# Patient Record
Sex: MALE | Race: WHITE | NOT HISPANIC OR LATINO | Employment: OTHER | URBAN - METROPOLITAN AREA
[De-identification: names, ages, dates, MRNs, and addresses within clinical notes are randomized per-mention and may not be internally consistent; named-entity substitution may affect disease eponyms.]

---

## 2017-01-12 LAB
EJECTION FRACTION: 53
HBA1C MFR BLD: 7.6 % (ref 0–5.7)

## 2017-01-16 ENCOUNTER — TRANSFERRED RECORDS (OUTPATIENT)
Dept: HEALTH INFORMATION MANAGEMENT | Facility: CLINIC | Age: 52
End: 2017-01-16

## 2017-01-16 ENCOUNTER — OFFICE VISIT (OUTPATIENT)
Dept: FAMILY MEDICINE | Facility: CLINIC | Age: 52
End: 2017-01-16
Payer: COMMERCIAL

## 2017-01-16 VITALS
SYSTOLIC BLOOD PRESSURE: 113 MMHG | HEIGHT: 74 IN | BODY MASS INDEX: 38.5 KG/M2 | HEART RATE: 89 BPM | RESPIRATION RATE: 20 BRPM | WEIGHT: 300 LBS | DIASTOLIC BLOOD PRESSURE: 78 MMHG | OXYGEN SATURATION: 97 % | TEMPERATURE: 97.3 F

## 2017-01-16 DIAGNOSIS — I25.810 CORONARY ARTERY DISEASE INVOLVING CORONARY BYPASS GRAFT OF NATIVE HEART WITHOUT ANGINA PECTORIS: Chronic | ICD-10-CM

## 2017-01-16 DIAGNOSIS — E66.01 MORBID OBESITY, UNSPECIFIED OBESITY TYPE (H): Chronic | ICD-10-CM

## 2017-01-16 DIAGNOSIS — E11.40 TYPE 2 DIABETES MELLITUS WITH DIABETIC NEUROPATHY, WITHOUT LONG-TERM CURRENT USE OF INSULIN (H): ICD-10-CM

## 2017-01-16 DIAGNOSIS — Z09 HOSPITAL DISCHARGE FOLLOW-UP: Primary | ICD-10-CM

## 2017-01-16 LAB
ANION GAP SERPL CALCULATED.3IONS-SCNC: 11 MMOL/L (ref 3–14)
BUN SERPL-MCNC: 19 MG/DL (ref 7–30)
CALCIUM SERPL-MCNC: 9.6 MG/DL (ref 8.5–10.1)
CHLORIDE SERPL-SCNC: 104 MMOL/L (ref 94–109)
CHOLEST SERPL-MCNC: 216 MG/DL
CO2 SERPL-SCNC: 21 MMOL/L (ref 20–32)
CREAT SERPL-MCNC: 0.89 MG/DL (ref 0.66–1.25)
GFR SERPL CREATININE-BSD FRML MDRD: 89 ML/MIN/1.7M2
GLUCOSE SERPL-MCNC: 250 MG/DL (ref 70–99)
HDLC SERPL-MCNC: 26 MG/DL
LDLC SERPL CALC-MCNC: ABNORMAL MG/DL
LDLC SERPL DIRECT ASSAY-MCNC: 98 MG/DL
NONHDLC SERPL-MCNC: 190 MG/DL
POTASSIUM SERPL-SCNC: 4.3 MMOL/L (ref 3.4–5.3)
SODIUM SERPL-SCNC: 136 MMOL/L (ref 133–144)
TRIGL SERPL-MCNC: 997 MG/DL

## 2017-01-16 PROCEDURE — 80048 BASIC METABOLIC PNL TOTAL CA: CPT | Performed by: INTERNAL MEDICINE

## 2017-01-16 PROCEDURE — 36415 COLL VENOUS BLD VENIPUNCTURE: CPT | Performed by: INTERNAL MEDICINE

## 2017-01-16 PROCEDURE — 80061 LIPID PANEL: CPT | Performed by: INTERNAL MEDICINE

## 2017-01-16 PROCEDURE — 99496 TRANSJ CARE MGMT HIGH F2F 7D: CPT | Performed by: INTERNAL MEDICINE

## 2017-01-16 PROCEDURE — 83721 ASSAY OF BLOOD LIPOPROTEIN: CPT | Mod: 59 | Performed by: INTERNAL MEDICINE

## 2017-01-16 RX ORDER — GABAPENTIN 300 MG/1
900 CAPSULE ORAL 3 TIMES DAILY
Qty: 270 CAPSULE | Refills: 5 | Status: SHIPPED | OUTPATIENT
Start: 2017-01-16 | End: 2017-06-30

## 2017-01-16 RX ORDER — GABAPENTIN 300 MG/1
900 CAPSULE ORAL 3 TIMES DAILY
Qty: 270 CAPSULE | Refills: 5 | Status: SHIPPED | OUTPATIENT
Start: 2017-01-16 | End: 2017-01-16

## 2017-01-16 RX ORDER — ISOSORBIDE MONONITRATE 30 MG/1
15 TABLET, EXTENDED RELEASE ORAL DAILY
Qty: 45 TABLET | Refills: 3 | Status: SHIPPED | OUTPATIENT
Start: 2017-01-16 | End: 2018-11-05

## 2017-01-16 RX ORDER — CLOPIDOGREL BISULFATE 75 MG/1
75 TABLET ORAL
COMMUNITY
Start: 2017-01-13 | End: 2017-01-16

## 2017-01-16 RX ORDER — CLOPIDOGREL BISULFATE 75 MG/1
75 TABLET ORAL DAILY
Qty: 90 TABLET | Refills: 3 | Status: SHIPPED | OUTPATIENT
Start: 2017-01-16 | End: 2018-01-24

## 2017-01-16 RX ORDER — ISOSORBIDE MONONITRATE 30 MG/1
15 TABLET, EXTENDED RELEASE ORAL
COMMUNITY
Start: 2017-01-13 | End: 2017-01-16

## 2017-01-16 NOTE — MR AVS SNAPSHOT
After Visit Summary   1/16/2017    Javier Markham    MRN: [id number]           Patient Information     Date Of Birth          1965        Visit Information        Provider Department      1/16/2017 1:00 PM Morelia Pedraza,  [facility]        Today's Diagnoses     Hospital discharge follow-up    -  1     Type 2 diabetes mellitus with diabetic neuropathy, without long-term current use of insulin (H)         Coronary artery disease involving coronary bypass graft of native heart without angina pectoris         Morbid obesity, unspecified obesity type (H)           Care Instructions    Update sleep clinic on recent sleep paralysis.   Schedule follow up appointment with cardiologist.  Begin taking Metformin 1,000 mg twice daily  Increase Gabapentin to 900 mg three times per day for nerve pain.  Labs today.  I'll send you the form from the [facility] and send in One Touch Verio supplies.  Follow up in 3 months or sooner if needed.         Follow-ups after your visit        Who to contact     If you have questions or need follow up information about today's clinic visit or your schedule please contact [facility] directly at [phone].  Normal or non-critical lab and imaging results will be communicated to you by [employer], letter or phone within 4 business days after the clinic has received the results. If you do not hear from us within 7 days, please contact the clinic through [employer] or phone. If you have a critical or abnormal lab result, we will notify you by phone as soon as possible.  Submit refill requests through [employer] or call your pharmacy and they will forward the refill request to us. Please allow 3 business days for your refill to be completed.          Additional Information About Your Visit        NuView Systemshart Information     [employer] lets you send messages to your doctor, view your test results, renew your prescriptions, schedule appointments and more. To sign up, go  "to [email or web address] . Click on \"Log in\" on the left side of the screen, which will take you to the Welcome page. Then click on \"Sign up Now\" on the right side of the page.     You will be asked to enter the access code listed below, as well as some personal information. Please follow the directions to create your username and password.     Your access code is: [email or web address]  Expires: 2017  1:59 PM     Your access code will  in 90 days. If you need help or a new code, please call your [place] clinic or [phone].        Care EveryWhere ID     This is your Care EveryWhere ID. This could be used by other organizations to access your [place] medical records  [id number]        Your Vitals Were     Pulse Temperature Respirations Height BMI (Body Mass Index) Pulse Oximetry    89 97.3  F (36.3  C) (Oral) 20 6' 1.75\" (1.873 m) 38.79 kg/m2 97%       Blood Pressure from Last 3 Encounters:   17 113/78   16 120/69   16 125/89    Weight from Last 3 Encounters:   17 300 lb (136.079 kg)   16 303 lb 3.2 oz (137.531 kg)   16 301 lb (136.533 kg)              We Performed the Following     Basic metabolic panel  (Ca, Cl, CO2, Creat, Gluc, K, Na, BUN)     Lipid panel reflex to direct LDL          Today's Medication Changes          These changes are accurate as of: 17  1:59 PM.  If you have any questions, ask your nurse or doctor.               These medicines have changed or have updated prescriptions.        Dose/Directions    clopidogrel 75 MG tablet   Commonly known as:  PLAVIX   This may have changed:  when to take this   Used for:  Coronary artery disease involving coronary bypass graft of native heart without angina pectoris   Changed by:  Morelia Pedraza DO        Dose:  75 mg   Take 1 tablet (75 mg) by mouth daily   Quantity:  90 tablet   Refills:  3       gabapentin 300 MG capsule   Commonly known as:  NEURONTIN   This may have changed:    - medication " strength  - when to take this  - additional instructions   Changed by:  Morelia Pedraza DO        Dose:  900 mg   Take 3 capsules (900 mg) by mouth 3 times daily   Quantity:  270 capsule   Refills:  5       isosorbide mononitrate 30 MG 24 hr tablet   Commonly known as:  IMDUR   This may have changed:  when to take this   Used for:  Coronary artery disease involving coronary bypass graft of native heart without angina pectoris   Changed by:  Morelia Pedraza DO        Dose:  15 mg   Take 0.5 tablets (15 mg) by mouth daily   Quantity:  45 tablet   Refills:  3       metFORMIN 500 MG tablet   Commonly known as:  GLUCOPHAGE   This may have changed:  how much to take   Used for:  Type 2 diabetes mellitus with diabetic neuropathy, without long-term current use of insulin (H)        Dose:  1000 mg   Take 2 tablets (1,000 mg) by mouth 2 times daily (with meals)   Quantity:  360 tablet   Refills:  1            Where to get your medicines      These medications were sent to [facility] [place], [place] - [address], [facility] [address]  [address], [facility] [address], [facility] [address]     Phone:  [phone]    - clopidogrel 75 MG tablet  - gabapentin 300 MG capsule  - isosorbide mononitrate 30 MG 24 hr tablet  - metFORMIN 500 MG tablet             Primary Care Provider Office Phone # Fax #    Morelia Pedraza [phone]       [facility] [address]  [facility] [address]        Thank you!     Thank you for choosing [facility]  for your care. Our goal is always to provide you with excellent care. Hearing back from our patients is one way we can continue to improve our services. Please take a few minutes to complete the written survey that you may receive in the mail after your visit with us. Thank you!             Your Updated Medication List - Protect others around you: Learn how to safely use, store and throw away your medicines at www.disposemymeds.org.           This list is accurate as of: 1/16/17  1:59 PM.  Always use your most recent med list.                   Brand Name Dispense Instructions for use    acetaminophen 500 MG tablet    TYLENOL    100 tablet    Take 2 tablets (1,000 mg) by mouth every 8 hours as needed for mild pain       albuterol 108 (90 BASE) MCG/ACT Inhaler    PROAIR HFA/PROVENTIL HFA/VENTOLIN HFA    1 Inhaler    Inhale 2 puffs into the lungs every 4 hours as needed for shortness of breath / dyspnea or wheezing       Alcohol Prep Pad 70 % Pads     100 each    1 each as needed       aspirin 81 MG EC tablet     90 tablet    TAKE ONE TABLET BY MOUTH EVERY DAY       atorvastatin 80 MG tablet    LIPITOR    90 tablet    Take 1 tablet (80 mg) by mouth daily       B-12 1000 MCG Tbcr     100 tablet    Take 1,000 mcg by mouth daily       blood glucose lancets standard    no brand specified    1 Box    Use to test blood sugar 2 times daily or as directed.       blood glucose monitoring meter device kit    no brand specified    1 kit    Use to test blood sugar 1 times daily or as directed.       blood glucose monitoring test strip    no brand specified    100 each    Use to test blood sugars 1 times daily or as directed       clopidogrel 75 MG tablet    PLAVIX    90 tablet    Take 1 tablet (75 mg) by mouth daily       ferrous sulfate 325 (65 FE) MG tablet    IRON    30 tablet    TAKE ONE TABLET BY MOUTH EVERY MORNING WITH BREAKFAST       gabapentin 300 MG capsule    NEURONTIN    270 capsule    Take 3 capsules (900 mg) by mouth 3 times daily       hydrOXYzine 25 MG tablet    ATARAX    120 tablet    TAKE ONE TO TWO TABLETS BY MOUTH EVERY 6 HOURS AS NEEDED FOR ANXIETY       ibuprofen 800 MG tablet    ADVIL/MOTRIN    60 tablet    Take 1 tablet (800 mg) by mouth every 8 hours as needed for moderate pain       isosorbide mononitrate 30 MG 24 hr tablet    IMDUR    45 tablet    Take 0.5 tablets (15 mg) by mouth daily       lisinopril 5 MG tablet    PRINIVIL/ZESTRIL     90 tablet    Take 1 tablet (5 mg) by mouth daily       loratadine 10 MG tablet    CLARITIN    90 tablet    TAKE ONE TABLET BY MOUTH EVERY DAY       MELATONIN PO      Take 10 mg by mouth nightly as needed       metFORMIN 500 MG tablet    GLUCOPHAGE    360 tablet    Take 2 tablets (1,000 mg) by mouth 2 times daily (with meals)       metoprolol 25 MG 24 hr tablet    TOPROL-XL    180 tablet    Take 1 tablet (25 mg) by mouth 2 times daily       NITROSTAT 0.4 MG sublingual tablet   Generic drug:  nitroglycerin     25 tablet    PLACE 1 TABLET UNDER THE TONGUE AT THE ONSET OF CHEST PAIN. MAY REPEAT EVERY 5 MINUTES AS NEEDED FOR A TOTAL OF 3 DOSES.       omeprazole 20 MG tablet     180 tablet    Take 1 tablet (20 mg) by mouth 2 times daily Take 30-60 minutes before a meal.       oxyCODONE 5 MG IR tablet    ROXICODONE    20 tablet    Take 1-2 tablets (5-10 mg) by mouth every 3 hours as needed for moderate to severe pain       polyethylene glycol powder    MIRALAX/GLYCOLAX     Take 17 g by mouth daily as needed for constipation       tamsulosin 0.4 MG capsule    FLOMAX    90 capsule    Take 1 capsule (0.4 mg) by mouth daily       triamcinolone 0.1 % cream    KENALOG     Apply topically daily as needed for irritation (under Arms)       vitamin D 2000 UNITS tablet     100 tablet    TAKE ONE TABLET BY MOUTH EVERY DAY

## 2017-01-16 NOTE — NURSING NOTE
"Chief Complaint   Patient presents with     Recheck Medication     Hospital F/U       Initial /78 mmHg  Pulse 89  Temp(Src) 97.3  F (36.3  C) (Oral)  Resp 20  Ht 6' 1.75\" (1.873 m)  Wt 300 lb (136.079 kg)  BMI 38.79 kg/m2  SpO2 97% Estimated body mass index is 38.79 kg/(m^2) as calculated from the following:    Height as of this encounter: 6' 1.75\" (1.873 m).    Weight as of this encounter: 300 lb (136.079 kg).  BP completed using cuff size: large; Right Arm  Anu Bal CMA (AAMA)      "

## 2017-01-16 NOTE — PROGRESS NOTES
SUBJECTIVE:                                                    Javier Markham is a 52 year old male who presents to clinic today for the following health issues:      Hospital Follow-up Visit:    Hospital/Nursing Home/IP Rehab Facility: [facility]  Date of Admission: 1/12/2017  Date of Discharge: 1/13/2017  Reason(s) for Admission: Chest Pain - thought to be anginal in nature although has some atypical features             Problems taking medications regularly:  None       Medication changes since discharge: None       Problems adhering to non-medication therapy:  None    Summary of hospitalization:  CareEverywhere information obtained and reviewed  Diagnostic Tests/Treatments reviewed.  Follow up needed: Cardiology, A1c, Lipids  Other Healthcare Providers Involved in Patient s Care: Specialist appointment - cardiologist- Dr. Silverio  Update since discharge: improved.     Post Discharge Medication Reconciliation: discharge medications reconciled, continue medications without change.  Plan of care communicated with patient     Coding guidelines for this visit:  Type of Medical   Decision Making Face-to-Face Visit       within 7 Days of discharge Face-to-Face Visit        within 14 days of discharge   Moderate Complexity [id number]   High Complexity 16168 [id number]            On 1/8 Alexx had chest pain while lying in bed with a sharp pain in his shoulder, he proceeded to take two tabs of Nitrostat and Aspirin which dulled the pain. The pain persisted on and off for 3 days before he finally chose to call the heart clinic on 1/12 when he was seen in clinic and subsequently admitted to the hospital to have catheterization (see below).    Alexx had an Echo and an angiogram (catheter site R groin) completed. No acute intervention could be performed. Two of the SVGs from his triple bypass are blocked. Troponin levels did not indicate a heart attack on admission but were minimally elevated.   Since discharge Alexx has been  taking Plavix and Imdur (Plavix re-added --> he had been on it a year but was ok'd to d/c it per cardiology after the year abby and prior to his inguinal hernia surgery); Imdur is a new medication. Alexx takes both medications in the evening. Alexx notes that he will experience intermittent discomfort when he is active which is very vague. He is better though. He says that they may do some other procedure b/c of his blocked grafts but he is not sure (per notes, Consider referral to expert for PCI of LCX )    Pt denies experiencing acid reflux. Alexx notes that the quality of the pain that he experienced prior to admission was different than pain he has previously experienced (hx of CAD). Alexx notes that he still experiences intermittent increased lethargy and feeling unwell. He is trying to work on weight loss. He states he has not smoked cigarettes in a year.     Latest A1C was 7.6 completed in the hospital. -140 before eating.    Alexx has noted good pain management with Gabapentin but it wears off. He expresses that he has new sleep paralysis which is scary to him (has seen sleep clinic) and diabetic neuropathy. Overall his left inguinal hernia surgery is healing well.    Angiogram Findings: 1/13/17: 2 SVGs are occluded and 2 stents are open.  Diagnostic Findings  DIAGNOSTIC - CORONARY  Right dominant coronary artery system  The left main artery has mild disease.  The LAD is severely diseased. Distal vessel grafted.  The circumflex artery is severely diseased. Distal vessel is collateralized from RPL  The RCA has mild disease. Prior stents patent.  DIAGNOSTIC - GRAFTS  The LIMA to the LAD is patent  Two other grafts (SVGs) known to be occluded  SPECIAL PROCEDURES  Right femoral arteriotomy was successfully closed utilizing a closure device (Perclose)  RECOMMENDATIONS & PLAN  Optimize risk factors and medications  Consider referral to expert for PCI of LCX     LEFT MAIN CORONARY ARTERY  30% stenosis  "in the LMCA.    LEFT ANTERIOR DESCENDING  100% stenosis in the Mid LAD.    RIGHT CORONARY ARTERY  20% stenosis in the Proximal RCA.    CORONARY ARTERY BYPASS GRAFTS  100% stenosis in the Aorta graft to 2nd Marginal.   Ascending aortic dilation from  from 4.1 to 4.3 cm.    Problem list and histories reviewed & adjusted, as indicated.    ROS:  Detailed as above    This document serves as a record of the services and decisions personally performed and made by Morelia Pedraza DO. It was created on his/her behalf by Zoya Patino, a trained medical scribe. The creation of this document is based the provider's statements to the medical scribe.  Scribe Zoya Patino 1:10 PM, January 16, 2017    OBJECTIVE:                                                    /78 mmHg  Pulse 89  Temp(Src) 97.3  F (36.3  C) (Oral)  Resp 20  Ht 1.873 m (6' 1.75\")  Wt 136.079 kg (300 lb)  BMI 38.79 kg/m2  SpO2 97%  Body mass index is 38.79 kg/(m^2).  Alert, pleasant, casually dressed, smells of smoke  Lungs clear to auscultation - no rales, rhonchi or wheezes  Heart regular rate and rhythm, normal S1 S2, no S3 or S4, no murmur, click or rub, no peripheral edema  R groin catheter site with very small ecchymosis as expected without tenderness/swelling/hematoma  L inguinal area mild firm buldge that is non tender (he states that d/t large size of his hernia he was told that this mild presence may last for up to 6 months and that he says it is getting some smaller)     ASSESSMENT/PLAN:                                                      1. Hospital discharge follow-up  Reviewed hospital discharge summary per CareEverywhere  Does need county SNAP form sent in     2. Coronary artery disease involving coronary bypass graft of native heart with angina pectoris  Will follow up with cardiologist, Dr. Silverio, in next month at [facility] and consider additional procedures - currently medication management with addition of Plavix and " Imdur  States has not smoked in a year although smells of smoke  His cardiologist was considering starting Lovaza to manage his hypertriglyceridemia   - Lipid panel reflex to direct LDL  - isosorbide mononitrate (IMDUR) 30 MG 24 hr tablet; Take 0.5 tablets (15 mg) by mouth daily  Dispense: 45 tablet; Refill: 3  - clopidogrel (PLAVIX) 75 MG tablet; Take 1 tablet (75 mg) by mouth daily  Dispense: 90 tablet; Refill: 3  - Basic metabolic panel  (Ca, Cl, CO2, Creat, Gluc, K, Na, BUN)    3. Type 2 diabetes mellitus with diabetic neuropathy, without long-term current use of insulin (H)  Sent in order for OneTouch Verio diabetic meter and test strips   A1C in hospital was 7.6% so will increase Metformin  Neuropathy worse and he can feel some wear-off so will increase Gabapentin - he is not feeling fatigued and no edema  Is on B12 as well  - metFORMIN (GLUCOPHAGE) 500 MG tablet; Take 2 tablets (1,000 mg) by mouth 2 times daily (with meals)  Dispense: 360 tablet; Refill: 1  - gabapentin (NEURONTIN) 300 MG capsule; Take 3 capsules (900 mg) by mouth 3 times daily  Dispense: 270 capsule; Refill: 5    4. Morbid obesity, unspecified obesity type (H)  Discussed healthy eating habits  Metformin has been increased to 1,000 mg BID    Patient Instructions   Update sleep clinic on recent sleep paralysis.   Schedule follow up appointment with cardiologist.  Begin taking Metformin 1,000 mg twice daily  Increase Gabapentin to 900 mg three times per day for nerve pain.  Labs today.  I'll send you the form from the [facility] and send in One Touch Verio supplies.  Follow up in 3 months or sooner if needed.     The information in this document, created by the medical scribe for me, accurately reflects the services I personally performed and the decisions made by me. I have reviewed and approved this document for accuracy prior to leaving the patient care area.  Morelia Pedraza DO  1:10 PM, 01/16/2017    Morelia Pedraza DO  [facility]  [facility]

## 2017-01-16 NOTE — PATIENT INSTRUCTIONS
Update sleep clinic on recent sleep paralysis.   Schedule follow up appointment with cardiologist.  Begin taking Metformin 1,000 mg twice daily  Increase Gabapentin to 900 mg three times per day for nerve pain.  Labs today.  I'll send you the form from the [facility] and send in One Touch Verio supplies.  Follow up in 3 months or sooner if needed.

## 2017-01-16 NOTE — Clinical Note
[facility]  [address] [facility]  [address]  [place]  [address]  Tel: [phone]    January 27, 2017    Javier Markham  [address]   [facility] [address]        Alexx,    I hope you are doing well.  Your triglycerides remain high. Per your cardiologist's office note, he was considering starting a medication called Lovaza to manage your high triglycerides.  Please show these labs to him as I agree that would be a good idea.  Please continue with the plan of care as we discussed and let me know if you have any questions/concerns. Thanks!      Sincerely,    Morelia Pedraza, DO    Results for orders placed or performed in visit on 01/16/17   Lipid panel reflex to direct LDL   Result Value Ref Range    Cholesterol 216 (H) <200 mg/dL    Triglycerides 997 (H) <150 mg/dL    HDL Cholesterol 26 (L) >39 mg/dL    LDL Cholesterol Calculated  <100 mg/dL     Cannot estimate LDL when triglyceride exceeds 400 mg/dL    Non HDL Cholesterol 190 (H) <130 mg/dL   Basic metabolic panel  (Ca, Cl, CO2, Creat, Gluc, K, Na, BUN)   Result Value Ref Range    Sodium 136 133 - 144 mmol/L    Potassium 4.3 3.4 - 5.3 mmol/L    Chloride 104 94 - 109 mmol/L    Carbon Dioxide 21 20 - 32 mmol/L    Anion Gap 11 3 - 14 mmol/L    Glucose 250 (H) 70 - 99 mg/dL    Urea Nitrogen 19 7 - 30 mg/dL    Creatinine 0.89 0.66 - 1.25 mg/dL    GFR Estimate 89 >60 mL/min/1.7m2    GFR Estimate If Black >90   GFR Calc   >60 mL/min/1.7m2    Calcium 9.6 8.5 - 10.1 mg/dL   LDL cholesterol direct   Result Value Ref Range    LDL Cholesterol Direct 98 <100 mg/dL

## 2017-01-17 ASSESSMENT — PATIENT HEALTH QUESTIONNAIRE - PHQ9: SUM OF ALL RESPONSES TO PHQ QUESTIONS 1-9: 8

## 2017-01-22 ENCOUNTER — TELEPHONE (OUTPATIENT)
Dept: FAMILY MEDICINE | Facility: CLINIC | Age: 52
End: 2017-01-22

## 2017-01-22 DIAGNOSIS — I25.810 CORONARY ARTERY DISEASE INVOLVING CORONARY BYPASS GRAFT OF NATIVE HEART, ANGINA PRESENCE UNSPECIFIED: Chronic | ICD-10-CM

## 2017-01-22 DIAGNOSIS — E11.9 TYPE 2 DIABETES MELLITUS WITHOUT COMPLICATION, WITHOUT LONG-TERM CURRENT USE OF INSULIN (H): Primary | Chronic | ICD-10-CM

## 2017-01-22 NOTE — TELEPHONE ENCOUNTER
"Filled out [facility]'s \"Request for Medical Opinion\" form which patient wants SENT TO HIS HOUSE so that he can sign it and turn it into the county.    I placed it in the FORMS box.  "

## 2017-02-15 ENCOUNTER — TRANSFERRED RECORDS (OUTPATIENT)
Dept: HEALTH INFORMATION MANAGEMENT | Facility: CLINIC | Age: 52
End: 2017-02-15

## 2017-02-17 ENCOUNTER — TELEPHONE (OUTPATIENT)
Dept: FAMILY MEDICINE | Facility: CLINIC | Age: 52
End: 2017-02-17

## 2017-02-17 DIAGNOSIS — K21.9 GASTROESOPHAGEAL REFLUX DISEASE WITHOUT ESOPHAGITIS: Chronic | ICD-10-CM

## 2017-02-17 DIAGNOSIS — K59.00 CONSTIPATION, UNSPECIFIED CONSTIPATION TYPE: Primary | ICD-10-CM

## 2017-02-17 DIAGNOSIS — J30.2 SEASONAL ALLERGIES: ICD-10-CM

## 2017-02-17 RX ORDER — POLYETHYLENE GLYCOL 3350 17 G/17G
17 POWDER, FOR SOLUTION ORAL DAILY PRN
Qty: 500 G | Refills: 5 | Status: SHIPPED | OUTPATIENT
Start: 2017-02-17

## 2017-02-17 RX ORDER — LORATADINE 10 MG/1
TABLET ORAL
Qty: 90 TABLET | Refills: 3 | Status: SHIPPED | OUTPATIENT
Start: 2017-02-17 | End: 2018-01-17

## 2017-02-17 NOTE — TELEPHONE ENCOUNTER
loratadine (CLARITIN) 10 MG tablet        Last Written Prescription Date: 3/18/2016  Last Fill Quantity: 90,  # refills: 3   Last Office Visit with [facility], [facility] or [facility] prescribing provider: 1/16/2017                                         Next 5 appointments (look out 90 days)     Apr 03, 2017 10:30 AM CDT   [facility] Morelia [facility]   [facility]    [address] [facility] [address]   [phone]

## 2017-02-17 NOTE — TELEPHONE ENCOUNTER
Prescription approved per [facility].  Sandra Carolina RN

## 2017-02-17 NOTE — TELEPHONE ENCOUNTER
Please do not close this encounter until this has been addressed.  (prior auth approved/denied, prescriber refusal to complete prior auth or medication changed/discontinued)    Prior Authorization needed on: Omeprazole 20mg  Drug NDC: [address]-57     Insurance: [employer]  Member ID: [id number]   Insurance phone #: [phone]    Pharmacy NPI: [id number]  Pharmacy Phone #: [phone]  Pharmacy Fax #: [phone]    Please let us know if the PA gets approved or denied or if medication is changed

## 2017-02-17 NOTE — TELEPHONE ENCOUNTER
Patient is requesting a stool softener, I do not see that he has had it on his list before.    Stool Softener      Last Written Prescription Date:  No Rx's Filled  Last Fill Quantity: 0,   # refills: 0  Last Office Visit with [facility], [facility] or [facility] prescribing provider: 1/16/2017  Future Office visit:    Next 5 appointments (look out 90 days)     Apr 03, 2017 10:30 AM CDT   Office Visit with Morelia Pedraza,    [facility]    [address] [facility] [address]   [phone]

## 2017-02-22 RX ORDER — OMEPRAZOLE 40 MG/1
40 CAPSULE, DELAYED RELEASE ORAL DAILY
Qty: 90 CAPSULE | Refills: 1 | Status: SHIPPED | OUTPATIENT
Start: 2017-02-22 | End: 2017-05-30

## 2017-02-22 NOTE — TELEPHONE ENCOUNTER
Patients insurance through [employer] is not covering the omeprazole 20 mg because it is exceeding quantity limits (max 1 per day). Could the patient try omeprazole 40 mg once daily instead?    Alexx Lopez, CMA

## 2017-02-27 ENCOUNTER — TELEPHONE (OUTPATIENT)
Dept: FAMILY MEDICINE | Facility: CLINIC | Age: 52
End: 2017-02-27

## 2017-02-27 NOTE — TELEPHONE ENCOUNTER
PCP: MIRELLA    A week ago Friday developed cold symptoms: headache, congestion, runny nose, cough.   The cold symptoms have tapered off and now has an occasional non-productive cough.    4 days ago developed a dizzy spell with onset of vomiting and diarrhea after the dizziness started.  Had 4-5 episodes of vomiting, 2-3 diarrhea stools, symptoms resolved after one day, no blood passed with either.     Again felt like he was improving, has been able to eat and drink without vomiting or diarrhea.     States that 4 days ago the roof of his mouth and the left side of his throat got sore.   Has not looked inside his mouth.   Continues to feel intermittently dizzy.   Does not feel like passing out.     Has been urinating normally. States that he does not feel dehydrated.   Denies any chest pain or shortness of breath.   Having normal BM's  Denies any abdominal discomfort.   Denies any numbness or tingling in the arms or legs, can move everything fully.  Denies headache.   Denies any vision change.  No recent falls.  Blood sugar today 145 around noon today (3 hours after eating breakfast)   Had a ringing in his left ear when the vomiting started, this resolved the same day.     States that he is able to function ok.  Feels better when laying down.  Concerned because of the ongoing soreness in the roof of his mouth, concerned about strep throat.     Appointment scheduled with TEAM tomorrow morning  Advised to be seen tonight if symptoms worsen or change, if dizziness worsens or feels like passing out.   He is agreeable to this plan.    Brandi Horowitz RN

## 2017-02-27 NOTE — TELEPHONE ENCOUNTER
Reason for call:  Patient reporting a symptom    Symptom or request: Sore throat, dizzy, cough  Vomit and diarrhea yesterday     Duration (how long have symptoms been present): 1 week    Additional comments:     Phone Number patient can be reached at:  Home number on file [phone] (home)    Best Time:  anytime    Can we leave a detailed message on this number:  YES    Call taken on 2/27/2017 at 4:59 PM by Cassia Whitmore

## 2017-02-28 ENCOUNTER — TELEPHONE (OUTPATIENT)
Dept: FAMILY MEDICINE | Facility: CLINIC | Age: 52
End: 2017-02-28

## 2017-02-28 ENCOUNTER — OFFICE VISIT (OUTPATIENT)
Dept: FAMILY MEDICINE | Facility: CLINIC | Age: 52
End: 2017-02-28
Payer: COMMERCIAL

## 2017-02-28 VITALS
WEIGHT: 293 LBS | DIASTOLIC BLOOD PRESSURE: 85 MMHG | BODY MASS INDEX: 37.6 KG/M2 | TEMPERATURE: 98.6 F | HEIGHT: 74 IN | OXYGEN SATURATION: 96 % | HEART RATE: 93 BPM | SYSTOLIC BLOOD PRESSURE: 118 MMHG

## 2017-02-28 DIAGNOSIS — B34.9 VIRAL ILLNESS: Primary | ICD-10-CM

## 2017-02-28 PROCEDURE — 99213 OFFICE O/P EST LOW 20 MIN: CPT | Performed by: NURSE PRACTITIONER

## 2017-02-28 RX ORDER — MECLIZINE HYDROCHLORIDE 25 MG/1
25 TABLET ORAL 3 TIMES DAILY PRN
Qty: 30 TABLET | Refills: 1 | Status: SHIPPED | OUTPATIENT
Start: 2017-02-28 | End: 2017-05-31

## 2017-02-28 RX ORDER — ECHINACEA PURPUREA EXTRACT 125 MG
1 TABLET ORAL 4 TIMES DAILY PRN
Qty: 1 BOTTLE | Refills: 0 | Status: SHIPPED | OUTPATIENT
Start: 2017-02-28 | End: 2017-12-18

## 2017-02-28 RX ORDER — GUAIFENESIN AND DEXTROMETHORPHAN HYDROBROMIDE 1200; 60 MG/1; MG/1
1 TABLET, EXTENDED RELEASE ORAL 2 TIMES DAILY PRN
Qty: 28 TABLET | Refills: 0 | Status: SHIPPED | OUTPATIENT
Start: 2017-02-28 | End: 2017-05-31

## 2017-02-28 NOTE — PATIENT INSTRUCTIONS
Be sure to push fluids   You can take Meclizine 12.5-25mg up to 3 times a day as needed for dizziness   I recommend a saline nasal spray as needed

## 2017-02-28 NOTE — TELEPHONE ENCOUNTER
Patient has a prescription for Lovaza from Dr. Angie Schneider dated 2/15/2017 - however it needs a prior authorization and patient has complained that their office is not working on this very fast for him.  He requested that I ask you for a prescription and ask that you work on getting a prior auth for him.  He is aware that he may have to pay for generic fish oil himself but would like to see if his insurance will cover this first.     If approved please send a new prescription (looks like other rx is 2bid) and work on a pa.    Please do not close this encounter until this has been addressed.  (prior auth approved/denied, prescriber refusal to complete prior auth or medication changed/discontinued)    Prior Authorization needed on: generic Lovaza  Drug NDC: 70950/3170/07     [facility]  Member ID: [id number]   Insurance phone #: [phone]    Pharmacy NPI: [id number]  Pharmacy Phone #: [phone]  Pharmacy Fax #: [phone]    Please let us know if the PA gets approved or denied or if medication is changed    Thanks so much!  Wanda Kenny CPhT  [facility]  [phone]

## 2017-02-28 NOTE — MR AVS SNAPSHOT
After Visit Summary   2/28/2017    Javier Markham    MRN: [id number]           Patient Information     Date Of Birth          1965        Visit Information        Provider Department      2/28/2017 9:00 AM Radha Knight APRN CNP [facility]        Today's Diagnoses     Viral illness    -  1      Care Instructions    Be sure to push fluids   You can take Meclizine 12.5-25mg up to 3 times a day as needed for dizziness   I recommend a saline nasal spray as needed             Follow-ups after your visit        Your next 10 appointments already scheduled     Apr 03, 2017 10:30 AM CDT   Office Visit with Morelia Pedraza, DO   [facility]    [address] [facility] [address] [phone]           Bring a current list of meds and any records pertaining to this visit.  For Physicals, please bring immunization records and any forms needing to be filled out.  Please arrive 10 minutes early to complete paperwork.              Who to contact     If you have questions or need follow up information about today's clinic visit or your schedule please contact [facility] directly at [phone].  Normal or non-critical lab and imaging results will be communicated to you by MyChart, letter or phone within 4 business days after the clinic has received the results. If you do not hear from us within 7 days, please contact the clinic through [facility] [employer] or phone. If you have a critical or abnormal lab result, we will notify you by phone as soon as possible.  Submit refill requests through [employer] or call your pharmacy and they will forward the refill request to us. Please allow 3 business days for your refill to be completed.          Additional Information About Your Visit        MyChart Information     [employer] lets you send messages to your doctor, view your test results, renew your prescriptions, schedule appointments and more. To sign up,  "go to [email or web address] . Click on \"Log in\" on the left side of the screen, which will take you to the Welcome page. Then click on \"Sign up Now\" on the right side of the page.     You will be asked to enter the access code listed below, as well as some personal information. Please follow the directions to create your username and password.     Your access code is: [email or web address]  Expires: 2017  1:59 PM     Your access code will  in 90 days. If you need help or a new code, please call your [place] clinic or [phone].        Care EveryWhere ID     This is your Care EveryWhere ID. This could be used by other organizations to access your [place] medical records  [id number]        Your Vitals Were     Pulse Temperature Height Pulse Oximetry BMI (Body Mass Index)       93 98.6  F (37  C) (Oral) 6' 1.75\" (1.873 m) 96% 37.87 kg/m2        Blood Pressure from Last 3 Encounters:   17 118/85   17 113/78   16 120/69    Weight from Last 3 Encounters:   17 293 lb (132.9 kg)   17 300 lb (136.1 kg)   16 (!) 303 lb 3.2 oz (137.5 kg)              Today, you had the following     No orders found for display         Today's Medication Changes          These changes are accurate as of: 17  9:31 AM.  If you have any questions, ask your nurse or doctor.               Start taking these medicines.        Dose/Directions    meclizine 25 MG tablet   Commonly known as:  ANTIVERT   Used for:  Viral illness   Started by:  Radha Knight APRN CNP        Dose:  25 mg   Take 1 tablet (25 mg) by mouth 3 times daily as needed for dizziness   Quantity:  30 tablet   Refills:  1       MUCINEX DM MAXIMUM STRENGTH  MG Tb12   Used for:  Viral illness   Started by:  Radha Knight APRN CNP        Dose:  1 each   Take 1 each by mouth 2 times daily as needed   Quantity:  28 tablet   Refills:  0       sodium chloride 0.65 % nasal spray   Commonly known as:  OCEAN   Used for:  " Viral illness   Started by:  Radha Knight APRN CNP        Dose:  1 spray   Spray 1 spray into both nostrils 4 times daily as needed for congestion   Quantity:  1 Bottle   Refills:  0            Where to get your medicines      These medications were sent to [facility], [place] - [address], [address]  [address], [address], [facility] [address]     Phone:  [phone]     meclizine 25 MG tablet    MUCINEX DM MAXIMUM STRENGTH  MG Tb12    sodium chloride 0.65 % nasal spray                Primary Care Provider Office Phone # Fax #    Morelia Pedraza, [phone]       [facility] [address]  [facility] [address]        Thank you!     Thank you for choosing [facility]  for your care. Our goal is always to provide you with excellent care. Hearing back from our patients is one way we can continue to improve our services. Please take a few minutes to complete the written survey that you may receive in the mail after your visit with us. Thank you!             Your Updated Medication List - Protect others around you: Learn how to safely use, store and throw away your medicines at www.disposemymeds.org.          This list is accurate as of: 2/28/17  9:31 AM.  Always use your most recent med list.                   Brand Name Dispense Instructions for use    acetaminophen 500 MG tablet    TYLENOL    100 tablet    Take 2 tablets (1,000 mg) by mouth every 8 hours as needed for mild pain       albuterol 108 (90 BASE) MCG/ACT Inhaler    PROAIR HFA/PROVENTIL HFA/VENTOLIN HFA    1 Inhaler    Inhale 2 puffs into the lungs every 4 hours as needed for shortness of breath / dyspnea or wheezing       Alcohol Prep Pad 70 % Pads     100 each    1 each as needed       aspirin 81 MG EC tablet     90 tablet    TAKE ONE TABLET BY MOUTH EVERY DAY       atorvastatin 80 MG tablet    LIPITOR    90 tablet    Take 1 tablet (80 mg) by mouth daily        B-12 1000 MCG Tbcr     100 tablet    Take 1,000 mcg by mouth daily       blood glucose lancets standard    no brand specified    1 Box    Use to test blood sugar daily or as directed. ONE TOUCH VERIO.       blood glucose monitoring meter device kit    no brand specified    1 kit    Use to test blood sugar 1 times daily or as directed. ONE TOUCH VERIO.       blood glucose monitoring test strip    no brand specified    100 each    Use to test blood sugars 1 times daily or as directed. ONE TOUCH VERIO.       clopidogrel 75 MG tablet    PLAVIX    90 tablet    Take 1 tablet (75 mg) by mouth daily       ferrous sulfate 325 (65 FE) MG tablet    IRON    30 tablet    TAKE ONE TABLET BY MOUTH EVERY MORNING WITH BREAKFAST       gabapentin 300 MG capsule    NEURONTIN    270 capsule    Take 3 capsules (900 mg) by mouth 3 times daily       hydrOXYzine 25 MG tablet    ATARAX    120 tablet    TAKE ONE TO TWO TABLETS BY MOUTH EVERY 6 HOURS AS NEEDED FOR ANXIETY       ibuprofen 800 MG tablet    ADVIL/MOTRIN    60 tablet    Take 1 tablet (800 mg) by mouth every 8 hours as needed for moderate pain       isosorbide mononitrate 30 MG 24 hr tablet    IMDUR    45 tablet    Take 0.5 tablets (15 mg) by mouth daily       lisinopril 5 MG tablet    PRINIVIL/ZESTRIL    90 tablet    Take 1 tablet (5 mg) by mouth daily       loratadine 10 MG tablet    CLARITIN    90 tablet    TAKE ONE TABLET BY MOUTH EVERY DAY       meclizine 25 MG tablet    ANTIVERT    30 tablet    Take 1 tablet (25 mg) by mouth 3 times daily as needed for dizziness       MELATONIN PO      Take 10 mg by mouth nightly as needed       metFORMIN 500 MG tablet    GLUCOPHAGE    360 tablet    Take 2 tablets (1,000 mg) by mouth 2 times daily (with meals)       metoprolol 25 MG 24 hr tablet    TOPROL-XL    180 tablet    Take 1 tablet (25 mg) by mouth 2 times daily       MUCINEX DM MAXIMUM STRENGTH  MG Tb12     28 tablet    Take 1 each by mouth 2 times daily as needed        NITROSTAT 0.4 MG sublingual tablet   Generic drug:  nitroglycerin     25 tablet    PLACE 1 TABLET UNDER THE TONGUE AT THE ONSET OF CHEST PAIN. MAY REPEAT EVERY 5 MINUTES AS NEEDED FOR A TOTAL OF 3 DOSES.       omeprazole 40 MG capsule    priLOSEC    90 capsule    Take 1 capsule (40 mg) by mouth daily Take 30-60 minutes before a meal.       polyethylene glycol powder    MIRALAX/GLYCOLAX    500 g    Take 17 g by mouth daily as needed for constipation       sodium chloride 0.65 % nasal spray    OCEAN    1 Bottle    Spray 1 spray into both nostrils 4 times daily as needed for congestion       tamsulosin 0.4 MG capsule    FLOMAX    90 capsule    Take 1 capsule (0.4 mg) by mouth daily       triamcinolone 0.1 % cream    KENALOG     Apply topically daily as needed for irritation (under Arms)       vitamin D 2000 UNITS tablet     100 tablet    TAKE ONE TABLET BY MOUTH EVERY DAY

## 2017-02-28 NOTE — PROGRESS NOTES
"  SUBJECTIVE:                                                    Javier Markham is a 52 year old male who presents to clinic today for the following health issues:    Chief Complaint   Patient presents with     Balance/ Vestibular     dizziness feels off balance, roof of mouth sore,  pain left throat when swallows nausea, 1 week. Pt wonders if has strep.        Was told he may have been in contact with mycobacterium a couple years ago during a surgery at [facility]. There was no evidence that their ORs were contaminated, but somewhere [place] the country was contaminated. He just wanted this noted.     About 10 days ago developed congestion and bad runny nose  Had throat soreness and roof of mouth  Symptoms fluctuated   Had one episode of dizziness when he bent forward that resolved   Then later that same day he went inside his apartment and got severely dizzy again which then lead to vomiting and diarrhea   Still has this \"car sick\" type feeling since Saturday night   Drinking plenty of water and able to eat just fine   No fevers   Lying he feels ok   Sitting here currently feels ok   Moving head suddenly and quickly, that'll bring on the dizziness       Past Medical History   Diagnosis Date     Anxiety      CAD (coronary artery disease) Dec 2014     s/p CABGx3 Dec 2014 and later RCA stent July 2015     DM2 (diabetes mellitus, type 2) (H) Dx June 2015     A1c 6.5% at time of dx     GERD (gastroesophageal reflux disease)      Heart attack (H) 12/2014, 6/2015, 7/4/2015     x3     History of cocaine abuse      Smoked crack cocaine (remission since Nov 2014)     History of tobacco abuse      Hyperlipidemia      Incomplete RBBB      Inguinal hernia      Left     Numbness and tingling      diabetic neuropathy     Prediabetes Dec 2014     Restless leg syndrome      S/P CABG x 3 Dec 2014     Sleep apnea      mild, does not use CPAP     Stented coronary artery      Past Surgical History   Procedure Laterality Date     C " nonspecific procedure  age 15     both feet bone spur removal in the arch of the foot     C cabg, vein, three  12/23/2014     Cardiac surgery       stents     Herniorrhaphy inguinal Left 9/20/2016     Procedure: HERNIORRHAPHY INGUINAL;  Surgeon: Haim Green MD;  Location: [facility]     Social History   Substance Use Topics     Smoking status: Former Smoker     Packs/day: 0.50     Years: 25.00     Types: Cigarettes     Quit date: 11/1/2015     Smokeless tobacco: Never Used     Alcohol use No     Current Outpatient Prescriptions   Medication Sig Dispense Refill     omeprazole (PRILOSEC) 40 MG capsule Take 1 capsule (40 mg) by mouth daily Take 30-60 minutes before a meal. 90 capsule 1     loratadine (CLARITIN) 10 MG tablet TAKE ONE TABLET BY MOUTH EVERY DAY 90 tablet 3     polyethylene glycol (MIRALAX/GLYCOLAX) powder Take 17 g by mouth daily as needed for constipation 500 g 5     isosorbide mononitrate (IMDUR) 30 MG 24 hr tablet Take 0.5 tablets (15 mg) by mouth daily 45 tablet 3     clopidogrel (PLAVIX) 75 MG tablet Take 1 tablet (75 mg) by mouth daily 90 tablet 3     metFORMIN (GLUCOPHAGE) 500 MG tablet Take 2 tablets (1,000 mg) by mouth 2 times daily (with meals) 360 tablet 1     gabapentin (NEURONTIN) 300 MG capsule Take 3 capsules (900 mg) by mouth 3 times daily 270 capsule 5     blood glucose monitoring (NO BRAND SPECIFIED) meter device kit Use to test blood sugar 1 times daily or as directed. ONE TOUCH VERIO. 1 kit 0     blood glucose monitoring (NO BRAND SPECIFIED) test strip Use to test blood sugars 1 times daily or as directed. ONE TOUCH VERIO. 100 each 5     blood glucose (NO BRAND SPECIFIED) lancets standard Use to test blood sugar daily or as directed. ONE TOUCH VERIO. 1 Box 5     hydrOXYzine (ATARAX) 25 MG tablet TAKE ONE TO TWO TABLETS BY MOUTH EVERY 6 HOURS AS NEEDED FOR ANXIETY 120 tablet 3     aspirin 81 MG EC tablet TAKE ONE TABLET BY MOUTH EVERY DAY 90 tablet 3     acetaminophen (TYLENOL)  "500 MG tablet Take 2 tablets (1,000 mg) by mouth every 8 hours as needed for mild pain 100 tablet 5     NITROSTAT 0.4 MG SL tablet PLACE 1 TABLET UNDER THE TONGUE AT THE ONSET OF CHEST PAIN. MAY REPEAT EVERY 5 MINUTES AS NEEDED FOR A TOTAL OF 3 DOSES. 25 tablet 1     ibuprofen (ADVIL,MOTRIN) 800 MG tablet Take 1 tablet (800 mg) by mouth every 8 hours as needed for moderate pain 60 tablet 1     ferrous sulfate (IRON) 325 (65 FE) MG tablet TAKE ONE TABLET BY MOUTH EVERY MORNING WITH BREAKFAST 30 tablet 11     MELATONIN PO Take 10 mg by mouth nightly as needed       triamcinolone (KENALOG) 0.1 % cream Apply topically daily as needed for irritation (under Arms)       metoprolol (TOPROL-XL) 25 MG 24 hr tablet Take 1 tablet (25 mg) by mouth 2 times daily 180 tablet 3     lisinopril (PRINIVIL,ZESTRIL) 5 MG tablet Take 1 tablet (5 mg) by mouth daily 90 tablet 3     tamsulosin (FLOMAX) 0.4 MG 24 hr capsule Take 1 capsule (0.4 mg) by mouth daily 90 capsule 3     atorvastatin (LIPITOR) 80 MG tablet Take 1 tablet (80 mg) by mouth daily 90 tablet 3     Cyanocobalamin (B-12) 1000 MCG TBCR Take 1,000 mcg by mouth daily 100 tablet 3     albuterol (PROAIR HFA, PROVENTIL HFA, VENTOLIN HFA) 108 (90 BASE) MCG/ACT inhaler Inhale 2 puffs into the lungs every 4 hours as needed for shortness of breath / dyspnea or wheezing 1 Inhaler 1     Cholecalciferol (VITAMIN D) 2000 UNITS tablet TAKE ONE TABLET BY MOUTH EVERY  tablet 2     Alcohol Swabs (ALCOHOL PREP PAD) 70 % PADS 1 each as needed 100 each 2     Allergies   Allergen Reactions     Perfume      Soap      Perfume in soap-reaction excema       Reviewed PMH, med list and allergies.      Review of Systems  Detailed as above     /85 (BP Location: Right arm, Patient Position: Chair, Cuff Size: Adult Large)  Pulse 93  Temp 98.6  F (37  C) (Oral)  Ht 6' 1.75\" (1.873 m)  Wt 293 lb (132.9 kg)  SpO2 96%  BMI 37.87 kg/m2      Physical Exam   Constitutional: He is oriented to person, " place, and time. He appears well-developed.   HENT:   Head: Normocephalic.   Right Ear: Tympanic membrane, external ear and ear canal normal.   Left Ear: Tympanic membrane, external ear and ear canal normal.   Mouth/Throat: Oropharynx is clear and moist. No oropharyngeal exudate.   Eyes: Conjunctivae are normal.   Neck: Normal range of motion.   Cardiovascular: Normal rate, regular rhythm and normal heart sounds.    No murmur heard.  Pulmonary/Chest: Effort normal and breath sounds normal. No respiratory distress.   Musculoskeletal: Normal range of motion.   Lymphadenopathy:     He has no cervical adenopathy.   Neurological: He is alert and oriented to person, place, and time.   Skin: Skin is warm and dry.   Psychiatric: He has a normal mood and affect. Judgment normal.         Assessment and Plan:       ICD-10-CM    1. Viral illness B34.9 sodium chloride (OCEAN) 0.65 % nasal spray     Dextromethorphan-Guaifenesin (MUCINEX DM MAXIMUM STRENGTH)  MG TB12     meclizine (ANTIVERT) 25 MG tablet       I suspect this is viral illnessthis on multiple symptoms  We'll treat symptomatically  Call or return to clinic with any worsening symptoms      Patient Instructions   Be sure to push fluids   You can take Meclizine 12.5-25mg up to 3 times a day as needed for dizziness   I recommend a saline nasal spray as needed         STEPHANIE Payne, CNP  [facility]

## 2017-03-01 NOTE — TELEPHONE ENCOUNTER
Reason for call:  Patient reporting a symptom    Symptom or request: sore throat. Possible strep    Duration (how long have symptoms been present): a week.    Have you been treated for this before? Yes    Additional comments: pt was seen earlier today but still having symptoms and would like to speak with someone    Phone Number patient can be reached at:  Home number on file [phone] (home)    Best Time:      Can we leave a detailed message on this number:  YES    Call taken on 2/28/2017 at 6:49 PM by Pepe Yen

## 2017-03-01 NOTE — TELEPHONE ENCOUNTER
I got notification from [facility] that PA was already denied from Dr Angie Schneider and there is now an appeal in process, again from Dr Angie Schneider. While this is in process we will not be able to submit any other PAs on this medication. Insurance has said that it can take up to 30 calender days to review appeals, but hopefully it will take far less time than that.    I left another detailed message letting the patient know.    Alexx Lopez, CMA

## 2017-03-01 NOTE — TELEPHONE ENCOUNTER
Ok I sent a PA to [facility] for further review of this medication. Still waiting on clinical review questions.\    I left voice message to patient letting him know what's going on    Alexx Lopez, [facility]

## 2017-03-01 NOTE — TELEPHONE ENCOUNTER
Call back to patient.  Patient states he was seen today in clinic for sore throat and cold symptoms.  He is reporting his throat is more sore, thinks he may have strep throat.  Reviewed with patient(after reviewing OV notes) that was not likely that was what he has, is likely viral in nature.  Reviewed also with patients that less probability of adults getting strep throat than children and he did not know of any positive contacts.  Patient reports that he has been sick for one week now, had diarrhea and vomiting over weekend, now has cough and sore throat.  Patient also reporting sinus congestion, no fever.  Reviewed with patient that multiple factors can be contributing to his sore throat including previous vomiting, cough, and PND.   Advised he push fluids and rest most importantly, treat symptoms with OTC medications including NSAID for sore throat.  Did state he could try OTC Flonase to help with his nasal secretions, may help PND contributing to sore throat.  Advised to avoid any foods/liquids that may also contribute to sore throat.  Advised he call back to the clinic if not improving or new symptoms by end of the week.  Patient hesitantly agrees to plan, has no further questions.  Alanis Blevins RN

## 2017-03-07 DIAGNOSIS — I25.10 CAD (CORONARY ARTERY DISEASE): ICD-10-CM

## 2017-03-07 NOTE — TELEPHONE ENCOUNTER
clopidogrel (PLAVIX) 75 MG tablet             Last Written Prescription Date: 1/16/2017  Last Fill Quantity: 90, # refills: 3    Last Office Visit with G, P or [facility] prescribing provider:  1/16/2017   Future Office Visit:    Next 5 appointments (look out 90 days)     Apr 03, 2017 10:30 AM CDT   [facility] Morelia [facility] DO   [facility]    [address] [facility] [address]   [phone]                   Lab Results   Component Value Date    WBC 10.4 06/14/2015     Lab Results   Component Value Date    RBC 4.81 06/14/2015     Lab Results   Component Value Date    HGB 14.5 11/09/2016     Lab Results   Component Value Date    HCT 41.5 06/14/2015     No components found for: MCT  Lab Results   Component Value Date    MCV 86 06/14/2015     Lab Results   Component Value Date    MCH 29.5 06/14/2015     Lab Results   Component Value Date    MCHC 34.2 06/14/2015     Lab Results   Component Value Date    RDW 13.9 06/14/2015     Lab Results   Component Value Date     06/14/2015     Lab Results   Component Value Date    AST 26 08/12/2016     Lab Results   Component Value Date    ALT 51 08/12/2016     Creatinine   Date Value Ref Range Status   01/16/2017 0.89 0.66 - 1.25 mg/dL Final   ]

## 2017-03-08 RX ORDER — CLOPIDOGREL BISULFATE 75 MG/1
TABLET ORAL
Qty: 90 TABLET | Refills: 3 | OUTPATIENT
Start: 2017-03-08

## 2017-03-08 NOTE — TELEPHONE ENCOUNTER
Sent denial to pharmacy.  Pharmacy should have prescription that was sent on 1/16/17    Mildred Huizar RN

## 2017-03-14 DIAGNOSIS — E11.9 TYPE 2 DIABETES MELLITUS WITHOUT COMPLICATION (H): Chronic | ICD-10-CM

## 2017-03-14 NOTE — TELEPHONE ENCOUNTER
Pending Prescriptions:                       Disp   Refills    Alcohol Swabs (B-D SINGLE USE SWABS REGUL*100 ea*2            Sig: USE AS NEEDED          Last Written Prescription Date: 3/1/16  Last Fill Quantity: 100 ea,  # refills: 2   Last Office Visit with [facility], [facility] or [facility] prescribing provider: 1/16/17                                         Next 5 appointments (look out 90 days)     Apr 03, 2017 10:30 AM CDT   [facility]    [address] [facility] [id number] [phone]

## 2017-03-15 RX ORDER — ISOPROPYL ALCOHOL 0.75 G/1
SWAB TOPICAL
Qty: 100 EACH | Refills: 2 | Status: SHIPPED | OUTPATIENT
Start: 2017-03-15 | End: 2017-07-31

## 2017-03-15 NOTE — TELEPHONE ENCOUNTER
Prescription approved per [facility] Refill Protocol.  Denita Lozada RN, BSN

## 2017-03-22 ENCOUNTER — TELEPHONE (OUTPATIENT)
Dept: FAMILY MEDICINE | Facility: CLINIC | Age: 52
End: 2017-03-22

## 2017-03-22 NOTE — TELEPHONE ENCOUNTER
Please do not close this encounter until this has been addressed.  (prior auth approved/denied, prescriber refusal to complete prior auth or medication changed/discontinued)    Prior Authorization needed on: Omeprazole 40mg (insurance limits to qty 90 for 365 days)  Drug NDC: [address]-68     Insurance: [employer]  Member ID: [id number]   Insurance phone #: [phone]    Pharmacy NPI: [id number]  Pharmacy Phone #: [phone]  Pharmacy Fax #: [phone]    Please let us know if the PA gets approved or denied or if medication is changed    [facility] Pharmacy Technician  [facility]  [phone] fax [phone]

## 2017-03-28 NOTE — TELEPHONE ENCOUNTER
PA has been approved for the quantity limit exception from today's date and good for 1 year. I let the pharmacy know.    Alexx Lopez, CMA

## 2017-04-11 DIAGNOSIS — F41.9 ANXIETY: Chronic | ICD-10-CM

## 2017-04-11 DIAGNOSIS — E53.8 VITAMIN B12 DEFICIENCY (NON ANEMIC): ICD-10-CM

## 2017-04-11 RX ORDER — HYDROXYZINE HYDROCHLORIDE 25 MG/1
TABLET, FILM COATED ORAL
Qty: 120 TABLET | Refills: 3 | Status: SHIPPED | OUTPATIENT
Start: 2017-04-11 | End: 2017-07-31

## 2017-04-11 RX ORDER — LANOLIN ALCOHOL/MO/W.PET/CERES
CREAM (GRAM) TOPICAL
Qty: 100 TABLET | Refills: 3 | Status: SHIPPED | OUTPATIENT
Start: 2017-04-11 | End: 2017-12-18

## 2017-04-11 NOTE — TELEPHONE ENCOUNTER
hydrOXYzine (ATARAX) 25 MG tablet 120 tablet 3 12/28/2016           Last Written Prescription Date: 12/28/2016  Last Fill Quantity: 120,  # refills: 3   [place] [facility] or  Health prescribing provider: 02/28/2017                                         Next 5 appointments (look out 90 days)     May 03, 2017 11:30 AM CDT   Office Visit with Morelia Pedraza DO   [facility] ([facility])    [address] [facility] [address]   [phone]                  Cyanocobalamin (B-12) 1000 MCG TBCR 100 tablet 3 8/22/2016            Last Written Prescription Date: 08/22/2016  Last Fill Quantity: 100,    # refills: 3  [place] [facility] or  Health prescribing provider:  2/28/2017   Next 5 appointments (look out 90 days)     May 03, 2017 11:30 AM CDT   Office Visit with Morelia Pedraza DO   [facility] ([facility])    [address] [facility] [address]   [phone]                   Lab Results   Component Value Date    WBC 10.4 06/14/2015     Lab Results   Component Value Date    RBC 4.81 06/14/2015     Lab Results   Component Value Date    HGB 14.5 11/09/2016     Lab Results   Component Value Date    HCT 41.5 06/14/2015     No components found for: MCT  Lab Results   Component Value Date    MCV 86 06/14/2015     Lab Results   Component Value Date    MCH 29.5 06/14/2015     Lab Results   Component Value Date    MCHC 34.2 06/14/2015     Lab Results   Component Value Date    RDW 13.9 06/14/2015     Lab Results   Component Value Date     06/14/2015     Lab Results   Component Value Date    AST 26 08/12/2016     Lab Results   Component Value Date    ALT 51 08/12/2016     Creatinine   Date Value Ref Range Status   01/16/2017 0.89 0.66 - 1.25 mg/dL Final

## 2017-05-09 DIAGNOSIS — E55.9 VITAMIN D DEFICIENCY: ICD-10-CM

## 2017-05-09 RX ORDER — CHOLECALCIFEROL (VITAMIN D3) 50 MCG
1 TABLET ORAL DAILY
Qty: 100 TABLET | Refills: 0 | Status: SHIPPED | OUTPATIENT
Start: 2017-05-09 | End: 2017-07-31

## 2017-05-09 NOTE — TELEPHONE ENCOUNTER
Vitamin D      Last Written Prescription Date: 7-  Last Fill Quantity: 100,  # refills: 2   Last Office Visit with [facility] or [facility] prescribing provider: 2-                                         Next 5 appointments (look out 90 days)     May 30, 2017 11:30 AM CDT   Office Visit with Morelia Pedraza DO   [facility] ([facility])    [address] [facility] [id number] [phone]                    [employer] [facility] [employer] Pharm[facility] Technician  [facility]  [phone] fax [phone]

## 2017-05-30 ENCOUNTER — OFFICE VISIT (OUTPATIENT)
Dept: FAMILY MEDICINE | Facility: CLINIC | Age: 52
End: 2017-05-30
Payer: COMMERCIAL

## 2017-05-30 VITALS
HEART RATE: 79 BPM | WEIGHT: 297 LBS | HEIGHT: 74 IN | TEMPERATURE: 98.1 F | OXYGEN SATURATION: 96 % | BODY MASS INDEX: 38.12 KG/M2 | DIASTOLIC BLOOD PRESSURE: 83 MMHG | SYSTOLIC BLOOD PRESSURE: 121 MMHG

## 2017-05-30 DIAGNOSIS — M25.561 RIGHT KNEE PAIN, UNSPECIFIED CHRONICITY: ICD-10-CM

## 2017-05-30 DIAGNOSIS — R11.2 NAUSEA AND VOMITING, INTRACTABILITY OF VOMITING NOT SPECIFIED, UNSPECIFIED VOMITING TYPE: ICD-10-CM

## 2017-05-30 DIAGNOSIS — I25.810 CORONARY ARTERY DISEASE INVOLVING CORONARY BYPASS GRAFT OF NATIVE HEART, ANGINA PRESENCE UNSPECIFIED: Chronic | ICD-10-CM

## 2017-05-30 DIAGNOSIS — G25.81 RESTLESS LEGS SYNDROME (RLS): Chronic | ICD-10-CM

## 2017-05-30 DIAGNOSIS — Z13.0 SCREENING FOR IRON DEFICIENCY ANEMIA: ICD-10-CM

## 2017-05-30 DIAGNOSIS — E55.9 VITAMIN D DEFICIENCY: ICD-10-CM

## 2017-05-30 DIAGNOSIS — E11.9 TYPE 2 DIABETES MELLITUS WITHOUT COMPLICATION, WITHOUT LONG-TERM CURRENT USE OF INSULIN (H): Primary | Chronic | ICD-10-CM

## 2017-05-30 DIAGNOSIS — K21.9 GASTROESOPHAGEAL REFLUX DISEASE WITHOUT ESOPHAGITIS: Chronic | ICD-10-CM

## 2017-05-30 DIAGNOSIS — G25.0 BENIGN ESSENTIAL TREMOR: ICD-10-CM

## 2017-05-30 DIAGNOSIS — Z12.11 SCREEN FOR COLON CANCER: ICD-10-CM

## 2017-05-30 LAB
ALBUMIN SERPL-MCNC: 3.9 G/DL (ref 3.4–5)
ALP SERPL-CCNC: 134 U/L (ref 40–150)
ALT SERPL W P-5'-P-CCNC: 60 U/L (ref 0–70)
ANION GAP SERPL CALCULATED.3IONS-SCNC: 11 MMOL/L (ref 3–14)
AST SERPL W P-5'-P-CCNC: 31 U/L (ref 0–45)
BILIRUB SERPL-MCNC: 0.4 MG/DL (ref 0.2–1.3)
BUN SERPL-MCNC: 11 MG/DL (ref 7–30)
CALCIUM SERPL-MCNC: 9.4 MG/DL (ref 8.5–10.1)
CHLORIDE SERPL-SCNC: 106 MMOL/L (ref 94–109)
CHOLEST SERPL-MCNC: 180 MG/DL
CO2 SERPL-SCNC: 23 MMOL/L (ref 20–32)
CREAT SERPL-MCNC: 0.82 MG/DL (ref 0.66–1.25)
FERRITIN SERPL-MCNC: 112 NG/ML (ref 26–388)
GFR SERPL CREATININE-BSD FRML MDRD: ABNORMAL ML/MIN/1.7M2
GLUCOSE SERPL-MCNC: 130 MG/DL (ref 70–99)
HBA1C MFR BLD: 7.6 % (ref 4.3–6)
HDLC SERPL-MCNC: 36 MG/DL
LDLC SERPL CALC-MCNC: ABNORMAL MG/DL
LDLC SERPL DIRECT ASSAY-MCNC: 90 MG/DL
NONHDLC SERPL-MCNC: 144 MG/DL
POTASSIUM SERPL-SCNC: 4 MMOL/L (ref 3.4–5.3)
PROT SERPL-MCNC: 7 G/DL (ref 6.8–8.8)
SODIUM SERPL-SCNC: 140 MMOL/L (ref 133–144)
TRIGL SERPL-MCNC: 463 MG/DL
TSH SERPL DL<=0.005 MIU/L-ACNC: 1.21 MU/L (ref 0.4–4)

## 2017-05-30 PROCEDURE — 83721 ASSAY OF BLOOD LIPOPROTEIN: CPT | Mod: 59 | Performed by: INTERNAL MEDICINE

## 2017-05-30 PROCEDURE — 84443 ASSAY THYROID STIM HORMONE: CPT | Performed by: INTERNAL MEDICINE

## 2017-05-30 PROCEDURE — 36415 COLL VENOUS BLD VENIPUNCTURE: CPT | Performed by: INTERNAL MEDICINE

## 2017-05-30 PROCEDURE — 82728 ASSAY OF FERRITIN: CPT | Performed by: INTERNAL MEDICINE

## 2017-05-30 PROCEDURE — 83036 HEMOGLOBIN GLYCOSYLATED A1C: CPT | Performed by: INTERNAL MEDICINE

## 2017-05-30 PROCEDURE — 80061 LIPID PANEL: CPT | Performed by: INTERNAL MEDICINE

## 2017-05-30 PROCEDURE — 99214 OFFICE O/P EST MOD 30 MIN: CPT | Performed by: INTERNAL MEDICINE

## 2017-05-30 PROCEDURE — 80053 COMPREHEN METABOLIC PANEL: CPT | Performed by: INTERNAL MEDICINE

## 2017-05-30 PROCEDURE — 82306 VITAMIN D 25 HYDROXY: CPT | Performed by: INTERNAL MEDICINE

## 2017-05-30 RX ORDER — OMEGA-3-ACID ETHYL ESTERS 1 G/1
2 CAPSULE, LIQUID FILLED ORAL 2 TIMES DAILY
COMMUNITY
Start: 2017-02-15 | End: 2017-10-24

## 2017-05-30 NOTE — PATIENT INSTRUCTIONS
Labs today   Continue to check your blood pressure with goal < 140/90 mmHg   -If you continue to see high blood pressure consider a 24 hour monitor   Let me know if dizziness does not resolve ; if knee pain gets worse let me know as well  Keep appointments with cardiology and dietician   Talk with Dr. Welsh for clonazepam for restless legs  Follow up with me in 6 months

## 2017-05-30 NOTE — NURSING NOTE
"Chief Complaint   Patient presents with     RECHECK       Initial /83 (BP Location: Right arm, Cuff Size: Adult Large)  Pulse 79  Temp 98.1  F (36.7  C) (Oral)  Ht 6' 1.75\" (1.873 m)  Wt 297 lb (134.7 kg)  SpO2 96%  BMI 38.39 kg/m2 Estimated body mass index is 38.39 kg/(m^2) as calculated from the following:    Height as of this encounter: 6' 1.75\" (1.873 m).    Weight as of this encounter: 297 lb (134.7 kg).  Medication Reconciliation: complete     Hayde Ramirez CMA      "

## 2017-05-30 NOTE — MR AVS SNAPSHOT
After Visit Summary   5/30/2017    Javier Markham    MRN: [id number]           Patient Information     Date Of Birth          1965        Visit Information        Provider Department      5/30/2017 11:30 AM Morelia Pedraza,  [facility]        Today's Diagnoses     Type 2 diabetes mellitus without complication, without long-term current use of insulin (H)    -  1    Coronary artery disease involving coronary bypass graft of native heart, angina presence unspecified        Nausea and vomiting, intractability of vomiting not specified, unspecified vomiting type        Vitamin D deficiency        Gastroesophageal reflux disease without esophagitis        Right knee pain, unspecified chronicity        Benign essential tremor        Restless legs syndrome (RLS)        Screening for iron deficiency anemia        Screen for colon cancer          Care Instructions    Labs today   Continue to check your blood pressure with goal < 140/90 mmHg   -If you continue to see high blood pressure consider a 24 hour monitor   Let me know if dizziness does not resolve ; if knee pain gets worse let me know as well  Keep appointments with cardiology and dietician   Talk with Dr. Welsh for clonazepam for restless legs  Follow up with me in 6 months           Follow-ups after your visit        Future tests that were ordered for you today     Open Future Orders        Priority Expected Expires Ordered    Fecal colorectal cancer screen FIT - Future (S+30) Routine 6/20/2017 6/29/2017 5/30/2017            Who to contact     If you have questions or need follow up information about today's clinic visit or your schedule please contact [facility] directly at [phone].  Normal or non-critical lab and imaging results will be communicated to you by MyChart, letter or phone within 4 business days after the clinic has received the results. If you do not hear from us within 7 days, please contact the  "clinic through [employer] or phone. If you have a critical or abnormal lab result, we will notify you by phone as soon as possible.  Submit refill requests through [employer] or call your pharmacy and they will forward the refill request to us. Please allow 3 business days for your refill to be completed.          Additional Information About Your Visit        Care EveryWhere ID     This is your Care EveryWhere ID. This could be used by other organizations to access your [facility] medical records  [id number]        Your Vitals Were     Pulse Temperature Height Pulse Oximetry BMI (Body Mass Index)       79 98.1  F (36.7  C) (Oral) 6' 1.75\" (1.873 m) 96% 38.39 kg/m2        Blood Pressure from Last 3 Encounters:   05/30/17 121/83   02/28/17 118/85   01/16/17 113/78    Weight from Last 3 Encounters:   05/30/17 297 lb (134.7 kg)   02/28/17 293 lb (132.9 kg)   01/16/17 300 lb (136.1 kg)              We Performed the Following     Comprehensive metabolic panel     Ferritin     Hemoglobin A1c     Lipid panel reflex to direct LDL     TSH WITH FREE T4 REFLEX     Vitamin D Deficiency          Today's Medication Changes          These changes are accurate as of: 5/30/17 12:21 PM.  If you have any questions, ask your nurse or doctor.               These medicines have changed or have updated prescriptions.        Dose/Directions    omeprazole 20 MG CR capsule   Commonly known as:  priLOSEC   This may have changed:    - medication strength  - how much to take  - when to take this   Used for:  Gastroesophageal reflux disease without esophagitis   Changed by:  Morelia Pedraza DO        Dose:  20 mg   Take 1 capsule (20 mg) by mouth 2 times daily Take 30-60 minutes before a meal.   Quantity:  180 capsule   Refills:  1       vitamin D 2000 UNITS tablet   This may have changed:  how much to take   Used for:  Vitamin D deficiency        Dose:  1 tablet   Take 1 tablet by mouth daily   Quantity:  100 tablet   Refills:  0            Where " to get your medicines      These medications were sent to [facility], [place] - [address], [address]  [address], [address], [facility] [place] [address]     Phone:  [phone]     omeprazole 20 MG CR capsule                Primary Care Provider Office Phone # Fax #    Morelia Pedraza [phone]       [facility] [address]  [facility] [address]        Thank you!     Thank you for choosing [facility]  for your care. Our goal is always to provide you with excellent care. Hearing back from our patients is one way we can continue to improve our services. Please take a few minutes to complete the written survey that you may receive in the mail after your visit with us. Thank you!             Your Updated Medication List - Protect others around you: Learn how to safely use, store and throw away your medicines at www.disposemymeds.org.          This list is accurate as of: 5/30/17 12:21 PM.  Always use your most recent med list.                   Brand Name Dispense Instructions for use    acetaminophen 500 MG tablet    TYLENOL    100 tablet    Take 2 tablets (1,000 mg) by mouth every 8 hours as needed for mild pain       albuterol 108 (90 BASE) MCG/ACT Inhaler    PROAIR HFA/PROVENTIL HFA/VENTOLIN HFA    1 Inhaler    Inhale 2 puffs into the lungs every 4 hours as needed for shortness of breath / dyspnea or wheezing       aspirin 81 MG EC tablet     90 tablet    TAKE ONE TABLET BY MOUTH EVERY DAY       atorvastatin 80 MG tablet    LIPITOR    90 tablet    Take 1 tablet (80 mg) by mouth daily       B-D SINGLE USE SWABS REGULAR Pads     100 each    USE AS NEEDED       blood glucose lancets standard    no brand specified    1 Box    Use to test blood sugar daily or as directed. ONE TOUCH VERIO.       blood glucose monitoring meter device kit    no brand specified    1 kit    Use to test blood sugar 1 times daily or as directed. ONE TOUCH VERIO.        blood glucose monitoring test strip    no brand specified    100 each    Use to test blood sugars 1 times daily or as directed. ONE TOUCH VERIO.       clopidogrel 75 MG tablet    PLAVIX    90 tablet    Take 1 tablet (75 mg) by mouth daily       cyanocobalamin 1000 MCG tablet    vitamin  B-12    100 tablet    TAKE ONE TABLET BY MOUTH EVERY DAY       ferrous sulfate 325 (65 FE) MG tablet    IRON    30 tablet    TAKE ONE TABLET BY MOUTH EVERY MORNING WITH BREAKFAST       gabapentin 300 MG capsule    NEURONTIN    270 capsule    Take 3 capsules (900 mg) by mouth 3 times daily       hydrOXYzine 25 MG tablet    ATARAX    120 tablet    TAKE ONE TO TWO TABLETS BY MOUTH EVERY 6 HOURS AS NEEDED FOR ANXIETY       ibuprofen 800 MG tablet    ADVIL/MOTRIN    60 tablet    Take 1 tablet (800 mg) by mouth every 8 hours as needed for moderate pain       isosorbide mononitrate 30 MG 24 hr tablet    IMDUR    45 tablet    Take 0.5 tablets (15 mg) by mouth daily       lisinopril 5 MG tablet    PRINIVIL/ZESTRIL    90 tablet    Take 1 tablet (5 mg) by mouth daily       loratadine 10 MG tablet    CLARITIN    90 tablet    TAKE ONE TABLET BY MOUTH EVERY DAY       meclizine 25 MG tablet    ANTIVERT    30 tablet    Take 1 tablet (25 mg) by mouth 3 times daily as needed for dizziness       MELATONIN PO      Take 10 mg by mouth nightly as needed       metFORMIN 500 MG tablet    GLUCOPHAGE    360 tablet    Take 2 tablets (1,000 mg) by mouth 2 times daily (with meals)       metoprolol 25 MG 24 hr tablet    TOPROL-XL    180 tablet    Take 1 tablet (25 mg) by mouth 2 times daily       MUCINEX DM MAXIMUM STRENGTH  MG Tb12     28 tablet    Take 1 each by mouth 2 times daily as needed       NITROSTAT 0.4 MG sublingual tablet   Generic drug:  nitroglycerin     25 tablet    PLACE 1 TABLET UNDER THE TONGUE AT THE ONSET OF CHEST PAIN. MAY REPEAT EVERY 5 MINUTES AS NEEDED FOR A TOTAL OF 3 DOSES.       omega-3 acid ethyl esters 1 G capsule     Lovaza     Take 2 g by mouth 2 times daily       omeprazole 20 MG CR capsule    priLOSEC    180 capsule    Take 1 capsule (20 mg) by mouth 2 times daily Take 30-60 minutes before a meal.       polyethylene glycol powder    MIRALAX/GLYCOLAX    500 g    Take 17 g by mouth daily as needed for constipation       sodium chloride 0.65 % nasal spray    OCEAN    1 Bottle    Spray 1 spray into both nostrils 4 times daily as needed for congestion       tamsulosin 0.4 MG capsule    FLOMAX    90 capsule    Take 1 capsule (0.4 mg) by mouth daily       triamcinolone 0.1 % cream    KENALOG     Apply topically daily as needed for irritation (under Arms)       vitamin D 2000 UNITS tablet     100 tablet    Take 1 tablet by mouth daily

## 2017-05-30 NOTE — PROGRESS NOTES
SUBJECTIVE:                                                    Javier Markham is a 52 year old male who presents to clinic today for the following health issues:      Diabetes Follow-up    Patient is checking blood sugars: once daily.  Results are as follows:         am - 148    Diabetic concerns: None     Symptoms of hypoglycemia (low blood sugar): shaky     Paresthesias (numbness or burning in feet) or sores: Yes      Date of last diabetic eye exam: 01/22/2017     Hyperlipidemia Follow-Up      Rate your low fat/cholesterol diet?: fair    Taking statin?  Yes, no muscle aches from statin    Other lipid medications/supplements?:  Lovaza, without side effects       Amount of exercise or physical activity: 4-5 days/week for an average of less than 15 minutes    Problems taking medications regularly: No    Medication side effects: none    Diet: regular (no restrictions)    Acute Sickness- Alexx reports nausea and vertigo last week (11 days ago). He went to bed and notes increased sweating and vomited many times that night. He continued to feel unwell for the next three days, but did not vomit any more. He still does not feel back to 100% but says he has been getting progressively better but still notes light headedness. He also saw Radha 02/28/17 for vertigo and viral illness. No diarrhea.    Hypertension- Checking BP with averages in 145/90's mmHg, he used his neighbors BP monitor and got similar readings. His BP is 121/83 mmHg in the office. Always good in office. ?Consider ambulatory BP monitor.    Diabetes- BG was 148 mg/dL this morning. He is trying to lose weight, but notes it is difficult. He had questions if he was able to lose more weight and reverse diabetic neuropathy. Complaining of intense nerve pain the other night. Gabapentin helps, but does not completely relieve nerve pain.     CAD- Follows with cardiology. They advised he see a specialist about potentially having another arterial stent surgery.  "Appointment with Dietician and cardiology on 6/14/17.     Ortho Concerns- Trauma to his right knee when he fell in the past. Notes now he will get excruciating pain across his middle and lower knee if he walks for too long- like going to [employer]. He will sit down for 10 minutes and this relieves pain. Used ACE wraps in the past. He does not want to pursue ortho at this time since he believes it is r/t weight.  Complaining of bilateral wrist pain, but does not hurt today.     Essential Tremor/Restless Legs- Tremor has become worse. Spread to his head and is exacerbated when he is ill. Past use of phenobarbital and inderal. He doesn't believe he gave phenobarbital enough time to notice results. He has also been on Ativan for tremor and restless leg, but he d/c after rehab. He is unsure if he has tried clonazepam, but I am unable to find it on his med list.      Of Note-  His brother has PKU.  Complains of increased cerumen build up with L>R.    Problem list and histories reviewed & adjusted, as indicated.    ROS:  Detailed as above     This document serves as a record of the services and decisions personally performed and made by Morelia Pedraza DO. It was created on his/her behalf by Milli De La Torre, a trained medical scribe. The creation of this document is based the provider's statements to the medical scribe.  Rk De La Torre 11:51 AM, May 30, 2017   OBJECTIVE:                                                    /83 (BP Location: Right arm, Cuff Size: Adult Large)  Pulse 79  Temp 98.1  F (36.7  C) (Oral)  Ht 6' 1.75\" (1.873 m)  Wt 297 lb (134.7 kg)  SpO2 96%  BMI 38.39 kg/m2  Body mass index is 38.39 kg/(m^2).  Alert, pleasant, cooperative, NAD, obese   Normal affect  Normal MS exam of right knee  Negative Gini's, abdomen soft and NT, ND  Normal rapid alternating movements bilaterally      ASSESSMENT/PLAN:                                                    1. Type 2 diabetes mellitus " "without complication, without long-term current use of insulin (H)  Discussed importance of weight loss which he is working on with some success  Continues on Metformin  Gabapentin 900 mg TID for neuropathy with still some lingering sx; consider adding Lyrica to this but he is already on so many medications - hold off for now  - TSH WITH FREE T4 REFLEX  - Hemoglobin A1c    2. Coronary artery disease involving coronary bypass graft of native heart, angina presence unspecified  Follows with cardiology   - Lipid panel reflex to direct LDL    3. Nausea and vomiting, intractability of vomiting not specified, unspecified vomiting type  Likely r/t food poisoning and resolved; still some minor lingering vertigo  - Comprehensive metabolic panel    4. Vitamin D deficiency  - Vitamin D Deficiency - taking 4000 IU daily    5. Gastroesophageal reflux disease without esophagitis  Controlled with omeprazole - dose adjustment back to prior Rx with new insurance  - omeprazole (PRILOSEC) 20 MG CR capsule; Take 1 capsule (20 mg) by mouth 2 times daily Take 30-60 minutes before a meal.  Dispense: 180 capsule; Refill: 1    6. Right knee pain, unspecified chronicity  Discussed future ortho referral, not concerned at this time, possibly r/t obesity and old impact injury  Exam unremarkable  He isn't concerned now - just wanted to bring it up to have on file    7. Benign essential tremor  Had tremor \"since birth\", family hx of essential tremor too  Tried Phenobarbital in the past which he doesn't recall any negative s/e. Inderal also was tried in the past as was Ativan.  Ativan has worked in the past, I would prefer Klonopin IF needed benzo but advised he discuss it with Dr. Welsh; caution with addictive prone meds for him. Also see below re: RLS as this could relate.    8. Restless legs syndrome (RLS)  He should discuss with Dr. Welsh b/c he has also had episodes of sleep paralysis.     9. Screening for iron deficiency anemia  Iron also " helpful for RLS - he continues on oral iron  - Ferritin    10. Screen for colon cancer  - Fecal colorectal cancer screen FIT - Future (S+30); Future    Patient Instructions   Labs today   Continue to check your blood pressure with goal < 140/90 mmHg   -If you continue to see high blood pressure consider a 24 hour monitor   Let me know if dizziness does not resolve ; if knee pain gets worse let me know as well  Keep appointments with cardiology and dietician   Talk with Dr. Welsh for clonazepam for restless legs  Follow up with me in 6 months     I did not remember to look in patients ear per his request to remove excess wax d/t number of other issues addressed.     The information in this document, created by the medical scribe for me, accurately reflects the services I personally performed and the decisions made by me. I have reviewed and approved this document for accuracy prior to leaving the patient care area.  Morelia Pedraza DO  11:52 AM, 05/30/17    Morelia Pedraza, DO  [facility]

## 2017-05-30 NOTE — LETTER
[facility]  [address] [facility]  [address]  [place]  [address]  Tel: [phone]    June 5, 2017    Javier Markham  [address]   [facility] [address]        Dear Mr. Markham,    It was nice seeing you in clinic recently!  Your A1c (diabetes test) is still a bit high at 7.6%. Please try to work on reducing the portions of carbohydrates in your diet (such as: breads, rice, pasta, sugars) which will help to lower the blood sugar. Goal is to have the A1c closer to 7%. You can achieve this goal by combination of Metformin, weight loss and more physical activity. If you think that will be challenging for you, there is an injectable medication approved for combination purpose of lowering blood sugar and weight loss - please let me know if you are interested in this (its called Saxenda).  Your cholesterol is improved - please share these numbers with your cardiologist.  Your other labs are all excellent.    Please continue with the plan of care as we discussed and let me know if you have any questions/concerns. Thanks!    If you have any further questions or problems, please contact our office.      Sincerely,    Morelia Pedraza, /FABIO          Enclosure: Lab Results  Results for orders placed or performed in visit on 05/30/17   TSH WITH FREE T4 REFLEX   Result Value Ref Range    TSH 1.21 0.40 - 4.00 mU/L   Hemoglobin A1c   Result Value Ref Range    Hemoglobin A1C 7.6 (H) 4.3 - 6.0 %   Comprehensive metabolic panel   Result Value Ref Range    Sodium 140 133 - 144 mmol/L    Potassium 4.0 3.4 - 5.3 mmol/L    Chloride 106 94 - 109 mmol/L    Carbon Dioxide 23 20 - 32 mmol/L    Anion Gap 11 3 - 14 mmol/L    Glucose 130 (H) 70 - 99 mg/dL    Urea Nitrogen 11 7 - 30 mg/dL    Creatinine 0.82 0.66 - 1.25 mg/dL    GFR Estimate >90  Non  GFR Calc   >60 mL/min/1.7m2    GFR Estimate If Black >90   GFR Calc   >60 mL/min/1.7m2    Calcium 9.4 8.5 - 10.1 mg/dL    Bilirubin Total 0.4 0.2 - 1.3  mg/dL    Albumin 3.9 3.4 - 5.0 g/dL    Protein Total 7.0 6.8 - 8.8 g/dL    Alkaline Phosphatase 134 40 - 150 U/L    ALT 60 0 - 70 U/L    AST 31 0 - 45 U/L   Lipid panel reflex to direct LDL   Result Value Ref Range    Cholesterol 180 <200 mg/dL    Triglycerides 463 (H) <150 mg/dL    HDL Cholesterol 36 (L) >39 mg/dL    LDL Cholesterol Calculated  <100 mg/dL     Cannot estimate LDL when triglyceride exceeds 400 mg/dL    Non HDL Cholesterol 144 (H) <130 mg/dL   Vitamin D Deficiency   Result Value Ref Range    Vitamin D Deficiency screening 41 20 - 75 ug/L   Ferritin   Result Value Ref Range    Ferritin 112 26 - 388 ng/mL   LDL cholesterol direct   Result Value Ref Range    LDL Cholesterol Direct 90 <100 mg/dL

## 2017-05-31 PROBLEM — G25.0 BENIGN ESSENTIAL TREMOR: Chronic | Status: ACTIVE | Noted: 2017-05-31

## 2017-05-31 PROBLEM — M25.561 RIGHT KNEE PAIN, UNSPECIFIED CHRONICITY: Status: ACTIVE | Noted: 2017-05-31

## 2017-05-31 PROBLEM — G25.0 BENIGN ESSENTIAL TREMOR: Status: ACTIVE | Noted: 2017-05-31

## 2017-05-31 LAB — DEPRECATED CALCIDIOL+CALCIFEROL SERPL-MC: 41 UG/L (ref 20–75)

## 2017-06-02 DIAGNOSIS — I45.10 INCOMPLETE RBBB: Chronic | ICD-10-CM

## 2017-06-02 DIAGNOSIS — K40.90 UNILATERAL INGUINAL HERNIA WITHOUT OBSTRUCTION OR GANGRENE, RECURRENCE NOT SPECIFIED: Chronic | ICD-10-CM

## 2017-06-02 RX ORDER — NITROGLYCERIN 0.4 MG/1
TABLET SUBLINGUAL
Qty: 25 TABLET | Refills: 1 | Status: SHIPPED | OUTPATIENT
Start: 2017-06-02 | End: 2018-11-30

## 2017-06-02 NOTE — TELEPHONE ENCOUNTER
Prescription approved per [facility] Refill Protocol.  Sandra Carolina RN

## 2017-06-02 NOTE — TELEPHONE ENCOUNTER
Pending Prescriptions:                       Disp   Refills    MAPAP 500 MG tablet [Pharmacy Med Name: MA*100 ta*5        Sig: TAKE TWO TABLETS BY MOUTH EVERY 8 HOURS AS NEEDED FOR           MILD PAIN    nitroglycerin (NITROSTAT) 0.4 MG sublingua*25 tab*1        Sig: PLACE 1 TABLET UNDER THE TONGUE AT THE ONSET OF CHEST           PAIN. MAY REPEAT EVERY 5 MINUTES AS NEEDED FOR A           TOTAL OF 3 DOSES.    MAPAP      Last Written Prescription Date: 12/26/2016  Last Fill Quantity: 100,  # refills: 5   Last Office Visit with [facility] or [facility] prescribing provider: 05/30/2017                                             Nitro      Last Written Prescription Date: 11/29/2016  Last Fill Quantity: 25,  # refills: 1   Last Office Visit with [facility], [facility] or [facility] prescribing provider: 05/30/2017

## 2017-06-05 PROCEDURE — 82274 ASSAY TEST FOR BLOOD FECAL: CPT | Performed by: INTERNAL MEDICINE

## 2017-06-07 DIAGNOSIS — Z12.11 SCREEN FOR COLON CANCER: ICD-10-CM

## 2017-06-07 LAB — HEMOCCULT STL QL IA: NEGATIVE

## 2017-06-14 ENCOUNTER — TRANSFERRED RECORDS (OUTPATIENT)
Dept: HEALTH INFORMATION MANAGEMENT | Facility: CLINIC | Age: 52
End: 2017-06-14

## 2017-06-30 DIAGNOSIS — I25.10 CORONARY ARTERY DISEASE INVOLVING NATIVE CORONARY ARTERY OF NATIVE HEART WITHOUT ANGINA PECTORIS: Chronic | ICD-10-CM

## 2017-06-30 DIAGNOSIS — E11.40 TYPE 2 DIABETES MELLITUS WITH DIABETIC NEUROPATHY, WITHOUT LONG-TERM CURRENT USE OF INSULIN (H): ICD-10-CM

## 2017-06-30 RX ORDER — BLOOD SUGAR DIAGNOSTIC
STRIP MISCELLANEOUS
Qty: 100 EACH | Refills: 5 | Status: SHIPPED | OUTPATIENT
Start: 2017-06-30 | End: 2024-06-14

## 2017-06-30 RX ORDER — GABAPENTIN 300 MG/1
CAPSULE ORAL
Qty: 270 CAPSULE | Refills: 5 | Status: SHIPPED | OUTPATIENT
Start: 2017-06-30 | End: 2017-12-18

## 2017-06-30 NOTE — TELEPHONE ENCOUNTER
ONE TOUCH VERIO IQ test strip     Last Written Prescription Date: 1/16/2017  Last Fill Quantity: 100,  # refills: 5   Last Office Visit with [facility], [facility] or [facility] prescribing provider: 5/30/2017                                             aspirin 81 MG EC tablet       REFILLS AT PHARMACY                                             gabapentin (NEURONTIN) 300 MG capsule      Last Written Prescription Date: 1/16/2017  Last Quantity: 270, # refills: 5  Last Office Visit with [facility], [facility] or [facility] prescribing provider: 5/30/2017       Creatinine   Date Value Ref Range Status   05/30/2017 0.82 0.66 - 1.25 mg/dL Final     Lab Results   Component Value Date    AST 31 05/30/2017     Lab Results   Component Value Date    ALT 60 05/30/2017     BP Readings from Last 3 Encounters:   05/30/17 121/83   02/28/17 118/85   01/16/17 113/78

## 2017-06-30 NOTE — TELEPHONE ENCOUNTER
Routing refill request to provider for review/approval because:  Drug not on the [facility] refill protocol  Gabapentin  Sandra Carolina RN

## 2017-06-30 NOTE — TELEPHONE ENCOUNTER
Reason for Call:  Medication or medication refill:    Do you use a [facility]?  Name of the pharmacy and phone number for the current request:    [facility], [place] - [address], [address]  Please walk over.    Name of the medication requested: gabapentin (NEURONTIN) 300 MG capsule    Other request:     Can we leave a detailed message on this number? YES    Phone number patient can be reached at: Home number on file [phone] (home)    Best Time: any    Call taken on 6/30/2017 at 8:59 AM by Valerie Wharton

## 2017-08-09 ENCOUNTER — OFFICE VISIT (OUTPATIENT)
Dept: FAMILY MEDICINE | Facility: CLINIC | Age: 52
End: 2017-08-09
Payer: COMMERCIAL

## 2017-08-09 VITALS
WEIGHT: 306 LBS | DIASTOLIC BLOOD PRESSURE: 99 MMHG | OXYGEN SATURATION: 95 % | HEART RATE: 86 BPM | HEIGHT: 74 IN | SYSTOLIC BLOOD PRESSURE: 144 MMHG | BODY MASS INDEX: 39.27 KG/M2 | TEMPERATURE: 96.8 F

## 2017-08-09 DIAGNOSIS — M62.830 BACK MUSCLE SPASM: ICD-10-CM

## 2017-08-09 DIAGNOSIS — M54.5 ACUTE LOW BACK PAIN, UNSPECIFIED BACK PAIN LATERALITY, WITH SCIATICA PRESENCE UNSPECIFIED: Primary | ICD-10-CM

## 2017-08-09 PROCEDURE — 99213 OFFICE O/P EST LOW 20 MIN: CPT | Performed by: INTERNAL MEDICINE

## 2017-08-09 RX ORDER — METHYLPREDNISOLONE 4 MG
TABLET, DOSE PACK ORAL
Qty: 21 TABLET | Refills: 0 | Status: SHIPPED | OUTPATIENT
Start: 2017-08-09 | End: 2017-10-04

## 2017-08-09 RX ORDER — CYCLOBENZAPRINE HCL 5 MG
5 TABLET ORAL 3 TIMES DAILY PRN
Qty: 42 TABLET | Refills: 1 | Status: SHIPPED | OUTPATIENT
Start: 2017-08-09 | End: 2018-11-05

## 2017-08-09 NOTE — NURSING NOTE
"Chief Complaint   Patient presents with     Back Pain       Initial BP (!) 144/99 (BP Location: Left arm, Cuff Size: Adult Large)  Pulse 86  Temp 96.8  F (36  C) (Tympanic)  Ht 6' 1.75\" (1.873 m)  Wt (!) 306 lb (138.8 kg)  SpO2 95%  BMI 39.55 kg/m2 Estimated body mass index is 39.55 kg/(m^2) as calculated from the following:    Height as of this encounter: 6' 1.75\" (1.873 m).    Weight as of this encounter: 306 lb (138.8 kg).  Medication Reconciliation: complete     Cassia Petit MA    "

## 2017-08-09 NOTE — MR AVS SNAPSHOT
After Visit Summary   8/9/2017    Javier Markham    MRN: [id number]           Patient Information     Date Of Birth          1965        Visit Information        Provider Department      8/9/2017 2:00 PM Jo-Ann Baird MD [facility]        Today's Diagnoses     Acute low back pain, unspecified back pain laterality, with sciatica presence unspecified    -  1    Back muscle spasm           Follow-ups after your visit        Additional Services     ORTHO  REFERRAL       [facility] is referring you to the Orthopedic  Services at [facility].       The  Representative will assist you in the coordination of your Orthopedic and Musculoskeletal Care as prescribed by your physician.    The  Representative will call you within 1 business day to help schedule your appointment, or you may contact the  Representative at:    All areas ~ [phone]     Type of Referral : Non Surgical       Timeframe requested: 1 - 2 days    Coverage of these services is subject to the terms and limitations of your health insurance plan.  Please call member services at your health plan with any benefit or coverage questions.      If X-rays, CT or MRI's have been performed, please contact the facility where they were done to arrange for , prior to your scheduled appointment.  Please bring this referral request to your appointment and present it to your specialist.                  Your next 10 appointments already scheduled     Oct 04, 2017 10:30 AM CDT   Office Visit with Morelia Pedraza,    [facility] ([facility])    [address] [facility] [id number] [phone]           Bring a current list of meds and any records pertaining to this visit. For Physicals, please bring immunization records and any forms needing to be filled out. Please arrive 10 minutes early to complete  "paperwork.              Who to contact     If you have questions or need follow up information about today's clinic visit or your schedule please contact [facility] directly at [phone].  Normal or non-critical lab and imaging results will be communicated to you by MyChart, letter or phone within 4 business days after the clinic has received the results. If you do not hear from us within 7 days, please contact the clinic through MyChart or phone. If you have a critical or abnormal lab result, we will notify you by phone as soon as possible.  Submit refill requests through [employer] or call your pharmacy and they will forward the refill request to us. Please allow 3 business days for your refill to be completed.          Additional Information About Your Visit        Care EveryWhere ID     This is your Care EveryWhere ID. This could be used by other organizations to access your [place] medical records  [id number]        Your Vitals Were     Pulse Temperature Height Pulse Oximetry BMI (Body Mass Index)       86 96.8  F (36  C) (Tympanic) 6' 1.75\" (1.873 m) 95% 39.55 kg/m2        Blood Pressure from Last 3 Encounters:   08/09/17 (!) 144/99   05/30/17 121/83   02/28/17 118/85    Weight from Last 3 Encounters:   08/09/17 (!) 306 lb (138.8 kg)   05/30/17 297 lb (134.7 kg)   02/28/17 293 lb (132.9 kg)              We Performed the Following     ORTHO  REFERRAL          Today's Medication Changes          These changes are accurate as of: 8/9/17  2:15 PM.  If you have any questions, ask your nurse or doctor.               Start taking these medicines.        Dose/Directions    cyclobenzaprine 5 MG tablet   Commonly known as:  FLEXERIL   Used for:  Acute low back pain, unspecified back pain laterality, with sciatica presence unspecified, Back muscle spasm   Started by:  Jo-Ann Baird MD        Dose:  5 mg   Take 1 tablet (5 mg) by mouth 3 times daily as needed for muscle spasms   Quantity:  42 " tablet   Refills:  1       methylPREDNISolone 4 MG tablet   Commonly known as:  MEDROL DOSEPAK   Used for:  Acute low back pain, unspecified back pain laterality, with sciatica presence unspecified, Back muscle spasm   Started by:  Jo-Ann Baird MD        Follow package instructions   Quantity:  21 tablet   Refills:  0            Where to get your medicines      These medications were sent to [facility], [place] - [address], [address]  [address], [address], [place] [address]     Phone:  [phone]     cyclobenzaprine 5 MG tablet    methylPREDNISolone 4 MG tablet                Primary Care Provider Office Phone # Fax #    Morelia Pedraza, [phone] [address]  [place] [address]        Equal Access to Services     : Hadii sol pittman hadasho Soomaali, waaxda luqadaha, qaybta kaalmada adeegyada, laya friend . So [place] [phone].    ATENCIÓN: Si habla español, tiene a hale disposición servicios gratuitos de asistencia lingüística. Llame al [phone].    We comply with applicable federal civil rights laws and [place] laws. We do not discriminate on the basis of race, color, national origin, age, disability sex, sexual orientation or gender identity.            Thank you!     Thank you for choosing [facility]  for your care. Our goal is always to provide you with excellent care. Hearing back from our patients is one way we can continue to improve our services. Please take a few minutes to complete the written survey that you may receive in the mail after your visit with us. Thank you!             Your Updated Medication List - Protect others around you: Learn how to safely use, store and throw away your medicines at www.disposemymeds.org.          This list is accurate as of: 8/9/17  2:15 PM.  Always use your most recent med list.                   Brand Name Dispense Instructions  for use Diagnosis    albuterol 108 (90 BASE) MCG/ACT Inhaler    PROAIR HFA/PROVENTIL HFA/VENTOLIN HFA    1 Inhaler    Inhale 2 puffs into the lungs every 4 hours as needed for shortness of breath / dyspnea or wheezing    Shortness of breath       aspirin 81 MG EC tablet     90 tablet    TAKE ONE TABLET BY MOUTH EVERY DAY    Coronary artery disease involving native coronary artery of native heart without angina pectoris       atorvastatin 80 MG tablet    LIPITOR    90 tablet    TAKE ONE TABLET BY MOUTH EVERY DAY    Coronary artery disease involving native coronary artery of native heart without angina pectoris       B-D SINGLE USE SWABS REGULAR Pads     100 each    USE AS NEEDED    Type 2 diabetes mellitus with diabetic neuropathy, without long-term current use of insulin (H)       blood glucose lancets standard    no brand specified    1 Box    Use to test blood sugar daily or as directed. ONE TOUCH VERIO.    Type 2 diabetes mellitus with diabetic neuropathy, without long-term current use of insulin (H)       blood glucose monitoring meter device kit    no brand specified    1 kit    Use to test blood sugar 1 times daily or as directed. ONE TOUCH VERIO.    Type 2 diabetes mellitus with diabetic neuropathy, without long-term current use of insulin (H)       clopidogrel 75 MG tablet    PLAVIX    90 tablet    Take 1 tablet (75 mg) by mouth daily    Coronary artery disease involving coronary bypass graft of native heart without angina pectoris       cyanocobalamin 1000 MCG tablet    vitamin  B-12    100 tablet    TAKE ONE TABLET BY MOUTH EVERY DAY    Vitamin B12 deficiency (non anemic)       cyclobenzaprine 5 MG tablet    FLEXERIL    42 tablet    Take 1 tablet (5 mg) by mouth 3 times daily as needed for muscle spasms    Acute low back pain, unspecified back pain laterality, with sciatica presence unspecified, Back muscle spasm       ferrous sulfate 325 (65 FE) MG tablet    IRON    30 tablet    TAKE ONE TABLET BY MOUTH  EVERY MORNING WITH BREAKFAST    Iron deficiency       gabapentin 300 MG capsule    NEURONTIN    270 capsule    TAKE THREE CAPSULES BY MOUTH THREE TIMES A DAY    Type 2 diabetes mellitus with diabetic neuropathy, without long-term current use of insulin (H)       hydrOXYzine 25 MG tablet    ATARAX    120 tablet    TAKE ONE TO TWO TABLETS BY MOUTH EVERY 6 HOURS AS NEEDED FOR ANXIETY    Anxiety       ibuprofen 800 MG tablet    ADVIL/MOTRIN    60 tablet    Take 1 tablet (800 mg) by mouth every 8 hours as needed for moderate pain    Left inguinal hernia       isosorbide mononitrate 30 MG 24 hr tablet    IMDUR    45 tablet    Take 0.5 tablets (15 mg) by mouth daily    Coronary artery disease involving coronary bypass graft of native heart without angina pectoris       lisinopril 5 MG tablet    PRINIVIL/ZESTRIL    90 tablet    TAKE ONE TABLET BY MOUTH EVERY DAY    Type 2 diabetes mellitus with diabetic neuropathy, without long-term current use of insulin (H)       loratadine 10 MG tablet    CLARITIN    90 tablet    TAKE ONE TABLET BY MOUTH EVERY DAY    Seasonal allergies       MAPAP 500 MG tablet   Generic drug:  acetaminophen     100 tablet    TAKE TWO TABLETS BY MOUTH EVERY 8 HOURS AS NEEDED FOR MILD PAIN    Unilateral inguinal hernia without obstruction or gangrene, recurrence not specified       MELATONIN PO      Take 10 mg by mouth nightly as needed        metFORMIN 500 MG tablet    GLUCOPHAGE    360 tablet    TAKE TWO TABLETS BY MOUTH TWICE A DAY WITH MEALS    Type 2 diabetes mellitus with diabetic neuropathy, without long-term current use of insulin (H)       methylPREDNISolone 4 MG tablet    MEDROL DOSEPAK    21 tablet    Follow package instructions    Acute low back pain, unspecified back pain laterality, with sciatica presence unspecified, Back muscle spasm       metoprolol 25 MG 24 hr tablet    TOPROL-XL    180 tablet    TAKE ONE TABLET BY MOUTH TWICE A DAY    Coronary artery disease involving native coronary  artery of native heart without angina pectoris       nitroGLYcerin 0.4 MG sublingual tablet    NITROSTAT    25 tablet    PLACE 1 TABLET UNDER THE TONGUE AT THE ONSET OF CHEST PAIN. MAY REPEAT EVERY 5 MINUTES AS NEEDED FOR A TOTAL OF 3 DOSES.    Incomplete RBBB       omega-3 acid ethyl esters 1 G capsule    Lovaza     Take 2 g by mouth 2 times daily        omeprazole 20 MG CR capsule    priLOSEC    180 capsule    Take 1 capsule (20 mg) by mouth 2 times daily Take 30-60 minutes before a meal.    Gastroesophageal reflux disease without esophagitis       ONE TOUCH VERIO IQ test strip   Generic drug:  blood glucose monitoring     100 each    TEST ONCE DAILY OR AS DIRECTED    Type 2 diabetes mellitus with diabetic neuropathy, without long-term current use of insulin (H)       polyethylene glycol powder    MIRALAX/GLYCOLAX    500 g    Take 17 g by mouth daily as needed for constipation    Constipation, unspecified constipation type       sodium chloride 0.65 % nasal spray    OCEAN    1 Bottle    Spray 1 spray into both nostrils 4 times daily as needed for congestion    Viral illness       tamsulosin 0.4 MG capsule    FLOMAX    90 capsule    TAKE ONE CAPSULE BY MOUTH EVERY DAY    Benign prostatic hyperplasia, presence of lower urinary tract symptoms unspecified       triamcinolone 0.1 % cream    KENALOG     Apply topically daily as needed for irritation (under Arms)        vitamin D 2000 UNITS tablet     100 tablet    TAKE ONE TABLET BY MOUTH EVERY DAY    Vitamin D deficiency

## 2017-08-09 NOTE — PROGRESS NOTES
Chief Complaint:     Acute on chronic mechanical low back pain    HPI:   Patient Javier Markham is a very pleasant 52 year old male with history of chronic obesity and chronic low back pain who presents to Internal Medicine clinic today for evaluation of acute on chronic mechanical low back pain with muscle spasms.    Back Pain       Duration: 6 days ago        Specific cause: changing a tire    Description:   Location of pain: low back both, middle   Character of pain: sharp, dull ache and stabbing  Pain radiation:radiates into the right buttocks, radiates into the right leg, radiates into the left buttocks and radiates into the left leg  New numbness or weakness in legs, not attributed to pain:  YES    Intensity: Currently 7/10, At its worst 9/10    History:   Pain interferes with job: Not applicable  History of back problems: yes, but recently  Any previous MRI or X-rays: None  Sees a specialist for back pain:  No  Therapies tried without relief: heat and NSAIDs    Alleviating factors:   Improved by: nothing      Precipitating factors:  Worsened by: Lifting, Bending, Standing, Sitting, Lying Flat and Walking    Functional and Psychosocial Screen (Reena STarT Back):      Not performed today    Accompanying Signs & Symptoms:  Risk of Fracture:  None  Risk of Cauda Equina:  None  Risk of Infection:  None  Risk of Cancer:  None  Risk of Ankylosing Spondylitis:  Onset at age <35, male, AND morning back stiffness. no         Current Medications:     Current Outpatient Prescriptions   Medication Sig Dispense Refill     cyclobenzaprine (FLEXERIL) 5 MG tablet Take 1 tablet (5 mg) by mouth 3 times daily as needed for muscle spasms 42 tablet 1     methylPREDNISolone (MEDROL DOSEPAK) 4 MG tablet Follow package instructions 21 tablet 0     metoprolol (TOPROL-XL) 25 MG 24 hr tablet TAKE ONE TABLET BY MOUTH TWICE A  tablet 3     lisinopril (PRINIVIL/ZESTRIL) 5 MG tablet TAKE ONE TABLET BY MOUTH EVERY DAY 90 tablet 3      metFORMIN (GLUCOPHAGE) 500 MG tablet TAKE TWO TABLETS BY MOUTH TWICE A DAY WITH MEALS 360 tablet 1     Alcohol Swabs (B-D SINGLE USE SWABS REGULAR) PADS USE AS NEEDED 100 each 3     hydrOXYzine (ATARAX) 25 MG tablet TAKE ONE TO TWO TABLETS BY MOUTH EVERY 6 HOURS AS NEEDED FOR ANXIETY 120 tablet 3     atorvastatin (LIPITOR) 80 MG tablet TAKE ONE TABLET BY MOUTH EVERY DAY 90 tablet 3     Cholecalciferol (VITAMIN D) 2000 UNITS tablet TAKE ONE TABLET BY MOUTH EVERY  tablet 3     tamsulosin (FLOMAX) 0.4 MG capsule TAKE ONE CAPSULE BY MOUTH EVERY DAY 90 capsule 3     ONE TOUCH VERIO IQ test strip TEST ONCE DAILY OR AS DIRECTED 100 each 5     gabapentin (NEURONTIN) 300 MG capsule TAKE THREE CAPSULES BY MOUTH THREE TIMES A  capsule 5     aspirin 81 MG EC tablet TAKE ONE TABLET BY MOUTH EVERY DAY 90 tablet 0     MAPAP 500 MG tablet TAKE TWO TABLETS BY MOUTH EVERY 8 HOURS AS NEEDED FOR MILD PAIN 100 tablet 5     nitroglycerin (NITROSTAT) 0.4 MG sublingual tablet PLACE 1 TABLET UNDER THE TONGUE AT THE ONSET OF CHEST PAIN. MAY REPEAT EVERY 5 MINUTES AS NEEDED FOR A TOTAL OF 3 DOSES. 25 tablet 1     omega-3 acid ethyl esters (LOVAZA) 1 G capsule Take 2 g by mouth 2 times daily       omeprazole (PRILOSEC) 20 MG CR capsule Take 1 capsule (20 mg) by mouth 2 times daily Take 30-60 minutes before a meal. 180 capsule 1     cyanocobalamin (VITAMIN  B-12) 1000 MCG tablet TAKE ONE TABLET BY MOUTH EVERY  tablet 3     sodium chloride (OCEAN) 0.65 % nasal spray Spray 1 spray into both nostrils 4 times daily as needed for congestion 1 Bottle 0     loratadine (CLARITIN) 10 MG tablet TAKE ONE TABLET BY MOUTH EVERY DAY 90 tablet 3     polyethylene glycol (MIRALAX/GLYCOLAX) powder Take 17 g by mouth daily as needed for constipation 500 g 5     isosorbide mononitrate (IMDUR) 30 MG 24 hr tablet Take 0.5 tablets (15 mg) by mouth daily 45 tablet 3     clopidogrel (PLAVIX) 75 MG tablet Take 1 tablet (75 mg) by mouth daily 90  tablet 3     blood glucose monitoring (NO BRAND SPECIFIED) meter device kit Use to test blood sugar 1 times daily or as directed. ONE TOUCH VERIO. 1 kit 0     blood glucose (NO BRAND SPECIFIED) lancets standard Use to test blood sugar daily or as directed. ONE TOUCH VERIO. 1 Box 5     ibuprofen (ADVIL,MOTRIN) 800 MG tablet Take 1 tablet (800 mg) by mouth every 8 hours as needed for moderate pain 60 tablet 1     ferrous sulfate (IRON) 325 (65 FE) MG tablet TAKE ONE TABLET BY MOUTH EVERY MORNING WITH BREAKFAST 30 tablet 11     MELATONIN PO Take 10 mg by mouth nightly as needed       triamcinolone (KENALOG) 0.1 % cream Apply topically daily as needed for irritation (under Arms)       albuterol (PROAIR HFA, PROVENTIL HFA, VENTOLIN HFA) 108 (90 BASE) MCG/ACT inhaler Inhale 2 puffs into the lungs every 4 hours as needed for shortness of breath / dyspnea or wheezing 1 Inhaler 1         Allergies:      Allergies   Allergen Reactions     Perfume      Soap      Perfume in soap-reaction excema            Past Medical History:     Past Medical History:   Diagnosis Date     Anxiety      CAD (coronary artery disease) Dec 2014    s/p CABGx3 Dec 2014 and later RCA stent July 2015     DM2 (diabetes mellitus, type 2) (H) Dx June 2015    A1c 6.5% at time of dx     GERD (gastroesophageal reflux disease)      Heart attack (H) 12/2014, 6/2015, 7/4/2015    x3     History of cocaine abuse     Smoked crack cocaine (remission since Nov 2014)     History of tobacco abuse      Hyperlipidemia      Incomplete RBBB      Inguinal hernia     Left     Numbness and tingling     diabetic neuropathy     Prediabetes Dec 2014     Restless leg syndrome      S/P CABG x 3 Dec 2014     Sleep apnea     mild, does not use CPAP     Stented coronary artery          Past Surgical History:     Past Surgical History:   Procedure Laterality Date     C CABG, VEIN, THREE  12/23/2014     C NONSPECIFIC PROCEDURE  age 15    both feet bone spur removal in the arch of the  foot     CARDIAC SURGERY      stents     HERNIORRHAPHY INGUINAL Left 9/20/2016    Procedure: HERNIORRHAPHY INGUINAL;  Surgeon: Haim Green MD;  Location: [facility]         Family Medical History:     Family History   Problem Relation Age of Onset     DIABETES Mother      type 2     Breast Cancer Mother      Macular Degeneration Mother      Dementia Mother      HEART DISEASE Paternal Uncle      DIABETES Maternal Grandfather      Breast Cancer Maternal Grandmother      Coronary Artery Disease Father 52     Prostate Cancer No family hx of          Social History:     Social History     Social History     Marital status: Single     Spouse name: N/A     Number of children: 2     Years of education: N/A     Occupational History     nurse assistant [employer]     Social History Main Topics     Smoking status: Former Smoker     Packs/day: 0.50     Years: 25.00     Types: Cigarettes     Quit date: 11/1/2015     Smokeless tobacco: Never Used     Alcohol use No     Drug use: No      Comment: Past coccaine 2 weeks ago as of 11/11/14; no h/o IVDA. Smoked cocaine (did not snort)     Sexual activity: Yes     Partners: Female     Other Topics Concern     Not on file     Social History Narrative           Review of System:     Constitutional: Negative for fever or chills  Skin: Negative for rashes  Ears/Nose/Throat: Negative for nasal congestion, sore throat  Respiratory: No shortness of breath, dyspnea on exertion, cough, or hemoptysis  Cardiovascular: Negative for chest pain  Gastrointestinal: Negative for nausea, vomitting  Genitourinary: Negative for dysuria, hematuria  Musculoskeletal: Positive for chronic low back pain with muscle spasms.  Neurologic: Negative for headaches  Psychiatric: Negative for depression, anxiety  Hematologic/Lymphatic/Immunologic: Negative  Endocrine: Negative  Behavioral: Negative for tobacco use       Physical Exam:   BP (!) 144/99 (BP Location: Left arm, Cuff Size: Adult Large)   "Pulse 86  Temp 96.8  F (36  C) (Tympanic)  Ht 6' 1.75\" (1.873 m)  Wt (!) 306 lb (138.8 kg)  SpO2 95%  BMI 39.55 kg/m2    GENERAL: obese, alert and no acute distress  EYES: eyes grossly normal to inspection, and conjunctivae and sclerae normal  HENT: Normocephalic atraumatic. Nose and mouth without ulcers or lesions  NECK: supple  RESP: lungs clear to auscultation   CV: regular rate and rhythm, normal S1 S2  LYMPH: no peripheral edema   ABDOMEN: obese  MS: Lumbar low back pain symptoms present with tenderness to palpitation and muscle spasms diffusely over the lower back  SKIN: no suspicious lesions or rashes  NEURO: Alert & Oriented x 3.   PSYCH: mentation appears normal, affect normal      ASSESSMENT/PLAN:       (M54.5) Acute low back pain, unspecified back pain laterality, with sciatica presence unspecified  (primary encounter diagnosis)  (M62.830) Back muscle spasm    Comment: New low back with muscle spasm after the patient hurt his back while changing tires on a van last week.    Plan: I have ordered ORTHO  REFERRAL for further evaluation of low back pain going forward. I have also ordered cyclobenzaprine (FLEXERIL) 5 MG tablet, and methylPREDNISolone       (MEDROL DOSEPAK) 4 MG tablet medications for pain relief.        Follow Up Plan:     Patient is instructed to return to Internal Medicine clinic for follow-up visit on an as-needed basis going forward.        Jo-Ann Baird MD  Internal Medicine  [facility]    "

## 2017-08-11 LAB
CHOLEST SERPL-MCNC: 193 MG/DL (ref 100–199)
HDLC SERPL-MCNC: 41 MG/DL
LDLC SERPL CALC-MCNC: 80 MG/DL
NONHDLC SERPL-MCNC: 152 MG/DL
TRIGL SERPL-MCNC: 360 MG/DL

## 2017-08-23 ENCOUNTER — OFFICE VISIT (OUTPATIENT)
Dept: NEUROSURGERY | Facility: CLINIC | Age: 52
End: 2017-08-23
Attending: PHYSICIAN ASSISTANT
Payer: COMMERCIAL

## 2017-08-23 VITALS
OXYGEN SATURATION: 97 % | BODY MASS INDEX: 38.37 KG/M2 | TEMPERATURE: 98.4 F | SYSTOLIC BLOOD PRESSURE: 137 MMHG | HEART RATE: 88 BPM | WEIGHT: 299 LBS | DIASTOLIC BLOOD PRESSURE: 92 MMHG | HEIGHT: 74 IN

## 2017-08-23 DIAGNOSIS — M54.16 LUMBAR RADICULOPATHY: Primary | ICD-10-CM

## 2017-08-23 PROCEDURE — 99203 OFFICE O/P NEW LOW 30 MIN: CPT | Performed by: PHYSICIAN ASSISTANT

## 2017-08-23 PROCEDURE — 99211 OFF/OP EST MAY X REQ PHY/QHP: CPT | Performed by: PHYSICIAN ASSISTANT

## 2017-08-23 NOTE — PROGRESS NOTES
Dr. Facundo Oliver  [facility]  Neurosurgery Clinic Visit      CC: Low back pain    Primary care Provider: Morelia Pedraza      Reason For Visit:   I was asked to consult on the patient for low back pain.      HPI: Javier Markham is a 52 year old male who presents for evaluation of low back pain x 2 weeks. Sustained the injury while changing the tire on his car. He also injured his back in the 1980's in the same location while shoveling rocks at work. Pain is located in bilateral low back and radiates into bilateral buttocks. Describes the pain as constantly achy with intermittent sharpness. Pain worsens with walking. Pain is alleviated with use of a heating pad. He was also given flexeril and a medrol dosepak by his PCP, which he believes helped with the pain. He does not have any recent imaging, has not done physical therapy or had any injections. Denies bladder/bowel changes or gait issues.  Current pain: 4-5/10 At worst: 7/10    Past Medical History:   Diagnosis Date     Anxiety      CAD (coronary artery disease) Dec 2014    s/p CABGx3 Dec 2014 and later RCA stent July 2015     DM2 (diabetes mellitus, type 2) (H) Dx June 2015    A1c 6.5% at time of dx     GERD (gastroesophageal reflux disease)      Heart attack (H) 12/2014, 6/2015, 7/4/2015    x3     History of cocaine abuse     Smoked crack cocaine (remission since Nov 2014)     History of tobacco abuse      Hyperlipidemia      Incomplete RBBB      Inguinal hernia     Left     Numbness and tingling     diabetic neuropathy     Prediabetes Dec 2014     Restless leg syndrome      S/P CABG x 3 Dec 2014     Sleep apnea     mild, does not use CPAP     Stented coronary artery        Past Medical History reviewed with patient during visit.    Past Surgical History:   Procedure Laterality Date     C CABG, VEIN, THREE  12/23/2014     C NONSPECIFIC PROCEDURE  age 15    both feet bone spur removal in the arch of the foot     CARDIAC SURGERY       stents     HERNIORRHAPHY INGUINAL Left 9/20/2016    Procedure: HERNIORRHAPHY INGUINAL;  Surgeon: Haim Green MD;  [facility]     Past Surgical History reviewed with patient during visit.    Current Outpatient Prescriptions   Medication     cyclobenzaprine (FLEXERIL) 5 MG tablet     methylPREDNISolone (MEDROL DOSEPAK) 4 MG tablet     metoprolol (TOPROL-XL) 25 MG 24 hr tablet     lisinopril (PRINIVIL/ZESTRIL) 5 MG tablet     metFORMIN (GLUCOPHAGE) 500 MG tablet     Alcohol Swabs (B-D SINGLE USE SWABS REGULAR) PADS     hydrOXYzine (ATARAX) 25 MG tablet     atorvastatin (LIPITOR) 80 MG tablet     Cholecalciferol (VITAMIN D) 2000 UNITS tablet     tamsulosin (FLOMAX) 0.4 MG capsule     ONE TOUCH VERIO IQ test strip     gabapentin (NEURONTIN) 300 MG capsule     aspirin 81 MG EC tablet     MAPAP 500 MG tablet     nitroglycerin (NITROSTAT) 0.4 MG sublingual tablet     omega-3 acid ethyl esters (LOVAZA) 1 G capsule     omeprazole (PRILOSEC) 20 MG CR capsule     cyanocobalamin (VITAMIN  B-12) 1000 MCG tablet     sodium chloride (OCEAN) 0.65 % nasal spray     loratadine (CLARITIN) 10 MG tablet     polyethylene glycol (MIRALAX/GLYCOLAX) powder     isosorbide mononitrate (IMDUR) 30 MG 24 hr tablet     clopidogrel (PLAVIX) 75 MG tablet     blood glucose monitoring (NO BRAND SPECIFIED) meter device kit     blood glucose (NO BRAND SPECIFIED) lancets standard     ibuprofen (ADVIL,MOTRIN) 800 MG tablet     ferrous sulfate (IRON) 325 (65 FE) MG tablet     MELATONIN PO     triamcinolone (KENALOG) 0.1 % cream     albuterol (PROAIR HFA, PROVENTIL HFA, VENTOLIN HFA) 108 (90 BASE) MCG/ACT inhaler     No current facility-administered medications for this visit.        Allergies   Allergen Reactions     Perfume      Soap      Perfume in soap-reaction excema       Social History     Social History     Marital status: Single     Spouse name: N/A     Number of children: 2     Years of education: N/A     Occupational History      "nurse assistant [employer]     Social History Main Topics     Smoking status: Former Smoker     Packs/day: 0.50     Years: 25.00     Types: Cigarettes     Quit date: 11/1/2015     Smokeless tobacco: Never Used     Alcohol use No     Drug use: No      Comment: Past coccaine 2 weeks ago as of 11/11/14; no h/o IVDA. Smoked cocaine (did not snort)     Sexual activity: Yes     Partners: Female     Other Topics Concern     None     Social History Narrative       Family History   Problem Relation Age of Onset     DIABETES Mother      type 2     Breast Cancer Mother      Macular Degeneration Mother      Dementia Mother      Coronary Artery Disease Father 52     HEART DISEASE Paternal Uncle      DIABETES Maternal Grandfather      Breast Cancer Maternal Grandmother      Prostate Cancer No family hx of           ROS: 10 point ROS neg other than the symptoms noted above in the HPI.    Vital Signs: BP (!) 137/92 (BP Location: Left arm, Cuff Size: Adult Large)  Pulse 88  Temp 98.4  F (36.9  C) (Oral)  Ht 6' 1.5\" (1.867 m)  Wt 299 lb (135.6 kg)  SpO2 97%  BMI 38.91 kg/m2    Examination:  Constitutional:  Alert, obese, NAD.  HEENT: Normocephalic, atraumatic.   Pulmonary:  Without shortness of breath, normal effort.   Lymph: no lymphadenopathy to low back or LE.   Integumentary: Skin is free of rashes or lesions.   Cardiovascular:  No pitting edema of BLE.      Neurological:  Awake  Alert  Oriented x 3  Speech clear  Cranial nerves II - XII grossly intact  PERRL  EOMI  Face symmetric  Tongue midline  Motor exam   Hip Flexor:                Right: 5/5  Left:  5/5  Hip Adductor:             Right:  5/5  Left:  5/5  Hip Abductor:             Right:  5/5  Left:  5/5  Gastroc Soleus:        Right:  5/5  Left:  5/5  Tib/Ant:                      Right:  5/5  Left:  5/5  EHL:                          Right:  5/5  Left:  5/5       Sensation normal to bilateral upper and lower extremities.    Reflexes are 2+ in the patellar " and Achilles. There is no clonus. Downgoing Babinski.    Musculoskeletal:  Gait: Able to stand from a seated position. Normal non-antalgic, non-myelopathic gait.  Able to heel/toe walk without loss of balance  Lumbar examination reveals tenderness of the spine and paraspinous muscles bilaterally.  Hip height is symmetrical. Negative SI joint, sciatic notch or greater trochanteric tenderness to palpation bilaterally.  Straight leg raise is negative bilaterally.      Imaging:   None    Assessment/Plan:   Javier Markham is a 52 year old male who presents for evaluation of low back pain x 2 weeks. Sustained the injury while changing the tire on his car. He also injured his back in the 1980's in the same location while shoveling rocks at work. Pain is located in bilateral low back and radiates into bilateral buttocks. Describes the pain as constantly achy with intermittent sharpness. He does not have any weakness or deficits on exam, but given his radicular symptoms, I have referred him for an MRI for further evaluation and will call him with the results. Patient voiced understanding and agreement.           Wendy Will PA-C  [facility]  [address] [address]    Tel [phone]  Pager [phone]

## 2017-08-23 NOTE — NURSING NOTE
"Javier Markham is a 52 year old male who presents for:  Chief Complaint   Patient presents with     Neurologic Problem     referral from Dr. Baird for acute low back pain        Initial Vitals:  There were no vitals taken for this visit. Estimated body mass index is 39.55 kg/(m^2) as calculated from the following:    Height as of 8/9/17: 6' 1.75\" (1.873 m).    Weight as of 8/9/17: 306 lb (138.8 kg).. There is no height or weight on file to calculate BSA. BP completed using cuff size: large  Data Unavailable    Do you feel safe in your environment?  Yes  Do you need any refills today? No    Nursing Comments: referral from Dr. Baird for acute low back pain.  Patient rates his pain today as 4-5 weakness in both legs to knees      5 min. nursing intake time  Valerie Archer CMA, AAS      Discharge plan:   See providers dictation   2 min. nursing discharge time  Valerie Archer CMA, AAS       "

## 2017-08-23 NOTE — MR AVS SNAPSHOT
After Visit Summary   8/23/2017    Javier Markham    MRN: [id number]           Patient Information     Date Of Birth          1965        Visit Information        Provider Department      8/23/2017 2:25 PM Wendy Will PA-C [facility]        Today's Diagnoses     Lumbar radiculopathy    -  1      Care Instructions    Lumbar MRI ordered - will call with results          Follow-ups after your visit        Your next 10 appointments already scheduled     Oct 04, 2017 10:30 AM CDT   [facility]    [address] [facility] [address] [phone]           Bring a current list of meds and any records pertaining to this visit. For Physicals, please bring immunization records and any forms needing to be filled out. Please arrive 10 minutes early to complete paperwork.              Future tests that were ordered for you today     Open Future Orders        Priority Expected Expires Ordered    MR Lumbar Spine w/o Contrast Routine  8/23/2018 8/23/2017            Who to contact     If you have questions or need follow up information about today's clinic visit or your schedule please contact [facility] directly at [phone].  Normal or non-critical lab and imaging results will be communicated to you by MyChart, letter or phone within 4 business days after the clinic has received the results. If you do not hear from us within 7 days, please contact the clinic through MyChart or phone. If you have a critical or abnormal lab result, we will notify you by phone as soon as possible.  Submit refill requests through [employer] or call your pharmacy and they will forward the refill request to us. Please allow 3 business days for your refill to be completed.          Additional Information About Your Visit        Care EveryWhere ID     This is your Care EveryWhere ID.  "This could be used by other organizations to access your [facility] medical records  [id number]        Your Vitals Were     Pulse Temperature Height Pulse Oximetry BMI (Body Mass Index)       88 98.4  F (36.9  C) (Oral) 6' 1.5\" (1.867 m) 97% 38.91 kg/m2        Blood Pressure from Last 3 Encounters:   08/23/17 (!) 137/92   08/09/17 (!) 144/99   05/30/17 121/83    Weight from Last 3 Encounters:   08/23/17 299 lb (135.6 kg)   08/09/17 (!) 306 lb (138.8 kg)   05/30/17 297 lb (134.7 kg)               Primary Care Provider Office Phone # Fax #    Morelia Pedraza,  [phone] [address] [facility] [address]  [place] [address]        Equal Access to Services     [facility]: Hadii aad ku hadasho Sodavidali, waaxda luqadaha, qaybta kaalmada adeegyada, waxay subhash hayjerrod friend . So [facility] [phone].    ATENCIÓN: Si habla español, tiene a hale disposición servicios gratuitos de asistencia lingüística. Yasminame al [phone].    We comply with applicable federal civil rights laws and [place] laws. We do not discriminate on the basis of race, color, national origin, age, disability sex, sexual orientation or gender identity.            Thank you!     Thank you for choosing [facility]  for your care. Our goal is always to provide you with excellent care. Hearing back from our patients is one way we can continue to improve our services. Please take a few minutes to complete the written survey that you may receive in the mail after your visit with us. Thank you!             Your Updated Medication List - Protect others around you: Learn how to safely use, store and throw away your medicines at www.disposemymeds.org.          This list is accurate as of: 8/23/17  2:45 PM.  Always use your most recent med list.                   Brand Name Dispense Instructions for use Diagnosis    albuterol 108 (90 BASE) MCG/ACT Inhaler    PROAIR HFA/PROVENTIL HFA/VENTOLIN HFA    1 Inhaler "    Inhale 2 puffs into the lungs every 4 hours as needed for shortness of breath / dyspnea or wheezing    Shortness of breath       aspirin 81 MG EC tablet     90 tablet    TAKE ONE TABLET BY MOUTH EVERY DAY    Coronary artery disease involving native coronary artery of native heart without angina pectoris       atorvastatin 80 MG tablet    LIPITOR    90 tablet    TAKE ONE TABLET BY MOUTH EVERY DAY    Coronary artery disease involving native coronary artery of native heart without angina pectoris       B-D SINGLE USE SWABS REGULAR Pads     100 each    USE AS NEEDED    Type 2 diabetes mellitus with diabetic neuropathy, without long-term current use of insulin (H)       blood glucose lancets standard    no brand specified    1 Box    Use to test blood sugar daily or as directed. ONE TOUCH VERIO.    Type 2 diabetes mellitus with diabetic neuropathy, without long-term current use of insulin (H)       blood glucose monitoring meter device kit    no brand specified    1 kit    Use to test blood sugar 1 times daily or as directed. ONE TOUCH VERIO.    Type 2 diabetes mellitus with diabetic neuropathy, without long-term current use of insulin (H)       clopidogrel 75 MG tablet    PLAVIX    90 tablet    Take 1 tablet (75 mg) by mouth daily    Coronary artery disease involving coronary bypass graft of native heart without angina pectoris       cyanocobalamin 1000 MCG tablet    vitamin  B-12    100 tablet    TAKE ONE TABLET BY MOUTH EVERY DAY    Vitamin B12 deficiency (non anemic)       cyclobenzaprine 5 MG tablet    FLEXERIL    42 tablet    Take 1 tablet (5 mg) by mouth 3 times daily as needed for muscle spasms    Acute low back pain, unspecified back pain laterality, with sciatica presence unspecified, Back muscle spasm       ferrous sulfate 325 (65 FE) MG tablet    IRON    30 tablet    TAKE ONE TABLET BY MOUTH EVERY MORNING WITH BREAKFAST    Iron deficiency       gabapentin 300 MG capsule    NEURONTIN    270 capsule    TAKE  THREE CAPSULES BY MOUTH THREE TIMES A DAY    Type 2 diabetes mellitus with diabetic neuropathy, without long-term current use of insulin (H)       hydrOXYzine 25 MG tablet    ATARAX    120 tablet    TAKE ONE TO TWO TABLETS BY MOUTH EVERY 6 HOURS AS NEEDED FOR ANXIETY    Anxiety       ibuprofen 800 MG tablet    ADVIL/MOTRIN    60 tablet    Take 1 tablet (800 mg) by mouth every 8 hours as needed for moderate pain    Left inguinal hernia       isosorbide mononitrate 30 MG 24 hr tablet    IMDUR    45 tablet    Take 0.5 tablets (15 mg) by mouth daily    Coronary artery disease involving coronary bypass graft of native heart without angina pectoris       lisinopril 5 MG tablet    PRINIVIL/ZESTRIL    90 tablet    TAKE ONE TABLET BY MOUTH EVERY DAY    Type 2 diabetes mellitus with diabetic neuropathy, without long-term current use of insulin (H)       loratadine 10 MG tablet    CLARITIN    90 tablet    TAKE ONE TABLET BY MOUTH EVERY DAY    Seasonal allergies       MAPAP 500 MG tablet   Generic drug:  acetaminophen     100 tablet    TAKE TWO TABLETS BY MOUTH EVERY 8 HOURS AS NEEDED FOR MILD PAIN    Unilateral inguinal hernia without obstruction or gangrene, recurrence not specified       MELATONIN PO      Take 10 mg by mouth nightly as needed        metFORMIN 500 MG tablet    GLUCOPHAGE    360 tablet    TAKE TWO TABLETS BY MOUTH TWICE A DAY WITH MEALS    Type 2 diabetes mellitus with diabetic neuropathy, without long-term current use of insulin (H)       methylPREDNISolone 4 MG tablet    MEDROL DOSEPAK    21 tablet    Follow package instructions    Acute low back pain, unspecified back pain laterality, with sciatica presence unspecified, Back muscle spasm       metoprolol 25 MG 24 hr tablet    TOPROL-XL    180 tablet    TAKE ONE TABLET BY MOUTH TWICE A DAY    Coronary artery disease involving native coronary artery of native heart without angina pectoris       nitroGLYcerin 0.4 MG sublingual tablet    NITROSTAT    25 tablet     PLACE 1 TABLET UNDER THE TONGUE AT THE ONSET OF CHEST PAIN. MAY REPEAT EVERY 5 MINUTES AS NEEDED FOR A TOTAL OF 3 DOSES.    Incomplete RBBB       omega-3 acid ethyl esters 1 G capsule    Lovaza     Take 2 g by mouth 2 times daily        omeprazole 20 MG CR capsule    priLOSEC    180 capsule    Take 1 capsule (20 mg) by mouth 2 times daily Take 30-60 minutes before a meal.    Gastroesophageal reflux disease without esophagitis       ONE TOUCH VERIO IQ test strip   Generic drug:  blood glucose monitoring     100 each    TEST ONCE DAILY OR AS DIRECTED    Type 2 diabetes mellitus with diabetic neuropathy, without long-term current use of insulin (H)       polyethylene glycol powder    MIRALAX/GLYCOLAX    500 g    Take 17 g by mouth daily as needed for constipation    Constipation, unspecified constipation type       sodium chloride 0.65 % nasal spray    OCEAN    1 Bottle    Spray 1 spray into both nostrils 4 times daily as needed for congestion    Viral illness       tamsulosin 0.4 MG capsule    FLOMAX    90 capsule    TAKE ONE CAPSULE BY MOUTH EVERY DAY    Benign prostatic hyperplasia, presence of lower urinary tract symptoms unspecified       triamcinolone 0.1 % cream    KENALOG     Apply topically daily as needed for irritation (under Arms)        vitamin D 2000 UNITS tablet     100 tablet    TAKE ONE TABLET BY MOUTH EVERY DAY    Vitamin D deficiency

## 2017-08-30 ENCOUNTER — HOSPITAL ENCOUNTER (OUTPATIENT)
Dept: MRI IMAGING | Facility: CLINIC | Age: 52
Discharge: HOME | End: 2017-08-30
Attending: PHYSICIAN ASSISTANT | Admitting: PHYSICIAN ASSISTANT
Payer: COMMERCIAL

## 2017-08-30 DIAGNOSIS — M54.16 LUMBAR RADICULOPATHY: ICD-10-CM

## 2017-08-30 DIAGNOSIS — E11.40 TYPE 2 DIABETES MELLITUS WITH DIABETIC NEUROPATHY, WITHOUT LONG-TERM CURRENT USE OF INSULIN (H): ICD-10-CM

## 2017-08-30 PROCEDURE — 72148 MRI LUMBAR SPINE W/O DYE: CPT

## 2017-08-30 NOTE — TELEPHONE ENCOUNTER
ONE TOUCH VERIO IQ test strip      Last Written Prescription Date: 6/302017  Last Fill Quantity: 100,  # refills: 5   Last Office Visit with [facility], [facility] or [facility] prescribing provider: 5/30/2017                                         Next 5 appointments (look out 90 days)     Oct 04, 2017 10:30 AM CDT   [facility]    [address] [facility] [address]   [phone]

## 2017-08-31 ENCOUNTER — TELEPHONE (OUTPATIENT)
Dept: NEUROSURGERY | Facility: CLINIC | Age: 52
End: 2017-08-31

## 2017-08-31 RX ORDER — BLOOD SUGAR DIAGNOSTIC
STRIP MISCELLANEOUS
Start: 2017-08-31

## 2017-08-31 NOTE — TELEPHONE ENCOUNTER
Spoke with patient regarding MRI results. Discussed pain management referral, but patient states his back pain has been improving and he does not feel as though he needs this at this time. Advised if his pain flares up or he decides he would like this referral placed, he can call our clinic and we will place this order. Patient voiced understanding and agreement.

## 2017-09-14 ENCOUNTER — OFFICE VISIT (OUTPATIENT)
Dept: SLEEP MEDICINE | Facility: CLINIC | Age: 52
End: 2017-09-14
Payer: COMMERCIAL

## 2017-09-14 VITALS
HEART RATE: 83 BPM | DIASTOLIC BLOOD PRESSURE: 89 MMHG | SYSTOLIC BLOOD PRESSURE: 138 MMHG | BODY MASS INDEX: 38.76 KG/M2 | HEIGHT: 74 IN | WEIGHT: 302 LBS | OXYGEN SATURATION: 97 % | RESPIRATION RATE: 18 BRPM

## 2017-09-14 DIAGNOSIS — F51.04 CHRONIC INSOMNIA: ICD-10-CM

## 2017-09-14 DIAGNOSIS — G47.33 OSA (OBSTRUCTIVE SLEEP APNEA): Primary | ICD-10-CM

## 2017-09-14 DIAGNOSIS — G25.81 RESTLESS LEGS SYNDROME (RLS): ICD-10-CM

## 2017-09-14 PROCEDURE — 99214 OFFICE O/P EST MOD 30 MIN: CPT | Performed by: INTERNAL MEDICINE

## 2017-09-14 RX ORDER — ZOLPIDEM TARTRATE 10 MG/1
TABLET ORAL
Qty: 1 TABLET | Refills: 0 | Status: SHIPPED | OUTPATIENT
Start: 2017-09-14 | End: 2017-10-04

## 2017-09-14 NOTE — PATIENT INSTRUCTIONS

## 2017-09-14 NOTE — MR AVS SNAPSHOT
After Visit Summary   9/14/2017    Javier Markham    MRN: [id number]           Patient Information     Date Of Birth          1965        Visit Information        Provider Department      9/14/2017 11:00 AM Forrest Welsh MD [facility]        Today's Diagnoses     STEPHAN (obstructive sleep apnea)    -  1    Restless legs syndrome (RLS)        Chronic insomnia          Care Instructions      Your BMI is Body mass index is 39.3 kg/(m^2).  Weight management is a personal decision.  If you are interested in exploring weight loss strategies, the following discussion covers the approaches that may be successful. Body mass index (BMI) is one way to tell whether you are at a healthy weight, overweight, or obese. It measures your weight in relation to your height.  A BMI of 18.5 to 24.9 is in the healthy range. A person with a BMI of 25 to 29.9 is considered overweight, and someone with a BMI of 30 or greater is considered obese. More than two-thirds of American adults are considered overweight or obese.  Being overweight or obese increases the risk for further weight gain. Excess weight may lead to heart disease and diabetes.  Creating and following plans for healthy eating and physical activity may help you improve your health.  Weight control is part of healthy lifestyle and includes exercise, emotional health, and healthy eating habits. Careful eating habits lifelong are the mainstay of weight control. Though there are significant health benefits from weight loss, long-term weight loss with diet alone may be very difficult to achieve- studies show long-term success with dietary management in less than 10% of people. Attaining a healthy weight may be especially difficult to achieve in those with severe obesity. In some cases, medications, devices and surgical management might be considered.  What can you do?  If you are overweight or obese and are interested in methods for weight loss,  you should discuss this with your provider.     Consider reducing daily calorie intake by 500 calories.     Keep a food journal.     Avoiding skipping meals, consider cutting portions instead.    Diet combined with exercise helps maintain muscle while optimizing fat loss. Strength training is particularly important for building and maintaining muscle mass. Exercise helps reduce stress, increase energy, and improves fitness. Increasing exercise without diet control, however, may not burn enough calories to loose weight.       Start walking three days a week 10-20 minutes at a time    Work towards walking thirty minutes five days a week     Eventually, increase the speed of your walking for 1-2 minutes at time    In addition, we recommend that you review healthy lifestyles and methods for weight loss available through the National Institutes of Health patient information sites:  http://win.niddk.nih.gov/publications/index.htm    And look into health and wellness programs that may be available through your health insurance provider, employer, local community center, or donaldo club.    Weight management plan: Patient was referred to their PCP to discuss a diet and exercise plan.              Follow-ups after your visit        Additional Services     SLEEP PSYCHOLOGY REFERRAL       Referral Urgency: Next available    Dr. Sky Campos is at the following clinics on the following days:  [facility] - [address] [place] [address]        Thursday and Friday (PLEASE CALL [phone] to schedule an appointment).  [facility] [address] [place]       Wednesdays   (PLEASE CALL [phone] to schedule an appointment).     Please be aware that coverage of these services is subject to the terms and limitations of your health insurance plan. Call member services at your health plan with any benefit or coverage questions.     Please bring the following to your appointment:  >> List of current  medications   >> This referral request   >> Any documents/labs given to you for this referral                  Your next 10 appointments already scheduled     Oct 04, 2017 10:30 AM CDT   Office Visit with Morelia Pedraza DO   [facility]    [address]  [facility] [id number] [phone]           Bring a current list of meds and any records pertaining to this visit. For Physicals, please bring immunization records and any forms needing to be filled out. Please arrive 10 minutes early to complete paperwork.            Oct 18, 2017  8:30 PM CDT   PSG Split with BED 2 SH SLEEP   [facility]    [address]  [facility] [address] [phone]            Nov 01, 2017 11:30 AM CDT   Return Sleep Patient with Forrest Welsh MD   [facility]    [address]  [facility] [address] [phone]            Nov 07, 2017 10:00 AM CST   New Sleep Patient with Sky Campos PsyD   [facility]    [address] [facility] [id number] [phone]              Future tests that were ordered for you today     Open Future Orders        Priority Expected Expires Ordered    Comprehensive Sleep Study Routine  3/13/2018 9/14/2017            Who to contact     If you have questions or need follow up information about today's clinic visit or your schedule please contact [facility] directly at [phone].  Normal or non-critical lab and imaging results will be communicated to you by MyChart, letter or phone within 4 business days after the clinic has received the results. If you do not hear from us within 7 days, please contact the clinic through MyChart or phone. If you have a critical or abnormal lab result, we will notify you by phone as soon as possible.  Submit refill  "requests through [facility] or call your pharmacy and they will forward the refill request to us. Please allow 3 business days for your refill to be completed.          Additional Information About Your Visit        [facility] Information     [facility] lets you send messages to your doctor, view your test results, renew your prescriptions, schedule appointments and more. To sign up, go to [email or web address] . Click on \"Log in\" on the left side of the screen, which will take you to the Welcome page. Then click on \"Sign up Now\" on the right side of the page.     You will be asked to enter the access code listed below, as well as some personal information. Please follow the directions to create your username and password.     Your access code is: [email or web address]  Expires: 2017 11:52 AM     Your access code will  in 90 days. If you need help or a new code, please call your [facility] clinic or [phone].        Care EveryWhere ID     This is your Care EveryWhere ID. This could be used by other organizations to access your [facility] medical records  [id number]        Your Vitals Were     Pulse Respirations Height Pulse Oximetry BMI (Body Mass Index)       83 18 1.867 m (6' 1.5\") 97% 39.3 kg/m2        Blood Pressure from Last 3 Encounters:   17 138/89   17 (!) 137/92   17 (!) 144/99    Weight from Last 3 Encounters:   17 (!) 137 kg (302 lb)   17 135.6 kg (299 lb)   17 (!) 138.8 kg (306 lb)              We Performed the Following     SLEEP PSYCHOLOGY REFERRAL          Today's Medication Changes          These changes are accurate as of: 17 11:52 AM.  If you have any questions, ask your nurse or doctor.               Start taking these medicines.        Dose/Directions    zolpidem 10 MG tablet   Commonly known as:  AMBIEN        Take tablet by mouth 15 minutes prior to sleep, for Sleep Study   Quantity:  1 tablet   Refills:  0            Where to get your medicines      Some " of these will need a paper prescription and others can be bought over the counter.  Ask your nurse if you have questions.     Bring a paper prescription for each of these medications     zolpidem 10 MG tablet                Primary Care Provider Office Phone # Fax #    Morelia Pedraza [phone] [address] [facility] [address]  [facility] [address]        Equal Access to Services     SHARON SIM : Hadii aad ku hadasho Soomaali, waaxda luqadaha, qaybta kaalmada adeegyada, waxay idiin hayaan adeeg kharash la'aan ah. So [facility] [phone].    ATENCIÓN: Si habla espmaurisio, tiene a hale disposición servicios gratuitos de asistencia lingüística. Yasmingenny al [phone].    We comply with applicable federal civil rights laws and [place] laws. We do not discriminate on the basis of race, color, national origin, age, disability sex, sexual orientation or gender identity.            Thank you!     Thank you for choosing [facility]  for your care. Our goal is always to provide you with excellent care. Hearing back from our patients is one way we can continue to improve our services. Please take a few minutes to complete the written survey that you may receive in the mail after your visit with us. Thank you!             Your Updated Medication List - Protect others around you: Learn how to safely use, store and throw away your medicines at www.disposemymeds.org.          This list is accurate as of: 9/14/17 11:52 AM.  Always use your most recent med list.                   Brand Name Dispense Instructions for use Diagnosis    albuterol 108 (90 BASE) MCG/ACT Inhaler    PROAIR HFA/PROVENTIL HFA/VENTOLIN HFA    1 Inhaler    Inhale 2 puffs into the lungs every 4 hours as needed for shortness of breath / dyspnea or wheezing    Shortness of breath       aspirin 81 MG EC tablet     90 tablet    TAKE ONE TABLET BY MOUTH EVERY DAY    Coronary artery disease involving native coronary artery of native heart  without angina pectoris       atorvastatin 80 MG tablet    LIPITOR    90 tablet    TAKE ONE TABLET BY MOUTH EVERY DAY    Coronary artery disease involving native coronary artery of native heart without angina pectoris       B-D SINGLE USE SWABS REGULAR Pads     100 each    USE AS NEEDED    Type 2 diabetes mellitus with diabetic neuropathy, without long-term current use of insulin (H)       blood glucose lancets standard    no brand specified    1 Box    Use to test blood sugar daily or as directed. ONE TOUCH VERIO.    Type 2 diabetes mellitus with diabetic neuropathy, without long-term current use of insulin (H)       blood glucose monitoring meter device kit    no brand specified    1 kit    Use to test blood sugar 1 times daily or as directed. ONE TOUCH VERIO.    Type 2 diabetes mellitus with diabetic neuropathy, without long-term current use of insulin (H)       clopidogrel 75 MG tablet    PLAVIX    90 tablet    Take 1 tablet (75 mg) by mouth daily    Coronary artery disease involving coronary bypass graft of native heart without angina pectoris       cyanocobalamin 1000 MCG tablet    vitamin  B-12    100 tablet    TAKE ONE TABLET BY MOUTH EVERY DAY    Vitamin B12 deficiency (non anemic)       cyclobenzaprine 5 MG tablet    FLEXERIL    42 tablet    Take 1 tablet (5 mg) by mouth 3 times daily as needed for muscle spasms    Acute low back pain, unspecified back pain laterality, with sciatica presence unspecified, Back muscle spasm       ferrous sulfate 325 (65 FE) MG tablet    IRON    30 tablet    TAKE ONE TABLET BY MOUTH EVERY MORNING WITH BREAKFAST    Iron deficiency       gabapentin 300 MG capsule    NEURONTIN    270 capsule    TAKE THREE CAPSULES BY MOUTH THREE TIMES A DAY    Type 2 diabetes mellitus with diabetic neuropathy, without long-term current use of insulin (H)       hydrOXYzine 25 MG tablet    ATARAX    120 tablet    TAKE ONE TO TWO TABLETS BY MOUTH EVERY 6 HOURS AS NEEDED FOR ANXIETY    Anxiety        ibuprofen 800 MG tablet    ADVIL/MOTRIN    60 tablet    Take 1 tablet (800 mg) by mouth every 8 hours as needed for moderate pain    Left inguinal hernia       isosorbide mononitrate 30 MG 24 hr tablet    IMDUR    45 tablet    Take 0.5 tablets (15 mg) by mouth daily    Coronary artery disease involving coronary bypass graft of native heart without angina pectoris       lisinopril 5 MG tablet    PRINIVIL/ZESTRIL    90 tablet    TAKE ONE TABLET BY MOUTH EVERY DAY    Type 2 diabetes mellitus with diabetic neuropathy, without long-term current use of insulin (H)       loratadine 10 MG tablet    CLARITIN    90 tablet    TAKE ONE TABLET BY MOUTH EVERY DAY    Seasonal allergies       MAPAP 500 MG tablet   Generic drug:  acetaminophen     100 tablet    TAKE TWO TABLETS BY MOUTH EVERY 8 HOURS AS NEEDED FOR MILD PAIN    Unilateral inguinal hernia without obstruction or gangrene, recurrence not specified       MELATONIN PO      Take 10 mg by mouth nightly as needed        metFORMIN 500 MG tablet    GLUCOPHAGE    360 tablet    TAKE TWO TABLETS BY MOUTH TWICE A DAY WITH MEALS    Type 2 diabetes mellitus with diabetic neuropathy, without long-term current use of insulin (H)       methylPREDNISolone 4 MG tablet    MEDROL DOSEPAK    21 tablet    Follow package instructions    Acute low back pain, unspecified back pain laterality, with sciatica presence unspecified, Back muscle spasm       metoprolol 25 MG 24 hr tablet    TOPROL-XL    180 tablet    TAKE ONE TABLET BY MOUTH TWICE A DAY    Coronary artery disease involving native coronary artery of native heart without angina pectoris       nitroGLYcerin 0.4 MG sublingual tablet    NITROSTAT    25 tablet    PLACE 1 TABLET UNDER THE TONGUE AT THE ONSET OF CHEST PAIN. MAY REPEAT EVERY 5 MINUTES AS NEEDED FOR A TOTAL OF 3 DOSES.    Incomplete RBBB       omega-3 acid ethyl esters 1 G capsule    Lovaza     Take 2 g by mouth 2 times daily        omeprazole 20 MG CR capsule    priLOSEC     180 capsule    Take 1 capsule (20 mg) by mouth 2 times daily Take 30-60 minutes before a meal.    Gastroesophageal reflux disease without esophagitis       ONE TOUCH VERIO IQ test strip   Generic drug:  blood glucose monitoring     100 each    TEST ONCE DAILY OR AS DIRECTED    Type 2 diabetes mellitus with diabetic neuropathy, without long-term current use of insulin (H)       polyethylene glycol powder    MIRALAX/GLYCOLAX    500 g    Take 17 g by mouth daily as needed for constipation    Constipation, unspecified constipation type       sodium chloride 0.65 % nasal spray    OCEAN    1 Bottle    Spray 1 spray into both nostrils 4 times daily as needed for congestion    Viral illness       tamsulosin 0.4 MG capsule    FLOMAX    90 capsule    TAKE ONE CAPSULE BY MOUTH EVERY DAY    Benign prostatic hyperplasia, presence of lower urinary tract symptoms unspecified       triamcinolone 0.1 % cream    KENALOG     Apply topically daily as needed for irritation (under Arms)        vitamin D 2000 UNITS tablet     100 tablet    TAKE ONE TABLET BY MOUTH EVERY DAY    Vitamin D deficiency       zolpidem 10 MG tablet    AMBIEN    1 tablet    Take tablet by mouth 15 minutes prior to sleep, for Sleep Study

## 2017-09-15 NOTE — PROGRESS NOTES
REQUESTING PHYSICIAN:  Dr. Morelia Pedraza      DATE OF SERVICE:  09/14/2017       CHIEF COMPLAINT:  Followup for restless legs syndrome and poor sleep quality.      HISTORY OF PRESENT ILLNESS:  Mr. Javier Markham is a 52-year-old male who was previously seen in my clinic, with the last visit on 01/09/2016.  The patient had presented with a history of restless legs syndrome, insomnia and snoring.  He had a polysomnogram on 09/20/2015 which captured 212 minutes of sleep with a low sleep efficiency.  Apnea-hypopnea index was 5.1 per hour and lowest oxygen saturation was 87.8%.  The study had him sleeping only in the lateral position.  No active interventions were initiated for his borderline sleep apnea.      He was started on gabapentin 900 mg at bedtime for management of restless legs.  A brief trial of pramipexole was unsuccessful and could not be tolerated due to irritability of mood.  Since then, his gabapentin dose has been increased to 900 mg 3 times a day for management of neuropathic pain associated with his diabetes.      His primary care physician, Dr. Pedraza is requesting a reevaluation of his restless legs management as he has residual symptoms despite this dose of gabapentin.  Dr. Pedraza had suggested use of clonazepam.      The patient continues to have insomnia.  He goes to bed at 10:00 p.m.  He can fall asleep within half an hour.  However, he tends to wake up every 1-2 hours in the night to use the bathroom.  On some nights, he cannot initiate sleep for 2-3 hours because of his restless legs.  He has multiple arousals throughout the night and wakes up at 6:00 a.m. for the morning.  He feels tired on waking up and feels tired during the daytime.  He takes a 1 hour nap at midday, 5-7 days in a week.  He is unemployed and spends most of his daytime either watching TV or visiting friends.      The patient reports comorbid anxiety.  His medical history includes diabetes mellitus type 2, coronary artery  disease, hypertension.      He has occasionally experienced sleep paralysis.      ASSESSMENT:   1.  Question of sleep apnea.   2.  Restless legs syndrome.   3.  Chronic insomnia, multifactorial.      The patient has frequent nocturnal arousals, snoring and poor sleep quality.  A previous polysomnogram in 2015 captured only 212 minutes of sleep and showed borderline sleep apnea with an apnea-hypopnea index of 5.1 per hour.  Sleep disordered breathing is a consideration and could be contributing to sleep maintenance difficulty for him.  Considering the inconclusive results from the previous sleep study, I recommended that we perform another test.  I have prescribed zolpidem 10 mg for the night of the sleep study to achieve more sleep.      The patient could not tolerate a dopaminergic agent due to irritability of mood.  He is currently on 900 mg gabapentin 3 times a day.  There is suggestion from him and his primary care physician to consider benzodiazepine, clonazepam, for additional management of his restless legs.  Benzodiazepines do not directly impact restless legs symptoms but can reduce the arousals associated with these.  I would prefer that he perform another sleep study for evaluation of sleep apnea before we start a benzodiazepine for him.  Adverse events with the use of a benzodiazepine, including tolerance, dependence, sedation, etc. were discussed with the patient.  The patient tells me that he previously was on lorazepam without any significant adverse effects.      His serum ferritin was checked on 05/30/2017 and was normal at 112.      We discussed management of insomnia.  The patient tends to spend long periods in bed trying to force sleep or watching TV.  He could benefit from behavioral modifications around sleep and I have recommended a consultation with Dr. Campos, our behavioral sleep specialist for further work.      TREATMENT PLAN:   1.  Split night polysomnogram for reevaluation of sleep  apnea.   2.  Consultation with Dr. Campos, behavioral Sleep Medicine for management of insomnia.   3.  Follow up after sleep study for further recommendations.      I spent a total of 25 minutes with this patient, with more than 50% spent in counseling.         NADJA LADD MD            D: 2017 13:09   T: 09/15/2017 09:14   MT: FABIO      Name:     CHRISTY ZIMMER   MRN:      [id number]        Account:      [id number]   :      1965           Visit Date:   2017      Document: [id number]       cc: Morelia Pedraza DO

## 2017-09-18 ENCOUNTER — TRANSFERRED RECORDS (OUTPATIENT)
Dept: HEALTH INFORMATION MANAGEMENT | Facility: CLINIC | Age: 52
End: 2017-09-18

## 2017-09-27 DIAGNOSIS — I25.10 CORONARY ARTERY DISEASE INVOLVING NATIVE CORONARY ARTERY OF NATIVE HEART WITHOUT ANGINA PECTORIS: Chronic | ICD-10-CM

## 2017-09-27 NOTE — TELEPHONE ENCOUNTER
aspirin 81 MG EC tablet 90 tablet 0 6/30/2017           Last Written Prescription Date: 6/30/17  Last Fill Quantity: 90,  # refills: 0   Last Office Visit with [facility], [facility] or [facility] prescribing provider: 8/9/17                                         Next 5 appointments (look out 90 days)     Oct 04, 2017 10:30 AM CDT   [facility] More[facility]    [address] [facility] [address]   [phone]

## 2017-09-27 NOTE — TELEPHONE ENCOUNTER
Pres[place] per [facility] [place].  Sandra Carolina RN

## 2017-10-04 ENCOUNTER — OFFICE VISIT (OUTPATIENT)
Dept: FAMILY MEDICINE | Facility: CLINIC | Age: 52
End: 2017-10-04
Payer: COMMERCIAL

## 2017-10-04 VITALS
HEART RATE: 86 BPM | OXYGEN SATURATION: 95 % | TEMPERATURE: 97.5 F | HEIGHT: 74 IN | SYSTOLIC BLOOD PRESSURE: 136 MMHG | DIASTOLIC BLOOD PRESSURE: 85 MMHG | WEIGHT: 308.2 LBS | BODY MASS INDEX: 39.55 KG/M2

## 2017-10-04 DIAGNOSIS — G25.0 BENIGN ESSENTIAL TREMOR: Chronic | ICD-10-CM

## 2017-10-04 DIAGNOSIS — G25.81 RESTLESS LEGS SYNDROME (RLS): Chronic | ICD-10-CM

## 2017-10-04 DIAGNOSIS — I10 BENIGN ESSENTIAL HYPERTENSION: Chronic | ICD-10-CM

## 2017-10-04 DIAGNOSIS — E11.9 TYPE 2 DIABETES MELLITUS WITHOUT COMPLICATION, WITHOUT LONG-TERM CURRENT USE OF INSULIN (H): Primary | Chronic | ICD-10-CM

## 2017-10-04 DIAGNOSIS — J30.2 CHRONIC SEASONAL ALLERGIC RHINITIS, UNSPECIFIED TRIGGER: ICD-10-CM

## 2017-10-04 DIAGNOSIS — Z23 NEED FOR PROPHYLACTIC VACCINATION AND INOCULATION AGAINST INFLUENZA: ICD-10-CM

## 2017-10-04 PROCEDURE — 99214 OFFICE O/P EST MOD 30 MIN: CPT | Mod: 25 | Performed by: INTERNAL MEDICINE

## 2017-10-04 PROCEDURE — 90686 IIV4 VACC NO PRSV 0.5 ML IM: CPT | Performed by: INTERNAL MEDICINE

## 2017-10-04 PROCEDURE — 90471 IMMUNIZATION ADMIN: CPT | Performed by: INTERNAL MEDICINE

## 2017-10-04 RX ORDER — FLUTICASONE PROPIONATE 50 MCG
1 SPRAY, SUSPENSION (ML) NASAL DAILY
Qty: 1 BOTTLE | Refills: 11 | Status: SHIPPED | OUTPATIENT
Start: 2017-10-04 | End: 2018-09-28

## 2017-10-04 RX ORDER — LISINOPRIL 20 MG/1
20 TABLET ORAL DAILY
Qty: 90 TABLET | Refills: 3 | Status: SHIPPED | OUTPATIENT
Start: 2017-10-04 | End: 2018-01-30

## 2017-10-04 RX ORDER — CLONAZEPAM 0.5 MG/1
0.5 TABLET ORAL
Qty: 30 TABLET | Refills: 0 | Status: SHIPPED | OUTPATIENT
Start: 2017-10-04 | End: 2017-10-24

## 2017-10-04 NOTE — MR AVS SNAPSHOT
"              After Visit Summary   10/4/2017    Javier Markham    MRN: [id number]           Patient Information     Date Of Birth          1965        Visit Information        Provider Department      10/4/2017 10:30 AM Morelia Pedraza,  [facility]        Today's Diagnoses     Type 2 diabetes mellitus without complication, without long-term current use of insulin (H)    -  1    Benign essential tremor        Benign essential hypertension; goal < 140/90        Restless legs syndrome (RLS)        Need for prophylactic vaccination and inoculation against influenza          Care Instructions    Flu shot today  You turn in your \"Request for Medical Opinion\" form  Start Clonazepam for restless legs (but not a couple nights prior to sleep study - ok to bring script with you that night in case they'd actually like you to take it)  Start increased dose Lisinopril 20 mg daily - schedule 1 month non-fasting lab and BP check here  Start Januvia for blood sugar and Please try to work on reducing the portions of carbohydrates in your diet (such as: breads, rice, pasta, sugars) which will help to lower the blood sugar.   Keep cardiology appointment  Flonase for nasal congestion/allergies every day - may also help your ears too  Knee brace over the counter  See me in about 3 months to recheck A1c          Follow-ups after your visit        Additional Services     NEUROLOGY ADULT REFERRAL       Your provider has referred you for the following:   Consult at [facility]: [facility] - [place] [phone]   [email or web address]    Please be aware that coverage of these services is subject to the terms and limitations of your health insurance plan.  Call member services at your health plan with any benefit or coverage questions.      Please bring the following with you to your appointment:    (1) Any X-Rays, CTs or MRIs which have been performed.  Contact the facility where " "they were done to arrange for  prior to your scheduled appointment.    (2) List of current medications  (3) This referral request   (4) Any documents/labs given to you for this referral                  Your next 10 appointments already scheduled     Oct 18, 2017  8:30 PM CDT   PSG Split with BED 2 SH SLEEP   [facility]    [address]  [facility] [address]   [phone]            Nov 01, 2017 11:30 AM CDT   Return Sleep Patient with Forrest Welsh MD   [facility]    [address]  [facility] [address]   [phone]            Nov 07, 2017 10:00 AM CST   New Sleep Patient with Sky Campos PsyD   [facility]    [address]  [facility] [address] [phone]              Who to contact     If you have questions or need follow up information about today's clinic visit or your schedule please contact [facility] directly at [phone].  Normal or non-critical lab and imaging results will be communicated to you by MyChart, letter or phone within 4 business days after the clinic has received the results. If you do not hear from us within 7 days, please contact the clinic through [employer] or phone. If you have a critical or abnormal lab result, we will notify you by phone as soon as possible.  Submit refill requests through [employer] or call your pharmacy and they will forward the refill request to us. Please allow 3 business days for your refill to be completed.          Additional Information About Your Visit        [employer] Information     [employer] lets you send messages to your doctor, view your test results, renew your prescriptions, schedule appointments and more. To sign up, go to [email or web address] . Click on \"Log in\" on the left side of the screen, which will take you to the Welcome " "page. Then click on \"Sign up Now\" on the right side of the page.     You will be asked to enter the access code listed below, as well as some personal information. Please follow the directions to create your username and password.     Your access code is: [email or web address]  Expires: 2017 11:52 AM     Your access code will  in 90 days. If you need help or a new code, please call your [facility] clinic or [phone].        Care EveryWhere ID     This is your Care EveryWhere ID. This could be used by other organizations to access your [facility] medical records  [id number]        Your Vitals Were     Pulse Temperature Height Pulse Oximetry BMI (Body Mass Index)       86 97.5  F (36.4  C) (Oral) 6' 1.5\" (1.867 m) 95% 40.11 kg/m2        Blood Pressure from Last 3 Encounters:   10/04/17 141/82   17 138/89   17 (!) 137/92    Weight from Last 3 Encounters:   10/04/17 (!) 308 lb 3.2 oz (139.8 kg)   17 (!) 302 lb (137 kg)   17 299 lb (135.6 kg)              We Performed the Following     FOOT EXAM  NO CHARGE [25007.114]     NEUROLOGY ADULT REFERRAL          Today's Medication Changes          These changes are accurate as of: 10/4/17 11:15 AM.  If you have any questions, ask your nurse or doctor.               Start taking these medicines.        Dose/Directions    clonazePAM 0.5 MG tablet   Commonly known as:  klonoPIN   Used for:  Restless legs syndrome (RLS)   Started by:  Morelia Pedraza DO        Dose:  0.5 mg   Take 1 tablet (0.5 mg) by mouth nightly as needed (Restless legs)   Quantity:  30 tablet   Refills:  0       sitagliptin 100 MG tablet   Commonly known as:  JANUVIA   Used for:  Type 2 diabetes mellitus without complication, without long-term current use of insulin (H)   Started by:  Morelia Pedraza DO        Dose:  100 mg   Take 1 tablet (100 mg) by mouth daily   Quantity:  90 tablet   Refills:  1         These medicines have changed or have updated prescriptions.        " Dose/Directions    lisinopril 20 MG tablet   Commonly known as:  PRINIVIL/ZESTRIL   This may have changed:  See the new instructions.   Used for:  Type 2 diabetes mellitus without complication, without long-term current use of insulin (H), Benign essential hypertension   Changed by:  Morelia Pedraza DO        Dose:  20 mg   Take 1 tablet (20 mg) by mouth daily   Quantity:  90 tablet   Refills:  3         Stop taking these medicines if you haven't already. Please contact your care team if you have questions.     zolpidem 10 MG tablet   Commonly known as:  AMBIEN   Stopped by:  Morelia Pedraza DO                Where to get your medicines      These medications were sent to [facility] [place] - [address], [address]  [address], [facility] [address], [facility] [address]     Phone:  [phone]     lisinopril 20 MG tablet         Some of these will need a paper prescription and others can be bought over the counter.  Ask your nurse if you have questions.     Bring a paper prescription for each of these medications     clonazePAM 0.5 MG tablet         Call your pharmacy to confirm that your medication is ready for pickup. It may take up to 24 hours for them to receive the prescription. If the prescription is not ready within 3 business days, please contact your clinic or your provider.     We will let you know when these medications are ready. If you don't hear back within 3 business days, please contact us.     sitagliptin 100 MG tablet                Primary Care Provider Office Phone # Fax #    Morelia Pedraza [phone] [address]  [facility] [address]        Equal Access to Services     [facility]: Hadii aad ku hadasho Soomaali, waaxda luqadaha, qaybta kaalmada adeegyajake, laya friend . So [facility] [phone].    ATENCIÓN: Si habla español, tiene a hale disposición servicios gratuitos de asistencia lingüística. Llame al  [phone].    We comply with applicable federal civil rights laws and [place] laws. We do not discriminate on the basis of race, color, national origin, age, disability, sex, sexual orientation, or gender identity.            Thank you!     Thank you for choosing [facility]  for your care. Our goal is always to provide you with excellent care. Hearing back from our patients is one way we can continue to improve our services. Please take a few minutes to complete the written survey that you may receive in the mail after your visit with us. Thank you!             Your Updated Medication List - Protect others around you: Learn how to safely use, store and throw away your medicines at www.disposemymeds.org.          This list is accurate as of: 10/4/17 11:15 AM.  Always use your most recent med list.                   Brand Name Dispense Instructions for use Diagnosis    albuterol 108 (90 BASE) MCG/ACT Inhaler    PROAIR HFA/PROVENTIL HFA/VENTOLIN HFA    1 Inhaler    Inhale 2 puffs into the lungs every 4 hours as needed for shortness of breath / dyspnea or wheezing    Shortness of breath       aspirin 81 MG EC tablet     90 tablet    TAKE ONE TABLET BY MOUTH EVERY DAY    Coronary artery disease involving native coronary artery of native heart without angina pectoris       atorvastatin 80 MG tablet    LIPITOR    90 tablet    TAKE ONE TABLET BY MOUTH EVERY DAY    Coronary artery disease involving native coronary artery of native heart without angina pectoris       B-D SINGLE USE SWABS REGULAR Pads     100 each    USE AS NEEDED    Type 2 diabetes mellitus with diabetic neuropathy, without long-term current use of insulin (H)       blood glucose lancets standard    no brand specified    1 Box    Use to test blood sugar daily or as directed. ONE TOUCH VERIO.    Type 2 diabetes mellitus with diabetic neuropathy, without long-term current use of insulin (H)       blood glucose monitoring meter device kit    no  brand specified    1 kit    Use to test blood sugar 1 times daily or as directed. ONE TOUCH VERIO.    Type 2 diabetes mellitus with diabetic neuropathy, without long-term current use of insulin (H)       clonazePAM 0.5 MG tablet    klonoPIN    30 tablet    Take 1 tablet (0.5 mg) by mouth nightly as needed (Restless legs)    Restless legs syndrome (RLS)       clopidogrel 75 MG tablet    PLAVIX    90 tablet    Take 1 tablet (75 mg) by mouth daily    Coronary artery disease involving coronary bypass graft of native heart without angina pectoris       cyanocobalamin 1000 MCG tablet    vitamin  B-12    100 tablet    TAKE ONE TABLET BY MOUTH EVERY DAY    Vitamin B12 deficiency (non anemic)       cyclobenzaprine 5 MG tablet    FLEXERIL    42 tablet    Take 1 tablet (5 mg) by mouth 3 times daily as needed for muscle spasms    Acute low back pain, unspecified back pain laterality, with sciatica presence unspecified, Back muscle spasm       ferrous sulfate 325 (65 FE) MG tablet    IRON    30 tablet    TAKE ONE TABLET BY MOUTH EVERY MORNING WITH BREAKFAST    Iron deficiency       gabapentin 300 MG capsule    NEURONTIN    270 capsule    TAKE THREE CAPSULES BY MOUTH THREE TIMES A DAY    Type 2 diabetes mellitus with diabetic neuropathy, without long-term current use of insulin (H)       hydrOXYzine 25 MG tablet    ATARAX    120 tablet    TAKE ONE TO TWO TABLETS BY MOUTH EVERY 6 HOURS AS NEEDED FOR ANXIETY    Anxiety       ibuprofen 800 MG tablet    ADVIL/MOTRIN    60 tablet    Take 1 tablet (800 mg) by mouth every 8 hours as needed for moderate pain    Left inguinal hernia       isosorbide mononitrate 30 MG 24 hr tablet    IMDUR    45 tablet    Take 0.5 tablets (15 mg) by mouth daily    Coronary artery disease involving coronary bypass graft of native heart without angina pectoris       lisinopril 20 MG tablet    PRINIVIL/ZESTRIL    90 tablet    Take 1 tablet (20 mg) by mouth daily    Type 2 diabetes mellitus without  complication, without long-term current use of insulin (H), Benign essential hypertension       loratadine 10 MG tablet    CLARITIN    90 tablet    TAKE ONE TABLET BY MOUTH EVERY DAY    Seasonal allergies       MAPAP 500 MG tablet   Generic drug:  acetaminophen     100 tablet    TAKE TWO TABLETS BY MOUTH EVERY 8 HOURS AS NEEDED FOR MILD PAIN    Unilateral inguinal hernia without obstruction or gangrene, recurrence not specified       MELATONIN PO      Take 10 mg by mouth nightly as needed        metFORMIN 500 MG tablet    GLUCOPHAGE    360 tablet    TAKE TWO TABLETS BY MOUTH TWICE A DAY WITH MEALS    Type 2 diabetes mellitus with diabetic neuropathy, without long-term current use of insulin (H)       metoprolol 25 MG 24 hr tablet    TOPROL-XL    180 tablet    TAKE ONE TABLET BY MOUTH TWICE A DAY    Coronary artery disease involving native coronary artery of native heart without angina pectoris       nitroGLYcerin 0.4 MG sublingual tablet    NITROSTAT    25 tablet    PLACE 1 TABLET UNDER THE TONGUE AT THE ONSET OF CHEST PAIN. MAY REPEAT EVERY 5 MINUTES AS NEEDED FOR A TOTAL OF 3 DOSES.    Incomplete RBBB       omega-3 acid ethyl esters 1 G capsule    Lovaza     Take 2 g by mouth 2 times daily        omeprazole 20 MG CR capsule    priLOSEC    180 capsule    Take 1 capsule (20 mg) by mouth 2 times daily Take 30-60 minutes before a meal.    Gastroesophageal reflux disease without esophagitis       ONE TOUCH VERIO IQ test strip   Generic drug:  blood glucose monitoring     100 each    TEST ONCE DAILY OR AS DIRECTED    Type 2 diabetes mellitus with diabetic neuropathy, without long-term current use of insulin (H)       polyethylene glycol powder    MIRALAX/GLYCOLAX    500 g    Take 17 g by mouth daily as needed for constipation    Constipation, unspecified constipation type       sitagliptin 100 MG tablet    JANUVIA    90 tablet    Take 1 tablet (100 mg) by mouth daily    Type 2 diabetes mellitus without complication,  without long-term current use of insulin (H)       sodium chloride 0.65 % nasal spray    OCEAN    1 Bottle    Spray 1 spray into both nostrils 4 times daily as needed for congestion    Viral illness       tamsulosin 0.4 MG capsule    FLOMAX    90 capsule    TAKE ONE CAPSULE BY MOUTH EVERY DAY    Benign prostatic hyperplasia, presence of lower urinary tract symptoms unspecified       triamcinolone 0.1 % cream    KENALOG     Apply topically daily as needed for irritation (under Arms)        vitamin D 2000 UNITS tablet     100 tablet    TAKE ONE TABLET BY MOUTH EVERY DAY    Vitamin D deficiency       ZETIA PO      Take 10 mg by mouth At Bedtime

## 2017-10-04 NOTE — PATIENT INSTRUCTIONS
"Flu shot today  You turn in your \"Request for Medical Opinion\" form  Start Clonazepam for restless legs (but not a couple nights prior to sleep study - ok to bring script with you that night in case they'd actually like you to take it)  Start increased dose Lisinopril 20 mg daily - schedule 1 month non-fasting lab and BP check here  Start Januvia for blood sugar and Please try to work on reducing the portions of carbohydrates in your diet (such as: breads, rice, pasta, sugars) which will help to lower the blood sugar.   Keep cardiology appointment  Flonase for nasal congestion/allergies every day - may also help your ears too  Knee brace over the counter  See me in about 3 months to recheck A1c  "

## 2017-10-04 NOTE — NURSING NOTE
"Chief Complaint   Patient presents with     Forms     Referral     neurologist       Initial /82 (BP Location: Left arm, Cuff Size: Adult Regular)  Pulse 86  Temp 97.5  F (36.4  C) (Oral)  Ht 6' 1.5\" (1.867 m)  Wt (!) 308 lb 3.2 oz (139.8 kg)  SpO2 95%  BMI 40.11 kg/m2 Estimated body mass index is 40.11 kg/(m^2) as calculated from the following:    Height as of this encounter: 6' 1.5\" (1.867 m).    Weight as of this encounter: 308 lb 3.2 oz (139.8 kg).  Medication Reconciliation: complete     CEDRIC Roach      "

## 2017-10-04 NOTE — PROGRESS NOTES
"  SUBJECTIVE:   Javier Markham is a 52 year old male who presents to clinic today for the following health issues:      Chief Complaint   Patient presents with     Forms     Referral     neurologist     Flu Shot       BPs have been high at home - 140s/90s at home.     A1c 8.8% per cardiology apt at [facility] a couple weeks ago. Is REALLY motivated to loose weight b/c doesn't want insulin. Is to come back for echo in 3 months to plan on if having surgery on blocked artery. Started on new medication Zetia per cardiology.  Sugars daily 150-200 first thing in AM. Has knowledge of poorly controlled diabetes.    Would like to see neurologist about essential tremor with writing and RLS - very wavy signature seen today. Really debilitating RLS he says. Gabapentin helps with diabetic nerve pain. Is to have f/u sleep study per Dr. Welsh who is also helping with RLS.    Did see neurosurg and had MRI b/c of back injury while changing tire; says back has been \"ok\". Worse when standing at counter top / sink. Sitting helps - has a stool.    Would like wax checked in ears; some ringing in left ear. Does have allergies.    Request for Medical Opinion form filled out - still waiting for disability to hear his case. 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    Problem list and histories reviewed & adjusted, as indicated.    ROS:  Detailed as above     OBJECTIVE:     /85  Pulse 86  Temp 97.5  F (36.4  C) (Oral)  Ht 6' 1.5\" (1.867 m)  Wt (!) 308 lb 3.2 oz (139.8 kg)  SpO2 95%  BMI 40.11 kg/m2  Body mass index is 40.11 kg/(m^2).  Alert, pleasant, talkative, NAD  Morbidly obese  Cerumen removed (small amount bilaterally); TMs normal    ASSESSMENT/PLAN:     1. Type 2 diabetes mellitus without complication, without long-term current use of insulin (H)  See " "HPI re: elevated A1c per CareEverywhere   Start Januvia if have insurance coverage and he is also to change diet and exercise  - sitagliptin (JANUVIA) 100 MG tablet; Take 1 tablet (100 mg) by mouth daily  Dispense: 90 tablet; Refill: 1  - lisinopril (PRINIVIL/ZESTRIL) 20 MG tablet; Take 1 tablet (20 mg) by mouth daily  Dispense: 90 tablet; Refill: 3    2. Benign essential tremor  Worsening over time via natural progression  - NEUROLOGY ADULT REFERRAL    3. Benign essential hypertension; goal < 140/90  Increase Lisinopril per patient instructions   - lisinopril (PRINIVIL/ZESTRIL) 20 MG tablet; Take 1 tablet (20 mg) by mouth daily  Dispense: 90 tablet; Refill: 3    4. Restless legs syndrome (RLS)  Continue gabapentin and f/u with Dr. Welsh  Is already on high dose gabapentin 900 mg TID  Ferritin 98 in Feb 2016 and is taking extra iron as well  Low dose Clonazepam may help too but I don't want to give higher doses  Discussed potential habit forming nature of this  - clonazePAM (KLONOPIN) 0.5 MG tablet; Take 1 tablet (0.5 mg) by mouth nightly as needed (Restless legs)  Dispense: 30 tablet; Refill: 0  - NEUROLOGY ADULT REFERRAL    5. Chronic seasonal allergic rhinitis, unspecified trigger  May help his ears too - ears actually looked quite good today  - fluticasone (FLONASE) 50 MCG/ACT spray; Spray 1 spray into both nostrils daily  Dispense: 1 Bottle; Refill: 11    6. Need for prophylactic vaccination and inoculation against influenza  - FLU VAC, SPLIT VIRUS IM > 3 YO (QUADRIVALENT) [10799]  - Vaccine Administration, Initial [27737]    Patient Instructions   Flu shot today  You turn in your \"Request for Medical Opinion\" form  Start Clonazepam for restless legs (but not a couple nights prior to sleep study - ok to bring script with you that night in case they'd actually like you to take it)  Start increased dose Lisinopril 20 mg daily - schedule 1 month non-fasting lab and BP check here  Start Januvia for blood sugar and " Please try to work on reducing the portions of carbohydrates in your diet (such as: breads, rice, pasta, sugars) which will help to lower the blood sugar.   Keep cardiology appointment  Flonase for nasal congestion/allergies every day - may also help your ears too  Knee brace over the counter  See me in about 3 months to recheck A1c      Morelia Pedraza, DO  [facility]

## 2017-10-23 DIAGNOSIS — E61.1 IRON DEFICIENCY: ICD-10-CM

## 2017-10-23 NOTE — TELEPHONE ENCOUNTER
ferrous sulfate (IRON) 325 (65 FE) MG tablet      Last Written Prescription Date:  10/10/2016  Last Fill Quantity: 30,   # refills: 11  LOV: 10/4/2017  Future Office visit:    Next 5 appointments (look out 90 days)     Noah 15, 2018 10:30 AM CST   [facility] Morelia [facility]raza DO   [facility]    [address] [facility] [address]   [phone]                   Routing refill request to provider for review/approval because:

## 2017-10-24 DIAGNOSIS — G25.81 RESTLESS LEGS SYNDROME (RLS): Chronic | ICD-10-CM

## 2017-10-24 DIAGNOSIS — E78.5 HYPERLIPIDEMIA, UNSPECIFIED HYPERLIPIDEMIA TYPE: Primary | Chronic | ICD-10-CM

## 2017-10-24 RX ORDER — FERROUS SULFATE 325(65) MG
TABLET ORAL
Qty: 30 TABLET | Refills: 5 | Status: SHIPPED | OUTPATIENT
Start: 2017-10-24 | End: 2018-05-29

## 2017-10-24 NOTE — TELEPHONE ENCOUNTER
clonazePAM (KLONOPIN) 0.5 MG tablet        Last Written Prescription Date:  10/4/2017  Last Fill Quantity: 30,   # refills: 0  LOV: 10/4/2017  Future Office visit:    Next 5 appointments (look out 90 days)     Noah 15, 2018 10:30 AM CST   [facility] Morelia [facility]   [facility]    [address] [facility] [address]   [phone]                   Routing refill request to provider for review/approval because:  Drug not on the [facility], [facility] or [facility] refill protocol or controlled substance

## 2017-10-25 RX ORDER — CLONAZEPAM 0.5 MG/1
0.5 TABLET ORAL
Qty: 30 TABLET | Refills: 0 | Status: SHIPPED | OUTPATIENT
Start: 2017-11-01 | End: 2017-11-28

## 2017-10-25 RX ORDER — OMEGA-3-ACID ETHYL ESTERS 1 G/1
2 CAPSULE, LIQUID FILLED ORAL 2 TIMES DAILY
Qty: 120 CAPSULE | Refills: 1 | Status: SHIPPED | OUTPATIENT
Start: 2017-10-25 | End: 2018-01-17

## 2017-10-25 NOTE — TELEPHONE ENCOUNTER
Signed order but cannot fill until Nov 1. That will be 28 days. Is only supposed to take 1 per night.

## 2017-10-26 NOTE — TELEPHONE ENCOUNTER
Walked signed  Klonopin Rx to pharm next door that has note may fill on or after Nov 1.   Oralia MÉNDEZ MA

## 2017-11-02 ENCOUNTER — DOCUMENTATION ONLY (OUTPATIENT)
Dept: LAB | Facility: CLINIC | Age: 52
End: 2017-11-02

## 2017-11-02 DIAGNOSIS — E11.9 TYPE 2 DIABETES MELLITUS WITHOUT COMPLICATION, WITHOUT LONG-TERM CURRENT USE OF INSULIN (H): Primary | Chronic | ICD-10-CM

## 2017-11-02 NOTE — PROGRESS NOTES
"Patient is scheduled for lab only for tomorrow (11/3/17). Please place future orders for testing to be complete at that time. Notes just state \"labs\".     Thank you.  "

## 2017-11-03 ENCOUNTER — ALLIED HEALTH/NURSE VISIT (OUTPATIENT)
Dept: FAMILY MEDICINE | Facility: CLINIC | Age: 52
End: 2017-11-03
Payer: COMMERCIAL

## 2017-11-03 VITALS — SYSTOLIC BLOOD PRESSURE: 110 MMHG | DIASTOLIC BLOOD PRESSURE: 78 MMHG

## 2017-11-03 DIAGNOSIS — E11.9 TYPE 2 DIABETES MELLITUS WITHOUT COMPLICATION, WITHOUT LONG-TERM CURRENT USE OF INSULIN (H): Chronic | ICD-10-CM

## 2017-11-03 DIAGNOSIS — I10 BENIGN ESSENTIAL HYPERTENSION: Primary | ICD-10-CM

## 2017-11-03 LAB
ANION GAP SERPL CALCULATED.3IONS-SCNC: 15 MMOL/L (ref 3–14)
BUN SERPL-MCNC: 17 MG/DL (ref 7–30)
CALCIUM SERPL-MCNC: 9.1 MG/DL (ref 8.5–10.1)
CHLORIDE SERPL-SCNC: 102 MMOL/L (ref 94–109)
CO2 SERPL-SCNC: 16 MMOL/L (ref 20–32)
CREAT SERPL-MCNC: 0.78 MG/DL (ref 0.66–1.25)
CREAT UR-MCNC: 135 MG/DL
GFR SERPL CREATININE-BSD FRML MDRD: >90 ML/MIN/1.7M2
GLUCOSE SERPL-MCNC: 266 MG/DL (ref 70–99)
HBA1C MFR BLD: 9.6 % (ref 4.3–6)
MICROALBUMIN UR-MCNC: 13 MG/L
MICROALBUMIN/CREAT UR: 9.41 MG/G CR (ref 0–17)
POTASSIUM SERPL-SCNC: 4.4 MMOL/L (ref 3.4–5.3)
SODIUM SERPL-SCNC: 133 MMOL/L (ref 133–144)

## 2017-11-03 PROCEDURE — 99207 ZZC NO CHARGE NURSE ONLY: CPT | Performed by: INTERNAL MEDICINE

## 2017-11-03 PROCEDURE — 36415 COLL VENOUS BLD VENIPUNCTURE: CPT | Performed by: INTERNAL MEDICINE

## 2017-11-03 PROCEDURE — 80048 BASIC METABOLIC PNL TOTAL CA: CPT | Performed by: INTERNAL MEDICINE

## 2017-11-03 PROCEDURE — 83036 HEMOGLOBIN GLYCOSYLATED A1C: CPT | Performed by: INTERNAL MEDICINE

## 2017-11-03 PROCEDURE — 82043 UR ALBUMIN QUANTITATIVE: CPT | Performed by: INTERNAL MEDICINE

## 2017-11-03 NOTE — LETTER
[facility]  [address] [facility]  [address]  [place]  [address]  Tel: [phone]    November 7, 2017    Javier Markham  [address]   [facility] [address]         Alexx,    Thank you for coming in for your labs and blood pressure check. Your blood pressure looked awesome!  Your labs are ok other than the high glucose. I'll have someone reach out to you to see how you are doing in this regard.  Resulted Orders   Basic metabolic panel   Result Value Ref Range    Sodium 133 133 - 144 mmol/L    Potassium 4.4 3.4 - 5.3 mmol/L    Chloride 102 94 - 109 mmol/L    Carbon Dioxide 16 (L) 20 - 32 mmol/L    Anion Gap 15 (H) 3 - 14 mmol/L    Glucose 266 (H) 70 - 99 mg/dL    Urea Nitrogen 17 7 - 30 mg/dL    Creatinine 0.78 0.66 - 1.25 mg/dL    GFR Estimate >90 >60 mL/min/1.7m2      Comment:      Non  GFR Calc    GFR Estimate If Black >90 >60 mL/min/1.7m2      Comment:       GFR Calc    Calcium 9.1 8.5 - 10.1 mg/dL   Albumin Random Urine Quantitative with Creat Ratio   Result Value Ref Range    Creatinine Urine 135 mg/dL    Albumin Urine mg/L 13 mg/L    Albumin Urine mg/g Cr 9.41 0 - 17 mg/g Cr   Hemoglobin A1c   Result Value Ref Range    Hemoglobin A1C 9.6 (H) 4.3 - 6.0 %           Sincerely,    Morelia Pedraza DO / estefanía

## 2017-11-03 NOTE — MR AVS SNAPSHOT
After Visit Summary   11/3/2017    Javier Markham    MRN: [id number]           Patient Information     Date Of Birth          1965        Visit Information        Provider Department      11/3/2017 10:37 AM Morelia Pedraza DO [facility]        Today's Diagnoses     Benign essential hypertension; goal < 140/90    -  1       Follow-ups after your visit        Your next 10 appointments already scheduled     Nov 07, 2017 10:00 AM CST   New Sleep Patient with Sky Cmapos PsyD   [facility] ([facility])    [address] [address]   [phone]            Nov 16, 2017  8:30 PM CST   PSG Split with BED 2 SH SLEEP   [facility] ([facility])    [address] [address]   [phone]            Nov 29, 2017 11:30 AM CST   Return Sleep Patient with Forrest Welsh MD   [facility] ([facility])    [address] [address] [phone]            Noah 15, 2018 10:30 AM CST   Office Visit with Morelia Pedraza DO   [facility] ([facility])    [address] [facility] [address] [phone]           Bring a current list of meds and any records pertaining to this visit. For Physicals, please bring immunization records and any forms needing to be filled out. Please arrive 10 minutes early to complete paperwork.              Who to contact     If you have questions or need follow up information about today's clinic visit or your schedule please contact [facility] directly at [phone].  Normal or non-critical lab and imaging results will be communicated to you by MyChart, letter or phone within 4 business days after the clinic has received the results. If you do not hear from us within 7 days, please contact the clinic through MyChart or phone. If you have  "a critical or abnormal lab result, we will notify you by phone as soon as possible.  Submit refill requests through [employer] or call your pharmacy and they will forward the refill request to us. Please allow 3 business days for your refill to be completed.          Additional Information About Your Visit        24PageBookshart Information     Skimble lets you send messages to your doctor, view your test results, renew your prescriptions, schedule appointments and more. To sign up, go to [email or web address] . Click on \"Log in\" on the left side of the screen, which will take you to the Welcome page. Then click on \"Sign up Now\" on the right side of the page.     You will be asked to enter the access code listed below, as well as some personal information. Please follow the directions to create your username and password.     Your access code is: [email or web address]  Expires: 2017 11:52 AM     Your access code will  in 90 days. If you need help or a new code, please call your [facility] clinic or [phone].        Care EveryWhere ID     This is your Care EveryWhere ID. This could be used by other organizations to access your [facility] medical records  [id number]         Blood Pressure from Last 3 Encounters:   10/04/17 136/85   17 138/89   17 (!) 137/92    Weight from Last 3 Encounters:   10/04/17 (!) 308 lb 3.2 oz (139.8 kg)   17 (!) 302 lb (137 kg)   17 299 lb (135.6 kg)              Today, you had the following     No orders found for display       Primary Care Provider Office Phone # Fax #    Morelia Rohini Pedraza [phone] [address]  [place] [address]        Equal Access to Services     [facility] RONEY : Leroy Ross, waaxda luqadaha, qaybta kaalmada adenaomiyajake, laya allred. So [facility] [phone].    ATENCIÓN: Si habla español, tiene a hale disposición servicios gratuitos de asistencia lingüística. Llame al " [phone].    We comply with applicable federal civil rights laws and [place] laws. We do not discriminate on the basis of race, color, national origin, age, disability, sex, sexual orientation, or gender identity.            Thank you!     Thank you for choosing [facility]  for your care. Our goal is always to provide you with excellent care. Hearing back from our patients is one way we can continue to improve our services. Please take a few minutes to complete the written survey that you may receive in the mail after your visit with us. Thank you!             Your Updated Medication List - Protect others around you: Learn how to safely use, store and throw away your medicines at www.disposemymeds.org.          This list is accurate as of: 11/3/17 10:49 AM.  Always use your most recent med list.                   Brand Name Dispense Instructions for use Diagnosis    albuterol 108 (90 BASE) MCG/ACT Inhaler    PROAIR HFA/PROVENTIL HFA/VENTOLIN HFA    1 Inhaler    Inhale 2 puffs into the lungs every 4 hours as needed for shortness of breath / dyspnea or wheezing    Shortness of breath       aspirin 81 MG EC tablet     90 tablet    TAKE ONE TABLET BY MOUTH EVERY DAY    Coronary artery disease involving native coronary artery of native heart without angina pectoris       atorvastatin 80 MG tablet    LIPITOR    90 tablet    TAKE ONE TABLET BY MOUTH EVERY DAY    Coronary artery disease involving native coronary artery of native heart without angina pectoris       B-D SINGLE USE SWABS REGULAR Pads     100 each    USE AS NEEDED    Type 2 diabetes mellitus with diabetic neuropathy, without long-term current use of insulin (H)       blood glucose lancets standard    no brand specified    1 Box    Use to test blood sugar daily or as directed. ONE TOUCH VERIO.    Type 2 diabetes mellitus with diabetic neuropathy, without long-term current use of insulin (H)       blood glucose monitoring meter device kit    no  brand specified    1 kit    Use to test blood sugar 1 times daily or as directed. ONE TOUCH VERIO.    Type 2 diabetes mellitus with diabetic neuropathy, without long-term current use of insulin (H)       clonazePAM 0.5 MG tablet    klonoPIN    30 tablet    Take 1 tablet (0.5 mg) by mouth nightly as needed (Restless legs)    Restless legs syndrome (RLS)       clopidogrel 75 MG tablet    PLAVIX    90 tablet    Take 1 tablet (75 mg) by mouth daily    Coronary artery disease involving coronary bypass graft of native heart without angina pectoris       cyanocobalamin 1000 MCG tablet    vitamin  B-12    100 tablet    TAKE ONE TABLET BY MOUTH EVERY DAY    Vitamin B12 deficiency (non anemic)       cyclobenzaprine 5 MG tablet    FLEXERIL    42 tablet    Take 1 tablet (5 mg) by mouth 3 times daily as needed for muscle spasms    Acute low back pain, unspecified back pain laterality, with sciatica presence unspecified, Back muscle spasm       ferrous sulfate 325 (65 FE) MG tablet    IRON    30 tablet    TAKE ONE TABLET BY MOUTH EVERY MORNING WITH BREAKFAST    Iron deficiency       fluticasone 50 MCG/ACT spray    FLONASE    1 Bottle    Spray 1 spray into both nostrils daily    Chronic seasonal allergic rhinitis, unspecified trigger       gabapentin 300 MG capsule    NEURONTIN    270 capsule    TAKE THREE CAPSULES BY MOUTH THREE TIMES A DAY    Type 2 diabetes mellitus with diabetic neuropathy, without long-term current use of insulin (H)       hydrOXYzine 25 MG tablet    ATARAX    120 tablet    TAKE ONE TO TWO TABLETS BY MOUTH EVERY 6 HOURS AS NEEDED FOR ANXIETY    Anxiety       ibuprofen 800 MG tablet    ADVIL/MOTRIN    60 tablet    Take 1 tablet (800 mg) by mouth every 8 hours as needed for moderate pain    Left inguinal hernia       isosorbide mononitrate 30 MG 24 hr tablet    IMDUR    45 tablet    Take 0.5 tablets (15 mg) by mouth daily    Coronary artery disease involving coronary bypass graft of native heart without angina  pectoris       lisinopril 20 MG tablet    PRINIVIL/ZESTRIL    90 tablet    Take 1 tablet (20 mg) by mouth daily    Type 2 diabetes mellitus without complication, without long-term current use of insulin (H), Benign essential hypertension       loratadine 10 MG tablet    CLARITIN    90 tablet    TAKE ONE TABLET BY MOUTH EVERY DAY    Seasonal allergies       MAPAP 500 MG tablet   Generic drug:  acetaminophen     100 tablet    TAKE TWO TABLETS BY MOUTH EVERY 8 HOURS AS NEEDED FOR MILD PAIN    Unilateral inguinal hernia without obstruction or gangrene, recurrence not specified       MELATONIN PO      Take 10 mg by mouth nightly as needed        metFORMIN 500 MG tablet    GLUCOPHAGE    360 tablet    TAKE TWO TABLETS BY MOUTH TWICE A DAY WITH MEALS    Type 2 diabetes mellitus with diabetic neuropathy, without long-term current use of insulin (H)       metoprolol 25 MG 24 hr tablet    TOPROL-XL    180 tablet    TAKE ONE TABLET BY MOUTH TWICE A DAY    Coronary artery disease involving native coronary artery of native heart without angina pectoris       nitroGLYcerin 0.4 MG sublingual tablet    NITROSTAT    25 tablet    PLACE 1 TABLET UNDER THE TONGUE AT THE ONSET OF CHEST PAIN. MAY REPEAT EVERY 5 MINUTES AS NEEDED FOR A TOTAL OF 3 DOSES.    Incomplete RBBB       omega-3 acid ethyl esters 1 G capsule    Lovaza    120 capsule    Take 2 capsules (2 g) by mouth 2 times daily    Hyperlipidemia, unspecified hyperlipidemia type       omeprazole 20 MG CR capsule    priLOSEC    180 capsule    Take 1 capsule (20 mg) by mouth 2 times daily Take 30-60 minutes before a meal.    Gastroesophageal reflux disease without esophagitis       ONETOUCH VERIO IQ test strip   Generic drug:  blood glucose monitoring     100 each    TEST ONCE DAILY OR AS DIRECTED    Type 2 diabetes mellitus with diabetic neuropathy, without long-term current use of insulin (H)       polyethylene glycol powder    MIRALAX/GLYCOLAX    500 g    Take 17 g by mouth daily  as needed for constipation    Constipation, unspecified constipation type       sitagliptin 100 MG tablet    JANUVIA    90 tablet    Take 1 tablet (100 mg) by mouth daily    Type 2 diabetes mellitus without complication, without long-term current use of insulin (H)       sodium chloride 0.65 % nasal spray    OCEAN    1 Bottle    Spray 1 spray into both nostrils 4 times daily as needed for congestion    Viral illness       tamsulosin 0.4 MG capsule    FLOMAX    90 capsule    TAKE ONE CAPSULE BY MOUTH EVERY DAY    Benign prostatic hyperplasia, presence of lower urinary tract symptoms unspecified       triamcinolone 0.1 % cream    KENALOG     Apply topically daily as needed for irritation (under Arms)        vitamin D 2000 UNITS tablet     100 tablet    TAKE ONE TABLET BY MOUTH EVERY DAY    Vitamin D deficiency       ZETIA PO      Take 10 mg by mouth At Bedtime

## 2017-11-03 NOTE — PROGRESS NOTES
Javier Markham is enrolled/participating in the retail pharmacy Blood Pressure Goals Achievement Program (BPGAP).  Javier Markham was evaluated at [facility] on November 3, 2017 at which time his blood pressure was:    BP Readings from Last 3 Encounters:   10/04/17 136/85   09/14/17 138/89   08/23/17 (!) 137/92     Reviewed lifestyle modifications for blood pressure control and reduction: including making healthy food choices, managing weight, getting regular exercise, smoking cessation, reducing alcohol consumption, monitoring blood pressure regularly.     Javier Markham is not experiencing symptoms.    Follow-Up: BP is at goal of < 140/90mmHg (patient 18+ years of age with or without diabetes).  Recommended follow-up at pharmacy in 6 months.     Recommendation to Provider: 6 months     Javier Markham was evaluated for enrollment into the PGEN study today.    Patient eligible for enrollment:  No  Patient interested in enrollment:  No    Completed by: Katya Carter, PharmD, [facility]     [facility]  [phone]

## 2017-11-06 ENCOUNTER — TELEPHONE (OUTPATIENT)
Dept: FAMILY MEDICINE | Facility: CLINIC | Age: 52
End: 2017-11-06

## 2017-11-07 NOTE — TELEPHONE ENCOUNTER
Please call Alexx that his glucose and A1c are still quite high.  Was he able to  the new Rx Januvia?  Would suggest seeing if he'd be willing to work with either MTM or diabetes education to maximize medications.  Thanks!

## 2017-11-07 NOTE — TELEPHONE ENCOUNTER
Phone does not ring and unable to leave a message.  I did phone the pharmacy and patient did  the Januvia. Would you like a letter sent to patient?  Please advise.  Gavi Stephenson CMA

## 2017-11-08 NOTE — TELEPHONE ENCOUNTER
Letter already has been sent. He will hopefully reach out to us again then. Glad to know Sonal was already picked up. His cardiologist has also discussed diet with him as well.

## 2017-11-20 DIAGNOSIS — F41.9 ANXIETY: Chronic | ICD-10-CM

## 2017-11-20 DIAGNOSIS — K21.9 GASTROESOPHAGEAL REFLUX DISEASE WITHOUT ESOPHAGITIS: Chronic | ICD-10-CM

## 2017-11-20 DIAGNOSIS — K40.90 UNILATERAL INGUINAL HERNIA WITHOUT OBSTRUCTION OR GANGRENE, RECURRENCE NOT SPECIFIED: Chronic | ICD-10-CM

## 2017-11-20 DIAGNOSIS — E11.40 TYPE 2 DIABETES MELLITUS WITH DIABETIC NEUROPATHY, WITHOUT LONG-TERM CURRENT USE OF INSULIN (H): ICD-10-CM

## 2017-11-21 RX ORDER — HYDROXYZINE HYDROCHLORIDE 25 MG/1
TABLET, FILM COATED ORAL
Refills: 0
Start: 2017-11-21

## 2017-11-21 RX ORDER — LANCETS 33 GAUGE
EACH MISCELLANEOUS
Qty: 100 EACH | Refills: 5 | Status: SHIPPED | OUTPATIENT
Start: 2017-11-21 | End: 2019-05-07

## 2017-11-21 NOTE — TELEPHONE ENCOUNTER
hydrOXYzine (ATARAX) 25 MG tablet 120 tablet 3 7/31/2017  No      Sig: TAKE ONE TO TWO TABLETS BY MOUTH EVERY 6 HOURS AS NEEDED FOR ANXIETY           Last Written Prescription Date:  07/31/2017  Last Fill Quantity: 120,   # refills: 3  Future Office visit:     Last OV: 10/04/2017  Next 5 appointments (look out 90 days)     Noha 15, 2018 10:30 AM CST   Office Visit with Morelia Pedraza DO   [facility]    [address] [facility] [address]   [phone]                   Routing refill request to provider for review/approval because:  Drug not on the [facility], P or [facility] refill protocol or controlled substance

## 2017-11-28 DIAGNOSIS — F41.9 ANXIETY: Chronic | ICD-10-CM

## 2017-11-28 DIAGNOSIS — G25.81 RESTLESS LEGS SYNDROME (RLS): Chronic | ICD-10-CM

## 2017-11-28 RX ORDER — HYDROXYZINE HYDROCHLORIDE 25 MG/1
TABLET, FILM COATED ORAL
Qty: 120 TABLET | Refills: 5 | Status: SHIPPED | OUTPATIENT
Start: 2017-11-28 | End: 2018-06-28

## 2017-11-28 NOTE — TELEPHONE ENCOUNTER
atarax      Last Written Prescription Date:  7/31/17  Last Fill Quantity: 120,   # refills: 3  Last Office Visit: 10/4/17  Future Office visit:    Next 5 appointments (look out 90 days)     Noah 15, 2018 10:30 AM CST   [facility] Visit with Morelia [facility]ra[facility] DO   [facility]    [address] [facility] [address]   [phone]                   Routing refill request to provider for review/approval because:  Drug not on the [facility], [facility] or  Health refill protocol or controlled substance

## 2017-11-29 RX ORDER — CLONAZEPAM 0.5 MG/1
0.5 TABLET ORAL
Qty: 30 TABLET | Refills: 0 | Status: SHIPPED | OUTPATIENT
Start: 2017-11-29 | End: 2017-12-22

## 2017-11-29 NOTE — TELEPHONE ENCOUNTER
Pending Prescriptions:                       Disp   Refills    clonazePAM (KLONOPIN) 0.5 MG tablet       30 tab*0            Sig: Take 1 tablet (0.5 mg) by mouth nightly as needed           (Restless legs)        Last Written Prescription Date:  11/1/17  Last Fill Quantity: 30,   # refills: 0  Last Office Visit: 10/4/17 Milo  Future Office visit:    Next 5 appointments (look out 90 days)     Noah 15, 2018 10:30 AM CST   Office Visit with Morelia Pedraza DO   [facility]    [address] [facility] [address]   [phone]                   Routing refill request to provider for review/approval because:  Drug not on the [facility], [facility] or [facility] refill protocol or controlled substance    RT Alberto(R)

## 2017-12-18 DIAGNOSIS — E53.8 VITAMIN B12 DEFICIENCY (NON ANEMIC): ICD-10-CM

## 2017-12-18 DIAGNOSIS — E11.40 TYPE 2 DIABETES MELLITUS WITH DIABETIC NEUROPATHY, WITHOUT LONG-TERM CURRENT USE OF INSULIN (H): ICD-10-CM

## 2017-12-19 NOTE — TELEPHONE ENCOUNTER
gabapentin (NEURONTIN) 300 MG capsule      Last Written Prescription Date:  6/30/17  Last Fill Quantity: 270,   # refills: 5  Last Office Visit: 10/04/17  Milo  Future Office visit:    Next 5 appointments (look out 90 days)     Noah 15, 2018 10:30 AM CST   Office Visit with Morelia Pedraza DO   [facility]    [address] [facility] [address]   [phone]                   Routing refill request to provider for review/approval because:  Drug not on the [facility], [facility] or  Health refill protocol or controlled substance          Misa Hutchins RT(R)

## 2017-12-20 RX ORDER — LANOLIN ALCOHOL/MO/W.PET/CERES
CREAM (GRAM) TOPICAL
Qty: 100 TABLET | Refills: 3 | Status: SHIPPED | OUTPATIENT
Start: 2017-12-20 | End: 2018-11-30

## 2017-12-20 RX ORDER — GABAPENTIN 300 MG/1
CAPSULE ORAL
Qty: 270 CAPSULE | Refills: 5 | Status: SHIPPED | OUTPATIENT
Start: 2017-12-20 | End: 2018-06-28

## 2017-12-22 ENCOUNTER — TELEPHONE (OUTPATIENT)
Dept: FAMILY MEDICINE | Facility: CLINIC | Age: 52
End: 2017-12-22

## 2017-12-22 DIAGNOSIS — G25.81 RESTLESS LEGS SYNDROME (RLS): Chronic | ICD-10-CM

## 2017-12-23 NOTE — TELEPHONE ENCOUNTER
clonazePAM (KLONOPIN) 0.5 MG tablet      Last Written Prescription Date:  11/29/17  Last Fill Quantity: 30,   # refills: 0  Last Office Visit: 10/04/17  Milo  Future Office visit:    Next 5 appointments (look out 90 days)     Noah 15, 2018 10:30 AM CST   Office Visit with Morelia Pedraza DO   [facility]    [address] [facility] [address]   [phone]                   Routing refill request to provider for review/approval because:  Drug not on the [facility], [facility] or [facility] refill protocol or controlled substance        Misa Hutchins RT(R)

## 2017-12-27 ENCOUNTER — TELEPHONE (OUTPATIENT)
Dept: FAMILY MEDICINE | Facility: CLINIC | Age: 52
End: 2017-12-27

## 2017-12-27 RX ORDER — CLONAZEPAM 0.5 MG/1
0.5 TABLET ORAL
Qty: 30 TABLET | Refills: 0 | Status: SHIPPED | OUTPATIENT
Start: 2017-12-27 | End: 2018-01-15

## 2017-12-27 NOTE — TELEPHONE ENCOUNTER
Please do not close this encounter until this has been addressed.  (prior auth approved/denied, prescriber refusal to complete prior auth or medication changed/discontinued)    Prior Authorization needed on: Januvia 100mg  Drug NDC: [address]-65     Insurance: Derek REYES  Rx BIN: [id number]  Rx PCN: [place]  Member ID: [id number]   Insurance phone #: [phone]    Pharmacy NPI: [id number]  Pharmacy Phone #: [phone]  Pharmacy Fax #: [phone]    Please let us know if the PA gets approved or denied or if medication is changed.    Thank You,  Delaney Gordon CPhT  On behalf of [facility]

## 2017-12-27 NOTE — TELEPHONE ENCOUNTER
Please disregard this PA request, RX just needed an override code and will not require PA. Thank you!

## 2018-01-11 ENCOUNTER — THERAPY VISIT (OUTPATIENT)
Dept: SLEEP MEDICINE | Facility: CLINIC | Age: 53
End: 2018-01-11
Payer: COMMERCIAL

## 2018-01-11 DIAGNOSIS — G25.81 RESTLESS LEGS SYNDROME (RLS): ICD-10-CM

## 2018-01-11 DIAGNOSIS — G47.33 OSA (OBSTRUCTIVE SLEEP APNEA): ICD-10-CM

## 2018-01-11 DIAGNOSIS — F51.04 CHRONIC INSOMNIA: ICD-10-CM

## 2018-01-11 PROCEDURE — 95810 POLYSOM 6/> YRS 4/> PARAM: CPT | Performed by: INTERNAL MEDICINE

## 2018-01-11 NOTE — MR AVS SNAPSHOT
After Visit Summary   1/11/2018    Javier Markham    MRN: [id number]           Patient Information     Date Of Birth          1965        Visit Information        Provider Department      1/11/2018 8:30 PM BED 4  SLEEP [facility]        Today's Diagnoses     Chronic insomnia        Restless legs syndrome (RLS)        STEPHAN (obstructive sleep apnea)           Follow-ups after your visit        Your next 10 appointments already scheduled     Noah 15, 2018 10:30 AM CST   [facility]    [address] [facility] [id number] [phone]           Bring a current list of meds and any records pertaining to this visit. For Physicals, please bring immunization records and any forms needing to be filled out. Please arrive 10 minutes early to complete paperwork.            Jan 16, 2018 10:30 AM CST   New Sleep Patient with Sky Campos PsyD   [facility] ([facility])    [address] [id number] [phone]            Jan 25, 2018 10:00 AM CST   Return Sleep Patient with Forrest Welsh MD   [facility] ([facility])    [address] [id number] [phone]              Who to contact     If you have questions or need follow up information about today's clinic visit or your schedule please contact [facility] directly at [phone].  Normal or non-critical lab and imaging results will be communicated to you by MyChart, letter or phone within 4 business days after the clinic has received the results. If you do not hear from us within 7 days, please contact the clinic through [employer] or phone. If you have a critical or abnormal lab result, we will notify you by phone as soon as possible.  Submit refill requests through [employer] or call your pharmacy and they will  "forward the refill request to us. Please allow 3 business days for your refill to be completed.          Additional Information About Your Visit        [employer] Information     [employer] lets you send messages to your doctor, view your test results, renew your prescriptions, schedule appointments and more. To sign up, go to [email or web address] . Click on \"Log in\" on the left side of the screen, which will take you to the Welcome page. Then click on \"Sign up Now\" on the right side of the page.     You will be asked to enter the access code listed below, as well as some personal information. Please follow the directions to create your username and password.     Your access code is: [email or web address]  Expires: 2018  5:24 AM     Your access code will  in 90 days. If you need help or a new code, please call your [facility] clinic or [phone].        Care EveryWhere ID     This is your Care EveryWhere ID. This could be used by other organizations to access your [facility] medical records  [id number]         Blood Pressure from Last 3 Encounters:   17 110/78   10/04/17 136/85   17 138/89    Weight from Last 3 Encounters:   10/04/17 (!) 139.8 kg (308 lb 3.2 oz)   17 (!) 137 kg (302 lb)   17 135.6 kg (299 lb)              We Performed the Following     Comprehensive Sleep Study        Primary Care Provider Office Phone # Fax #    Morelia Rohini Pedraza [phone] [address] [address]        Equal Access to Services     [facility] AH: Hadii sol Ross, waaxda luqadaha, qaybta kaallaya ridley. So [facility] [phone].    ATENCIÓN: Si habla español, tiene a hale disposición servicios gratuitos de asistencia lingüística. Niurka rob [phone].    We comply with applicable federal civil rights laws and [place] laws. We do not discriminate on the basis of race, color, national origin, age, disability, " sex, sexual orientation, or gender identity.            Thank you!     Thank you for choosing [facility]  for your care. Our goal is always to provide you with excellent care. Hearing back from our patients is one way we can continue to improve our services. Please take a few minutes to complete the written survey that you may receive in the mail after your visit with us. Thank you!             Your Updated Medication List - Protect others around you: Learn how to safely use, store and throw away your medicines at www.disposemymeds.org.          This list is accurate as of: 1/11/18 11:59 PM.  Always use your most recent med list.                   Brand Name Dispense Instructions for use Diagnosis    albuterol 108 (90 BASE) MCG/ACT Inhaler    PROAIR HFA/PROVENTIL HFA/VENTOLIN HFA    1 Inhaler    Inhale 2 puffs into the lungs every 4 hours as needed for shortness of breath / dyspnea or wheezing    Shortness of breath       aspirin 81 MG EC tablet     90 tablet    TAKE ONE TABLET BY MOUTH EVERY DAY    Coronary artery disease involving native coronary artery of native heart without angina pectoris       atorvastatin 80 MG tablet    LIPITOR    90 tablet    TAKE ONE TABLET BY MOUTH EVERY DAY    Coronary artery disease involving native coronary artery of native heart without angina pectoris       B-D SINGLE USE SWABS REGULAR Pads     100 each    USE AS NEEDED    Type 2 diabetes mellitus with diabetic neuropathy, without long-term current use of insulin (H)       blood glucose monitoring meter device kit    no brand specified    1 kit    Use to test blood sugar 1 times daily or as directed. ONE TOUCH VERIO.    Type 2 diabetes mellitus with diabetic neuropathy, without long-term current use of insulin (H)       clonazePAM 0.5 MG tablet    klonoPIN    30 tablet    Take 1 tablet (0.5 mg) by mouth nightly as needed (Restless legs)    Restless legs syndrome (RLS)       clopidogrel 75 MG tablet    PLAVIX    90  tablet    Take 1 tablet (75 mg) by mouth daily    Coronary artery disease involving coronary bypass graft of native heart without angina pectoris       cyanocobalamin 1000 MCG tablet    vitamin  B-12    100 tablet    TAKE ONE TABLET BY MOUTH EVERY DAY    Vitamin B12 deficiency (non anemic)       cyclobenzaprine 5 MG tablet    FLEXERIL    42 tablet    Take 1 tablet (5 mg) by mouth 3 times daily as needed for muscle spasms    Acute low back pain, unspecified back pain laterality, with sciatica presence unspecified, Back muscle spasm       DEEP SEA NASAL SPRAY 0.65 % nasal spray   Generic drug:  sodium chloride     44 mL    SPRAY 1 SPRAY IN EACH NOSTRIL FOUR TIMES A DAY AS NEEDED FOR CONGESTION    Viral illness       ferrous sulfate 325 (65 FE) MG tablet    IRON    30 tablet    TAKE ONE TABLET BY MOUTH EVERY MORNING WITH BREAKFAST    Iron deficiency       fluticasone 50 MCG/ACT spray    FLONASE    1 Bottle    Spray 1 spray into both nostrils daily    Chronic seasonal allergic rhinitis, unspecified trigger       gabapentin 300 MG capsule    NEURONTIN    270 capsule    TAKE THREE CAPSULES BY MOUTH THREE TIMES A DAY    Type 2 diabetes mellitus with diabetic neuropathy, without long-term current use of insulin (H)       hydrOXYzine 25 MG tablet    ATARAX    120 tablet    TAKE ONE TO TWO TABLETS BY MOUTH EVERY 6 HOURS AS NEEDED FOR ANXIETY    Anxiety       ibuprofen 800 MG tablet    ADVIL/MOTRIN    60 tablet    Take 1 tablet (800 mg) by mouth every 8 hours as needed for moderate pain    Left inguinal hernia       isosorbide mononitrate 30 MG 24 hr tablet    IMDUR    45 tablet    Take 0.5 tablets (15 mg) by mouth daily    Coronary artery disease involving coronary bypass graft of native heart without angina pectoris       lisinopril 20 MG tablet    PRINIVIL/ZESTRIL    90 tablet    Take 1 tablet (20 mg) by mouth daily    Type 2 diabetes mellitus without complication, without long-term current use of insulin (H), Benign  essential hypertension       loratadine 10 MG tablet    CLARITIN    90 tablet    TAKE ONE TABLET BY MOUTH EVERY DAY    Seasonal allergies       MAPAP 500 MG tablet   Generic drug:  acetaminophen     100 tablet    TAKE TWO TABLETS BY MOUTH EVERY 8 HOURS AS NEEDED FOR MILD PAIN    Unilateral inguinal hernia without obstruction or gangrene, recurrence not specified       MELATONIN PO      Take 10 mg by mouth nightly as needed        metFORMIN 500 MG tablet    GLUCOPHAGE    360 tablet    TAKE TWO TABLETS BY MOUTH TWICE A DAY WITH MEALS    Type 2 diabetes mellitus with diabetic neuropathy, without long-term current use of insulin (H)       metoprolol succinate 25 MG 24 hr tablet    TOPROL-XL    180 tablet    TAKE ONE TABLET BY MOUTH TWICE A DAY    Coronary artery disease involving native coronary artery of native heart without angina pectoris       nitroGLYcerin 0.4 MG sublingual tablet    NITROSTAT    25 tablet    PLACE 1 TABLET UNDER THE TONGUE AT THE ONSET OF CHEST PAIN. MAY REPEAT EVERY 5 MINUTES AS NEEDED FOR A TOTAL OF 3 DOSES.    Incomplete RBBB       omega-3 acid ethyl esters 1 G capsule    Lovaza    120 capsule    Take 2 capsules (2 g) by mouth 2 times daily    Hyperlipidemia, unspecified hyperlipidemia type       omeprazole 20 MG CR capsule    priLOSEC    180 capsule    TAKE ONE CAPSULE BY MOUTH TWICE A DAY TAKE 30 TO 60 MINUTES BEFORE A MEAL    Gastroesophageal reflux disease without esophagitis       ONETOUCH DELICA LANCETS 33G Misc     100 each    TEST ONCE DAILY OR AS DIRECTED    Type 2 diabetes mellitus with diabetic neuropathy, without long-term current use of insulin (H)       ONETOUCH VERIO IQ test strip   Generic drug:  blood glucose monitoring     100 each    TEST ONCE DAILY OR AS DIRECTED    Type 2 diabetes mellitus with diabetic neuropathy, without long-term current use of insulin (H)       polyethylene glycol powder    MIRALAX/GLYCOLAX    500 g    Take 17 g by mouth daily as needed for constipation     Constipation, unspecified constipation type       sitagliptin 100 MG tablet    JANUVIA    90 tablet    Take 1 tablet (100 mg) by mouth daily    Type 2 diabetes mellitus without complication, without long-term current use of insulin (H)       tamsulosin 0.4 MG capsule    FLOMAX    90 capsule    TAKE ONE CAPSULE BY MOUTH EVERY DAY    Benign prostatic hyperplasia, presence of lower urinary tract symptoms unspecified       triamcinolone 0.1 % cream    KENALOG     Apply topically daily as needed for irritation (under Arms)        vitamin D 2000 UNITS tablet     100 tablet    TAKE ONE TABLET BY MOUTH EVERY DAY    Vitamin D deficiency       ZETIA PO      Take 10 mg by mouth At Bedtime

## 2018-01-15 ENCOUNTER — OFFICE VISIT (OUTPATIENT)
Dept: FAMILY MEDICINE | Facility: CLINIC | Age: 53
End: 2018-01-15
Payer: COMMERCIAL

## 2018-01-15 VITALS
SYSTOLIC BLOOD PRESSURE: 117 MMHG | HEIGHT: 74 IN | BODY MASS INDEX: 38.4 KG/M2 | WEIGHT: 299.2 LBS | DIASTOLIC BLOOD PRESSURE: 72 MMHG | OXYGEN SATURATION: 96 % | HEART RATE: 82 BPM | TEMPERATURE: 96.9 F

## 2018-01-15 DIAGNOSIS — L85.3 DRY SKIN: ICD-10-CM

## 2018-01-15 DIAGNOSIS — E11.65 TYPE 2 DIABETES MELLITUS WITH HYPERGLYCEMIA, WITHOUT LONG-TERM CURRENT USE OF INSULIN (H): Primary | Chronic | ICD-10-CM

## 2018-01-15 DIAGNOSIS — G25.0 ESSENTIAL TREMOR: ICD-10-CM

## 2018-01-15 DIAGNOSIS — E66.01 MORBID OBESITY (H): Chronic | ICD-10-CM

## 2018-01-15 DIAGNOSIS — I25.810 CORONARY ARTERY DISEASE INVOLVING CORONARY BYPASS GRAFT OF NATIVE HEART, ANGINA PRESENCE UNSPECIFIED: Chronic | ICD-10-CM

## 2018-01-15 DIAGNOSIS — G25.81 RESTLESS LEGS SYNDROME (RLS): Chronic | ICD-10-CM

## 2018-01-15 LAB
ERYTHROCYTE [DISTWIDTH] IN BLOOD BY AUTOMATED COUNT: 13.9 % (ref 10–15)
HBA1C MFR BLD: 9 % (ref 4.3–6)
HCT VFR BLD AUTO: 40.5 % (ref 40–53)
HGB BLD-MCNC: 13.6 G/DL (ref 13.3–17.7)
MCH RBC QN AUTO: 29.6 PG (ref 26.5–33)
MCHC RBC AUTO-ENTMCNC: 33.6 G/DL (ref 31.5–36.5)
MCV RBC AUTO: 88 FL (ref 78–100)
PLATELET # BLD AUTO: 242 10E9/L (ref 150–450)
RBC # BLD AUTO: 4.59 10E12/L (ref 4.4–5.9)
WBC # BLD AUTO: 8.2 10E9/L (ref 4–11)

## 2018-01-15 PROCEDURE — 99215 OFFICE O/P EST HI 40 MIN: CPT | Mod: 25 | Performed by: INTERNAL MEDICINE

## 2018-01-15 PROCEDURE — 85027 COMPLETE CBC AUTOMATED: CPT | Performed by: INTERNAL MEDICINE

## 2018-01-15 PROCEDURE — 83036 HEMOGLOBIN GLYCOSYLATED A1C: CPT | Performed by: INTERNAL MEDICINE

## 2018-01-15 PROCEDURE — 99207 C FOOT EXAM  NO CHARGE: CPT | Performed by: INTERNAL MEDICINE

## 2018-01-15 PROCEDURE — 36415 COLL VENOUS BLD VENIPUNCTURE: CPT | Performed by: INTERNAL MEDICINE

## 2018-01-15 RX ORDER — CLONAZEPAM 0.5 MG/1
0.5 TABLET ORAL
Qty: 30 TABLET | Refills: 5 | Status: SHIPPED | OUTPATIENT
Start: 2018-01-22 | End: 2018-07-31

## 2018-01-15 NOTE — LETTER
[facility]  [address] [facility]  [address]  [place]  [address]  Tel: [phone]    January 18, 2018    Javier Markham  [address]    [facility] [address]        Dear Karin CastromomoAlexx,  It was nice to see you recently!  Your complete blood count including white blood cells (infection fighting/inflammatory cells), hemoglobin (oxygen carrying) and platelets (which help blood clot) is normal.  Your A1c is improved from 9.6% down to 9.0%. Keep up with more healthy lifestyle changes as we discussed as the goal would be to get this closer to 7%.  Continue your current medications and please let me know if you have any questions or concerns. Thanks!    If you have any further questions or problems, please contact our office.      Sincerely,    Morelia Pedraza DO/ Elle Herbert, CMA  Results for orders placed or performed in visit on 01/15/18   CBC with platelets   Result Value Ref Range    WBC 8.2 4.0 - 11.0 10e9/L    RBC Count 4.59 4.4 - 5.9 10e12/L    Hemoglobin 13.6 13.3 - 17.7 g/dL    Hematocrit 40.5 40.0 - 53.0 %    MCV 88 78 - 100 fl    MCH 29.6 26.5 - 33.0 pg    MCHC 33.6 31.5 - 36.5 g/dL    RDW 13.9 10.0 - 15.0 %    Platelet Count 242 150 - 450 10e9/L   Hemoglobin A1c   Result Value Ref Range    Hemoglobin A1C 9.0 (H) 4.3 - 6.0 %               Enclosure: Lab Results

## 2018-01-15 NOTE — PROGRESS NOTES
SUBJECTIVE:   Javier Markham is a 53 year old male who presents to clinic today for the following health issues:    Alexx is here for f/u on uncontrolled diabetes. Sugars in -180. Has stationary bike at home and he really wants to work on this and feels he is confident he can do this. Did loose a bit of weight since last visit. Had some winter blues that are improving and stress awaiting his disability hearing and feels will continue to improve his health after this stress has been lifted (hopefully). 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 He doesn't think he will be able to do this ever again d/t comorbidities, back pain, morbid obesity, fragile CAD. The disability hearing is coming up Feb 1st so he is hopeful about that. Had physical therapy evaluation at [facility] and I filled out a form in August 2016 in regards to this and he doesn't know of any additional paperwork required from me at this time but he will check with his .     Wt Readings from Last 4 Encounters:   01/15/18 299 lb 3.2 oz (135.7 kg)   10/04/17 (!) 308 lb 3.2 oz (139.8 kg)   09/14/17 (!) 302 lb (137 kg)   08/23/17 299 lb (135.6 kg)     Also in regards to RLS, 3 months ago we trialed Clonazepam and neuro referral placed for this and ET. He had repeat sleep study and doesn't qualify for PAP therapy. Did see neurologist who Rx Mirapex every evening with gabapentin and Clonazepam and for the first time in a long time feels benefit. Taking Primidone TID but no effect on his ET. Lots of difficulty with writing d/t ET. Some days worse than others - once even flipped pizza over when taking it out of the oven. Has strong family h/o ET in brother and sister. Will f/u with neurologist next month (Dr. Mason at [facility] in [place]). Feels he has been dealing with it his whole life but worse with age+after heart surgery.    F/u with cardiology  "coming up soon later this month and will have an echo at that appointment to help decide if he needs more cardiac surgery. His BP is much better on higher dose of Lisinopril and f/u labs after adjustment showed stable BMP.      Diabetes Follow-up    Patient is checking blood sugars: once daily.  Results are as follows:       am - 170-180 and trying to retrain himself to eat more natural foods with less carbohydrates    Diabetic concerns: None     Symptoms of hypoglycemia (low blood sugar): none      Paresthesias (numbness or burning in feet) or sores: Yes neuropathy    Stubbed left 2nd toe on [place] day (but didn't feel it) but saw it was bleeding under the toenail and nail ripped off so this surprised him how significant his neuropathy has become     Date of last diabetic eye exam: 2017    Taking Metformin and Januvia    Hyperlipidemia Follow-Up      Rate your low fat/cholesterol diet?: fair    Taking statin?  Yes, no muscle aches from statin    Other lipid medications/supplements?:  none    Hypertension Follow-up      Outpatient blood pressures are being checked at home.     Low Salt Diet: not monitoring salt  BP Readings from Last 2 Encounters:   01/15/18 117/72   11/03/17 110/78     Hemoglobin A1C (%)   Date Value   01/15/2018 9.0 (H)   11/03/2017 9.6 (H)     LDL Cholesterol Calculated (mg/dL)   Date Value   08/11/2017 80   05/30/2017     Cannot estimate LDL when triglyceride exceeds 400 mg/dL     LDL Cholesterol Direct (mg/dL)   Date Value   05/30/2017 90         Amount of exercise or physical activity: 6-7 days/week     Problems taking medications regularly: No    Medication side effects: none    Diet: regular (no restrictions)      Problem list and histories reviewed & adjusted, as indicated.    ROS:  Detailed as above     OBJECTIVE:     /72 (BP Location: Right arm, Cuff Size: Adult Large)  Pulse 82  Temp 96.9  F (36.1  C) (Oral)  Ht 6' 1.5\" (1.867 m)  Wt 299 lb 3.2 oz (135.7 kg)  SpO2 96%  BMI " 38.94 kg/m2  Body mass index is 38.94 kg/(m^2).  Alert, pleasant, NAD, overweight  Feet are nice and clean with some callus formation on bottom of ball and heels of feet, dim monofilament testing, left 2nd toenail growing back without signs of infection, no ulcerations   Dry skin on sides of abdomen with mild maculopapular rash  Walks without an assist device  Obese, rotund abdomen limits bending forward and I had to help him put on his own socks (he says he usually puts foot up on chair/bed to get his socks on daily)  Normal strength bilateral upper and lower extremities    ASSESSMENT/PLAN:     1. Type 2 diabetes mellitus with hyperglycemia, without long-term current use of insulin (H)  Making strides with motivation to improve diet and weight loss  On Metformin and Januvia  - FOOT EXAM  NO CHARGE [57280.114]  - Hemoglobin A1c    2. Morbid obesity (H)  Discussed continuing to work on weight loss and he will try to use his stationary bike more often    3. Coronary artery disease involving coronary bypass graft of native heart, angina presence unspecified  Appreciate cardiology assistance as noted above in HPI  Is on several cardiac meds given his extensive CAD  - CBC with platelets    4. Restless legs syndrome (RLS)  Appreciate neurology assistance as noted above in HPI  No evidence of apnea now even on second test  Is taking an iron supplement although ferritin 112 in 2017  - clonazePAM (KLONOPIN) 0.5 MG tablet; Take 1 tablet (0.5 mg) by mouth nightly as needed (Restless legs)  Dispense: 30 tablet; Refill: 5    5. Essential tremor  Will f/u with neuro on this. Strong family component and not improved with Primidone.    6. Dry skin  H/o allergy prone too  - Skin Protectants, Misc. (EUCERIN) cream; Apply topically as needed for dry skin  Dispense: 240 g; Refill: 3    Patient Instructions   Labs today    Ask your  if any updated forms are needed from me - please give us as much notice as possible    Eucerin to  be applied on moist skin after bathing and as often as needed to calm dry/irritated skin    Clonazepam script given to you today    Ride your bike more and continue to watch carbohydrate intake    See me in 3 months        MDM: > 40 minutes spent with patient, over 50% time counseling, coordinating care and explaining about nature of the patient's conditions.      Morelia Pedraza,   [facility]

## 2018-01-15 NOTE — PROCEDURES
"SLEEP STUDY INTERPRETATION  POLYSOMNOGRAPHY REPORT      Patient: Javier Markham  YOB: 1965  Study Date: 1/11/2018  MRN: [id number]  Referring Provider: Morelia Pedraza MD  Ordering Provider: Forrest Welsh MD    Indications for Polysomnography: The patient is a 53 y old Male who is 6' 1\" and weighs 302.0 lbs.  His BMI is 39.6, [place] sleepiness scale 9.0 and neck size is 0.0.  Relevant medical history includes hypertension, coronary artery disease, DM and restless leg syndrome. A diagnostic polysomnogram was performed to evaluate for sleep apnea.    Polysomnogram Data:  A full night polysomnogram recorded the standard physiologic parameters including EEG, EOG, EMG, ECG, nasal and oral airflow.  Respiratory parameters of chest and abdominal movements were recorded with respiratory inductance plethysmography.  Oxygen saturation was recorded by pulse oximetry.      Sleep Architecture: Sleep efficiency was low with a prolonged wake period in the night.   The total recording time of the polysomnogram was 437.4 minutes.  The total sleep time was 302.5 minutes.  Sleep latency was decreased at 3.6 minutes without the use of a sleep aid.  REM latency was 89.5 minutes.  Arousal index was normal at 10.1 arousals per hour.  Sleep efficiency was decreased at 69.2%.  Wake after sleep onset was 129.0 minutes.  The patient spent 2.0% of total sleep time in Stage N1, 50.2% in Stage N2, 30.7% in Stages N3, and 17.0% in REM.  Time in REM supine was - minutes.    Respiration: Mild obstructive sleep apnea.     Events - The polysomnogram revealed a presence of - obstructive, - central, and - mixed apneas resulting in an apnea index of - events per hour.  There were 36 hypopneas resulting in a hypopnea index of 7.1 events per hour.  The combined apnea/hypopnea index was 7.1 events per hour.  The REM AHI was 26.8 events per hour.  The supine AHI was - events per hour.  The RERA index was 0.4 events per hour.   The RDI was 7.5 " events per hour.    Snoring - was reported as moderate to loud.    Respiratory rate and pattern - was notable for normal respiratory rate and pattern.    Sustained Sleep Associated Hypoventilation - Transcutaneous carbon dioxide monitoring was not used; however significant hypoventilation was not suggested by oximetry.    Sleep Associated Hypoxemia - (Greater than 5 minutes O2 sat below 89%) was present.  Baseline oxygen saturation was 91.8%. Lowest oxygen saturation was 80.8%.  Time spent less than or equal to 88% was 13.3 minutes.  Time spent less than or equal to 89% was 24.5 minutes.  7.5 0.4 7.1     Movement Activity: There was no significant movement abnormality.     Periodic Limb Activity - There were - PLMs during the entire study. The PLM index was - movements per hour.  The PLM Arousal Index was - per hour.    REM EMG Activity - Excessive transient / sustained muscle activity was not present.    Nocturnal Behavior - Abnormal sleep related behaviors were not noted during / arising out of NREM / REM sleep.  Talking was noted in REM.     Bruxism - None apparent.    Cardiac Summary: Normal sinus rhythm.   The average pulse rate was 68.5 bpm.  The minimum pulse rate was 57.9 bpm while the maximum pulse rate was 88.0 bpm. The rhythm is normal sinus. Arrhythmias were not noted.    Assessment:     This sleep study shows mild obstructive sleep apnea, predominantly REM related. Patient did not sleep supine during this test.     Sleep efficiency was low with a prolonged period of wakefulness after sleep onset.     There were no significant periodic limb movements of sleep during this test.      Recommendations:    Treatment options for mild obstructive sleep apnea with comorbid hypertension and coronary artery disease include auto PAP therapy and dental appliance.    Treatment could be empirically initiated with Auto-titrating PAP therapy with a range of 5 - 15 cmH2O. Recommend clinical follow up with sleep  management team.    Clinical management of insomnia and restless leg syndrome.     Patient may be a candidate for dental appliance through referral to Sleep Dentistry for the treatment of obstructive sleep apnea.    If devices are not acceptable or effective, patient may benefit from evaluation of possible surgical options.  If he is interested, would recommend referral to specialized ENT-Sleep provider.    Weight management.    Cardiac Comments: Normal Sinus Rhythm  Diagnostic Codes:     Obstructive Sleep Apnea G47.33       _________________________________   Forrest Welsh MD 1/15/2018

## 2018-01-15 NOTE — PATIENT INSTRUCTIONS
Labs today    Ask your  if any updated forms are needed from me - please give us as much notice as possible    Eucerin to be applied on moist skin after bathing and as often as needed to calm dry/irritated skin    Clonazepam script given to you today    Ride your bike more and continue to watch carbohydrate intake    See me in 3 months

## 2018-01-15 NOTE — MR AVS SNAPSHOT
After Visit Summary   1/15/2018    Javier Markham    MRN: [id number]           Patient Information     Date Of Birth          1965        Visit Information        Provider Department      1/15/2018 10:30 AM Morelia Pedraza,  [facility]        Today's Diagnoses     Type 2 diabetes mellitus with hyperglycemia, without long-term current use of insulin (H)    -  1    Coronary artery disease involving coronary bypass graft of native heart, angina presence unspecified        Restless legs syndrome (RLS)        Essential tremor        Dry skin          Care Instructions    Labs today    Ask your  if any updated forms are needed from me - please give us as much notice as possible    Eucerin to be applied on moist skin after bathing and as often as needed to calm dry/irritated skin    Clonazepam script given to you today    Ride your bike more and continue to watch carbohydrate intake    See me in 3 months              Follow-ups after your visit        Your next 10 appointments already scheduled     Jan 16, 2018 10:30 AM CST   New Sleep Patient with Sky Campos PsyD   [facility] ([facility])    [address] [place] [id number] [phone]            Jan 25, 2018 10:00 AM CST   Return Sleep Patient with Forrest Welsh MD   [facility] ([facility])    [address] [id number] [phone]              Who to contact     If you have questions or need follow up information about today's clinic visit or your schedule please contact [facility] directly at [phone].  Normal or non-critical lab and imaging results will be communicated to you by MyChart, letter or phone within 4 business days after the clinic has received the results. If you do not hear from us within 7 days, please contact the clinic through MyChart or phone. If you have a critical or  "abnormal lab result, we will notify you by phone as soon as possible.  Submit refill requests through [email or web address] or call your pharmacy and they will forward the refill request to us. Please allow 3 business days for your refill to be completed.          Additional Information About Your Visit        Care EveryWhere ID     This is your Care EveryWhere ID. This could be used by other organizations to access your [facility] medical records  [id number]        Your Vitals Were     Pulse Temperature Height Pulse Oximetry BMI (Body Mass Index)       82 96.9  F (36.1  C) (Oral) 6' 1.5\" (1.867 m) 96% 38.94 kg/m2        Blood Pressure from Last 3 Encounters:   01/15/18 117/72   11/03/17 110/78   10/04/17 136/85    Weight from Last 3 Encounters:   01/15/18 299 lb 3.2 oz (135.7 kg)   10/04/17 (!) 308 lb 3.2 oz (139.8 kg)   09/14/17 (!) 302 lb (137 kg)              We Performed the Following     CBC with platelets     FOOT EXAM  NO CHARGE [29115.114]     Hemoglobin A1c          Today's Medication Changes          These changes are accurate as of: 1/15/18 11:23 AM.  If you have any questions, ask your nurse or doctor.               Start taking these medicines.        Dose/Directions    eucerin cream   Used for:  Dry skin   Started by:  Morelia Pedraza, DO        Apply topically as needed for dry skin   Quantity:  240 g   Refills:  3            Where to get your medicines      These medications were sent to [facility], [place] - [address], [facility] [address]  [address], [place] [address], [facility] [address]     Phone:  [phone]     eucerin cream         Some of these will need a paper prescription and others can be bought over the counter.  Ask your nurse if you have questions.     Bring a paper prescription for each of these medications     clonazePAM 0.5 MG tablet                Primary Care Provider Office Phone # Fax #    Morelia Pedraza [phone] [address]  [facility] [address]        Equal Access to Services     SHARON SIM : Hadii sol pittman hadgloriachris Sodavidali, waaxda luqadaha, qaybta kaalmalaya ramos. So [facility] [phone].    ATENCIÓN: Si habla español, tiene a hale disposición servicios gratuitos de asistencia lingüística. Yasminame al [phone].    We comply with applicable federal civil rights laws and [place] laws. We do not discriminate on the basis of race, color, national origin, age, disability, sex, sexual orientation, or gender identity.            Thank you!     Thank you for choosing [facility]  for your care. Our goal is always to provide you with excellent care. Hearing back from our patients is one way we can continue to improve our services. Please take a few minutes to complete the written survey that you may receive in the mail after your visit with us. Thank you!             Your Updated Medication List - Protect others around you: Learn how to safely use, store and throw away your medicines at www.disposemymeds.org.          This list is accurate as of: 1/15/18 11:23 AM.  Always use your most recent med list.                   Brand Name Dispense Instructions for use Diagnosis    albuterol 108 (90 BASE) MCG/ACT Inhaler    PROAIR HFA/PROVENTIL HFA/VENTOLIN HFA    1 Inhaler    Inhale 2 puffs into the lungs every 4 hours as needed for shortness of breath / dyspnea or wheezing    Shortness of breath       aspirin 81 MG EC tablet     90 tablet    TAKE ONE TABLET BY MOUTH EVERY DAY    Coronary artery disease involving native coronary artery of native heart without angina pectoris       atorvastatin 80 MG tablet    LIPITOR    90 tablet    TAKE ONE TABLET BY MOUTH EVERY DAY    Coronary artery disease involving native coronary artery of native heart without angina pectoris       B-D SINGLE USE SWABS REGULAR Pads     100 each    USE AS NEEDED    Type 2 diabetes mellitus with diabetic neuropathy, without  long-term current use of insulin (H)       blood glucose monitoring meter device kit    no brand specified    1 kit    Use to test blood sugar 1 times daily or as directed. ONE TOUCH VERIO.    Type 2 diabetes mellitus with diabetic neuropathy, without long-term current use of insulin (H)       clonazePAM 0.5 MG tablet   Start taking on:  1/22/2018    klonoPIN    30 tablet    Take 1 tablet (0.5 mg) by mouth nightly as needed (Restless legs)    Restless legs syndrome (RLS)       clopidogrel 75 MG tablet    PLAVIX    90 tablet    Take 1 tablet (75 mg) by mouth daily    Coronary artery disease involving coronary bypass graft of native heart without angina pectoris       cyanocobalamin 1000 MCG tablet    vitamin  B-12    100 tablet    TAKE ONE TABLET BY MOUTH EVERY DAY    Vitamin B12 deficiency (non anemic)       cyclobenzaprine 5 MG tablet    FLEXERIL    42 tablet    Take 1 tablet (5 mg) by mouth 3 times daily as needed for muscle spasms    Acute low back pain, unspecified back pain laterality, with sciatica presence unspecified, Back muscle spasm       DEEP SEA NASAL SPRAY 0.65 % nasal spray   Generic drug:  sodium chloride     44 mL    SPRAY 1 SPRAY IN EACH NOSTRIL FOUR TIMES A DAY AS NEEDED FOR CONGESTION    Viral illness       eucerin cream     240 g    Apply topically as needed for dry skin    Dry skin       ferrous sulfate 325 (65 FE) MG tablet    IRON    30 tablet    TAKE ONE TABLET BY MOUTH EVERY MORNING WITH BREAKFAST    Iron deficiency       fluticasone 50 MCG/ACT spray    FLONASE    1 Bottle    Spray 1 spray into both nostrils daily    Chronic seasonal allergic rhinitis, unspecified trigger       gabapentin 300 MG capsule    NEURONTIN    270 capsule    TAKE THREE CAPSULES BY MOUTH THREE TIMES A DAY    Type 2 diabetes mellitus with diabetic neuropathy, without long-term current use of insulin (H)       hydrOXYzine 25 MG tablet    ATARAX    120 tablet    TAKE ONE TO TWO TABLETS BY MOUTH EVERY 6 HOURS AS NEEDED  FOR ANXIETY    Anxiety       ibuprofen 800 MG tablet    ADVIL/MOTRIN    60 tablet    Take 1 tablet (800 mg) by mouth every 8 hours as needed for moderate pain    Left inguinal hernia       isosorbide mononitrate 30 MG 24 hr tablet    IMDUR    45 tablet    Take 0.5 tablets (15 mg) by mouth daily    Coronary artery disease involving coronary bypass graft of native heart without angina pectoris       lisinopril 20 MG tablet    PRINIVIL/ZESTRIL    90 tablet    Take 1 tablet (20 mg) by mouth daily    Type 2 diabetes mellitus without complication, without long-term current use of insulin (H), Benign essential hypertension       loratadine 10 MG tablet    CLARITIN    90 tablet    TAKE ONE TABLET BY MOUTH EVERY DAY    Seasonal allergies       MAPAP 500 MG tablet   Generic drug:  acetaminophen     100 tablet    TAKE TWO TABLETS BY MOUTH EVERY 8 HOURS AS NEEDED FOR MILD PAIN    Unilateral inguinal hernia without obstruction or gangrene, recurrence not specified       MELATONIN PO      Take 10 mg by mouth nightly as needed        metFORMIN 500 MG tablet    GLUCOPHAGE    360 tablet    TAKE TWO TABLETS BY MOUTH TWICE A DAY WITH MEALS    Type 2 diabetes mellitus with diabetic neuropathy, without long-term current use of insulin (H)       metoprolol succinate 25 MG 24 hr tablet    TOPROL-XL    180 tablet    TAKE ONE TABLET BY MOUTH TWICE A DAY    Coronary artery disease involving native coronary artery of native heart without angina pectoris       MIRAPEX PO           nitroGLYcerin 0.4 MG sublingual tablet    NITROSTAT    25 tablet    PLACE 1 TABLET UNDER THE TONGUE AT THE ONSET OF CHEST PAIN. MAY REPEAT EVERY 5 MINUTES AS NEEDED FOR A TOTAL OF 3 DOSES.    Incomplete RBBB       omega-3 acid ethyl esters 1 G capsule    Lovaza    120 capsule    Take 2 capsules (2 g) by mouth 2 times daily    Hyperlipidemia, unspecified hyperlipidemia type       omeprazole 20 MG CR capsule    priLOSEC    180 capsule    TAKE ONE CAPSULE BY MOUTH TWICE  A DAY TAKE 30 TO 60 MINUTES BEFORE A MEAL    Gastroesophageal reflux disease without esophagitis       ONETOUCH DELICA LANCETS 33G Misc     100 each    TEST ONCE DAILY OR AS DIRECTED    Type 2 diabetes mellitus with diabetic neuropathy, without long-term current use of insulin (H)       ONETOUCH VERIO IQ test strip   Generic drug:  blood glucose monitoring     100 each    TEST ONCE DAILY OR AS DIRECTED    Type 2 diabetes mellitus with diabetic neuropathy, without long-term current use of insulin (H)       polyethylene glycol powder    MIRALAX/GLYCOLAX    500 g    Take 17 g by mouth daily as needed for constipation    Constipation, unspecified constipation type       PRIMIDONE PO      Take by mouth 3 times daily        sitagliptin 100 MG tablet    JANUVIA    90 tablet    Take 1 tablet (100 mg) by mouth daily    Type 2 diabetes mellitus without complication, without long-term current use of insulin (H)       tamsulosin 0.4 MG capsule    FLOMAX    90 capsule    TAKE ONE CAPSULE BY MOUTH EVERY DAY    Benign prostatic hyperplasia, presence of lower urinary tract symptoms unspecified       triamcinolone 0.1 % cream    KENALOG     Apply topically daily as needed for irritation (under Arms)        vitamin D 2000 UNITS tablet     100 tablet    TAKE ONE TABLET BY MOUTH EVERY DAY    Vitamin D deficiency       ZETIA PO      Take 10 mg by mouth At Bedtime

## 2018-01-17 DIAGNOSIS — E78.5 HYPERLIPIDEMIA, UNSPECIFIED HYPERLIPIDEMIA TYPE: Chronic | ICD-10-CM

## 2018-01-17 DIAGNOSIS — B34.9 VIRAL ILLNESS: ICD-10-CM

## 2018-01-17 DIAGNOSIS — E11.40 TYPE 2 DIABETES MELLITUS WITH DIABETIC NEUROPATHY, WITHOUT LONG-TERM CURRENT USE OF INSULIN (H): ICD-10-CM

## 2018-01-17 NOTE — TELEPHONE ENCOUNTER
"Requested Prescriptions   Pending Prescriptions Disp Refills     omega-3 acid ethyl esters (LOVAZA) 1 G capsule [Pharmacy Med Name: OMEGA-3-ACID ETHYL ESTERS 1GM CAPS] 120 capsule 1     Sig: TAKE TWO CAPSULES BY MOUTH TWICE A DAY    Antihyperlipidemic agents Failed    1/17/2018  1:47 PM       Failed - Lipid panel on file in past 12 mos    Recent Labs   Lab Test 08/11/17   12/10/14   1725   CHOL  193   < >  308*   TRIG  360*   < >  288*   HDL  41   < >  50   LDL  80   < >  200*   VLDL   --    --   58*   CHOLHDLRATIO   --    --   6.2*    < > = values in this interval not displayed.              Passed - Normal serum ALT on record in past 12 mos    Recent Labs   Lab Test  05/30/17   1224   ALT  60            Passed - Recent or future visit with authorizing provider's specialty    Patient had office visit in the last year or has a visit in the next 30 days with authorizing provider.  See \"Patient Info\" tab in inbasket, or \"Choose Columns\" in Meds & Orders section of the refill encounter.            Passed - Patient is age 18 years or older        DEEP SEA NASAL SPRAY 0.65 % nasal spray [Pharmacy Med Name: DEEP SEA NASAL SPRAY 0.65% SOLN] 44 mL 0     Sig: SPRAY 1 SPRAY IN EACH NOSTRIL FOUR TIMES A DAY AS NEEDED FOR CONGESTION    There is no refill protocol information for this order        omega-3 acid ethyl esters (LOVAZA) 1 G capsule 120 capsule 1 10/25/2017       Last Written Prescription Date:  10/25/2017  Last Fill Quantity: 120,  # refills: 1   Last Office Visit with [facility] or  [facility] prescribing provider:  01/15/2018   Future Office Visit:   Unknown    DEEP SEA NASAL SPRAY 0.65 % nasal spray 44 mL 0 12/20/2017           Last Written Prescription Date:  12/20/2017  Last Fill Quantity: 44 ml,   # refills: 0  Last Office Visit: 01/15/2018  Future Office visit:   Unknown    Routing refill request to provider for review/approval because:  Drug not on the [facility] or  [facility] refill protocol or controlled " substance

## 2018-01-17 NOTE — TELEPHONE ENCOUNTER
"Requested Prescriptions   Pending Prescriptions Disp Refills     metFORMIN (GLUCOPHAGE) 500 MG tablet [Pharmacy Med Name: METFORMIN HCL 500MG TABS] 360 tablet 1     Sig: TAKE TWO TABLETS BY MOUTH TWICE A DAY WITH MEALS    Biguanide Agents Passed    1/17/2018  1:47 PM       Passed - Patient's BP is less than 140/90    BP Readings from Last 3 Encounters:   01/15/18 117/72   11/03/17 110/78   10/04/17 136/85                Passed - Patient has documented LDL within the past 12 mos.    Recent Labs   Lab Test 08/11/17   LDL  80            Passed - Patient has had a Microalbumin in the past 12 mos.    Recent Labs   Lab Test  11/03/17   0913   MICROL  13   UMALCR  9.41            Passed - Patient is age 10 or older       Passed - Patient has documented A1c within the specified period of time.    Recent Labs   Lab Test  01/15/18   1135   A1C  9.0*            Passed - Patient's CR is NOT>1.4 OR Patient's EGFR is NOT<45 within past 12 mos.    Recent Labs   Lab Test  11/03/17   0912   GFRESTIMATED  >90   GFRESTBLACK  >90       Recent Labs   Lab Test  11/03/17   0912   CR  0.78            Passed - Patient does NOT have a diagnosis of CHF.       Passed - Recent (6 mos) or future visit with authorizing provider's specialty    Patient had office visit in the last 6 months or has a visit in the next 30 days with authorizing provider.  See \"Patient Info\" tab in inbasket, or \"Choose Columns\" in Meds & Orders section of the refill encounter.             metFORMIN (GLUCOPHAGE) 500 MG tablet 360 tablet 1 7/31/2017       Last Written Prescription Date:  07/31/2017  Last Fill Quantity: 360,  # refills: 1   Last Office Visit with [facility], [facility] or [facility] prescribing provider:  01/15/2018   Future Office Visit:   Unknown    "

## 2018-01-18 RX ORDER — OMEGA-3-ACID ETHYL ESTERS 1 G/1
CAPSULE, LIQUID FILLED ORAL
Qty: 120 CAPSULE | Refills: 1 | Status: SHIPPED | OUTPATIENT
Start: 2018-01-18 | End: 2018-03-14

## 2018-01-18 RX ORDER — SODIUM CHLORIDE 0.65 %
AEROSOL, SPRAY (ML) NASAL
Qty: 44 ML | Refills: 0 | Status: SHIPPED | OUTPATIENT
Start: 2018-01-18 | End: 2018-02-15

## 2018-01-24 ENCOUNTER — TELEPHONE (OUTPATIENT)
Dept: FAMILY MEDICINE | Facility: CLINIC | Age: 53
End: 2018-01-24

## 2018-01-24 DIAGNOSIS — I25.810 CORONARY ARTERY DISEASE INVOLVING CORONARY BYPASS GRAFT OF NATIVE HEART WITHOUT ANGINA PECTORIS: Chronic | ICD-10-CM

## 2018-01-24 RX ORDER — CLOPIDOGREL BISULFATE 75 MG/1
TABLET ORAL
Qty: 90 TABLET | Refills: 3 | Status: SHIPPED | OUTPATIENT
Start: 2018-01-24 | End: 2018-11-30

## 2018-01-24 NOTE — TELEPHONE ENCOUNTER
Please do not close this encounter until this has been addressed.  (prior auth approved/denied, prescriber refusal to complete prior auth or medication changed/discontinued)    Prior Authorization needed on: Januvia 100mg  Drug NDC: [address]-30     Insurance: Derek REYES  Member ID: [id number]   Insurance phone #: [phone]    Pharmacy NPI: [id number]  Pharmacy Phone #: [phone]  Pharmacy Fax #: [phone]    Please let us know if the PA gets approved or denied or if medication is changed

## 2018-01-24 NOTE — TELEPHONE ENCOUNTER
"Requested Prescriptions   Pending Prescriptions Disp Refills     clopidogrel (PLAVIX) 75 MG tablet [Pharmacy Med Name: CLOPIDOGREL BISULFATE 75MG TABS] 90 tablet 3     Sig: TAKE ONE TABLET BY MOUTH EVERY DAY    Plavix Failed    1/24/2018 10:00 AM       Failed - No active PPI on record unless is Protonix       Passed - Normal HGB on file in past 12 months    Recent Labs   Lab Test  01/15/18   1135   HGB  13.6              Passed - Normal Platelets on file in past 12 months    Recent Labs   Lab Test  01/15/18   1135   PLT  242              Passed - Recent or future visit with authorizing provider's specialty    Patient had office visit in the last year or has a visit in the next 30 days with authorizing provider.  See \"Patient Info\" tab in inbasket, or \"Choose Columns\" in Meds & Orders section of the refill encounter.            Passed - Patient is age 18 or older        clopidogrel (PLAVIX) 75 MG tablet 90 tablet 3 1/16/2017       Last Written Prescription Date:  1/16/17  Last Fill Quantity: 90,  # refills: 3   Last Office Visit with [facility] prescribing provider:  1/15/18   Future Office Visit:   none  PHQ-9 SCORE 1/16/2017   Total Score 8       "

## 2018-01-24 NOTE — TELEPHONE ENCOUNTER
Routing refill request to provider for review/approval because:  PPI on medication list.    Keya Wahl, LETY, RN, PHN  [facility]) [phone]

## 2018-01-25 ENCOUNTER — OFFICE VISIT (OUTPATIENT)
Dept: SLEEP MEDICINE | Facility: CLINIC | Age: 53
End: 2018-01-25
Payer: COMMERCIAL

## 2018-01-25 VITALS
DIASTOLIC BLOOD PRESSURE: 80 MMHG | HEART RATE: 80 BPM | WEIGHT: 306.4 LBS | SYSTOLIC BLOOD PRESSURE: 122 MMHG | OXYGEN SATURATION: 96 % | HEIGHT: 75 IN | BODY MASS INDEX: 38.1 KG/M2 | RESPIRATION RATE: 16 BRPM

## 2018-01-25 DIAGNOSIS — I10 ESSENTIAL HYPERTENSION: ICD-10-CM

## 2018-01-25 DIAGNOSIS — G47.33 OSA (OBSTRUCTIVE SLEEP APNEA): Primary | ICD-10-CM

## 2018-01-25 DIAGNOSIS — E66.9 OBESITY WITH BODY MASS INDEX (BMI) OF 30.0 TO 39.9: ICD-10-CM

## 2018-01-25 PROCEDURE — 99214 OFFICE O/P EST MOD 30 MIN: CPT | Performed by: INTERNAL MEDICINE

## 2018-01-25 NOTE — PATIENT INSTRUCTIONS

## 2018-01-25 NOTE — MR AVS SNAPSHOT
After Visit Summary   1/25/2018    Javier Markham    MRN: [id number]           Patient Information     Date Of Birth          1965        Visit Information        Provider Department      1/25/2018 10:00 AM Forrest Welsh MD [facility]        Today's Diagnoses     STEPHAN (obstructive sleep apnea)    -  1    Essential hypertension        Obesity with body mass index (BMI) of 30.0 to 39.9          Care Instructions      Your BMI is Body mass index is 38.3 kg/(m^2).  Weight management is a personal decision.  If you are interested in exploring weight loss strategies, the following discussion covers the approaches that may be successful. Body mass index (BMI) is one way to tell whether you are at a healthy weight, overweight, or obese. It measures your weight in relation to your height.  A BMI of 18.5 to 24.9 is in the healthy range. A person with a BMI of 25 to 29.9 is considered overweight, and someone with a BMI of 30 or greater is considered obese. More than two-thirds of American adults are considered overweight or obese.  Being overweight or obese increases the risk for further weight gain. Excess weight may lead to heart disease and diabetes.  Creating and following plans for healthy eating and physical activity may help you improve your health.  Weight control is part of healthy lifestyle and includes exercise, emotional health, and healthy eating habits. Careful eating habits lifelong are the mainstay of weight control. Though there are significant health benefits from weight loss, long-term weight loss with diet alone may be very difficult to achieve- studies show long-term success with dietary management in less than 10% of people. Attaining a healthy weight may be especially difficult to achieve in those with severe obesity. In some cases, medications, devices and surgical management might be considered.  What can you do?  If you are overweight or obese and are interested  in methods for weight loss, you should discuss this with your provider.     Consider reducing daily calorie intake by 500 calories.     Keep a food journal.     Avoiding skipping meals, consider cutting portions instead.    Diet combined with exercise helps maintain muscle while optimizing fat loss. Strength training is particularly important for building and maintaining muscle mass. Exercise helps reduce stress, increase energy, and improves fitness. Increasing exercise without diet control, however, may not burn enough calories to loose weight.       Start walking three days a week 10-20 minutes at a time    Work towards walking thirty minutes five days a week     Eventually, increase the speed of your walking for 1-2 minutes at time    In addition, we recommend that you review healthy lifestyles and methods for weight loss available through the National Institutes of Health patient information sites:  http://win.niddk.nih.gov/publications/index.htm    And look into health and wellness programs that may be available through your health insurance provider, employer, local community center, or donaldo club.    Weight management plan: Patient was referred to their PCP to discuss a diet and exercise plan.              Follow-ups after your visit        Additional Services     BARIATRIC ADULT REFERRAL       Your provider has referred you to: [facility]: [facility] [phone]. [email or web address]    Please be aware that coverage of these services is subject to the terms and limitations of your health insurance plan.  Call member services at your health plan with any benefit or coverage questions.      Please bring the following with you to your appointment:      (1) List of current medications   (2) This referral request   (3) Any documents/labs given to you for this referral                  Who to contact     If you  "have questions or need follow up information about today's clinic visit or your schedule please contact [facility] directly at [phone].  Normal or non-critical lab and imaging results will be communicated to you by MyChart, letter or phone within 4 business days after the clinic has received the results. If you do not hear from us within 7 days, please contact the clinic through MyChart or phone. If you have a critical or abnormal lab result, we will notify you by phone as soon as possible.  Submit refill requests through [employer] or call your pharmacy and they will forward the refill request to us. Please allow 3 business days for your refill to be completed.          Additional Information About Your Visit        Care EveryWhere ID     This is your Care EveryWhere ID. This could be used by other organizations to access your [place] medical records  [id number]        Your Vitals Were     Pulse Respirations Height Pulse Oximetry BMI (Body Mass Index)       80 16 1.905 m (6' 3\") 96% 38.3 kg/m2        Blood Pressure from Last 3 Encounters:   01/25/18 122/80   01/15/18 117/72   11/03/17 110/78    Weight from Last 3 Encounters:   01/25/18 (!) 139 kg (306 lb 6.4 oz)   01/15/18 135.7 kg (299 lb 3.2 oz)   10/04/17 (!) 139.8 kg (308 lb 3.2 oz)              We Performed the Following     BARIATRIC ADULT REFERRAL     [facility] [place] Care Provider Office Phone # Fax #    Morelia Nova DO Mil[place] [phone] [address] [facility] [address]  [facility] [address]        Equal Access to Services     [facility] AH: Hadii aad ku hadasho Soomaali, waaxda luqadaha, qaybta kaalmada adeegyada, laya allred. So [facility] [phone].    ATENCIÓN: Si habla español, tiene a hale disposición servicios gratuitos de asistencia lingüística. Yasminame al [phone].    We comply with applicable federal civil rights laws and [place] laws. We do not discriminate on the basis of " race, color, national origin, age, disability, sex, sexual orientation, or gender identity.            Thank you!     Thank you for choosing [facility]  for your care. Our goal is always to provide you with excellent care. Hearing back from our patients is one way we can continue to improve our services. Please take a few minutes to complete the written survey that you may receive in the mail after your visit with us. Thank you!             Your Updated Medication List - Protect others around you: Learn how to safely use, store and throw away your medicines at www.disposemymeds.org.          This list is accurate as of 1/25/18 10:30 AM.  Always use your most recent med list.                   Brand Name Dispense Instructions for use Diagnosis    albuterol 108 (90 BASE) MCG/ACT Inhaler    PROAIR HFA/PROVENTIL HFA/VENTOLIN HFA    1 Inhaler    Inhale 2 puffs into the lungs every 4 hours as needed for shortness of breath / dyspnea or wheezing    Shortness of breath       aspirin 81 MG EC tablet     90 tablet    TAKE ONE TABLET BY MOUTH EVERY DAY    Coronary artery disease involving native coronary artery of native heart without angina pectoris       atorvastatin 80 MG tablet    LIPITOR    90 tablet    TAKE ONE TABLET BY MOUTH EVERY DAY    Coronary artery disease involving native coronary artery of native heart without angina pectoris       B-D SINGLE USE SWABS REGULAR Pads     100 each    USE AS NEEDED    Type 2 diabetes mellitus with diabetic neuropathy, without long-term current use of insulin (H)       blood glucose monitoring meter device kit    no brand specified    1 kit    Use to test blood sugar 1 times daily or as directed. ONE TOUCH VERIO.    Type 2 diabetes mellitus with diabetic neuropathy, without long-term current use of insulin (H)       clonazePAM 0.5 MG tablet    klonoPIN    30 tablet    Take 1 tablet (0.5 mg) by mouth nightly as needed (Restless legs)    Restless legs syndrome (RLS)        clopidogrel 75 MG tablet    PLAVIX    90 tablet    TAKE ONE TABLET BY MOUTH EVERY DAY    Coronary artery disease involving coronary bypass graft of native heart without angina pectoris       cyanocobalamin 1000 MCG tablet    vitamin  B-12    100 tablet    TAKE ONE TABLET BY MOUTH EVERY DAY    Vitamin B12 deficiency (non anemic)       cyclobenzaprine 5 MG tablet    FLEXERIL    42 tablet    Take 1 tablet (5 mg) by mouth 3 times daily as needed for muscle spasms    Acute low back pain, unspecified back pain laterality, with sciatica presence unspecified, Back muscle spasm       DEEP SEA NASAL SPRAY 0.65 % nasal spray   Generic drug:  sodium chloride     44 mL    SPRAY 1 SPRAY IN EACH NOSTRIL FOUR TIMES A DAY AS NEEDED FOR CONGESTION    Viral illness       eucerin cream     240 g    Apply topically as needed for dry skin    Dry skin       ferrous sulfate 325 (65 FE) MG tablet    IRON    30 tablet    TAKE ONE TABLET BY MOUTH EVERY MORNING WITH BREAKFAST    Iron deficiency       fluticasone 50 MCG/ACT spray    FLONASE    1 Bottle    Spray 1 spray into both nostrils daily    Chronic seasonal allergic rhinitis, unspecified trigger       gabapentin 300 MG capsule    NEURONTIN    270 capsule    TAKE THREE CAPSULES BY MOUTH THREE TIMES A DAY    Type 2 diabetes mellitus with diabetic neuropathy, without long-term current use of insulin (H)       hydrOXYzine 25 MG tablet    ATARAX    120 tablet    TAKE ONE TO TWO TABLETS BY MOUTH EVERY 6 HOURS AS NEEDED FOR ANXIETY    Anxiety       ibuprofen 800 MG tablet    ADVIL/MOTRIN    60 tablet    Take 1 tablet (800 mg) by mouth every 8 hours as needed for moderate pain    Left inguinal hernia       isosorbide mononitrate 30 MG 24 hr tablet    IMDUR    45 tablet    Take 0.5 tablets (15 mg) by mouth daily    Coronary artery disease involving coronary bypass graft of native heart without angina pectoris       lisinopril 20 MG tablet    PRINIVIL/ZESTRIL    90 tablet    Take 1 tablet (20  mg) by mouth daily    Type 2 diabetes mellitus without complication, without long-term current use of insulin (H), Benign essential hypertension       loratadine 10 MG tablet    CLARITIN    90 tablet    TAKE ONE TABLET BY MOUTH EVERY DAY    Seasonal allergies       MAPAP 500 MG tablet   Generic drug:  acetaminophen     100 tablet    TAKE TWO TABLETS BY MOUTH EVERY 8 HOURS AS NEEDED FOR MILD PAIN    Unilateral inguinal hernia without obstruction or gangrene, recurrence not specified       MELATONIN PO      Take 10 mg by mouth nightly as needed        metFORMIN 500 MG tablet    GLUCOPHAGE    360 tablet    TAKE TWO TABLETS BY MOUTH TWICE A DAY WITH MEALS    Type 2 diabetes mellitus with diabetic neuropathy, without long-term current use of insulin (H)       metoprolol succinate 25 MG 24 hr tablet    TOPROL-XL    180 tablet    TAKE ONE TABLET BY MOUTH TWICE A DAY    Coronary artery disease involving native coronary artery of native heart without angina pectoris       MIRAPEX PO           nitroGLYcerin 0.4 MG sublingual tablet    NITROSTAT    25 tablet    PLACE 1 TABLET UNDER THE TONGUE AT THE ONSET OF CHEST PAIN. MAY REPEAT EVERY 5 MINUTES AS NEEDED FOR A TOTAL OF 3 DOSES.    Incomplete RBBB       omega-3 acid ethyl esters 1 G capsule    Lovaza    120 capsule    TAKE TWO CAPSULES BY MOUTH TWICE A DAY    Hyperlipidemia, unspecified hyperlipidemia type       omeprazole 20 MG CR capsule    priLOSEC    180 capsule    TAKE ONE CAPSULE BY MOUTH TWICE A DAY TAKE 30 TO 60 MINUTES BEFORE A MEAL    Gastroesophageal reflux disease without esophagitis       ONETOUCH DELICA LANCETS 33G Misc     100 each    TEST ONCE DAILY OR AS DIRECTED    Type 2 diabetes mellitus with diabetic neuropathy, without long-term current use of insulin (H)       ONETOUCH VERIO IQ test strip   Generic drug:  blood glucose monitoring     100 each    TEST ONCE DAILY OR AS DIRECTED    Type 2 diabetes mellitus with diabetic neuropathy, without long-term  current use of insulin (H)       polyethylene glycol powder    MIRALAX/GLYCOLAX    500 g    Take 17 g by mouth daily as needed for constipation    Constipation, unspecified constipation type       PRIMIDONE PO      Take by mouth 3 times daily        sitagliptin 100 MG tablet    JANUVIA    90 tablet    Take 1 tablet (100 mg) by mouth daily    Type 2 diabetes mellitus without complication, without long-term current use of insulin (H)       tamsulosin 0.4 MG capsule    FLOMAX    90 capsule    TAKE ONE CAPSULE BY MOUTH EVERY DAY    Benign prostatic hyperplasia, presence of lower urinary tract symptoms unspecified       triamcinolone 0.1 % cream    KENALOG     Apply topically daily as needed for irritation (under Arms)        vitamin D 2000 UNITS tablet     100 tablet    TAKE ONE TABLET BY MOUTH EVERY DAY    Vitamin D deficiency       ZETIA PO      Take 10 mg by mouth At Bedtime

## 2018-01-25 NOTE — PROGRESS NOTES
Sleep Study Follow-Up Visit:    Date on this visit: 1/25/2018    Javier Markham comes in today for follow-up of his sleep study done on 1/11/2018 at the [facility] [place] [facility] for possible sleep apnea.    Sleep latency 3.6 minutes without Ambien.  REM achieved.   REM latency 89.5 minutes.  Sleep efficiency 69.2%. Total sleep time 302.5 minutes.    Sleep architecture:  Stage 1, 2% (5%), stage 2, 50.2% (45-55%), stage 3, 30.7% (15-20%), stage REM, 17% (20-25%).  AHI was 7.1, with desaturations. RDI 7.5.  REM RDI 28, consistent with moderate REM STEPHAN.  Supine RDI -, no supine sleep.  Periodic Limb Movement Index 0/hour.       These findings were reviewed with patient.     Past medical/surgical history, family history, social history, medications and allergies were reviewed.      Problem List:  Patient Active Problem List    Diagnosis Date Noted     Right knee pain, unspecified chronicity 05/31/2017     Priority: Medium     Benign essential tremor 05/31/2017     Priority: Medium     History of tobacco abuse 09/02/2016     Priority: Medium     Incomplete RBBB      Priority: Medium     Benign essential hypertension; goal < 140/90      Priority: Medium     Morbid obesity (H)      Priority: Medium     BPH (benign prostatic hypertrophy) 08/12/2015     Priority: Medium     Health Care Home 07/09/2015     Priority: Medium     Restless legs syndrome (RLS) 06/18/2015     Priority: Medium     Anxiety      Priority: Medium     Type 2 diabetes mellitus with hyperglycemia, without long-term current use of insulin (H) 06/15/2015     Priority: Medium     Hyperlipidemia      Priority: Medium     CAD (coronary artery disease) 12/01/2014     Priority: Medium     History of Cocaine Abuse 11/12/2014     Priority: Medium     Esophageal reflux 02/02/2009     Priority: Medium        Impression/Plan:    1. Mild Obstructive sleep apnea  2. Sleep related hypoxemia   3. Restless leg syndrome   4. Hypertension   5. Coronary artery  disease     - Patient was counseled regarding results of the sleep study. There is mild obstructive sleep apnea, predominantly REM related with associated hypoxemia. We discussed consequences of untreated disease and treatment options and compared efficacy of dental appliance and auto PAP therapy. Patient opts for CPAP.     - He has good control of restless leg with a combination of Gabapentin and Pramipexole. He will continue this.     - Patient was counseled regarding better sleep hygeine, restriction of time in bed and avoidance of daytime naps for better sleep consolidation at night.     - We discussed weight management and its impact on sleep apnea. He was provided a referral to weight loss clinic.     Plan:     1. Start auto PAP therapy 5-15 cm H2O     He will follow up with me in about 7 week(s).     Twenty five minutes spent with patient, all of which were spent face-to-face counseling, consulting, coordinating plan of care.      Forrest Welsh MD     CC: Morelia Pedraza

## 2018-01-25 NOTE — NURSING NOTE
"Chief Complaint   Patient presents with     Study Results       Initial /80  Pulse 80  Resp 16  Ht 1.905 m (6' 3\")  Wt (!) 139 kg (306 lb 6.4 oz)  SpO2 96%  BMI 38.3 kg/m2 Estimated body mass index is 38.3 kg/(m^2) as calculated from the following:    Height as of this encounter: 1.905 m (6' 3\").    Weight as of this encounter: 139 kg (306 lb 6.4 oz).  Medication Reconciliation: complete     ESS       [facility] Special[facility]      "

## 2018-01-26 ENCOUNTER — TRANSFERRED RECORDS (OUTPATIENT)
Dept: HEALTH INFORMATION MANAGEMENT | Facility: CLINIC | Age: 53
End: 2018-01-26

## 2018-01-30 ENCOUNTER — TELEPHONE (OUTPATIENT)
Dept: FAMILY MEDICINE | Facility: CLINIC | Age: 53
End: 2018-01-30

## 2018-01-30 DIAGNOSIS — E11.9 TYPE 2 DIABETES MELLITUS WITHOUT COMPLICATION, WITHOUT LONG-TERM CURRENT USE OF INSULIN (H): Chronic | ICD-10-CM

## 2018-01-30 DIAGNOSIS — I10 BENIGN ESSENTIAL HYPERTENSION: Chronic | ICD-10-CM

## 2018-01-30 RX ORDER — FENOFIBRATE 54 MG/1
54 TABLET ORAL DAILY
Qty: 1 TABLET | Refills: 0 | Status: ON HOLD | COMMUNITY
Start: 2018-01-30 | End: 2020-07-31

## 2018-01-30 RX ORDER — LISINOPRIL 20 MG/1
20 TABLET ORAL 2 TIMES DAILY
Qty: 90 TABLET | Refills: 3 | COMMUNITY
Start: 2018-01-30 | End: 2018-11-05

## 2018-01-30 NOTE — TELEPHONE ENCOUNTER
Updated medication list per cardiology who added TriCor and increased Lisinopril - he will have f/u BMP at their clinic in a week

## 2018-01-31 NOTE — TELEPHONE ENCOUNTER
Form and letter faxed, sent to scanning, copy in file.  [facility] [employer] notified.  Vee Craig, CMA

## 2018-01-31 NOTE — TELEPHONE ENCOUNTER
Received a fax today with deadline Thursday for an updated letter to go to [employer] for [place]'s social security disability hearing on Feb 1st. They had my paperwork that I filled out on July 25, 2016 and just wanted a letter knowing if this was still accurate. I drafted a letter and printed it with the original fax and left it on my desk for team to help with faxing tomorrow.

## 2018-01-31 NOTE — TELEPHONE ENCOUNTER
Vee FLORES do you have this letter? Thank you for picking up from my desk. Please fax if you haven't already and then call the  contact to make sure this letter is received as his disability hearing is tomorrow.  Thank you!

## 2018-02-08 ENCOUNTER — DOCUMENTATION ONLY (OUTPATIENT)
Dept: SLEEP MEDICINE | Facility: CLINIC | Age: 53
End: 2018-02-08
Payer: COMMERCIAL

## 2018-02-12 ENCOUNTER — DOCUMENTATION ONLY (OUTPATIENT)
Dept: SLEEP MEDICINE | Facility: CLINIC | Age: 53
End: 2018-02-12

## 2018-02-12 NOTE — PROGRESS NOTES
Pt returned call.    He decided that he would like a new mask that goes over his mouth and nose.  He is unable to use nasal mask. He has an appointment on Wednesday for mask exchange.

## 2018-02-12 NOTE — PROGRESS NOTES
3 DAY STM VISIT    Patient contacted for 3 day STM visit  Message left for patient to return call     Device type: Auto-CPAP  PAP settings from order:: CPAP min 5 cm  H20     CPAP max 15 cm  H20    Assessment: No usage reporting.  Action plan: Pt to have f/u 14 day STM visit.  Diagnostic AHI: 7.1

## 2018-02-14 ENCOUNTER — DOCUMENTATION ONLY (OUTPATIENT)
Dept: SLEEP MEDICINE | Facility: CLINIC | Age: 53
End: 2018-02-14

## 2018-02-14 NOTE — PROGRESS NOTES
Patient came to [facility] mask fitting appointment on February 14, 2018. Patient requested to switch masks because oral breathing.  Patient tried on the followings masks:SIMPLUS LARGE, AIRFIT F20 LARGE   Patient selected  Resmed, type AIRFIT [id number] Face maskLarge

## 2018-02-15 DIAGNOSIS — B34.9 VIRAL ILLNESS: ICD-10-CM

## 2018-02-16 RX ORDER — SODIUM CHLORIDE 0.65 %
AEROSOL, SPRAY (ML) NASAL
Qty: 44 ML | Refills: 0 | Status: SHIPPED | OUTPATIENT
Start: 2018-02-16 | End: 2018-03-12

## 2018-02-16 NOTE — TELEPHONE ENCOUNTER
DEEP SEA NASAL SPRAY 0.65 % nasal spray        Last Written Prescription Date:  1/18/2018  Last Fill Quantity: 44mL,   # refills: 0  Last Office Visit: 1/15/2018  Future Office visit:       Routing refill request to provider for review/approval because:  Drug not on the [facility], P or [facility] refill protocol or controlled substance

## 2018-02-23 ENCOUNTER — DOCUMENTATION ONLY (OUTPATIENT)
Dept: SLEEP MEDICINE | Facility: CLINIC | Age: 53
End: 2018-02-23

## 2018-02-23 NOTE — PROGRESS NOTES
14 DAY STM VISIT    Subjective measures:   Pt feels that things are better and he recently received new mask and fit is much better.     Assessment: Pt not meeting objective benchmarks for compliance  Patient meeting subjective benchmarks.   Action plan: pt to have 30 day [facility] visit.   Device type: Auto-CPAP  PAP settings: CPAP min 5 cm  H20     CPAP max 15 cm  H20     90th % pressure6.2 cm  H20    Objective measures: 14 day rolling measures         Compliance  42 %      % of night spent in large leak  13 % last  upload      AHI 3.1   last  upload      Average number of minutes 170    Diagnostic AHI: 7.1            Objective measure goal  Compliance   Goal >70%  Leak   Goal < 10%  AHI  Goal < 5  Usage  Goal >240

## 2018-02-26 ENCOUNTER — TRANSFERRED RECORDS (OUTPATIENT)
Dept: HEALTH INFORMATION MANAGEMENT | Facility: CLINIC | Age: 53
End: 2018-02-26

## 2018-03-12 ENCOUNTER — DOCUMENTATION ONLY (OUTPATIENT)
Dept: SLEEP MEDICINE | Facility: CLINIC | Age: 53
End: 2018-03-12

## 2018-03-12 DIAGNOSIS — E78.5 HYPERLIPIDEMIA, UNSPECIFIED HYPERLIPIDEMIA TYPE: Chronic | ICD-10-CM

## 2018-03-12 DIAGNOSIS — B34.9 VIRAL ILLNESS: ICD-10-CM

## 2018-03-12 NOTE — TELEPHONE ENCOUNTER
DEEP SEA NASAL SPRAY 0.65 % nasal spray        Last Written Prescription Date:  2/16/2018  Last Fill Quantity: 44mL,   # refills: 0  Last Office Visit: 1/15/2018  Future Office visit:       Routing refill request to provider for review/approval because:  Drug not on the [facility], P or [facility] refill protocol or controlled substance

## 2018-03-12 NOTE — TELEPHONE ENCOUNTER
"omega-3 acid ethyl esters (LOVAZA) 1 G capsule 120 capsule 1 1/18/2018     Last Written Prescription Date:  1/18/18  Last Fill Quantity: 120,  # refills: 1   Last office visit: 1/15/2018 with prescribing provider:     Future Office Visit:  none    Requested Prescriptions   Pending Prescriptions Disp Refills     omega-3 acid ethyl esters (LOVAZA) 1 G capsule [Pharmacy Med Name: OMEGA-3-ACID ETHYL ESTERS 1GM CAPS] 120 capsule 1     Sig: TAKE TWO CAPSULES BY MOUTH TWICE A DAY    Antihyperlipidemic agents Passed    3/12/2018 10:02 AM       Passed - Lipid panel on file in past 12 mos    Recent Labs   Lab Test 08/11/17   12/10/14   1725   CHOL  193   < >  308*   TRIG  360*   < >  288*   HDL  41   < >  50   LDL  80   < >  200*   NHDL  152*   < >   --    VLDL   --    --   58*   CHOLHDLRATIO   --    --   6.2*    < > = values in this interval not displayed.              Passed - Normal serum ALT on record in past 12 mos    Recent Labs   Lab Test  05/30/17   1224   ALT  60            Passed - Recent (12 mo) or future (30 days) visit within the authorizing provider's specialty    Patient had office visit in the last 12 months or has a visit in the next 30 days with authorizing provider or within the authorizing provider's specialty.  See \"Patient Info\" tab in inbasket, or \"Choose Columns\" in Meds & Orders section of the refill encounter.           Passed - Patient is age 18 years or older        PHQ-9 SCORE 1/16/2017   Total Score 8     "

## 2018-03-12 NOTE — PROGRESS NOTES
30 DAY [facility] VISIT    Message left for patient to return call     Assessment: Pt not meeting objective benchmarks for leak    Action plan: waiting for patient to return call.   Patient has a follow up visit with Dr. Welsh on 4/4/2018   Device type: Auto-CPAP  PAP settings: CPAP min 5 cm  H20     CPAP max 15 cm  H20         90th % pressure6.7 cm  H20    Objective measures: 14 day rolling measures         Compliance  78 %     % of night spent in large leak  25 % last  upload      AHI 4.68   last  upload      Average number of minutes 324    Diagnostic AHI: 7.1            Objective measure goal  Compliance   Goal >70%  Leak   Goal < 10%  AHI  Goal < 5  Usage  Goal >240

## 2018-03-14 RX ORDER — OMEGA-3-ACID ETHYL ESTERS 1 G/1
CAPSULE, LIQUID FILLED ORAL
Refills: 1
Start: 2018-03-14

## 2018-03-14 RX ORDER — SODIUM CHLORIDE 0.65 %
AEROSOL, SPRAY (ML) NASAL
Qty: 44 ML | Refills: 1 | Status: SHIPPED | OUTPATIENT
Start: 2018-03-14 | End: 2018-05-29

## 2018-03-14 NOTE — PROGRESS NOTES
Patient returned call.     Subjective measures:  Patient meeting subjective benchmarks. He currently has an abscess tooth and has not had any issues yet     Action plan:pt to have 6 month STM visit

## 2018-03-16 ENCOUNTER — TELEPHONE (OUTPATIENT)
Dept: FAMILY MEDICINE | Facility: CLINIC | Age: 53
End: 2018-03-16

## 2018-03-16 NOTE — TELEPHONE ENCOUNTER
Please do not close this encounter until this has been addressed.  (prior auth approved/denied, prescriber refusal to complete prior auth or medication changed/discontinued)    Prior Authorization needed on: Lovaza 1GM  Drug NDC: [id number]     [facility]  Member ID: [id number]    Insurance phone #: [phone]    Pharmacy NPI: [id number]  Pharmacy Phone #: [phone]  Pharmacy Fax #: [phone]    Please let us know if the PA gets approved or denied or if medication is changed    Thank You!  [place]  [facility]

## 2018-03-20 ENCOUNTER — TELEPHONE (OUTPATIENT)
Dept: SLEEP MEDICINE | Facility: CLINIC | Age: 53
End: 2018-03-20

## 2018-03-20 NOTE — TELEPHONE ENCOUNTER
Pt calling to report that he is having a root canal today and has not been using his device due to pain of abscess tooth.  He will be restarting after procedure and will call with any questions or concerns once he starts using again.

## 2018-03-21 NOTE — TELEPHONE ENCOUNTER
PA Initiation    [facility]  Insurance Company: [employer] - Phone [phone] Fax [phone]  Pharmacy Filling the Rx: [place] [address], [address]  Filling Pharmacy Phone: [phone]  Filling Pharmacy Fax:    Start Date: 3/21/2018    HAD TO CALL AND INITIATE PA RQUEST  THEY WILL SEND US THE DECISION FAX WITH 24-72 HOURS

## 2018-03-22 NOTE — TELEPHONE ENCOUNTER
Prior Authorization Approval    Authorization Effective Date: 2/19/2018  Authorization Expiration Date: 3/21/2019  Medication: Lovaza - APPROVED  Approved Dose/Quantity: 120 FOR 30 DAYS  Reference #: [id number]   Insurance Company: [employer] - Phone [phone] Fax [phone]  Expected CoPay: $0.00     CoPay Card Available:      Foundation Assistance Needed:    Which Pharmacy is filling the prescription (Not needed for infusion/clinic administered): [facility], [place] - [address], [address]  Pharmacy Notified: Yes  Patient Notified: Yes

## 2018-03-23 DIAGNOSIS — E11.9 TYPE 2 DIABETES MELLITUS WITHOUT COMPLICATION, WITHOUT LONG-TERM CURRENT USE OF INSULIN (H): Chronic | ICD-10-CM

## 2018-03-23 RX ORDER — SITAGLIPTIN 100 MG/1
TABLET, FILM COATED ORAL
Qty: 90 TABLET | Refills: 3 | Status: SHIPPED | OUTPATIENT
Start: 2018-03-23 | End: 2018-11-05

## 2018-03-23 NOTE — TELEPHONE ENCOUNTER
"Last Written Prescription Date:  unknown  Last Fill Quantity: unk,  # refills: unk   Last office visit: 1/15/2018    Future Office Visit:      Requested Prescriptions   Pending Prescriptions Disp Refills     JANUVIA 100 MG tablet [Pharmacy Med Name: JANUVIA 100MG TABS] 90 tablet 1     Sig: TAKE ONE TABLET BY MOUTH EVERY DAY    DPP4 Inhibitors Protocol Passed    3/23/2018 11:39 AM       Passed - Blood pressure less than 140/90 in past 6 months    BP Readings from Last 3 Encounters:   01/25/18 122/80   01/15/18 117/72   11/03/17 110/78                Passed - LDL on file in past 12 months    Recent Labs   Lab Test 08/11/17   LDL  80            Passed - Microalbumin on file in past 12 months    Recent Labs   Lab Test  11/03/17   0913   MICROL  13   UMALCR  9.41            Passed - HgbA1C in past 3 or 6 months    Recent Labs   Lab Test  01/15/18   1135   A1C  9.0*            Passed - Patient is age 18 or older       Passed - Normal serum creatinine in past 12 months    Recent Labs   Lab Test  11/03/17   0912   CR  0.78            Passed - Recent (6 mo) or future (30 days) visit within the authorizing provider's specialty    Patient had office visit in the last 6 months or has a visit in the next 30 days with authorizing provider.  See \"Patient Info\" tab in inbasket, or \"Choose Columns\" in Meds & Orders section of the refill encounter.          Routing refill request to provider for review/approval because:  Drug not active on patient's medication list  Medication is reported/historical  RN cannot locate this medication in pt's chart    Milagro Reinoso RN  Triage-[employer]          "

## 2018-05-15 ENCOUNTER — TELEPHONE (OUTPATIENT)
Dept: SLEEP MEDICINE | Facility: CLINIC | Age: 53
End: 2018-05-15

## 2018-05-22 ENCOUNTER — TELEPHONE (OUTPATIENT)
Dept: SLEEP MEDICINE | Facility: CLINIC | Age: 53
End: 2018-05-22

## 2018-05-29 DIAGNOSIS — E78.5 HYPERLIPIDEMIA, UNSPECIFIED HYPERLIPIDEMIA TYPE: Chronic | ICD-10-CM

## 2018-05-29 DIAGNOSIS — E61.1 IRON DEFICIENCY: ICD-10-CM

## 2018-05-29 DIAGNOSIS — B34.9 VIRAL ILLNESS: ICD-10-CM

## 2018-05-29 DIAGNOSIS — I25.10 CORONARY ARTERY DISEASE INVOLVING NATIVE CORONARY ARTERY OF NATIVE HEART WITHOUT ANGINA PECTORIS: Chronic | ICD-10-CM

## 2018-05-30 RX ORDER — FERROUS SULFATE 325(65) MG
TABLET ORAL
Qty: 30 TABLET | Refills: 0 | Status: SHIPPED | OUTPATIENT
Start: 2018-05-30 | End: 2018-10-29

## 2018-05-30 RX ORDER — SODIUM CHLORIDE 0.65 %
AEROSOL, SPRAY (ML) NASAL
Qty: 44 ML | Refills: 0 | Status: SHIPPED | OUTPATIENT
Start: 2018-05-30 | End: 2018-07-30

## 2018-05-30 RX ORDER — OMEGA-3-ACID ETHYL ESTERS 1 G/1
CAPSULE, LIQUID FILLED ORAL
Qty: 360 CAPSULE | Refills: 0 | Status: SHIPPED | OUTPATIENT
Start: 2018-05-30 | End: 2018-09-28

## 2018-05-30 RX ORDER — ASPIRIN 81 MG/1
TABLET ORAL
Start: 2018-05-30

## 2018-05-30 NOTE — TELEPHONE ENCOUNTER
"Requested Prescriptions   Pending Prescriptions Disp Refills     omega-3 acid ethyl esters (LOVAZA) 1 g capsule [Pharmacy Med Name: OMEGA-3-ACID ETHYL ESTERS 1GM CAPS] 120 capsule 0     Sig: TAKE TWO CAPSULES BY MOUTH TWICE A DAY    Antihyperlipidemic agents Passed    5/29/2018  3:15 PM       Passed - Lipid panel on file in past 12 mos    Recent Labs   Lab Test 08/11/17   12/10/14   1725   CHOL  193   < >  308*   TRIG  360*   < >  288*   HDL  41   < >  50   LDL  80   < >  200*   NHDL  152*   < >   --    VLDL   --    --   58*   CHOLHDLRATIO   --    --   6.2*    < > = values in this interval not displayed.              Passed - Normal serum ALT on record in past 12 mos    Recent Labs   Lab Test  05/30/17   1224   ALT  60            Passed - Recent (12 mo) or future (30 days) visit within the authorizing provider's specialty    Patient had office visit in the last 12 months or has a visit in the next 30 days with authorizing provider or within the authorizing provider's specialty.  See \"Patient Info\" tab in inbasket, or \"Choose Columns\" in Meds & Orders section of the refill encounter.           Passed - Patient is age 18 years or older        omega-3 acid ethyl esters (LOVAZA) 1 G capsule 120 capsule 0 3/15/2018       Last Written Prescription Date:  03/15/2018  Last Fill Quantity: 120,  # refills: 0   Last office visit: 1/15/2018 with prescribing provider:  Dr. Pedraza    Future Office Visit:  Unknown     "

## 2018-05-30 NOTE — TELEPHONE ENCOUNTER
"ASA:  Denied  Too early; Rx sent 9/27/2017 for 1 year    Iron and Nasal Spray:  Prescription approved per [facility] Refill Protocol.    Noted on Rx's: Due for appointment with PCP  Was supposed to see PCP April 2018    Ping FRIEDMAN RN    Requested Prescriptions   Pending Prescriptions Disp Refills     ASPIR-LOW 81 MG EC tablet [Pharmacy Med Name: ASPIR-LOW 81MG TBEC] 120 tablet 3     Sig: TAKE ONE TABLET BY MOUTH EVERY DAY    Analgesics (Non-Narcotic Tylenol and ASA Only) Passed    5/30/2018 10:35 AM       Passed - Recent (12 mo) or future (30 days) visit within the authorizing provider's specialty    Patient had office visit in the last 12 months or has a visit in the next 30 days with authorizing provider or within the authorizing provider's specialty.  See \"Patient Info\" tab in inbasket, or \"Choose Columns\" in Meds & Orders section of the refill encounter.           Passed - Patient is age 20 years or older    If ASA is flagged for ages under 20 years old. Forward to provider for confirmation Ryes Syndrome is not a concern.              DEEP SEA NASAL SPRAY 0.65 % nasal spray [Pharmacy Med Name: DEEP SEA NASAL SPRAY 0.65% SOLN] 44 mL 1     Sig: SPRAY 1 SPRAY IN EACH NOSTRIL FOUR TIMES A DAY AS NEEDED FOR CONGESTION    There is no refill protocol information for this order        ferrous sulfate (IRON) 325 (65 Fe) MG tablet [Pharmacy Med Name: FERROUS SULFATE 325 (65 FE)MG TABS] 30 tablet 5     Sig: TAKE ONE TABLET BY MOUTH EVERY MORNING WITH BREAKFAST    There is no refill protocol information for this order            "

## 2018-06-11 ENCOUNTER — TELEPHONE (OUTPATIENT)
Dept: FAMILY MEDICINE | Facility: CLINIC | Age: 53
End: 2018-06-11

## 2018-06-11 NOTE — TELEPHONE ENCOUNTER
Agreed, thanks Cynthia!    Mulu Newell, PharmD, [facility]  Medication Therapy Management Provider  Pager: [phone]

## 2018-06-11 NOTE — TELEPHONE ENCOUNTER
Returned pt's call and reiterated the importance of contacting Neurologist who prescribed the medication. He has left a message for his Neurologist after our last call.     Pt started taking the Mirapex January. Symptoms began in April (the weekend of the snowstorm).     Discussed it could be other issues aside from the Mirapex.     Advised he call back if symptoms worsen or become constant, or seek care in UC/ER.     Pt agrees with plan, he will also await return call from Neurology.     Cynthia VENTURA RN

## 2018-06-11 NOTE — TELEPHONE ENCOUNTER
Reason for Call:  Medication or medication refill:    Do you use a [facility]?  Name of the pharmacy and phone number for the current request:  [facility], [place] - [address], [address]    Name of the medication requested: miratex rx    Other request: pt has questions regarding side effects for this medication ? Please advise    Can we leave a detailed message on this number? YES    Phone number patient can be reached at: Home number on file [phone] (home)    Best Time: any    Call taken on 6/11/2018 at 12:12 PM by Ruddy Marie

## 2018-06-11 NOTE — TELEPHONE ENCOUNTER
Spasm/twitching is seen in 2-5% of patients taking Mirapex; hallucinations are also commonly seen - 5-17% of patients (I'm not saying this what he has).  I'm assuming Mirapex is a relatively new medication for him?  I'd suggest he contact his neurologist since they've prescribed it for him.    Mulu Newell, PharmD, [facility]  Medication Therapy Management Provider  Pager: [phone]

## 2018-06-28 DIAGNOSIS — E11.40 TYPE 2 DIABETES MELLITUS WITH DIABETIC NEUROPATHY, WITHOUT LONG-TERM CURRENT USE OF INSULIN (H): ICD-10-CM

## 2018-06-28 DIAGNOSIS — I25.10 CORONARY ARTERY DISEASE INVOLVING NATIVE CORONARY ARTERY OF NATIVE HEART WITHOUT ANGINA PECTORIS: Chronic | ICD-10-CM

## 2018-06-28 DIAGNOSIS — N40.0 BENIGN PROSTATIC HYPERPLASIA, PRESENCE OF LOWER URINARY TRACT SYMPTOMS UNSPECIFIED: ICD-10-CM

## 2018-06-28 DIAGNOSIS — F41.9 ANXIETY: Chronic | ICD-10-CM

## 2018-06-28 NOTE — TELEPHONE ENCOUNTER
"Requested Prescriptions   Pending Prescriptions Disp Refills     hydrOXYzine (ATARAX) 25 MG tablet [Pharmacy Med Name: hydrOXYzine HCL 25MG TAB] 120 tablet 5     Sig: TAKE ONE TO TWO TABLETS BY MOUTH EVERY 6 HOURS AS NEEDED FOR ANXIETY    Antihistamines Protocol Passed    6/28/2018 11:28 AM       Passed - Recent (12 mo) or future (30 days) visit within the authorizing provider's specialty    Patient had office visit in the last 12 months or has a visit in the next 30 days with authorizing provider or within the authorizing provider's specialty.  See \"Patient Info\" tab in inbasket, or \"Choose Columns\" in Meds & Orders section of the refill encounter.           Passed - Patient is age 3 or older    Apply age and/or weight-based dosing for peds patients age 3 and older.    Forward request to provider for patients under the age of 3.       Last Written Prescription Date:  11/28/17  Last Fill Quantity: 120 tablet,  # refills: 5   Last office visit: 1/15/2018 with prescribing provider:  Milo Rock Office Visit:          ASPIR-LOW 81 MG EC tablet [Pharmacy Med Name: ASPIR-LOW 81MG TBEC] 120 tablet 3     Sig: TAKE ONE TABLET BY MOUTH EVERY DAY    Analgesics (Non-Narcotic Tylenol and ASA Only) Passed    6/28/2018 11:28 AM       Passed - Recent (12 mo) or future (30 days) visit within the authorizing provider's specialty    Patient had office visit in the last 12 months or has a visit in the next 30 days with authorizing provider or within the authorizing provider's specialty.  See \"Patient Info\" tab in inbasket, or \"Choose Columns\" in Meds & Orders section of the refill encounter.           Passed - Patient is age 20 years or older    If ASA is flagged for ages under 20 years old. Forward to provider for confirmation Ryes Syndrome is not a concern.           Last Written Prescription Date:  9/27/17  Last Fill Quantity: 90 tablet,  # refills: 3   Last office visit: 1/15/2018 with prescribing provider:  Milo Rock " Office Visit:          gabapentin (NEURONTIN) 300 MG capsule [Pharmacy Med Name: GABAPENTIN 300MG CAPS] 270 capsule 5     Sig: TAKE THREE CAPSULES BY MOUTH THREE TIMES A DAY    There is no refill protocol information for this order   gabapentin (NEURONTIN) 300 MG capsule      Last Written Prescription Date:  12/20/17  Last Fill Quantity: 270 capsule,   # refills: 5  Last Office Visit: 1/15/2018 (Milo)  Future Office visit:    Next 5 appointments (look out 90 days)     Aug 07, 2018 11:00 AM CDT   [facility]lia [facility]   [facility]    [address] [facility] [address]   [phone]                   Routing refill request to provider for review/approval because:  Drug not on the [facility], [facility] or [facility] refill protocol or controlled substance

## 2018-06-28 NOTE — TELEPHONE ENCOUNTER
"Last Written Prescription Date:  7/31/17  Last Fill Quantity: 90 capsule,  # refills: 3   Last office visit: 1/15/2018 with prescribing provider:  Milo   Future Office Visit:   Next 5 appointments (look out 90 days)     Aug 07, 2018 11:00 AM CDT   Office Visit with Morelia Pedra[facility],    [facility]    [address] [facility] [id number] [phone]                 Requested Prescriptions   Pending Prescriptions Disp Refills     tamsulosin (FLOMAX) 0.4 MG capsule [Pharmacy Med Name: TAMSULOSIN HCL 0.4MG CAPS] 90 capsule 3     Sig: TAKE ONE CAPSULE BY MOUTH EVERY DAY    Alpha Blockers Passed    6/28/2018 11:36 AM       Passed - Blood pressure under 140/90 in past 12 months    BP Readings from Last 3 Encounters:   01/25/18 122/80   01/15/18 117/72   11/03/17 110/78                Passed - Recent (12 mo) or future (30 days) visit within the authorizing provider's specialty    Patient had office visit in the last 12 months or has a visit in the next 30 days with authorizing provider or within the authorizing provider's specialty.  See \"Patient Info\" tab in inbasket, or \"Choose Columns\" in Meds & Orders section of the refill encounter.           Passed - Patient does not have Tadalafil, Vardenafil, or Sildenafil on their medication list       Passed - Patient is 18 years of age or older          "

## 2018-06-29 RX ORDER — TAMSULOSIN HYDROCHLORIDE 0.4 MG/1
CAPSULE ORAL
Qty: 90 CAPSULE | Refills: 1 | Status: SHIPPED | OUTPATIENT
Start: 2018-06-29 | End: 2018-11-30

## 2018-06-29 RX ORDER — GABAPENTIN 300 MG/1
CAPSULE ORAL
Qty: 270 CAPSULE | Refills: 5 | Status: SHIPPED | OUTPATIENT
Start: 2018-06-29 | End: 2018-11-28

## 2018-06-29 RX ORDER — ASPIRIN 81 MG/1
TABLET ORAL
Qty: 120 TABLET | Refills: 0 | Status: SHIPPED | OUTPATIENT
Start: 2018-06-29 | End: 2018-11-28

## 2018-06-29 RX ORDER — HYDROXYZINE HYDROCHLORIDE 25 MG/1
TABLET, FILM COATED ORAL
Qty: 120 TABLET | Refills: 0 | Status: SHIPPED | OUTPATIENT
Start: 2018-06-29 | End: 2018-09-28

## 2018-06-29 NOTE — TELEPHONE ENCOUNTER
Routing refill request to provider for review/approval because:  Drug not on the [facility], [facility] or [facility] refill protocol or controlled substance    Please review and authorize if appropriate,     Thank you,   Gaurang BAUER RN

## 2018-06-29 NOTE — TELEPHONE ENCOUNTER
Prescription approved per [facility] Refill Protocol.    Gaurang BAUER RN

## 2018-07-30 DIAGNOSIS — R09.81 NASAL CONGESTION: Primary | ICD-10-CM

## 2018-07-30 DIAGNOSIS — B34.9 VIRAL ILLNESS: ICD-10-CM

## 2018-07-31 DIAGNOSIS — G25.81 RESTLESS LEGS SYNDROME (RLS): Chronic | ICD-10-CM

## 2018-07-31 RX ORDER — CLONAZEPAM 0.5 MG/1
0.5 TABLET ORAL
Qty: 30 TABLET | Refills: 5 | Status: SHIPPED | OUTPATIENT
Start: 2018-07-31 | End: 2018-12-24

## 2018-07-31 NOTE — TELEPHONE ENCOUNTER
Pending Prescriptions:                       Disp   Refills    clonazePAM (KLONOPIN) 0.5 MG tablet       30 tab*5            Sig: Take 1 tablet (0.5 mg) by mouth nightly as needed           (Restless legs)          Last Written Prescription Date:  1-22-18  Last Fill Quantity: 30,   # refills: 5  Last Office Visit: 1-15-18 EO  Future Office visit:    Next 5 appointments (look out 90 days)     Aug 07, 2018 11:00 AM CDT   Office Visit with Morelia [facility]za DO   [facility]    [address] [facility] [address]   [phone]                   Routing refill request to provider for review/approval because:  Drug not on the [facility], [facility] or [facility] refill protocol or controlled substance    RT Jesse (R)

## 2018-08-01 NOTE — TELEPHONE ENCOUNTER
Deep sea nasal spray 0.65%    Last Written Prescription Date:  05/30/18  Last Fill Quantity: 44 mL,  # refills: 0   Last office visit: 1/15/2018 with prescribing provider:  Milo   Future Office Visit:   Next 5 appointments (look out 90 days)     Aug 07, 2018 11:00 AM CDT   Office Visit with Morelia Pedraza DO   [facility]    [address] [facility] [address]   [phone]                Routing refill request to provider for review/approval because:  Drug not on the [facility], [facility] or [facility] refill protocol or controlled substance

## 2018-08-01 NOTE — TELEPHONE ENCOUNTER
TCs: Please call pt to help schedule appt and route back to triage.     Thank you,  Gaurang SAN RN

## 2018-08-02 RX ORDER — SODIUM CHLORIDE 0.65 %
AEROSOL, SPRAY (ML) NASAL
Qty: 44 ML | Refills: 3 | Status: SHIPPED | OUTPATIENT
Start: 2018-08-02 | End: 2019-05-07

## 2018-08-09 ENCOUNTER — DOCUMENTATION ONLY (OUTPATIENT)
Dept: SLEEP MEDICINE | Facility: CLINIC | Age: 53
End: 2018-08-09

## 2018-08-09 NOTE — PROGRESS NOTES
6 month [facility] Recheck Visit     Diagnostic AHI: 7.1    PSG    Data only recheck -pt returned device as he felt it was not working for him.      Assessment: Pt not meeting objective benchmarks for compliance     Action plan:   pt to follow up per provider request (1-2 yrs)       Device type: Auto-CPAP  PAP settings: CPAP min 5 cm  H20     CPAP max 15 cm  H20        Objective measure goal  Compliance   Goal >70%  Leak   Goal < 24 lpm  AHI  Goal < 5  Usage  Goal >240

## 2018-09-28 DIAGNOSIS — J31.0 CHRONIC RHINITIS: ICD-10-CM

## 2018-09-28 DIAGNOSIS — E78.5 HYPERLIPIDEMIA, UNSPECIFIED HYPERLIPIDEMIA TYPE: Chronic | ICD-10-CM

## 2018-09-28 DIAGNOSIS — F41.9 ANXIETY: Primary | Chronic | ICD-10-CM

## 2018-09-28 NOTE — TELEPHONE ENCOUNTER
"Requested Prescriptions   Pending Prescriptions Disp Refills     hydrOXYzine (ATARAX) 25 MG tablet [Pharmacy Med Name: hydrOXYzine HCL 25MG TAB] 120 tablet 0     Sig: TAKE ONE TO TWO TABLETS BY MOUTH EVERY 6 HOURS AS NEEDED FOR ANXIETY. SCHEDULE OFFICE VISIT TO FOR FURTHER REFILLS    Antihistamines Protocol Passed    9/28/2018 12:02 PM       Passed - Recent (12 mo) or future (30 days) visit within the authorizing provider's specialty    Patient had office visit in the last 12 months or has a visit in the next 30 days with authorizing provider or within the authorizing provider's specialty.  See \"Patient Info\" tab in inbasket, or \"Choose Columns\" in Meds & Orders section of the refill encounter.           Passed - Patient is age 3 or older    Apply age and/or weight-based dosing for peds patients age 3 and older.    Forward request to provider for patients under the age of 3.         Last Written Prescription Date:  6/29/18  Last Fill Quantity: 120 tablet,  # refills: 0   Last office visit: 1/15/2018 with prescribing provider:  Milo   Future Office Visit:          fluticasone (FLONASE) 50 MCG/ACT spray [Pharmacy Med Name: FLUTICASONE PROPIONATE 50 SUSP] 16 g 11     Sig: USE ONE SPRAY IN EACH NOSTRIL EVERY DAY    Inhaled Steroids Protocol Passed    9/28/2018 12:02 PM       Passed - Patient is age 12 or older       Passed - Recent (12 mo) or future (30 days) visit within the authorizing provider's specialty    Patient had office visit in the last 12 months or has a visit in the next 30 days with authorizing provider or within the authorizing provider's specialty.  See \"Patient Info\" tab in inbasket, or \"Choose Columns\" in Meds & Orders section of the refill encounter.           Last Written Prescription Date:  10/04/17  Last Fill Quantity: 1 bottle,  # refills: 11   Last office visit: 1/15/2018 with prescribing provider:  Milo   Future Office Visit:          omega-3 acid ethyl esters (LOVAZA) 1 g capsule [Pharmacy " "Med Name: OMEGA-3-ACID ETHYL ESTERS 1GM CAPS] 360 capsule 0     Sig: TAKE TWO CAPSULES BY MOUTH TWICE A DAY. PLEASE SCHEDULE OFFICE VISIT (FASTING) FOR FURTHER REFILLS.    Antihyperlipidemic agents Failed    9/28/2018 12:02 PM       Failed - Lipid panel on file in past 12 mos    Recent Labs   Lab Test 08/11/17   12/10/14   1725   CHOL  193   < >  308*   TRIG  360*   < >  288*   HDL  41   < >  50   LDL  80   < >  200*   NHDL  152*   < >   --    VLDL   --    --   58*   CHOLHDLRATIO   --    --   6.2*    < > = values in this interval not displayed.              Failed - Normal serum ALT on record in past 12 mos    Recent Labs   Lab Test  05/30/17   1224   ALT  60            Passed - Recent (12 mo) or future (30 days) visit within the authorizing provider's specialty    Patient had office visit in the last 12 months or has a visit in the next 30 days with authorizing provider or within the authorizing provider's specialty.  See \"Patient Info\" tab in inbasket, or \"Choose Columns\" in Meds & Orders section of the refill encounter.           Passed - Patient is age 18 years or older       Last Written Prescription Date:  5/30/18  Last Fill Quantity: 360 capsule,  # refills: 0   Last office visit: 1/15/2018 with prescribing provider:  Milo   Future Office Visit:   Next 5 appointments (look out 90 days)     Nov 05, 2018 11:30 AM CST   Office Visit with Morelia Pedraza,    [facility]    [address] [facility] [address] [phone]                        "

## 2018-10-02 RX ORDER — HYDROXYZINE HYDROCHLORIDE 25 MG/1
25-50 TABLET, FILM COATED ORAL EVERY 6 HOURS PRN
Qty: 120 TABLET | Refills: 5 | Status: SHIPPED | OUTPATIENT
Start: 2018-10-02 | End: 2019-05-07

## 2018-10-02 RX ORDER — OMEGA-3-ACID ETHYL ESTERS 1 G/1
2 CAPSULE, LIQUID FILLED ORAL 2 TIMES DAILY
Qty: 360 CAPSULE | Refills: 3 | Status: SHIPPED | OUTPATIENT
Start: 2018-10-02 | End: 2019-05-07

## 2018-10-02 RX ORDER — FLUTICASONE PROPIONATE 50 MCG
SPRAY, SUSPENSION (ML) NASAL
Qty: 16 G | Refills: 11 | Status: SHIPPED | OUTPATIENT
Start: 2018-10-02 | End: 2018-12-10

## 2018-10-02 NOTE — TELEPHONE ENCOUNTER
Future Office visit:    Next 5 appointments (look out 90 days)     Nov 05, 2018 11:30 AM CST   Office Visit with Morelia Pedraza,    [facility]    [address] [facility] [id number] [phone]                   Routing refill request to provider for review/approval because:  Labs for omega 3    Flonase the Dx was removed- I tried adding but Epic didn't like that    Hydroxyzine- she has scheduled OV  Vianey Shipman RN- Triage [employer]

## 2018-10-16 ENCOUNTER — TRANSFERRED RECORDS (OUTPATIENT)
Dept: HEALTH INFORMATION MANAGEMENT | Facility: CLINIC | Age: 53
End: 2018-10-16

## 2018-10-16 LAB
CHOLEST SERPL-MCNC: 189 MG/DL (ref 100–199)
EJECTION FRACTION: 58
HBA1C MFR BLD: 7.2 % (ref 0–?)
HDLC SERPL-MCNC: 38 MG/DL
LDLC SERPL CALC-MCNC: 76 MG/DL
NONHDLC SERPL-MCNC: 151 MG/DL
TRIGL SERPL-MCNC: 373 MG/DL

## 2018-10-29 DIAGNOSIS — E61.1 IRON DEFICIENCY: ICD-10-CM

## 2018-10-29 DIAGNOSIS — I25.10 CORONARY ARTERY DISEASE INVOLVING NATIVE CORONARY ARTERY OF NATIVE HEART WITHOUT ANGINA PECTORIS: ICD-10-CM

## 2018-10-29 DIAGNOSIS — E55.9 VITAMIN D DEFICIENCY: ICD-10-CM

## 2018-10-29 NOTE — TELEPHONE ENCOUNTER
"Cholecalciferol (VITAMIN D) 2000 UNITS tablet 100 tablet 3 7/31/2017     Last Written Prescription Date:  7/31/17  Last Fill Quantity: 100,  # refills: 3   Last office visit: 1/15/2018 with prescribing provider:  Milo   Future Office Visit:   Next 5 appointments (look out 90 days)     Nov 05, 2018 11:30 AM CST   Office Visit with Morelia Pedraza, DO   [facility]    [address] [facility] [address] [phone]                 Requested Prescriptions   Pending Prescriptions Disp Refills     Cholecalciferol (VITAMIN D) 2000 units tablet [Pharmacy Med Name: VITAMIN D3 TAB 2000UNIT] 100 tablet 3     Sig: TAKE ONE TABLET BY MOUTH EVERY DAY    Vitamin Supplements (Adult) Protocol Passed    10/29/2018 11:11 AM       Passed - High dose Vitamin D not ordered       Passed - Recent (12 mo) or future (30 days) visit within the authorizing provider's specialty    Patient had office visit in the last 12 months or has a visit in the next 30 days with authorizing provider or within the authorizing provider's specialty.  See \"Patient Info\" tab in inbasket, or \"Choose Columns\" in Meds & Orders section of the refill encounter.              atorvastatin (LIPITOR) 80 MG tablet [Pharmacy Med Name: ATORVASTATIN CALCIUM 80MG TABS] 90 tablet 3     Sig: TAKE ONE TABLET BY MOUTH EVERY DAY    Statins Protocol Failed    10/29/2018 11:11 AM       Failed - LDL on file in past 12 months    Recent Labs   Lab Test 08/11/17   LDL  80            Passed - No abnormal creatine kinase in past 12 months    Recent Labs   Lab Test  08/12/16   1624   CKT  52               Passed - Recent (12 mo) or future (30 days) visit within the authorizing provider's specialty    Patient had office visit in the last 12 months or has a visit in the next 30 days with authorizing provider or within the authorizing provider's specialty.  See \"Patient Info\" tab in inbasket, or \"Choose Columns\" in Meds & Orders section of the " refill encounter.             Passed - Patient is age 18 or older        PHQ-9 SCORE 1/16/2017   Total Score 8     atorvastatin (LIPITOR) 80 MG tablet 90 tablet 3 7/31/2017     Last Written Prescription Date:  7/31/17  Last Fill Quantity: 90,  # refills: 3   Last office visit: 1/15/2018 with prescribing provider:  Milo   Future Office Visit:   Next 5 appointments (look out 90 days)     Nov 05, 2018 11:30 AM CST   [facility] with Morelia [facility],    [facility]    [address] [facility] [address] [phone]

## 2018-10-29 NOTE — TELEPHONE ENCOUNTER
"ferrous sulfate (IRON) 325 (65 Fe) MG tablet 30 tablet 0 5/30/2018   Last Written Prescription Date:  5/30/18  Last Fill Quantity: 30,  # refills: 0   Last office visit: 1/15/2018 with prescribing provider:  Milo   Future Office Visit:   Next 5 appointments (look out 90 days)     Nov 05, 2018 11:30 AM CST   Office Visit with Morelia Pedraza, DO   [facility]    [address] [facility] [address] [phone]                 Requested Prescriptions   Pending Prescriptions Disp Refills     ferrous sulfate (IRON) 325 (65 Fe) MG tablet [Pharmacy Med Name: FERROUS SULFATE 325 (65 FE)MG TABS] 30 tablet 0     Sig: TAKE ONE TABLET BY MOUTH EVERY MORNING WITH BREAKFAST    Iron Supplements Passed    10/29/2018 11:11 AM       Passed - Patient is 12 years of age or older       Passed - Recent (12 mo) or future (30 days) visit within the authorizing provider's specialty    Patient had office visit in the last 12 months or has a visit in the next 30 days with authorizing provider or within the authorizing provider's specialty.  See \"Patient Info\" tab in inbasket, or \"Choose Columns\" in Meds & Orders section of the refill encounter.             Passed - Hgb OR Hct on record within the past 12 mos.    Patient need only have had a HGB or HCT on file in the past 12 mos. That result does not need to be normal.    Recent Labs   Lab Test  01/15/18   1135  11/09/16   1123  08/12/16   1624   HGB  13.6  14.5  14.7     Recent Labs   Lab Test  01/15/18   1135  06/14/15   0430  11/01/14   0444   HCT  40.5  41.5  41.7       Please verify a HGB or HCT has been checked SINCE THE LAST DOSE CHANGE.            PHQ-9 SCORE 1/16/2017   Total Score 8     "

## 2018-10-30 RX ORDER — CHOLECALCIFEROL (VITAMIN D3) 50 MCG
TABLET ORAL
Qty: 100 TABLET | Refills: 0 | Status: SHIPPED | OUTPATIENT
Start: 2018-10-30 | End: 2019-03-05

## 2018-10-30 RX ORDER — ATORVASTATIN CALCIUM 80 MG/1
TABLET, FILM COATED ORAL
Qty: 30 TABLET | Refills: 0 | Status: SHIPPED | OUTPATIENT
Start: 2018-10-30 | End: 2018-11-28

## 2018-10-30 NOTE — TELEPHONE ENCOUNTER
A 30 day supply is given, patient is due for an office visit.  Patient has appointment scheduled for 11/5/2018  LEVI Hernandez, RN  [employer] Triage

## 2018-10-31 RX ORDER — FERROUS SULFATE 325(65) MG
TABLET ORAL
Qty: 30 TABLET | Refills: 0 | Status: SHIPPED | OUTPATIENT
Start: 2018-10-31 | End: 2018-11-28

## 2018-10-31 NOTE — TELEPHONE ENCOUNTER
Pt has upcoming OV. Sent #30 to get him through.     Plan to review and discuss additional refills at upcoming visit

## 2018-11-05 ENCOUNTER — OFFICE VISIT (OUTPATIENT)
Dept: FAMILY MEDICINE | Facility: CLINIC | Age: 53
End: 2018-11-05
Payer: MEDICARE

## 2018-11-05 VITALS
WEIGHT: 276.8 LBS | OXYGEN SATURATION: 95 % | HEIGHT: 75 IN | TEMPERATURE: 97.6 F | BODY MASS INDEX: 34.42 KG/M2 | DIASTOLIC BLOOD PRESSURE: 86 MMHG | HEART RATE: 78 BPM | SYSTOLIC BLOOD PRESSURE: 125 MMHG

## 2018-11-05 DIAGNOSIS — I25.810 CORONARY ARTERY DISEASE INVOLVING CORONARY BYPASS GRAFT OF NATIVE HEART, ANGINA PRESENCE UNSPECIFIED: Primary | Chronic | ICD-10-CM

## 2018-11-05 DIAGNOSIS — H93.12 TINNITUS, LEFT: ICD-10-CM

## 2018-11-05 DIAGNOSIS — Z12.11 SCREEN FOR COLON CANCER: ICD-10-CM

## 2018-11-05 DIAGNOSIS — E11.9 TYPE 2 DIABETES MELLITUS WITHOUT COMPLICATION, WITHOUT LONG-TERM CURRENT USE OF INSULIN (H): Chronic | ICD-10-CM

## 2018-11-05 DIAGNOSIS — E66.811 CLASS 1 OBESITY WITH SERIOUS COMORBIDITY AND BODY MASS INDEX (BMI) OF 34.0 TO 34.9 IN ADULT, UNSPECIFIED OBESITY TYPE: ICD-10-CM

## 2018-11-05 DIAGNOSIS — M79.10 MYALGIA: ICD-10-CM

## 2018-11-05 DIAGNOSIS — R20.2 PARESTHESIAS: ICD-10-CM

## 2018-11-05 DIAGNOSIS — K59.01 SLOW TRANSIT CONSTIPATION: ICD-10-CM

## 2018-11-05 DIAGNOSIS — G25.0 BENIGN ESSENTIAL TREMOR: Chronic | ICD-10-CM

## 2018-11-05 PROCEDURE — 99215 OFFICE O/P EST HI 40 MIN: CPT | Performed by: INTERNAL MEDICINE

## 2018-11-05 RX ORDER — SENNOSIDES A AND B 8.6 MG/1
2 TABLET, FILM COATED ORAL DAILY
Qty: 100 TABLET | Refills: 5 | Status: SHIPPED | OUTPATIENT
Start: 2018-11-05 | End: 2020-01-02

## 2018-11-05 NOTE — PROGRESS NOTES
"  SUBJECTIVE:   Javier Markham is a 53 year old male who presents to clinic today for the following health issues:      Alexx is here in follow up ; last seen several months ago    Did win his disability case and in process of changing insurance and also pharmacies as well will go to a clinic closer to home in [place].    Will f/u with cardiologist soon at [facility]. Had angiogram via left wrist and right groin during recent hospitalization. Chest pain is definitely better.     Has been working hard on weight loss and doing a great job. Portion control and trying to eat better.   Is checking sugars in AM about 130s. A1c 7.2% at [facility] 10/16/18 - per CareEverywhere. Still has high lipids. With weight loss his BP was dropping. Off of Lisinopril even at [facility]. He isn't taking Imdur though b/c leads to mild headache.    Wants to see ENT for ringing in left ear; no pain but maybe some loss of hearing. Sometimes feels \"off balance\" but not true vertigo.    Also has paresthesias of scalp but not elsewhere on body; started in April. Not taking Primidone or Phenobarbital b/c led to fatigue s/e. F/u with neuro in Feb. Is taking Mirapex and Clonazepam for his RLS.    Says he was given Toradol in the hospital; takes Ibu PRN. Does have some muscle / joint pain especially in legs since first heart surgery. Maybe worse since Januvia???? Discussed concerns with NSAIDs in setting of CAD.    Problem list and histories reviewed & adjusted, as indicated.    ROS:  Detailed as above     OBJECTIVE:     /86 (BP Location: Right arm, Patient Position: Sitting, Cuff Size: Adult Large)  Pulse 78  Temp 97.6  F (36.4  C) (Oral)  Ht 6' 3\" (1.905 m)  Wt 276 lb 12.8 oz (125.6 kg)  SpO2 95%  BMI 34.6 kg/m2  Body mass index is 34.6 kg/(m^2).  Alert, pleasant, cheerful, NAD  Shaved head without rash or infestation  Bilateral TM within normal limits without cerumen impaction  Walks without assist device  Mood euthymic    Wt Readings " from Last 4 Encounters:   11/05/18 276 lb 12.8 oz (125.6 kg)   01/25/18 (!) 306 lb 6.4 oz (139 kg)   01/15/18 299 lb 3.2 oz (135.7 kg)   10/04/17 (!) 308 lb 3.2 oz (139.8 kg)         ASSESSMENT/PLAN:     1. Coronary artery disease with h/o CABG x 3 in Dec 2014, then NSTEMI with RCA stent July 2015 and recent RICHELLE to left circumflex on 10/19/18   Extensive history and his cardiologist is through [facility]  He has f/u soon and will discuss Imdur that they had prescribed but he stopped due to headaches   Angiogram 10/19/18 with successful angioplasty and stenting (drug eluting) of the proximal circumflex d/t chronic total occlusion  Is on extensive regimen of medications per cardiology    2. Myalgia  ?R/t Januvia - will try dose reduction    3. Paresthesias  Non-specific of scalp    4. Tinnitus, left  See HPI re: reason for consultation   - OTOLARYNGOLOGY REFERRAL    5. Type 2 diabetes mellitus without complication, without long-term current use of insulin (H)  Is checking sugars in AM about 130s. A1c 7.2% at [facility] 10/16/18 - per CareEverywhere. With his lifestyle changes and weight loss ok to cut Januvia in half and see if helps some of his myalgias. Continue Metformin.   - sitagliptin (JANUVIA) 100 MG tablet; Take 0.5 tablets (50 mg) by mouth daily  Dispense: 1 tablet; Refill: 0    6. Slow transit constipation  Senna is helpful at this dose; is also on Miralax  - senna (SENOKOT) 8.6 MG tablet; Take 2 tablets by mouth daily  Dispense: 100 tablet; Refill: 5    7. Benign essential tremor  Followed by neurology - Not taking Primidone or Phenobarbital b/c led to fatigue s/e    8. Class 1 obesity with serious comorbidity and body mass index (BMI) of 34.0 to 34.9 in adult, unspecified obesity type  Improving with lifestyle changes!    Wt Readings from Last 4 Encounters:   11/05/18 276 lb 12.8 oz (125.6 kg)   01/25/18 (!) 306 lb 6.4 oz (139 kg)   01/15/18 299 lb 3.2 oz (135.7 kg)   10/04/17 (!) 308 lb 3.2 oz (139.8 kg)       9.  "Restless leg syndrome and mild STEPHAN  On Mirapex, Gabapentin and Clonazepam and has visited with sleep clinic  He tried CPAP but ultimately returned mask as he didn't find it beneficial - see notes - does have mild STEPHAN    10. Screen for colon cancer  - Fecal colorectal cancer screen (FIT); Future    Patient Instructions   Have your new pharmacy call the old pharmacy to \"take over\" your prescription    Schedule with ENT    Cut Januvia in half to 50 mg daily to see if that helps reduce muscle aches; meanwhile continue daily glucose checks for stability and working on weight loss    Follow up with cardiology: Ask about Imdur (Isosorbide) and Lisinopril as well as their opinion on Ibuprofen    Mail in stool test    Ask neurologist about scalp \"creepy crawly\" feeling    Establish with new primary care physician in the coming months        MDM: >40 minutes spent with patient, over 50% time counseling, coordinating care and explaining about nature of the patient's conditions as outlined above as well as review of outside records with him via cardiology reports at [facility].    Morelia Pedraza, DO  [facility]  "

## 2018-11-05 NOTE — MR AVS SNAPSHOT
"              After Visit Summary   11/5/2018    Javier Markham    MRN: [id number]           Patient Information     Date Of Birth          1965        Visit Information        Provider Department      11/5/2018 11:30 AM Morelia Pedraza, [facility]        Today's Diagnoses     Tinnitus, left    -  1    Coronary artery disease involving coronary bypass graft of native heart, angina presence unspecified        Myalgia        Type 2 diabetes mellitus without complication, without long-term current use of insulin (H)        Screen for colon cancer          Care Instructions    Have your new pharmacy call the old pharmacy to \"take over\" your prescription    Schedule with ENT    Cut Januvia in half to 50 mg daily to see if that helps reduce muscle aches; meanwhile continue daily glucose checks for stability and working on weight loss    Follow up with cardiology: Ask about Imdur (Isosorbide) and Lisinopril as well as their opinion on Ibuprofen    Mail in stool test    Ask neurologist about scalp \"creepy crawly\" feeling    Establish with new primary care physician in the coming months              Follow-ups after your visit        Additional Services     OTOLARYNGOLOGY REFERRAL       Your provider has referred you to: [facility]: [employer] [facility]  Kae [phone]   [email or web address]    Please be aware that coverage of these services is subject to the terms and limitations of your health insurance plan.  Call member services at your health plan with any benefit or coverage questions.      Please bring the following with you to your appointment:    (1) Any X-Rays, CTs or MRIs which have been performed.  Contact the facility where they were done to arrange for  prior to your scheduled appointment.   (2) List of current medications  (3) This referral request   (4) Any documents/labs given to you for this referral                  Future tests that were ordered for " "you today     Open Future Orders        Priority Expected Expires Ordered    Fecal colorectal cancer screen (FIT) Routine 2018            Who to contact     If you have questions or need follow up information about today's clinic visit or your schedule please contact [facility] directly at [phone].  Normal or non-critical lab and imaging results will be communicated to you by MyChart, letter or phone within 4 business days after the clinic has received the results. If you do not hear from us within 7 days, please contact the clinic through [employer] or phone. If you have a critical or abnormal lab result, we will notify you by phone as soon as possible.  Submit refill requests through [employer] or call your pharmacy and they will forward the refill request to us. Please allow 3 business days for your refill to be completed.          Additional Information About Your Visit        [employer] Information     [employer] lets you send messages to your doctor, view your test results, renew your prescriptions, schedule appointments and more. To sign up, go to [email or web address] . Click on \"Log in\" on the left side of the screen, which will take you to the Welcome page. Then click on \"Sign up Now\" on the right side of the page.     You will be asked to enter the access code listed below, as well as some personal information. Please follow the directions to create your username and password.     Your access code is: [email or web address]  Expires: 2/3/2019 12:59 PM     Your access code will  in 90 days. If you need help or a new code, please call your [place] clinic or [phone].        Care EveryWhere ID     This is your Care EveryWhere ID. This could be used by other organizations to access your [place] medical records  [id number]        Your Vitals Were     Pulse Temperature Height Pulse Oximetry BMI (Body Mass Index)       78 97.6  F (36.4  C) (Oral) 6' 3\" (1.905 m) 95% 34.6 " kg/m2        Blood Pressure from Last 3 Encounters:   11/05/18 125/86   01/25/18 122/80   01/15/18 117/72    Weight from Last 3 Encounters:   11/05/18 276 lb 12.8 oz (125.6 kg)   01/25/18 (!) 306 lb 6.4 oz (139 kg)   01/15/18 299 lb 3.2 oz (135.7 kg)              We Performed the Following     OTOLARYNGOLOGY REFERRAL          Today's Medication Changes          These changes are accurate as of 11/5/18 12:59 PM.  If you have any questions, ask your nurse or doctor.               These medicines have changed or have updated prescriptions.        Dose/Directions    JANUVIA 100 MG tablet   This may have changed:  See the new instructions.   Used for:  Type 2 diabetes mellitus without complication, without long-term current use of insulin (H)   Generic drug:  sitagliptin   Changed by:  Morelia Pedraza DO        Dose:  50 mg   Take 0.5 tablets (50 mg) by mouth daily   Quantity:  1 tablet   Refills:  0         Stop taking these medicines if you haven't already. Please contact your care team if you have questions.     cyclobenzaprine 5 MG tablet   Commonly known as:  FLEXERIL   Stopped by:  Morelia Pedraza DO           MELATONIN PO   Stopped by:  Morelia Pedraza DO           PRIMIDONE PO   Stopped by:  Morelia Pedraza DO                    Primary Care Provider Office Phone # Fax #    Morelia Pedraza [phone] [address]  [place] [address]        Equal Access to Services     [facility] AH: Hadii aad ku hadasho Soomaali, waaxda luqadaha, qaybta kaalmada adeegyada, waxay idiin hayaan adenaomi zee'jerrod . So [facility] [phone].    ATENCIÓN: Si habla español, tiene a hale disposición servicios gratuitos de asistencia lingüística. Llame al [phone].    We comply with applicable federal civil rights laws and [place] laws. We do not discriminate on the basis of race, color, national origin, age, disability, sex, sexual orientation, or gender identity.            Thank you!      Thank you for choosing [facility]  for your care. Our goal is always to provide you with excellent care. Hearing back from our patients is one way we can continue to improve our services. Please take a few minutes to complete the written survey that you may receive in the mail after your visit with us. Thank you!             Your Updated Medication List - Protect others around you: Learn how to safely use, store and throw away your medicines at www.disposemymeds.org.          This list is accurate as of 11/5/18 12:59 PM.  Always use your most recent med list.                   Brand Name Dispense Instructions for use Diagnosis    albuterol 108 (90 Base) MCG/ACT inhaler    PROAIR HFA/PROVENTIL HFA/VENTOLIN HFA    1 Inhaler    Inhale 2 puffs into the lungs every 4 hours as needed for shortness of breath / dyspnea or wheezing    Shortness of breath       ASPIR-LOW 81 MG EC tablet   Generic drug:  aspirin     120 tablet    TAKE ONE TABLET BY MOUTH EVERY DAY    Coronary artery disease involving native coronary artery of native heart without angina pectoris       atorvastatin 80 MG tablet    LIPITOR    30 tablet    TAKE ONE TABLET BY MOUTH EVERY DAY    Coronary artery disease involving native coronary artery of native heart without angina pectoris       B-D SINGLE USE SWABS REGULAR Pads     100 each    USE AS NEEDED    Type 2 diabetes mellitus with diabetic neuropathy, without long-term current use of insulin (H)       blood glucose monitoring meter device kit    no brand specified    1 kit    Use to test blood sugar 1 times daily or as directed. ONE TOUCH VERIO.    Type 2 diabetes mellitus with diabetic neuropathy, without long-term current use of insulin (H)       clonazePAM 0.5 MG tablet    klonoPIN    30 tablet    Take 1 tablet (0.5 mg) by mouth nightly as needed (Restless legs)    Restless legs syndrome (RLS)       clopidogrel 75 MG tablet    PLAVIX    90 tablet    TAKE ONE TABLET BY MOUTH EVERY DAY     Coronary artery disease involving coronary bypass graft of native heart without angina pectoris       cyanocobalamin 1000 MCG tablet    vitamin  B-12    100 tablet    TAKE ONE TABLET BY MOUTH EVERY DAY    Vitamin B12 deficiency (non anemic)       DEEP SEA NASAL SPRAY 0.65 % nasal spray   Generic drug:  sodium chloride     44 mL    SPRAY 1 SPRAY IN EACH NOSTRIL FOUR TIMES A DAY AS NEEDED FOR CONGESTION    Nasal congestion       eucerin cream     240 g    Apply topically as needed for dry skin    Dry skin       fenofibrate 54 MG tablet     1 tablet    Take 1 tablet (54 mg) by mouth daily        ferrous sulfate 325 (65 Fe) MG tablet    IRON    30 tablet    TAKE ONE TABLET BY MOUTH EVERY MORNING WITH BREAKFAST    Iron deficiency       fluticasone 50 MCG/ACT spray    FLONASE    16 g    USE ONE SPRAY IN EACH NOSTRIL EVERY DAY    Chronic rhinitis       gabapentin 300 MG capsule    NEURONTIN    270 capsule    TAKE THREE CAPSULES BY MOUTH THREE TIMES A DAY    Type 2 diabetes mellitus with diabetic neuropathy, without long-term current use of insulin (H)       hydrOXYzine 25 MG tablet    ATARAX    120 tablet    Take 1-2 tablets (25-50 mg) by mouth every 6 hours as needed for anxiety    Anxiety       ibuprofen 800 MG tablet    ADVIL/MOTRIN    60 tablet    Take 1 tablet (800 mg) by mouth every 8 hours as needed for moderate pain    Left inguinal hernia       JANUVIA 100 MG tablet   Generic drug:  sitagliptin     1 tablet    Take 0.5 tablets (50 mg) by mouth daily    Type 2 diabetes mellitus without complication, without long-term current use of insulin (H)       loratadine 10 MG tablet    CLARITIN    90 tablet    TAKE ONE TABLET BY MOUTH EVERY DAY    Seasonal allergies       MAPAP 500 MG tablet   Generic drug:  acetaminophen     100 tablet    TAKE TWO TABLETS BY MOUTH EVERY 8 HOURS AS NEEDED FOR MILD PAIN    Unilateral inguinal hernia without obstruction or gangrene, recurrence not specified       metFORMIN 500 MG tablet     GLUCOPHAGE    360 tablet    TAKE TWO TABLETS BY MOUTH TWICE A DAY WITH MEALS    Type 2 diabetes mellitus with diabetic neuropathy, without long-term current use of insulin (H)       metoprolol succinate 25 MG 24 hr tablet    TOPROL-XL    180 tablet    TAKE ONE TABLET BY MOUTH TWICE A DAY    Coronary artery disease involving native coronary artery of native heart without angina pectoris       MIRAPEX PO           nitroGLYcerin 0.4 MG sublingual tablet    NITROSTAT    25 tablet    PLACE 1 TABLET UNDER THE TONGUE AT THE ONSET OF CHEST PAIN. MAY REPEAT EVERY 5 MINUTES AS NEEDED FOR A TOTAL OF 3 DOSES.    Incomplete RBBB       omega-3 acid ethyl esters 1 g capsule    Lovaza    360 capsule    Take 2 capsules (2 g) by mouth 2 times daily    Hyperlipidemia, unspecified hyperlipidemia type       omeprazole 20 MG CR capsule    priLOSEC    180 capsule    TAKE ONE CAPSULE BY MOUTH TWICE A DAY TAKE 30 TO 60 MINUTES BEFORE A MEAL    Gastroesophageal reflux disease without esophagitis       ONETOUCH DELICA LANCETS 33G Misc     100 each    TEST ONCE DAILY OR AS DIRECTED    Type 2 diabetes mellitus with diabetic neuropathy, without long-term current use of insulin (H)       ONETOUCH VERIO IQ test strip   Generic drug:  blood glucose monitoring     100 each    TEST ONCE DAILY OR AS DIRECTED    Type 2 diabetes mellitus with diabetic neuropathy, without long-term current use of insulin (H)       polyethylene glycol powder    MIRALAX/GLYCOLAX    500 g    Take 17 g by mouth daily as needed for constipation    Constipation, unspecified constipation type       tamsulosin 0.4 MG capsule    FLOMAX    90 capsule    TAKE ONE CAPSULE BY MOUTH EVERY DAY    Benign prostatic hyperplasia, presence of lower urinary tract symptoms unspecified       triamcinolone 0.1 % cream    KENALOG     Apply topically daily as needed for irritation (under Arms)        vitamin D 2000 units tablet     100 tablet    TAKE ONE TABLET BY MOUTH EVERY DAY    Vitamin D  deficiency       ZETIA PO      Take 10 mg by mouth At Bedtime

## 2018-11-05 NOTE — PATIENT INSTRUCTIONS
"Have your new pharmacy call the old pharmacy to \"take over\" your prescription    Schedule with ENT    Cut Januvia in half to 50 mg daily to see if that helps reduce muscle aches; meanwhile continue daily glucose checks for stability and working on weight loss    Follow up with cardiology: Ask about Imdur (Isosorbide) and Lisinopril as well as their opinion on Ibuprofen    Mail in stool test    Ask neurologist about scalp \"creepy crawly\" feeling    Establish with new primary care physician in the coming months      "

## 2018-11-08 PROCEDURE — 82274 ASSAY TEST FOR BLOOD FECAL: CPT | Performed by: INTERNAL MEDICINE

## 2018-11-09 DIAGNOSIS — Z12.11 SCREEN FOR COLON CANCER: ICD-10-CM

## 2018-11-11 LAB — HEMOCCULT STL QL IA: NEGATIVE

## 2018-11-28 DIAGNOSIS — I45.10 INCOMPLETE RBBB: Chronic | ICD-10-CM

## 2018-11-28 DIAGNOSIS — E11.40 TYPE 2 DIABETES MELLITUS WITH DIABETIC NEUROPATHY, WITHOUT LONG-TERM CURRENT USE OF INSULIN (H): ICD-10-CM

## 2018-11-28 DIAGNOSIS — N40.0 BENIGN PROSTATIC HYPERPLASIA, PRESENCE OF LOWER URINARY TRACT SYMPTOMS UNSPECIFIED: ICD-10-CM

## 2018-11-28 DIAGNOSIS — E61.1 IRON DEFICIENCY: ICD-10-CM

## 2018-11-28 DIAGNOSIS — I25.10 CORONARY ARTERY DISEASE INVOLVING NATIVE CORONARY ARTERY OF NATIVE HEART WITHOUT ANGINA PECTORIS: Chronic | ICD-10-CM

## 2018-11-28 DIAGNOSIS — K21.9 GASTROESOPHAGEAL REFLUX DISEASE WITHOUT ESOPHAGITIS: Chronic | ICD-10-CM

## 2018-11-28 DIAGNOSIS — I25.10 CORONARY ARTERY DISEASE INVOLVING NATIVE CORONARY ARTERY OF NATIVE HEART WITHOUT ANGINA PECTORIS: ICD-10-CM

## 2018-11-28 DIAGNOSIS — I25.810 CORONARY ARTERY DISEASE INVOLVING CORONARY BYPASS GRAFT OF NATIVE HEART WITHOUT ANGINA PECTORIS: Chronic | ICD-10-CM

## 2018-11-28 DIAGNOSIS — E53.8 VITAMIN B12 DEFICIENCY (NON ANEMIC): ICD-10-CM

## 2018-11-28 DIAGNOSIS — K40.90 UNILATERAL INGUINAL HERNIA WITHOUT OBSTRUCTION OR GANGRENE, RECURRENCE NOT SPECIFIED: Chronic | ICD-10-CM

## 2018-11-29 NOTE — TELEPHONE ENCOUNTER
"Aspirin 81 mg    Last Written Prescription Date:  06/29/18  Last Fill Quantity: 120 tablets,  # refills: 0   Last office visit: 11/05/18 with prescribing provider:  Milo Rock Office Visit:      Ferrous Sulfate 325 mg    Last Written Prescription Date:  10/31/18  Last Fill Quantity: 30 tablets,  # refills: 0   Last office visit: 11/05/18 with prescribing provider:  Mil[facility] Rock Office Visit:      Gabapentin 300 mg  Medication is not on [facility] refill protocol.    Last Written Prescription Date:  06/29/18  Last Fill Quantity: 270 capsules,  # refills: 5   Last office visit: 11/05/18 with prescribing provider:  Milo Rock Office Visit:      Mapap 500 mg    Last Written Prescription Date:  11/21/17  Last Fill Quantity: 100 tablets,  # refills: 5   Last office visit: 11/05/18 with prescribing provider:  Milo Rock Office Visit:      Requested Prescriptions   Pending Prescriptions Disp Refills     MAPAP 500 MG tablet [Pharmacy Med Name: MAPAP 500MG TABS] 100 tablet 5     Sig: TAKE TWO TABLETS BY MOUTH EVERY 8 HOURS AS NEEDED FOR MILD PAIN    Analgesics (Non-Narcotic Tylenol and ASA Only) Passed    11/28/2018  9:08 AM       Passed - Recent (12 mo) or future (30 days) visit within the authorizing provider's specialty    Patient had office visit in the last 12 months or has a visit in the next 30 days with authorizing provider or within the authorizing provider's specialty.  See \"Patient Info\" tab in inbasket, or \"Choose Columns\" in Meds & Orders section of the refill encounter.             Passed - Patient is 7 months old or older    If patient is a peds patient of the age 7 mos -12 years, ok to refill using weight-based dosing.     If >3g daily and/or sig is not \"prn\", check for liver enzymes. If normal in the last year, ok to refill.  If not, refer to the provider.         Passed - Patient is age 20 years or older    If ASA is flagged for ages under 20 years old. Forward to provider for confirmation Ryes " "Syndrome is not a concern.              aspirin (ASA) 81 MG EC tablet [Pharmacy Med Name: ASPIRIN 81MG TBEC] 120 tablet 0     Sig: TAKE ONE TABLET BY MOUTH EVERY DAY. SCHEDULE OFFICE VISIT TO DISCUSS FURTHER REFILLS    Analgesics (Non-Narcotic Tylenol and ASA Only) Passed    11/28/2018  9:08 AM       Passed - Recent (12 mo) or future (30 days) visit within the authorizing provider's specialty    Patient had office visit in the last 12 months or has a visit in the next 30 days with authorizing provider or within the authorizing provider's specialty.  See \"Patient Info\" tab in inbasket, or \"Choose Columns\" in Meds & Orders section of the refill encounter.             Passed - Patient is 7 months old or older    If patient is a peds patient of the age 7 mos -12 years, ok to refill using weight-based dosing.     If >3g daily and/or sig is not \"prn\", check for liver enzymes. If normal in the last year, ok to refill.  If not, refer to the provider.         Passed - Patient is age 20 years or older    If ASA is flagged for ages under 20 years old. Forward to provider for confirmation Ryes Syndrome is not a concern.              ferrous sulfate (FEROSUL) 325 (65 Fe) MG tablet [Pharmacy Med Name: FERROUS SULFATE 325 (65 FE)MG TABS] 30 tablet 0     Sig: TAKE ONE TABLET BY MOUTH EVERY MORNING WITH BREAKFAST    Iron Supplements Passed    11/28/2018  9:08 AM       Passed - Patient is 12 years of age or older       Passed - Recent (12 mo) or future (30 days) visit within the authorizing provider's specialty    Patient had office visit in the last 12 months or has a visit in the next 30 days with authorizing provider or within the authorizing provider's specialty.  See \"Patient Info\" tab in inbasket, or \"Choose Columns\" in Meds & Orders section of the refill encounter.             Passed - Hgb OR Hct on record within the past 12 mos.    Patient need only have had a HGB or HCT on file in the past 12 mos. That result does not need " to be normal.    Recent Labs   Lab Test  01/15/18   1135  11/09/16   1123  08/12/16   1624   HGB  13.6  14.5  14.7     Recent Labs   Lab Test  01/15/18   1135  06/14/15   0430  11/01/14   0444   HCT  40.5  41.5  41.7       Please verify a HGB or HCT has been checked SINCE THE LAST DOSE CHANGE.            gabapentin (NEURONTIN) 300 MG capsule [Pharmacy Med Name: GABAPENTIN 300MG CAPS] 270 capsule 5     Sig: TAKE THREE CAPSULES BY MOUTH THREE TIMES A DAY    There is no refill protocol information for this order

## 2018-11-29 NOTE — TELEPHONE ENCOUNTER
"Atorvastatin 80 mg    Last Written Prescription Date:  10/30/18  Last Fill Quantity: 30 tablets,  # refills: 0   Last office visit: 11/05/18 with prescribing provider:  Milo Rock Office Visit:      Metformin 500 mg    Last Written Prescription Date:  01/18/18  Last Fill Quantity: 360 tablets,  # refills: 1   Last office visit: 11/05/18 with prescribing provider:  Milo Rock Office Visit:      Requested Prescriptions   Pending Prescriptions Disp Refills     atorvastatin (LIPITOR) 80 MG tablet [Pharmacy Med Name: ATORVASTATIN CALCIUM 80MG TABS] 30 tablet 0     Sig: TAKE ONE TABLET BY MOUTH EVERY DAY (MUST MAKE APPOINTMENT FOR FURTHER REFILLS)    Statins Protocol Failed    11/28/2018  9:06 AM       Failed - LDL on file in past 12 months    Recent Labs   Lab Test 08/11/17   LDL  80            Passed - No abnormal creatine kinase in past 12 months    Recent Labs   Lab Test  08/12/16   1624   CKT  52               Passed - Recent (12 mo) or future (30 days) visit within the authorizing provider's specialty    Patient had office visit in the last 12 months or has a visit in the next 30 days with authorizing provider or within the authorizing provider's specialty.  See \"Patient Info\" tab in inbasket, or \"Choose Columns\" in Meds & Orders section of the refill encounter.             Passed - Patient is age 18 or older        metFORMIN (GLUCOPHAGE) 500 MG tablet [Pharmacy Med Name: METFORMIN HCL 500MG TABS] 360 tablet 1     Sig: TAKE TWO TABLETS BY MOUTH TWICE A DAY WITH MEALS    Biguanide Agents Failed    11/28/2018  9:06 AM       Failed - Patient has documented LDL within the past 12 mos.    Recent Labs   Lab Test 08/11/17   LDL  80            Failed - Patient has documented A1c within the specified period of time.    If HgbA1C is 8 or greater, it needs to be on file within the past 3 months.  If less than 8, must be on file within the past 6 months.     Recent Labs   Lab Test  01/15/18   1135   A1C  9.0*            " "Passed - Blood pressure less than 140/90 in past 6 months    BP Readings from Last 3 Encounters:   11/05/18 125/86   01/25/18 122/80   01/15/18 117/72                Passed - Patient has had a Microalbumin in the past 15 mos.    Recent Labs   Lab Test  11/03/17   0913   MICROL  13   UMALCR  9.41            Passed - Patient is age 10 or older       Passed - Patient's CR is NOT>1.4 OR Patient's EGFR is NOT<45 within past 12 mos.    Recent Labs   Lab Test  11/03/17   0912   GFRESTIMATED  >90   GFRESTBLACK  >90       Recent Labs   Lab Test  11/03/17   0912   CR  0.78            Passed - Patient does NOT have a diagnosis of CHF.       Passed - Recent (6 mo) or future (30 days) visit within the authorizing provider's specialty    Patient had office visit in the last 6 months or has a visit in the next 30 days with authorizing provider or within the authorizing provider's specialty.  See \"Patient Info\" tab in inbasket, or \"Choose Columns\" in Meds & Orders section of the refill encounter.              "

## 2018-11-30 ENCOUNTER — TELEPHONE (OUTPATIENT)
Dept: FAMILY MEDICINE | Facility: CLINIC | Age: 53
End: 2018-11-30

## 2018-11-30 RX ORDER — TAMSULOSIN HYDROCHLORIDE 0.4 MG/1
0.4 CAPSULE ORAL DAILY
Qty: 90 CAPSULE | Refills: 3 | Status: SHIPPED | OUTPATIENT
Start: 2018-11-30 | End: 2020-01-02

## 2018-11-30 RX ORDER — GABAPENTIN 300 MG/1
CAPSULE ORAL
Qty: 810 CAPSULE | Refills: 3 | Status: SHIPPED | OUTPATIENT
Start: 2018-11-30 | End: 2019-05-07

## 2018-11-30 RX ORDER — ATORVASTATIN CALCIUM 80 MG/1
80 TABLET, FILM COATED ORAL DAILY
Qty: 90 TABLET | Refills: 3 | Status: SHIPPED | OUTPATIENT
Start: 2018-11-30 | End: 2018-12-24 | Stop reason: ALTCHOICE

## 2018-11-30 RX ORDER — METOPROLOL SUCCINATE 25 MG/1
25 TABLET, EXTENDED RELEASE ORAL 2 TIMES DAILY
Qty: 180 TABLET | Refills: 3 | Status: SHIPPED | OUTPATIENT
Start: 2018-11-30 | End: 2019-05-07

## 2018-11-30 RX ORDER — CLOPIDOGREL BISULFATE 75 MG/1
75 TABLET ORAL DAILY
Qty: 90 TABLET | Refills: 3 | Status: SHIPPED | OUTPATIENT
Start: 2018-11-30 | End: 2019-05-07

## 2018-11-30 RX ORDER — FERROUS SULFATE 325(65) MG
TABLET ORAL
Qty: 90 TABLET | Refills: 0 | Status: SHIPPED | OUTPATIENT
Start: 2018-11-30 | End: 2019-03-05

## 2018-11-30 RX ORDER — NITROGLYCERIN 0.4 MG/1
TABLET SUBLINGUAL
Qty: 25 TABLET | Refills: 1 | Status: SHIPPED | OUTPATIENT
Start: 2018-11-30 | End: 2019-05-07

## 2018-11-30 RX ORDER — LANOLIN ALCOHOL/MO/W.PET/CERES
1000 CREAM (GRAM) TOPICAL DAILY
Qty: 100 TABLET | Refills: 3 | Status: SHIPPED | OUTPATIENT
Start: 2018-11-30 | End: 2020-01-02

## 2018-11-30 NOTE — TELEPHONE ENCOUNTER
Routing refill request to provider for review/approval because:  Drug not on the [facility] refill protocol - gabapentin     APAP, ASA, and Ferrous Sulfate - Prescriptions approved per [facility] Refill Protocol.    Beth SKAGGS RN

## 2018-11-30 NOTE — TELEPHONE ENCOUNTER
Routing refill request to provider for review/approval because:  Labs not current:  Fasting lipid panel (should patient come in for fasting lab-only visit? Would need orders in chart)     Beth SKAGGS RN

## 2018-12-01 PROBLEM — H93.12 TINNITUS, LEFT: Status: ACTIVE | Noted: 2018-12-01

## 2018-12-01 NOTE — TELEPHONE ENCOUNTER
"Completed \"Level of Need Assessment Form\" regarding transportation and placed in 5th floor TC bin for faxing please.  "

## 2018-12-10 DIAGNOSIS — J31.0 CHRONIC RHINITIS: ICD-10-CM

## 2018-12-10 NOTE — TELEPHONE ENCOUNTER
Hello,    Patient stated that is taking flonase 2 sprays in each nostril every day. The RX we have says 1 spray in each nostril every day. Can we get a new RX that reflects this change?    Thank you

## 2018-12-10 NOTE — TELEPHONE ENCOUNTER
Flonase 50 mcg/act  Pt using 2 sprays in each nostril daily instead of original sig of 1 spray in each nostril. Needing new Rx to reflect change    Last Written Prescription Date:  10/02/18  Last Fill Quantity: 16 g,  # refills: 11   Last office visit: 11/5/2018 with prescribing provider:  Milo   Future Office Visit:

## 2018-12-13 ENCOUNTER — TRANSFERRED RECORDS (OUTPATIENT)
Dept: HEALTH INFORMATION MANAGEMENT | Facility: CLINIC | Age: 53
End: 2018-12-13

## 2018-12-13 RX ORDER — FLUTICASONE PROPIONATE 50 MCG
2 SPRAY, SUSPENSION (ML) NASAL DAILY
Qty: 16 G | Refills: 11 | Status: SHIPPED | OUTPATIENT
Start: 2018-12-13 | End: 2020-01-02

## 2018-12-13 NOTE — TELEPHONE ENCOUNTER
EO  Did you want to sign new Rx to reflect patient using 2 sprays ea nostril for flonase, pended?    Please approve if appropriate  Jacqueline Gu RN

## 2018-12-24 ENCOUNTER — OFFICE VISIT (OUTPATIENT)
Dept: INTERNAL MEDICINE | Facility: CLINIC | Age: 53
End: 2018-12-24
Payer: MEDICARE

## 2018-12-24 VITALS
HEART RATE: 78 BPM | SYSTOLIC BLOOD PRESSURE: 126 MMHG | TEMPERATURE: 98.2 F | WEIGHT: 300 LBS | BODY MASS INDEX: 37.5 KG/M2 | DIASTOLIC BLOOD PRESSURE: 80 MMHG | OXYGEN SATURATION: 95 %

## 2018-12-24 DIAGNOSIS — I10 BENIGN ESSENTIAL HYPERTENSION: ICD-10-CM

## 2018-12-24 DIAGNOSIS — E11.59 TYPE 2 DIABETES MELLITUS WITH OTHER CIRCULATORY COMPLICATION, WITHOUT LONG-TERM CURRENT USE OF INSULIN (H): ICD-10-CM

## 2018-12-24 DIAGNOSIS — G25.81 RESTLESS LEGS SYNDROME (RLS): Chronic | ICD-10-CM

## 2018-12-24 DIAGNOSIS — I25.810 CORONARY ARTERY DISEASE INVOLVING CORONARY BYPASS GRAFT OF NATIVE HEART, ANGINA PRESENCE UNSPECIFIED: Primary | ICD-10-CM

## 2018-12-24 DIAGNOSIS — E66.01 SEVERE OBESITY (BMI 35.0-35.9 WITH COMORBIDITY) (H): ICD-10-CM

## 2018-12-24 DIAGNOSIS — L20.84 INTRINSIC ECZEMA: ICD-10-CM

## 2018-12-24 DIAGNOSIS — F41.9 ANXIETY: Chronic | ICD-10-CM

## 2018-12-24 PROCEDURE — 99214 OFFICE O/P EST MOD 30 MIN: CPT | Performed by: INTERNAL MEDICINE

## 2018-12-24 RX ORDER — TRIAMCINOLONE ACETONIDE 1 MG/G
CREAM TOPICAL
Qty: 80 G | Refills: 1 | Status: SHIPPED | OUTPATIENT
Start: 2018-12-24 | End: 2020-01-15

## 2018-12-24 RX ORDER — ROSUVASTATIN CALCIUM 40 MG/1
40 TABLET, COATED ORAL DAILY
COMMUNITY
Start: 2018-12-24

## 2018-12-24 RX ORDER — ROSUVASTATIN CALCIUM 40 MG/1
40 TABLET, COATED ORAL DAILY
COMMUNITY
End: 2019-05-07

## 2018-12-24 RX ORDER — PRAMIPEXOLE DIHYDROCHLORIDE 0.75 MG/1
0.75 TABLET ORAL AT BEDTIME
COMMUNITY
Start: 2018-12-24 | End: 2020-01-15

## 2018-12-24 RX ORDER — CLONAZEPAM 0.5 MG/1
0.5 TABLET ORAL
Qty: 30 TABLET | Refills: 2 | Status: SHIPPED | OUTPATIENT
Start: 2018-12-24 | End: 2019-05-07

## 2018-12-24 NOTE — PROGRESS NOTES
SUBJECTIVE:   Javier Markham is a 53 year old male who presents to clinic today for the following health issues:      New Patient/Transfer of Care  Would like to talk about getting prescriptions, and talk about medications for allergies.       1.  Premature coronary artery disease   First MI 2014  CABG x 3 2014  multiple stents/PCTA since that time    Most recent hospitalization October 2018:    results reviewed:  Coronary angiogram (10/19/2018):  * Circumflex      100% (denovo) stenosis in the Proximal Circumflex.  The lesion has a AMELIA flow of 0.      80% stenosis in the 1st Marginal.  The lesion has a AMELIA flow of 3.  * Circumflex      Successful intervention to the Proximal Circumflex 100% lesion with a final stenosis of 0% using a  1.5mm x 20mm Balloon,  2mm x 20mm Balloon,  2.5mm x 24mm Drug Eluting Stent, and a  2.5mm x 15mm Balloon.  The final AMELIA flow was 3.      Intervention to the 1st Marginal 80% lesion with a final stenosis of 0% using a  2.25mm x 16mm Drug Eluting Stent and a  2.25mm x 16mm Balloon.  The final AMELIA flow was 3.    Coronary angiogram (10/17/18):  * Left Main Coronary Artery      30% stenosis in the LMCA.  * Left Anterior Descending      stenosis in the Proximal LAD.      50% ifr=0.95 stenosis in the Mid LAD.  * Circumflex      100% stenosis in the Proximal Circumflex.  * Ramus      The Ramus free of significant disease.  * Right Coronary Artery      50% stenosis in the Right PDA.  * Coronary Artery Bypass Grafts      The LIMA graft to the Mid LAD is patent.      2.  Diabetes since 2014    Lab Results   Component Value Date    A1C 7.2 10/16/2018    A1C 9.0 01/15/2018    A1C 9.6 11/03/2017    A1C 7.6 05/30/2017    A1C 7.6 01/12/2017    A1C 7.0 08/12/2016    A1C 8.2 02/19/2016    A1C 7.3 01/03/2016    A1C 6.5 06/15/2015    A1C 6.3 12/10/2014        3.  The patient has had a history of ongoing obesity.  Reviewed the weigth curves.   Their current BMI is:  Body mass index is 37.5  kg/m .  Reviewed previous attempts at weight loss which have not been successful in producing prolonged weigth loss.   Discussed current eating and exercise habits.     Reviewed weights in chart:  Wt Readings from Last 10 Encounters:   12/24/18 136.1 kg (300 lb)   11/05/18 125.6 kg (276 lb 12.8 oz)   01/25/18 (!) 139 kg (306 lb 6.4 oz)   01/15/18 135.7 kg (299 lb 3.2 oz)   10/04/17 (!) 139.8 kg (308 lb 3.2 oz)   09/14/17 (!) 137 kg (302 lb)   08/23/17 135.6 kg (299 lb)   08/09/17 (!) 138.8 kg (306 lb)   05/30/17 134.7 kg (297 lb)   02/28/17 132.9 kg (293 lb)     4,  chronci anxiety issues. Has been on clonazepam for years through his former primary MD.   Can only see one trial of citalopram in prior avaioable records.   He does not rememerb what happended with this medication and does not remember any other med trials.     Problem list and histories reviewed & adjusted, as indicated.  Additional history: as documented        Reviewed and updated as needed this visit by clinical staff  Allergies  Meds       Reviewed and updated as needed this visit by Provider           Past Medical History:  ---------------------------  Past Medical History:   Diagnosis Date     Anxiety      CAD (coronary artery disease) Dec 2014    s/p CABGx3 Dec 2014 and later RCA stent July 2015     DM2 (diabetes mellitus, type 2) (H) Dx June 2015    A1c 6.5% at time of dx     GERD (gastroesophageal reflux disease)      Heart attack (H) 12/2014, 6/2015, 7/4/2015    x3     History of cocaine abuse     Smoked crack cocaine (remission since Nov 2014)     History of tobacco abuse      Hyperlipidemia      Incomplete RBBB      Inguinal hernia     Left     Numbness and tingling     diabetic neuropathy     Prediabetes Dec 2014     Restless leg syndrome      S/P CABG x 3 Dec 2014     Sleep apnea     mild, does not use CPAP     Stented coronary artery        Past Surgical History:  ---------------------------  Past Surgical History:   Procedure  Laterality Date     C CABG, VEIN, THREE  12/23/2014    [facility]     C NONSPECIFIC PROCEDURE  age 15    both feet bone spur removal in the arch of the foot     HC DRUG-ELUTING STENTS, SINGLE  10/19/2018    Left circumflex at [facility]     HERNIORRHAPHY INGUINAL Left 9/20/2016    Procedure: HERNIORRHAPHY INGUINAL;  Surgeon: Haim Green MD;  Location:  [facility]     STENT  07/2015    RCA stent       Current Medications:  ---------------------------  Current Outpatient Medications   Medication Sig Dispense Refill     albuterol (PROAIR HFA, PROVENTIL HFA, VENTOLIN HFA) 108 (90 BASE) MCG/ACT inhaler Inhale 2 puffs into the lungs every 4 hours as needed for shortness of breath / dyspnea or wheezing 1 Inhaler 1     Alcohol Swabs (B-D SINGLE USE SWABS REGULAR) PADS USE AS NEEDED 100 each 3     aspirin (ASA) 81 MG EC tablet Take 1 tablet (81 mg) by mouth daily 90 tablet 3     atorvastatin (LIPITOR) 80 MG tablet Take 1 tablet (80 mg) by mouth daily 90 tablet 3     blood glucose monitoring (NO BRAND SPECIFIED) meter device kit Use to test blood sugar 1 times daily or as directed. ONE TOUCH VERIO. 1 kit 0     Cholecalciferol (VITAMIN D) 2000 units tablet TAKE ONE TABLET BY MOUTH EVERY  tablet 0     clonazePAM (KLONOPIN) 0.5 MG tablet Take 1 tablet (0.5 mg) by mouth nightly as needed (Restless legs) 30 tablet 5     clopidogrel (PLAVIX) 75 MG tablet Take 1 tablet (75 mg) by mouth daily 90 tablet 3     DEEP SEA NASAL SPRAY 0.65 % nasal spray SPRAY 1 SPRAY IN EACH NOSTRIL FOUR TIMES A DAY AS NEEDED FOR CONGESTION 44 mL 3     empagliflozin (JARDIANCE) 10 MG TABS tablet Take 10 mg by mouth daily       Ezetimibe (ZETIA PO) Take 10 mg by mouth At Bedtime       fenofibrate 54 MG tablet Take 1 tablet (54 mg) by mouth daily 1 tablet 0     ferrous sulfate (FEROSUL) 325 (65 Fe) MG tablet TAKE ONE TABLET BY MOUTH EVERY MORNING WITH BREAKFAST 90 tablet 0     fluticasone (FLONASE) 50 MCG/ACT nasal spray Spray 2 sprays into both  nostrils daily 16 g 11     gabapentin (NEURONTIN) 300 MG capsule TAKE THREE CAPSULES BY MOUTH THREE TIMES A  capsule 3     hydrOXYzine (ATARAX) 25 MG tablet Take 1-2 tablets (25-50 mg) by mouth every 6 hours as needed for anxiety 120 tablet 5     ibuprofen (ADVIL,MOTRIN) 800 MG tablet Take 1 tablet (800 mg) by mouth every 8 hours as needed for moderate pain 60 tablet 1     loratadine (CLARITIN) 10 MG tablet TAKE ONE TABLET BY MOUTH EVERY DAY 90 tablet 3     MAPAP 500 MG tablet TAKE TWO TABLETS BY MOUTH EVERY 8 HOURS AS NEEDED FOR MILD PAIN 100 tablet 5     metFORMIN (GLUCOPHAGE) 500 MG tablet TAKE TWO TABLETS BY MOUTH TWICE A DAY WITH MEALS 360 tablet 3     metoprolol succinate ER (TOPROL-XL) 25 MG 24 hr tablet Take 1 tablet (25 mg) by mouth 2 times daily 180 tablet 3     nitroGLYcerin (NITROSTAT) 0.4 MG sublingual tablet PLACE 1 TABLET UNDER THE TONGUE AT THE ONSET OF CHEST PAIN. MAY REPEAT EVERY 5 MINUTES AS NEEDED FOR A TOTAL OF 3 DOSES. 25 tablet 1     omega-3 acid ethyl esters (LOVAZA) 1 g capsule Take 2 capsules (2 g) by mouth 2 times daily 360 capsule 3     omeprazole (PRILOSEC) 20 MG DR capsule TAKE ONE CAPSULE BY MOUTH TWICE A DAY TAKE 30 TO 60 MINUTES BEFORE A MEAL 180 capsule 3     ONE TOUCH VERIO IQ test strip TEST ONCE DAILY OR AS DIRECTED 100 each 5     ONETOUCH DELICA LANCETS 33G MISC TEST ONCE DAILY OR AS DIRECTED 100 each 5     polyethylene glycol (MIRALAX/GLYCOLAX) powder Take 17 g by mouth daily as needed for constipation 500 g 5     Pramipexole Dihydrochloride (MIRAPEX PO)        rosuvastatin (CRESTOR) 40 MG tablet Take 40 mg by mouth daily       senna (SENOKOT) 8.6 MG tablet Take 2 tablets by mouth daily 100 tablet 5     sitagliptin (JANUVIA) 100 MG tablet Take 0.5 tablets (50 mg) by mouth daily 1 tablet 0     Skin Protectants, Misc. (EUCERIN) cream Apply topically as needed for dry skin 240 g 3     tamsulosin (FLOMAX) 0.4 MG capsule Take 1 capsule (0.4 mg) by mouth daily 90 capsule 3      triamcinolone (KENALOG) 0.1 % cream Apply topically daily as needed for irritation (under Arms)       vitamin B-12 (CYANOCOBALAMIN) 1000 MCG tablet Take 1 tablet (1,000 mcg) by mouth daily 100 tablet 3       Allergies:  -------------  Allergies   Allergen Reactions     Flumist [Influenza Vaccine Live]      Vomiting, felt like he had the flu.     Perfume      Soap      Perfume in soap-reaction excema       Social History:  -------------------  Social History     Socioeconomic History     Marital status: Single     Spouse name: Not on file     Number of children: 2     Years of education: Not on file     Highest education level: Not on file   Social Needs     Financial resource strain: Not on file     Food insecurity - worry: Not on file     Food insecurity - inability: Not on file     Transportation needs - medical: Not on file     Transportation needs - non-medical: Not on file   Occupational History     Occupation: nurse assistant     Employer: [employer]   Tobacco Use     Smoking status: Former Smoker     Packs/day: 0.50     Years: 25.00     Pack years: 12.50     Types: Cigarettes     Last attempt to quit: 11/1/2015     Years since quitting: 3.1     Smokeless tobacco: Never Used   Substance and Sexual Activity     Alcohol use: No     Alcohol/week: 0.0 oz     Drug use: No     Comment: Past coccaine 2 weeks ago as of 11/11/14; no h/o IVDA. Smoked cocaine (did not snort)     Sexual activity: Yes     Partners: Female   Other Topics Concern     Parent/sibling w/ CABG, MI or angioplasty before 65F 55M? Not Asked   Social History Narrative     Not on file       Family Medical History:  ------------------------------  Family History   Problem Relation Age of Onset     Diabetes Mother         type 2     Breast Cancer Mother      Macular Degeneration Mother      Dementia Mother      Coronary Artery Disease Father 52     Heart Disease Paternal Uncle      Diabetes Maternal Grandfather      Breast Cancer Maternal  Grandmother      Prostate Cancer No family hx of          ROS:  REVIEW OF SYSTEMS:    RESP: negative for cough, dyspnea, wheezing, hemoptysis  CV: negative for chest pain, palpitations, PND, JACINTO, orthopnea; reports no changes in their ability to perform physical activity (from cardiovascular standpoint)  GI: negative for dysphagia, N/V, pain, melena, diarrhea and constipation  NEURO: negative for numbness/tingling, paralysis, incoordination, or focal weakness     OBJECTIVE:                                                    /80   Pulse 78   Temp 98.2  F (36.8  C) (Oral)   Wt 136.1 kg (300 lb)   SpO2 95%   BMI 37.50 kg/m       GENERAL alert and no distress  EYES:  Normal sclera,conjunctiva, EOMI  HENT: oral and posterior pharynx without lesions or erythema, facies symmetric  NECK: Neck supple. No LAD, without thyroidmegaly or JVD., Carotids without bruits.  RESP: Clear to ausculation bilaterally without wheezes or crackles. Normal BS in all fields.  CV: RRR normal S1S2 without murmurs, rubs or gallops. PMI normal  LYMPH: no cervical lymph adenopathy appreciated  MS: extremities- no gross deformities of the visible extremities noted, no edema  PSYCH: Alert and oriented times 3; speech- coherent  SKIN:  No obvious significant skin lesions on visible portions of face          ASSESSMENT/PLAN:                                                      (I25.810) Coronary artery disease involving coronary bypass graft of native heart, angina presence unspecified  (primary encounter diagnosis)  Comment: The patient does not report any signs or symptoms of angina or active cardiac ischemia.   They do not report any relative changes in their ability to perform physical activities over the past year.    We discussed aggressive secondary risk factor modification, including aggressive BP control (under 130/ideally), aggressive LDL lowering with statin (goal under 100 for sure/under 70 ideally), no smoking, diabetes  prevention/management, no smoking, and use of either ASA or similar anti platelet agent if tolerated.     Plan: empagliflozin (JARDIANCE) 10 MG TABS tablet,         rosuvastatin (CRESTOR) 40 MG tablet, Lipid         panel reflex to direct LDL Fasting, Hemoglobin         A1c, CBC with platelets, Comprehensive         metabolic panel, Albumin Random Urine         Quantitative with Creat Ratio            (I10) Benign essential hypertension; goal < 140/90  Comment: This condition is currently controlled on the current medical regimen.  Continue current therapy.   Discussed current hypertension treatment guidelines, including indications for treatment and treatment options.  Discussed the importance for aggressive management of HTN to prevent vascular complications later.  Recommended lower fat, lower carbohydrate, and lower sodium (<2000 mg)diet.  Discussed required intervals for follow up on HTN, lab studies.  Recommened pt. follow their blood pressures outside the clinic to ensure that BPs are remaining within guidelines, and to contact me if the readings are not within guidelines on a regular basis so we can adjust treatment as needed.   Plan: CBC with platelets, Comprehensive metabolic         panel            (F41.9) Anxiety  Comment: originally prescribed by a former primary MD, I could only see one brief trial of citalopram many years ago.  No other meds for anxiety tried that I can see or he can remember.    Spoke at length about mood disorders including suspected pathophysiology, role of neurotransmitters, and treatment options including medication options (SSRIs that treat cause of sx and Benzos which treat the sx.) and counselling.   Discusssed my philosophy with regard to the use of intermittent use of short acting benzodiazepenes.  They are used to treat the acute symptoms of anxiety/stress, but do little to treat the underlying cause.   I would interpret any increase in useage as an indicaiton that there  needs to be another choice in therapy aimed at treating the cause, e.g. a SSRI.  We reviewed the differences in half life of the different benzos and how that related to making those choices for their use in pts.  I will only rx short acting benzos for rescue situations.  If the need to use them arises more frequently, then that is a signal that something else needs to be done to treat the undelrying cause of the disorder.     Will continue this medication for now since I am meeting him for the first time, but I made it very clear that it is going to be my full intention to wean him off of this medicaiton and onto something else to manage his anxiety, especially with a prior history of substance abuse. ,   Plan: clonazePAM (KLONOPIN) 0.5 MG tablet            (G25.81) Restless legs syndrome (RLS)  Comment: try higher dose of mirapex.   Plan: pramipexole (MIRAPEX) 0.75 MG tablet,         clonazePAM (KLONOPIN) 0.5 MG tablet            (E11.59) Type 2 diabetes mellitus with other circulatory complication, without long-term current use of insulin (H)  Comment: This condition is currently controlled on the current medical regimen.  Continue current therapy.   \Discussed importance in compliance in all areas of diabetic control including diet, routine BS checks, absolute medication compliance, laboratory monitoring, and attending regular follow up appointments as ordered.  Failure to comply with instructions regarding diabetes will lead to a greater chance of poor diabetic control and therefore a greater chance of diabetes related complications such as CAD, CVA, PVD, and retinopathy/neuropathy/nephropathy.  Based on level of diabetes control: testing frequency ONE TIME PER DAY   Plan: empagliflozin (JARDIANCE) 10 MG TABS tablet,         rosuvastatin (CRESTOR) 40 MG tablet, DIABETES         EDUCATOR REFERRAL, Lipid panel reflex to direct        LDL Fasting, Hemoglobin A1c, CBC with         platelets, Comprehensive metabolic  panel,         Albumin Random Urine Quantitative with Creat         Ratio            (E66.01,  Z68.35) Severe obesity (BMI 35.0-35.9 with comorbidity) (H)  Comment: The patient's current BMI is: Body mass index is 37.5 kg/m .  Obesity is a biological preventable and treatable disease, which is associated with significantly increased risk of many acute and chronic health conditions. Obesity has now been recognized as a chronic disease by the American Medical Association.  A range of serious co-morbidities are associated with obesity including increased risk for hypertension, stroke, coronary artery disease, dyslipidemia, Type II diabetes, depression, sleep apnea, cancers of the colon, breast and endometrium, obstructive sleep apnea, osteoarthritis and female infertility. Therefore, obesity is not just a problem; it s a disease that warrants serious evidence based treatements.  Recommended regular aerobic exercise, recommended improved diet aiming at lowering amount of processed foods, lower sugars, and lower wheat products, and moderation carbs and fat in the diet, establishing more regular meal times, always eating breakfast, front loading some of the calories and adding more protein to the diet.    Discussed the increased risks of developing or worsening all types of diabetes and other dysmetabolic syndromes.    Surgical therapy may be considered, especially in patients with BMI greater than or equal to 35 kg/m2 with multiple complications. Surgical treatments include lap-band, gastric sleeve or gastric bypass surgery.     Plan:     (L20.84) Intrinsic eczema  Comment: refill for occ use  Plan: triamcinolone (KENALOG) 0.1 % external cream               See Patient Instructions    IRLANDA MALDONADO M.D., MD  [facility]    (Chart documentation may have been completed, in part, with [employer] voice-recognition software. Even though reviewed, some grammatical, spelling, and word errors may remain.)

## 2018-12-24 NOTE — LETTER
[facility]  [address] [address]  [phone]        May 21, 2019    Javier Markham  [address]  [facility] [place] [address]              Dear Javier Markham    This is to remind you that your fasting labs is due.    You may call our office at [phone] to schedule an appointment.    Please disregard this notice if you have already had your labs drawn or made an appointment.        Sincerely,        Ron Whitehead MD

## 2018-12-24 NOTE — PATIENT INSTRUCTIONS
"*  Continue all medications at the same doses.  Contact your usual pharmacy if you need refills.     *  Diabetes educator referral to learn more about diabetes.      --[facility] Diabetes Education - All [facility] [phone]   [email or web address]    *  Return to see or one of our partners in 3 months, sooner if needed.  Please get fasting labs done at the [facility] or any other [facility] Lab lab 1-2 days before this appointment.  If you get the labs done at another [facility], make arrangements with them directly.  The orders will be in place.  Eat nothing for at least 8 hours prior to having these labs drawn.  Call [phone] to schedule appointments at [facility].       5 GOALS IN MANAGING DIABETES (i.e. to give the best chance to prevent diabetic complications and vascular disease):     1.  Aggressive diabetic management.  The target for A1C (3 months average blood sugar) is ideally below 6.5, preferably below 7.5    Your diabetes is under good control.      Lab Results   Component Value Date    A1C 7.2 10/16/2018    A1C 9.0 01/15/2018    A1C 9.6 11/03/2017    A1C 7.6 05/30/2017    A1C 7.6 01/12/2017    A1C 7.0 08/12/2016    A1C 8.2 02/19/2016    A1C 7.3 01/03/2016    A1C 6.5 06/15/2015    A1C 6.3 12/10/2014         2.  Aggressive blood pressure control, under 130/80 ideally.  Using medications if needed.    Your blood pressure is under good control    BP Readings from Last 4 Encounters:   12/24/18 126/80   11/05/18 125/86   01/25/18 122/80   01/15/18 117/72       3.  Aggressive LDL cholesterol (bad cholesterol) lowering as needed.  Your goal is an LDL under 100 for sure, preferably under 70.     *  All patients with diabetes are recommended to be on a \"statin\" cholesterol lowering medication regardless of the cholesterol levels to give the best chance at avoiding vascular disease.      New guidelines identify four " "high-risk groups who could benefit from statins:   *people with pre-existing heart disease, such as those who have had a heart attack;   *people ages 40 to 75 who have diabetes of any type  *patients ages 40 to 75 with at least a 7.5% risk of developing cardiovascular disease over the next decade, according to a formula described in the guidelines  *patients with the sort of super-high cholesterol that sometimes runs in families, as evidenced by an LDL of 190 milligrams per deciliter or higher    *  Your cholesterol levels are well controlled.    Recent Labs   Lab Test 10/16/18 08/11/17  12/10/14  1725   CHOL 189 193   < > 308*   HDL 38* 41   < > 50   LDL 76 80   < > 200*   TRIG 373* 360*   < > 288*   CHOLHDLRATIO  --   --   --  6.2*    < > = values in this interval not displayed.       4.  No smoking    5.  Aspirin 81 mg tablet (or plavix tablet) once per day, every day unless there is a specific reason that you cannot take aspirin.           --Good Grains:  Oats, brown rice, Quinoa (these do not raise the blood sugar as much)     --Bad grains:  Anything made from wheat or white rice     (because these raise the blood sugars significantly, and the possible gluten issue from wheat for some people).      --Proteins:  Aim for \"lean proteins\" including chicken, fish, seafood, pork, turkey, and eggs (in moderation); Eat red meat only occasionally          Sky Tapia:                    *  OBESITY/WEIGHT LOSS:  ====================================================    *  Obesity is a major problem in this country.  Over 2/3 of adult Americans are overweight (BMI Over 25), and 1/3 are obese (BMI over 30)    *  Obesity is the leading cause of diabetes type II, which currently affects 1 out of 10 adult Americans and is projected to potentially affect 1 out of 3 adult Americans by 2040    *  Chronic obesity leads to \"insulin resistance\" which affects the carbohydrate (sugar) metabolism causing \"diabetisy\" which leads to " "type II Diabetes, increased visceral fat, a chronic low grade inflammatory state that leads to higher rates of vascular disease among other things.     *  Obesity is defined as BMI (body mass index) greater than 30.    *  Morbid obesity is defined as BMI over 40    Your most recent Body mass index is:  Body mass index is 37.5 kg/m .    The main principles in weight loss are diet and exercise. You need to have both.      Dietary principles are aimed at keeping the fat content in your diet to less than 30% of total calories AND minimizing the simple carbohydrates (breads, sugars, pasta, etc.).  Complex carbohydrates such as wild rice, brown rice, legumes and most vegetables are OK.    *  Think \"Eat like a caveman\", eat \"primitive\" foods.    *  Keep your food shopping to the outside of the grocery store, do not eat foods that come from a bag or box, do not eat foods with bar codes.    *  Use the fork rule for all eating. [If you can't pick food up with a fork, then the food will not turn off hunger very well. Using this rule helps patients avoid choosing the wrong types of food, especially if you have had a bariatric surgery operation.   The \"wrong foods\" include liquid calories such as soup, juice, soda, slimfast, ice cream, and beer, crumbly foods such as chips, crackers, and cookies, and starch based foods such as pasta, too much rice, and too much bread.]     * Keep your calorie intake at least less than 1500 per day to start (under 1300 total if you want to be more aggressive), divided between 3 meals (try to have no meal more than 500 calories) and 2 snacks.      *  \"Learn what 500 calories looks like\" and try to keep all meals under 500 calories.      *  Drink one large glass of water BEFORE each meal.  This not only helps guard against dehydration, but it also helps provide additional \"fullness\" that will help reduce the amount of food that you will consume in a given meal by helping you fill up earlier.      * " " Figure out what kind of calories you are taking in now at this time.  It is hard to change behaviors that you do not understand.      *  Eat breakfast every day.  Skipping meals has been shown to be one of the factors that correlates the highest with obesity, (even more than fast food).  Try to avoid the typical carbohydrates and sugars that dominate the typical American breakfast.  Try to get more protein in earlier in the day, this will make you less hungry later.       *  Try High Protein Ensure or Boost nutritional supplements (or similar versions) for breakfast if nothing else.      *  Avoid \"manufactured foods\" as much as possible.  My definition of a \"manufactured\" or \"processed\" food is any single food product that has more than 6 ingredients.     *  Keep your grocery shopping to the \"outside\" of the grocery store, this is where all the \"real food\" is located.      *  Try to limit all simple carbohydrate foods such as white breads (whole grain natural breads are OK), white rice (brown rice and wild rice are OK), pasta, noodles, \"snack foods\", candy, jam/jellies, cakes, pies, sweets, regular soda (sugar free is OK).    *  Limit the amount of citrus fruits such as oranges, greta, grapefruit.  These contain a large amount of sugars and will raise your blood sugars very high.  Try to keep less than 3 pieces of fruit per day.  Grapefruits specifically can interfere with the metabolism of \"statin\" cholesterol lowering drugs and therefore should be avoided completely if you are on a statin medication.      *  Always eat three meals a day every day:  breakfast, lunch, and supper.    *  I do not recommend over the counter diet aids such as Metabolife or Dexatrim since they have a high risk of side effects mainly fast heart rate, insomnia, and nervousness.    *  Very hard to lose weight with diet changes alone.  You need to have regular exercise.  You should aim for either 3 hours per week total of cumulative " "physical aerobic activity or 10,000 steps per day of activity.  Buy a pedometer and keep track of your activity.  If your typical daily activity does not add up to 10,000 total steps, then make up the difference with walking or some sort of aerobic activity.          Good resources for nutrition are:    ---> \"Wheat Belly\" by Vickey Singh  (a book about the many bad things processed wheat products (i.e. Bread) have caused in our country...which I believe has serious merit)       --->  \"The Paleo Diet\"  By Mala Wellington.             --->\"In Defense of Food\"  Of \"Food Rules\" (Kip Tapia) a book that goes into the causes obesity epidemic in our country    Sky Tapia:                    ---> \"Picture Perfect Weight Loss\" by Donald Torres (another good book about choices)        ---> \"Eat This, Not That\" Series,  by Marvin Potter  (a book about food choices in the modern world)              ---> \"The Blood Sugar Solution\" by Rajeev Segal ( a book about obesity and insulin resistance leading to \"diabesity\")           Calorie Michi ( a guidebook for counting calories, and knowing the components of the food that you eat)       \"The Best Life Diet\"  (a complete diet program)             Aim for a Healthy Weight Web Page at www.nhlbi.nih.gov       [place] Diet       Taiwo Oneill:  \"Eat More, Weigh Less\" or \"The Program for the Reversal of Heart Disease\"       [facility] web site  the food and nutrition link.       American Heart Association       American Diabetes Association (www.diabetes.org)            "

## 2019-01-07 PROBLEM — E66.01 SEVERE OBESITY (BMI 35.0-35.9 WITH COMORBIDITY) (H): Status: ACTIVE | Noted: 2018-12-24

## 2019-01-31 ENCOUNTER — TELEPHONE (OUTPATIENT)
Dept: FAMILY MEDICINE | Facility: CLINIC | Age: 54
End: 2019-01-31

## 2019-01-31 NOTE — TELEPHONE ENCOUNTER
Prior Authorization Retail Medication Request    Medication/Dose: Omega-3-acid 1 gm  ICD code (if different than what is on RX):  E78.5  Previously Tried and Failed:  Atorvastatin  Rationale:  Patient takes multiple medications for hyperlipidemia. Pt currently on Zetia, Fenofibrate, Omega-3 acid, and Rosuvastatin.    Insurance Name:  [employer]  Insurance ID:  [id number]      Pharmacy Information (if different than what is on RX)  Name:  [facility]  Phone:  [phone]

## 2019-02-06 NOTE — TELEPHONE ENCOUNTER
Per ERX medication was sold on 1/29/19 and went through insurance.  Will call pharmacy when they open.

## 2019-02-06 NOTE — TELEPHONE ENCOUNTER
Central Prior Authorization Team   Phone: [phone]      PA NOT NEEDED  Medication: Omega-3-acid 1 gm-PA NOT NEEDED  Insurance Company:    Pharmacy Filling the Rx:    Filling Pharmacy Phone:    Filling Pharmacy Fax:    Start Date: 2/6/2019        Called and spoke to pharmacy, PA is not needed for medication.

## 2019-02-12 ENCOUNTER — TELEPHONE (OUTPATIENT)
Dept: LAB | Facility: CLINIC | Age: 54
End: 2019-02-12

## 2019-02-12 NOTE — TELEPHONE ENCOUNTER
Prior Authorization Retail Medication Request    Medication/Dose: Noahuvia  ICD code (if different than what is on RX):  E11.9  Previously Tried and Failed:  Empagliflozin  Rationale:  Patient stable on current medication    Insurance Name:  [facility]  Insurance ID:  [id number]      Pharmacy Information (if different than what is on RX)  [facility]:  [id number]  Phone:  [phone]

## 2019-02-18 NOTE — TELEPHONE ENCOUNTER
PA Initiation    Medication: Noahuvia  Insurance Company: [place] Medicaid ([facility] - Phone [phone] Fax [phone]  [facility] [address] [place], [place] [address] [address] [address] AT [facility] OF  & [address]  Filling Pharmacy Phone: [phone]  Filling Pharmacy Fax:    Start Date: 2/18/2019    Central Prior Authorization Team   Phone: [phone]

## 2019-02-21 NOTE — TELEPHONE ENCOUNTER
Prior Authorization Not Needed per Insurance    Medication: Januvia  Insurance Company: [place] Medicaid ([facility] - Phone [phone] Fax [phone]  Expected CoPay:      Pharmacy [facility]illing the Rx: [facility] [address] [place], [place] [address] [address] [address] AT [facility] OF  & [address]    Per response back from Medicaid patient is eligible for Medicare Part D coverage and this has to be billed under Medicare Part D. Called [facility] at [phone] to make sure they have his part D plan information and if it is now going through his [employer] and per tech no PA is needed.

## 2019-03-05 ENCOUNTER — TELEPHONE (OUTPATIENT)
Dept: FAMILY MEDICINE | Facility: CLINIC | Age: 54
End: 2019-03-05

## 2019-03-05 DIAGNOSIS — I25.810 CORONARY ARTERY DISEASE INVOLVING CORONARY BYPASS GRAFT OF NATIVE HEART, ANGINA PRESENCE UNSPECIFIED: Chronic | ICD-10-CM

## 2019-03-05 DIAGNOSIS — E78.5 HYPERLIPIDEMIA LDL GOAL <100: Primary | ICD-10-CM

## 2019-03-05 DIAGNOSIS — E66.01 SEVERE OBESITY (BMI 35.0-35.9 WITH COMORBIDITY) (H): ICD-10-CM

## 2019-03-05 DIAGNOSIS — E55.9 VITAMIN D DEFICIENCY: ICD-10-CM

## 2019-03-05 DIAGNOSIS — E61.1 IRON DEFICIENCY: ICD-10-CM

## 2019-03-05 NOTE — TELEPHONE ENCOUNTER
Please do not close this encounter until this has been addressed.  (prior auth approved/denied, prescriber refusal to complete prior auth or medication changed/discontinued)    Prior Authorization needed on: Generic Lovaza 1GM  Drug NDC: [address]-64     Insurance: [employer] Part D  Member ID: [id number]   Insurance phone #: [phone]    Pharmacy NPI: [id number]  Pharmacy Phone #: [phone]  Pharmacy Fax #: [phone]    Please let us know if the PA gets approved or denied or if medication is changed

## 2019-03-06 RX ORDER — CHOLECALCIFEROL (VITAMIN D3) 50 MCG
TABLET ORAL
Qty: 100 TABLET | Refills: 2 | Status: SHIPPED | OUTPATIENT
Start: 2019-03-06 | End: 2019-05-07

## 2019-03-06 RX ORDER — FERROUS SULFATE 325(65) MG
TABLET ORAL
Qty: 90 TABLET | Refills: 0 | Status: SHIPPED | OUTPATIENT
Start: 2019-03-06 | End: 2019-05-07

## 2019-03-06 NOTE — TELEPHONE ENCOUNTER
Prescription approved per [facility] Refill Protocol.  Patient has appt this month    Vianey Shipman RN- Triage [employer]

## 2019-03-06 NOTE — TELEPHONE ENCOUNTER
Prior Authorization Retail Medication Request    Medication/Dose: Generic Lovaza  ICD code (if different than what is on RX):  E78.5  Previously Tried and Failed:  Atorvastatin  Rationale:  Patient takes multiple medications for hyperlipidemia. Pt currently on Zetia, Fenofibrate, Omega-3 acid, and Rosuvastatin    Insurance Name:  [place]MD  Insurance ID:  [id number]      Pharmacy Information (if different than what is on RX)  Name:  [facility]  Phone:  [phone]

## 2019-03-06 NOTE — TELEPHONE ENCOUNTER
"Routing to new clinic    Vianey Shipman RN- Triage [employer]      Requested Prescriptions   Pending Prescriptions Disp Refills     vitamin D3 (CHOLECALCIFEROL) 2000 units tablet [Pharmacy Med Name: VITAMIN D3 TAB 2000UNIT] 100 tablet 0     Sig: TAKE ONE TABLET BY MOUTH EVERY DAY    Vitamin Supplements (Adult) Protocol Passed - 3/5/2019 11:35 AM       Passed - High dose Vitamin D not ordered       Passed - Recent (12 mo) or future (30 days) visit within the authorizing provider's specialty    Patient had office visit in the last 12 months or has a visit in the next 30 days with authorizing provider or within the authorizing provider's specialty.  See \"Patient Info\" tab in inbasket, or \"Choose Columns\" in Meds & Orders section of the refill encounter.             Passed - Medication is active on med list          "

## 2019-03-06 NOTE — TELEPHONE ENCOUNTER
"ferrous sulfate (FEROSUL) 325 (65 Fe) MG tablet 90 tablet 0 11/30/2018     Last Written Prescription Date:  11/30/18  Last Fill Quantity: 90,  # refills: 0   Last office visit: 11/5/2018 with prescribing provider:  [facility]   Future Office Visit:   Next 5 appointments (look out 90 days)    Mar 26, 2019 10:30 AM CDT  [facility] [address] [facility] [id number] [phone]         Requested Prescriptions   Pending Prescriptions Disp Refills     ferrous sulfate (FEROSUL) 325 (65 Fe) MG tablet [Pharmacy Med Name: FERROUS SULFATE 325 (65 FE)MG TABS] 90 tablet 0     Sig: TAKE ONE TABLET BY MOUTH EVERY MORNING WITH BREAKFAST    Iron Supplements Failed - 3/5/2019 11:35 AM       Failed - Hgb OR Hct on record within the past 12 mos.    Patient need only have had a HGB or HCT on file in the past 12 mos. That result does not need to be normal.    Recent Labs   Lab Test 01/15/18  1135 11/09/16  1123 08/12/16  1624   HGB 13.6 14.5 14.7     Recent Labs   Lab Test 01/15/18  1135 06/14/15  0430 11/01/14  0444   HCT 40.5 41.5 41.7       Please verify a HGB or HCT has been checked SINCE THE LAST DOSE CHANGE.           Passed - Patient is 12 years of age or older       Passed - Recent (12 mo) or future (30 days) visit within the authorizing provider's specialty    Patient had office visit in the last 12 months or has a visit in the next 30 days with authorizing provider or within the authorizing provider's specialty.  See \"Patient Info\" tab in inbasket, or \"Choose Columns\" in Meds & Orders section of the refill encounter.             Passed - Medication is active on med list        [facility] Follow-up to PHQ 1/16/2017   PHQ-9 9. Suicide Ideation past 2 weeks Not at all       "

## 2019-03-08 NOTE — TELEPHONE ENCOUNTER
PA Initiation    Medication: Generic Lovaza - INITIATED  Insurance Company: [place] Medicaid ([facility]) - Phone [phone] Fax [phone]  Pharmacy Filling the Rx: [facility] - [address]  Filling Pharmacy Phone: [phone]  Filling Pharmacy Fax:    Start Date: 3/8/2019

## 2019-03-11 RX ORDER — ICOSAPENT ETHYL 1 G/1
2 CAPSULE ORAL 2 TIMES DAILY WITH MEALS
Qty: 120 CAPSULE | Refills: 2 | Status: SHIPPED | OUTPATIENT
Start: 2019-03-11 | End: 2019-05-07

## 2019-03-11 NOTE — TELEPHONE ENCOUNTER
Re-initiated  Sent to different [facility]  PA Initiation

## 2019-03-11 NOTE — TELEPHONE ENCOUNTER
Generic Lovaza PA denied unless he has tried and failed formulary alternatives.   Formulary alternatives are Vascepa or gemfibrozil.   Already on fenofibrate.   Will send RX for Vascepa, 2 g BID with meals.    Prescription(s) sent electronically to specified pharmacy.

## 2019-03-11 NOTE — TELEPHONE ENCOUNTER
PA denied, patient needs to try and fail either Vascepa or Gemfibrozil. Please advise.    Bernardo Corado CMA on 3/11/2019 at 2:19 PM

## 2019-03-11 NOTE — TELEPHONE ENCOUNTER
PRIOR AUTHORIZATION DENIED    Medication: Generic Lovaza - DENIED    Denial Date: 3/11/2019    Denial Rational: PATIENT NEEDS TO TRY/FAIL VASCEPA- Gemfibrozil (generic of LOPID) tabs    Appeal Information:

## 2019-03-20 DIAGNOSIS — J30.2 SEASONAL ALLERGIES: ICD-10-CM

## 2019-03-20 NOTE — TELEPHONE ENCOUNTER
"loratadine (CLARITIN) 10 MG tablet    Last Written Prescription Date:  01/18/2018  Last Fill Quantity: 90,  # refills: 3   Last office visit: 11/5/2018 with prescribing provider:  Milo   Future Office Visit:   Next 5 appointments (look out 90 days)    Mar 26, 2019 10:30 AM CDT  Office Visit with Lyle Quintana MD  [facility] [address] [id number] [phone]         Requested Prescriptions   Pending Prescriptions Disp Refills     loratadine (CLARITIN) 10 MG tablet [Pharmacy Med Name: LORATADINE 10MG TABS] 90 tablet 3     Sig: TAKE ONE TABLET BY MOUTH EVERY DAY    Antihistamines Protocol Passed - 3/20/2019  9:18 AM       Passed - Patient is 3-64 years of age    Apply weight-based dosing for peds patients age 3 - 12 years of age.    Forward request to provider for patients under the age of 3 or over the age of 64.         Passed - Recent (12 mo) or future (30 days) visit within the authorizing provider's specialty    Patient had office visit in the last 12 months or has a visit in the next 30 days with authorizing provider or within the authorizing provider's specialty.  See \"Patient Info\" tab in inbasket, or \"Choose Columns\" in Meds & Orders section of the refill encounter.             Passed - Medication is active on med list          "

## 2019-03-21 RX ORDER — LORATADINE 10 MG/1
TABLET ORAL
Qty: 90 TABLET | Refills: 3 | Status: SHIPPED | OUTPATIENT
Start: 2019-03-21 | End: 2019-05-07

## 2019-03-21 NOTE — TELEPHONE ENCOUNTER
Prescription approved per [facility] Refill Protocol.  Cynthia VENTURA RN

## 2019-04-30 DIAGNOSIS — E11.9 TYPE 2 DIABETES MELLITUS WITHOUT COMPLICATION, WITHOUT LONG-TERM CURRENT USE OF INSULIN (H): Chronic | ICD-10-CM

## 2019-05-07 ENCOUNTER — OFFICE VISIT (OUTPATIENT)
Dept: FAMILY MEDICINE | Facility: CLINIC | Age: 54
End: 2019-05-07
Payer: MEDICARE

## 2019-05-07 VITALS
TEMPERATURE: 99.2 F | BODY MASS INDEX: 34.82 KG/M2 | HEIGHT: 75 IN | WEIGHT: 280 LBS | OXYGEN SATURATION: 97 % | DIASTOLIC BLOOD PRESSURE: 67 MMHG | HEART RATE: 67 BPM | SYSTOLIC BLOOD PRESSURE: 115 MMHG

## 2019-05-07 DIAGNOSIS — E55.9 VITAMIN D DEFICIENCY: ICD-10-CM

## 2019-05-07 DIAGNOSIS — K21.9 GASTROESOPHAGEAL REFLUX DISEASE WITHOUT ESOPHAGITIS: Chronic | ICD-10-CM

## 2019-05-07 DIAGNOSIS — E11.40 TYPE 2 DIABETES MELLITUS WITH DIABETIC NEUROPATHY, WITHOUT LONG-TERM CURRENT USE OF INSULIN (H): ICD-10-CM

## 2019-05-07 DIAGNOSIS — R06.02 SHORTNESS OF BREATH: ICD-10-CM

## 2019-05-07 DIAGNOSIS — F14.11 HISTORY OF COCAINE ABUSE (H): Chronic | ICD-10-CM

## 2019-05-07 DIAGNOSIS — E11.59 TYPE 2 DIABETES MELLITUS WITH OTHER CIRCULATORY COMPLICATION, WITHOUT LONG-TERM CURRENT USE OF INSULIN (H): Primary | ICD-10-CM

## 2019-05-07 DIAGNOSIS — I25.810 CORONARY ARTERY DISEASE INVOLVING CORONARY BYPASS GRAFT OF NATIVE HEART, ANGINA PRESENCE UNSPECIFIED: ICD-10-CM

## 2019-05-07 DIAGNOSIS — J30.2 SEASONAL ALLERGIES: ICD-10-CM

## 2019-05-07 DIAGNOSIS — E78.5 HYPERLIPIDEMIA, UNSPECIFIED HYPERLIPIDEMIA TYPE: Chronic | ICD-10-CM

## 2019-05-07 DIAGNOSIS — I25.810 CORONARY ARTERY DISEASE INVOLVING CORONARY BYPASS GRAFT OF NATIVE HEART WITHOUT ANGINA PECTORIS: Chronic | ICD-10-CM

## 2019-05-07 DIAGNOSIS — R09.81 NASAL CONGESTION: ICD-10-CM

## 2019-05-07 DIAGNOSIS — G25.81 RESTLESS LEGS SYNDROME (RLS): Chronic | ICD-10-CM

## 2019-05-07 DIAGNOSIS — E61.1 IRON DEFICIENCY: ICD-10-CM

## 2019-05-07 DIAGNOSIS — I25.10 CORONARY ARTERY DISEASE INVOLVING NATIVE CORONARY ARTERY OF NATIVE HEART WITHOUT ANGINA PECTORIS: Chronic | ICD-10-CM

## 2019-05-07 DIAGNOSIS — E66.01 SEVERE OBESITY (BMI 35.0-35.9 WITH COMORBIDITY) (H): ICD-10-CM

## 2019-05-07 DIAGNOSIS — F41.9 ANXIETY: Chronic | ICD-10-CM

## 2019-05-07 LAB — HBA1C MFR BLD: 6.9 % (ref 0–5.6)

## 2019-05-07 PROCEDURE — 83036 HEMOGLOBIN GLYCOSYLATED A1C: CPT | Performed by: INTERNAL MEDICINE

## 2019-05-07 PROCEDURE — 36415 COLL VENOUS BLD VENIPUNCTURE: CPT | Performed by: INTERNAL MEDICINE

## 2019-05-07 PROCEDURE — 99214 OFFICE O/P EST MOD 30 MIN: CPT | Performed by: INTERNAL MEDICINE

## 2019-05-07 RX ORDER — CHOLECALCIFEROL (VITAMIN D3) 50 MCG
1 TABLET ORAL DAILY
Qty: 100 TABLET | Refills: 2 | Status: SHIPPED | OUTPATIENT
Start: 2019-05-07 | End: 2020-07-20

## 2019-05-07 RX ORDER — LORATADINE 10 MG/1
1 TABLET ORAL DAILY
Qty: 90 TABLET | Refills: 3 | Status: SHIPPED | OUTPATIENT
Start: 2019-05-07 | End: 2020-08-10

## 2019-05-07 RX ORDER — CLONAZEPAM 0.5 MG/1
0.5 TABLET ORAL
Qty: 30 TABLET | Refills: 2 | Status: SHIPPED | OUTPATIENT
Start: 2019-05-07 | End: 2019-10-02

## 2019-05-07 RX ORDER — ALBUTEROL SULFATE 90 UG/1
2 AEROSOL, METERED RESPIRATORY (INHALATION) EVERY 4 HOURS PRN
Qty: 8.5 G | Refills: 3 | Status: SHIPPED | OUTPATIENT
Start: 2019-05-07

## 2019-05-07 RX ORDER — GABAPENTIN 300 MG/1
CAPSULE ORAL
Qty: 810 CAPSULE | Refills: 3 | Status: SHIPPED | OUTPATIENT
Start: 2019-05-07 | End: 2020-06-10

## 2019-05-07 RX ORDER — CLOPIDOGREL BISULFATE 75 MG/1
75 TABLET ORAL DAILY
Qty: 90 TABLET | Refills: 3 | Status: ON HOLD | OUTPATIENT
Start: 2019-05-07 | End: 2020-07-31

## 2019-05-07 RX ORDER — OMEGA-3-ACID ETHYL ESTERS 1 G/1
2 CAPSULE, LIQUID FILLED ORAL 2 TIMES DAILY
Qty: 360 CAPSULE | Refills: 3 | Status: SHIPPED | OUTPATIENT
Start: 2019-05-07 | End: 2020-09-01

## 2019-05-07 RX ORDER — NITROGLYCERIN 0.4 MG/1
TABLET SUBLINGUAL
Qty: 25 TABLET | Refills: 1 | Status: SHIPPED | OUTPATIENT
Start: 2019-05-07 | End: 2020-07-23

## 2019-05-07 RX ORDER — ISOPROPYL ALCOHOL 0.75 G/1
SWAB TOPICAL
Qty: 100 EACH | Refills: 3 | Status: SHIPPED | OUTPATIENT
Start: 2019-05-07 | End: 2020-12-01

## 2019-05-07 RX ORDER — ECHINACEA PURPUREA EXTRACT 125 MG
TABLET ORAL
Qty: 44 ML | Refills: 3 | Status: SHIPPED | OUTPATIENT
Start: 2019-05-07

## 2019-05-07 RX ORDER — HYDROXYZINE HYDROCHLORIDE 25 MG/1
25-50 TABLET, FILM COATED ORAL EVERY 6 HOURS PRN
Qty: 120 TABLET | Refills: 5 | Status: SHIPPED | OUTPATIENT
Start: 2019-05-07 | End: 2020-07-22

## 2019-05-07 RX ORDER — LANCETS 33 GAUGE
1 EACH MISCELLANEOUS
Qty: 100 EACH | Refills: 5 | Status: SHIPPED | OUTPATIENT
Start: 2019-05-07 | End: 2020-12-01

## 2019-05-07 RX ORDER — METOPROLOL SUCCINATE 25 MG/1
25 TABLET, EXTENDED RELEASE ORAL 2 TIMES DAILY
Qty: 180 TABLET | Refills: 3 | Status: SHIPPED | OUTPATIENT
Start: 2019-05-07 | End: 2020-07-15

## 2019-05-07 RX ORDER — EZETIMIBE 10 MG/1
10 TABLET ORAL AT BEDTIME
Qty: 90 TABLET | Refills: 1 | Status: SHIPPED | OUTPATIENT
Start: 2019-05-07

## 2019-05-07 RX ORDER — FERROUS SULFATE 325(65) MG
TABLET ORAL
Qty: 90 TABLET | Refills: 3 | Status: SHIPPED | OUTPATIENT
Start: 2019-05-07 | End: 2020-07-20

## 2019-05-07 ASSESSMENT — MIFFLIN-ST. JEOR: SCORE: 2185.7

## 2019-05-07 NOTE — PROGRESS NOTES
SUBJECTIVE:   Javier Markham is a 54 year old male who presents to clinic today for the following   health issues:  New Patient/Transfer of Care  Medication Followup of all meds    Taking Medication as prescribed: yes    Side Effects:  None    Medication Helping Symptoms:  yes     Patient is transferring care from [facility]. He reviewed past medical history, medications, and current problem list. He needs more test strips, he is only able to test blood sugars 1 x daily. Notes he is trying to lose weight to reverse his diabetes.         Additional history: as documented    Reviewed  and updated as needed this visit by clinical staff  Meds         Reviewed and updated as needed this visit by Provider         Patient Active Problem List   Diagnosis     Esophageal reflux     History of cocaine use in remission     Hyperlipidemia     Anxiety     Restless legs syndrome (RLS)     Type 2 diabetes mellitus with circulatory disorder, without long-term current use of insulin (H)     Health assisted     Coronary artery disease involving autologous artery coronary bypass graft without angina pectoris     BPH (benign prostatic hypertrophy)     Class 1 obesity with serious comorbidity and body mass index (BMI) of 34.0 to 34.9 in adult, unspecified obesity type     Benign essential hypertension; goal < 140/90     History of tobacco abuse     Incomplete RBBB     Right knee pain, unspecified chronicity     Benign essential tremor     Tinnitus, left     Severe obesity (BMI 35.0-35.9 with comorbidity) (H)     Hyperlipidemia LDL goal <100     Past Surgical History:   Procedure Laterality Date     C CABG, VEIN, THREE  12/23/2014    [facility]     C NONSPECIFIC PROCEDURE  age 15    both feet bone spur removal in the arch of the foot     HC DRUG-ELUTING STENTS, SINGLE  10/19/2018    Left circumflex at [facility]     HERNIORRHAPHY INGUINAL Left 9/20/2016    Procedure: HERNIORRHAPHY INGUINAL;  Surgeon: Haim Green MD;  Location:  [facility]     STENT  07/2015    RCA stent       Social History     Tobacco Use     Smoking status: Former Smoker     Packs/day: 0.50     Years: 25.00     Pack years: 12.50     Types: Cigarettes     Last attempt to quit: 11/1/2015     Years since quitting: 3.5     Smokeless tobacco: Never Used   Substance Use Topics     Alcohol use: No     Alcohol/week: 0.0 oz     Family History   Problem Relation Age of Onset     Diabetes Mother         type 2     Breast Cancer Mother      Macular Degeneration Mother      Dementia Mother      Coronary Artery Disease Father 52     Heart Disease Paternal Uncle      Diabetes Maternal Grandfather      Breast Cancer Maternal Grandmother      Prostate Cancer No family hx of          Current Outpatient Medications   Medication Sig Dispense Refill     albuterol (PROAIR HFA, PROVENTIL HFA, VENTOLIN HFA) 108 (90 BASE) MCG/ACT inhaler Inhale 2 puffs into the lungs every 4 hours as needed for shortness of breath / dyspnea or wheezing 1 Inhaler 1     Alcohol Swabs (B-D SINGLE USE SWABS REGULAR) PADS USE AS NEEDED 100 each 3     aspirin (ASA) 81 MG EC tablet Take 1 tablet (81 mg) by mouth daily 90 tablet 3     blood glucose monitoring (NO BRAND SPECIFIED) meter device kit Use to test blood sugar 1 times daily or as directed. ONE TOUCH VERIO. 1 kit 0     clonazePAM (KLONOPIN) 0.5 MG tablet Take 1 tablet (0.5 mg) by mouth nightly as needed (Restless legs) 30 tablet 2     clopidogrel (PLAVIX) 75 MG tablet Take 1 tablet (75 mg) by mouth daily 90 tablet 3     DEEP SEA NASAL SPRAY 0.65 % nasal spray SPRAY 1 SPRAY IN EACH NOSTRIL FOUR TIMES A DAY AS NEEDED FOR CONGESTION 44 mL 3     empagliflozin (JARDIANCE) 10 MG TABS tablet Take 1 tablet (10 mg) by mouth daily       Ezetimibe (ZETIA PO) Take 10 mg by mouth At Bedtime       fenofibrate 54 MG tablet Take 1 tablet (54 mg) by mouth daily 1 tablet 0     ferrous sulfate (FEROSUL) 325 (65 Fe) MG tablet TAKE ONE TABLET BY MOUTH EVERY MORNING WITH BREAKFAST 90  tablet 0     fluticasone (FLONASE) 50 MCG/ACT nasal spray Spray 2 sprays into both nostrils daily 16 g 11     gabapentin (NEURONTIN) 300 MG capsule TAKE THREE CAPSULES BY MOUTH THREE TIMES A  capsule 3     hydrOXYzine (ATARAX) 25 MG tablet Take 1-2 tablets (25-50 mg) by mouth every 6 hours as needed for anxiety 120 tablet 5     ibuprofen (ADVIL,MOTRIN) 800 MG tablet Take 1 tablet (800 mg) by mouth every 8 hours as needed for moderate pain 60 tablet 1     loratadine (CLARITIN) 10 MG tablet TAKE ONE TABLET BY MOUTH EVERY DAY 90 tablet 3     MAPAP 500 MG tablet TAKE TWO TABLETS BY MOUTH EVERY 8 HOURS AS NEEDED FOR MILD PAIN 100 tablet 5     metFORMIN (GLUCOPHAGE) 500 MG tablet TAKE TWO TABLETS BY MOUTH TWICE A DAY WITH MEALS 360 tablet 3     metoprolol succinate ER (TOPROL-XL) 25 MG 24 hr tablet Take 1 tablet (25 mg) by mouth 2 times daily 180 tablet 3     nitroGLYcerin (NITROSTAT) 0.4 MG sublingual tablet PLACE 1 TABLET UNDER THE TONGUE AT THE ONSET OF CHEST PAIN. MAY REPEAT EVERY 5 MINUTES AS NEEDED FOR A TOTAL OF 3 DOSES. 25 tablet 1     omega-3 acid ethyl esters (LOVAZA) 1 g capsule Take 2 capsules (2 g) by mouth 2 times daily 360 capsule 3     omeprazole (PRILOSEC) 20 MG DR capsule TAKE ONE CAPSULE BY MOUTH TWICE A DAY TAKE 30 TO 60 MINUTES BEFORE A MEAL 180 capsule 3     ONE TOUCH VERIO IQ test strip TEST ONCE DAILY OR AS DIRECTED 100 each 5     ONETOUCH DELICA LANCETS 33G MISC TEST ONCE DAILY OR AS DIRECTED 100 each 5     polyethylene glycol (MIRALAX/GLYCOLAX) powder Take 17 g by mouth daily as needed for constipation 500 g 5     pramipexole (MIRAPEX) 0.75 MG tablet Take 1 tablet (0.75 mg) by mouth At Bedtime       rosuvastatin (CRESTOR) 40 MG tablet Take 1 tablet (40 mg) by mouth daily       senna (SENOKOT) 8.6 MG tablet Take 2 tablets by mouth daily 100 tablet 5     sitagliptin (JANUVIA) 100 MG tablet Take 0.5 tablets (50 mg) by mouth daily 30 tablet 0     Skin Protectants, Misc. (EUCERIN) cream Apply  "topically as needed for dry skin 240 g 3     tamsulosin (FLOMAX) 0.4 MG capsule Take 1 capsule (0.4 mg) by mouth daily 90 capsule 3     triamcinolone (KENALOG) 0.1 % cream Apply topically daily as needed for irritation (under Arms)       triamcinolone (KENALOG) 0.1 % external cream Apply sparingly once or twice per day as needed to affected area until the skin is better, then stop; REPEAT AS NEEDED 80 g 1     vitamin B-12 (CYANOCOBALAMIN) 1000 MCG tablet Take 1 tablet (1,000 mcg) by mouth daily 100 tablet 3     vitamin D3 (CHOLECALCIFEROL) 2000 units tablet TAKE ONE TABLET BY MOUTH EVERY  tablet 2   SHx: Exercise: no current routine, plans on joining the gym     Allergies   Allergen Reactions     Flumist [Influenza Vaccine Live]      Vomiting, felt like he had the flu.     Perfume      Soap      Perfume in soap-reaction excema     BP Readings from Last 3 Encounters:   05/07/19 115/67   12/24/18 126/80   11/05/18 125/86    Wt Readings from Last 3 Encounters:   05/07/19 127 kg (280 lb)   12/24/18 136.1 kg (300 lb)   11/05/18 125.6 kg (276 lb 12.8 oz)          ROS:  Constitutional, HEENT, cardiovascular, pulmonary, gi and gu systems are negative, except as otherwise noted.    This document serves as a record of the services and decisions personally performed and made by Ruben Bunch MD. It was created on his behalf by Nusrat Reyes, a trained medical scribe. The creation of this document is based on the provider's statements to the medical scribe.  Nusrat Reyes May 7, 2019 2:35 PM      OBJECTIVE:     /67 (BP Location: Right arm, Patient Position: Sitting, Cuff Size: Adult Large)   Pulse 67   Temp 99.2  F (37.3  C) (Oral)   Ht 1.905 m (6' 3\")   Wt 127 kg (280 lb)   SpO2 97%   BMI 35.00 kg/m    Body mass index is 35 kg/m .    RESP: lungs clear to auscultation - no rales, rhonchi or wheezes  CV: regular rate and rhythm, normal S1 S2, no S3 or S4, no murmur, click or rub, no peripheral edema and " peripheral pulses strong  ABDOMEN: soft, nontender, no hepatosplenomegaly, no masses and bowel sounds normal  MS: non tender 1 + pretibial edema, feet clean and dry  SKIN: no suspicious lesions or rashes    Diabetic foot exam: normal DP and PT pulses, no trophic changes or ulcerative lesions and normal sensory exam        ASSESSMENT/PLAN:   1. Type 2 diabetes mellitus with other circulatory complication, without long-term current use of insulin (H)  - empagliflozin (JARDIANCE) 10 MG TABS tablet; Take 1 tablet (10 mg) by mouth daily  Dispense: 90 tablet; Refill: 3  - Hemoglobin A1c, testing today    2. Severe obesity (BMI 35.0-35.9 with comorbidity) (H)  Patient is planning on joining the gym and is determined to lose weight   3. Gastroesophageal reflux disease without esophagitis  - omeprazole (PRILOSEC) 20 MG DR capsule; TAKE ONE CAPSULE BY MOUTH TWICE A DAY TAKE 30 TO 60 MINUTES BEFORE A MEAL  Dispense: 180 capsule; Refill: 3    4. History of cocaine use in remission    5. Hyperlipidemia, unspecified hyperlipidemia type  - ezetimibe (ZETIA) 10 MG tablet; Take 1 tablet (10 mg) by mouth At Bedtime  Dispense: 90 tablet; Refill: 1  - omega-3 acid ethyl esters (LOVAZA) 1 g capsule; Take 2 capsules (2 g) by mouth 2 times daily  Dispense: 360 capsule; Refill: 3    6. Anxiety  - clonazePAM (KLONOPIN) 0.5 MG tablet; Take 1 tablet (0.5 mg) by mouth nightly as needed (Restless legs)  Dispense: 30 tablet; Refill: 2  - hydrOXYzine (ATARAX) 25 MG tablet; Take 1-2 tablets (25-50 mg) by mouth every 6 hours as needed for anxiety  Dispense: 120 tablet; Refill: 5    7. Restless legs syndrome (RLS)  - clonazePAM (KLONOPIN) 0.5 MG tablet; Take 1 tablet (0.5 mg) by mouth nightly as needed (Restless legs)  Dispense: 30 tablet; Refill: 2    8. Shortness of breath  - albuterol (PROAIR HFA/PROVENTIL HFA/VENTOLIN HFA) 108 (90 Base) MCG/ACT inhaler; Inhale 2 puffs into the lungs every 4 hours as needed for shortness of breath / dyspnea or  wheezing  Dispense: 8.5 g; Refill: 3    9. Coronary artery disease involving native coronary artery of native heart without angina pectoris  - aspirin (ASA) 81 MG EC tablet; Take 1 tablet (81 mg) by mouth daily  Dispense: 90 tablet; Refill: 3    10. Type 2 diabetes mellitus with diabetic neuropathy, without long-term current use of insulin (H)  - Alcohol Swabs (B-D SINGLE USE SWABS REGULAR) PADS; USE AS NEEDED  Dispense: 100 each; Refill: 3  - gabapentin (NEURONTIN) 300 MG capsule; TAKE THREE CAPSULES BY MOUTH THREE TIMES A DAY  Dispense: 810 capsule; Refill: 3  - metFORMIN (GLUCOPHAGE) 500 MG tablet; TAKE TWO TABLETS BY MOUTH TWICE A DAY WITH MEALS  Dispense: 360 tablet; Refill: 3  - ONETOUCH DELICA LANCETS 33G MISC; 1 Act by Device route 2 times daily  Dispense: 100 each; Refill: 5    11. Coronary artery disease involving coronary bypass graft of native heart without angina pectoris  - clopidogrel (PLAVIX) 75 MG tablet; Take 1 tablet (75 mg) by mouth daily  Dispense: 90 tablet; Refill: 3  - metoprolol succinate ER (TOPROL-XL) 25 MG 24 hr tablet; Take 1 tablet (25 mg) by mouth 2 times daily  Dispense: 180 tablet; Refill: 3  - nitroGLYcerin (NITROSTAT) 0.4 MG sublingual tablet; PLACE 1 TABLET UNDER THE TONGUE AT THE ONSET OF CHEST PAIN. MAY REPEAT EVERY 5 MINUTES AS NEEDED FOR A TOTAL OF 3 DOSES.  Dispense: 25 tablet; Refill: 1    12. Nasal congestion  - sodium chloride (DEEP SEA NASAL SPRAY) 0.65 % nasal spray; SPRAY 1 SPRAY IN EACH NOSTRIL FOUR TIMES A DAY AS NEEDED FOR CONGESTION  Dispense: 44 mL; Refill: 3    13. Coronary artery disease involving coronary bypass graft of native heart, angina presence unspecified  - empagliflozin (JARDIANCE) 10 MG TABS tablet; Take 1 tablet (10 mg) by mouth daily  Dispense: 90 tablet; Refill: 3    14. Seasonal allergies  - loratadine (CLARITIN) 10 MG tablet; Take 1 tablet (10 mg) by mouth daily  Dispense: 90 tablet; Refill: 3    15. Iron deficiency  - ferrous sulfate (FEROSUL)  325 (65 Fe) MG tablet; TAKE ONE TABLET BY MOUTH EVERY MORNING WITH BREAKFAST  Dispense: 90 tablet; Refill: 3    16. Vitamin D deficiency  - vitamin D3 (CHOLECALCIFEROL) 2000 units tablet; Take 1 tablet by mouth daily  Dispense: 100 tablet; Refill: 2        The information in this document, created by the medical scribe for me, accurately reflects the services I personally performed and the decisions made by me. I have reviewed and approved this document for accuracy prior to leaving the patient care area.  May 7, 2019 2:35 PM    Lyle Quintana MD  [facility]

## 2019-05-07 NOTE — LETTER
[facility]  [address] [facility]  [address]  [place]  [address]  Tel: [phone]    May 9, 2019    Javier Markham  [address]    [facility] [address]        Jessica Coffey is your hemoglobin A1c report which I am happy to say is at goal.  We would like to keep your A1c less than 7.  Keep up the good work and I would anticipate you coming back in 6 months for a wellness exam.     If you have any further questions or problems, please contact our office.      Sincerely,    Lyle Quintana MD / estefanía    Resulted Orders   Hemoglobin A1c   Result Value Ref Range    Hemoglobin A1C 6.9 (H) 0 - 5.6 %      Comment:      Normal <5.7% Prediabetes 5.7-6.4%  Diabetes 6.5% or higher - adopted from ADA   consensus guidelines.

## 2019-05-09 ENCOUNTER — TRANSFERRED RECORDS (OUTPATIENT)
Dept: HEALTH INFORMATION MANAGEMENT | Facility: CLINIC | Age: 54
End: 2019-05-09

## 2019-06-06 ENCOUNTER — TELEPHONE (OUTPATIENT)
Dept: FAMILY MEDICINE | Facility: CLINIC | Age: 54
End: 2019-06-06

## 2019-06-06 NOTE — TELEPHONE ENCOUNTER
Reason for Call:  Form, our goal is to have forms completed with 72 hours, however, some forms may require a visit or additional information.    Type of letter, form or note:   pet form    Who is the form from?: patient is wondering if clinic has a form for this? Or if Dr. Quintana can provide a letter?       Additional comments: patient needs a form from PCP in order to keep his  pet at home with him.  His landlord is requiring this.    Patient would like this form/letter mailed to home address on file.    Patient can be reached at:  [phone]  OK to leave     Call taken on 6/6/2019 at 7:52 AM by Maria Luisa Gu    .

## 2019-06-06 NOTE — TELEPHONE ENCOUNTER
Dr. Quintana,    Please write letter regarding need of  pet at home.    Bernardo Corado, CMA on 6/6/2019 at 8:22 AM

## 2019-06-06 NOTE — LETTER
June 12, 2019      Javier Markham  [address]    [facility] [address]        To Whom It May Concern,        Please allow Alexx to have his  dog, Blue.  This helps Mr. Markham manage with his anxiety, depression loneliness and disability.  If you have any questions, please contact our office.         Sincerely,        Lyle Quintana MD/es

## 2019-06-12 NOTE — TELEPHONE ENCOUNTER
Left message for pt to call back. Would like to discuss with pt and see exactly what letter is for/pend for PCP.  Please have pt speak to triage nurse.  Sandra Carolina RN

## 2019-06-12 NOTE — TELEPHONE ENCOUNTER
Spoke to pt, wrote letter for PCP to sign if appropriate.  Pt has been thinking about a pet for a long time, and is now ready for this responsibility.  Sandra Carolina RN

## 2019-07-01 DIAGNOSIS — E11.9 TYPE 2 DIABETES MELLITUS WITHOUT COMPLICATION, WITHOUT LONG-TERM CURRENT USE OF INSULIN (H): Chronic | ICD-10-CM

## 2019-07-02 NOTE — TELEPHONE ENCOUNTER
"Last Written Prescription Date:  5/02/19  Last Fill Quantity: 30 tablet,  # refills: 0   Last office visit: 5/7/2019 with prescribing provider:  Mariano   Future Office Visit:      Requested Prescriptions   Pending Prescriptions Disp Refills     JANUVIA 50 MG tablet [Pharmacy Med Name: JANUVIA 50MG TABS] 60 tablet 0     Sig: TAKE ONE TABLET BY MOUTH ONCE DAILY       DPP4 Inhibitors Protocol Failed - 7/1/2019  3:58 PM        Failed - Microalbumin on file in past 12 months     Recent Labs   Lab Test 11/03/17  0913   MICROL 13   UMALCR 9.41             Failed - Normal serum creatinine in past 12 months     Recent Labs   Lab Test 11/03/17  0912   CR 0.78             Passed - Blood pressure less than 140/90 in past 6 months     BP Readings from Last 3 Encounters:   05/07/19 115/67   12/24/18 126/80   11/05/18 125/86                 Passed - LDL on file in past 12 months     Recent Labs   Lab Test 10/16/18   LDL 76             Passed - HgbA1C in past 3 or 6 months     If HgbA1C is 8 or greater, it needs to be on file within the past 3 months.  If less than 8, must be on file within the past 6 months.     Recent Labs   Lab Test 05/07/19  1502   A1C 6.9*             Passed - Medication is active on med list        Passed - Patient is age 18 or older        Passed - Recent (6 mo) or future (30 days) visit within the authorizing provider's specialty     Patient had office visit in the last 6 months or has a visit in the next 30 days with authorizing provider.  See \"Patient Info\" tab in inbasket, or \"Choose Columns\" in Meds & Orders section of the refill encounter.              "

## 2019-07-02 NOTE — TELEPHONE ENCOUNTER
GB  Routing refill request to provider for review/approval because:  Labs not current:  Microalbumin & Cr from 2017    Please approve if appropriate  Jacqueline Gu RN

## 2019-07-03 RX ORDER — SITAGLIPTIN 50 MG/1
TABLET, FILM COATED ORAL
Qty: 180 TABLET | Refills: 0 | Status: SHIPPED | OUTPATIENT
Start: 2019-07-03 | End: 2019-12-31

## 2019-08-15 ENCOUNTER — TELEPHONE (OUTPATIENT)
Dept: FAMILY MEDICINE | Facility: CLINIC | Age: 54
End: 2019-08-15

## 2019-08-15 DIAGNOSIS — E11.59 TYPE 2 DIABETES MELLITUS WITH OTHER CIRCULATORY COMPLICATION, WITHOUT LONG-TERM CURRENT USE OF INSULIN (H): Primary | ICD-10-CM

## 2019-08-15 NOTE — TELEPHONE ENCOUNTER
"Spoke with patient:     He states he takes Januvia 50mg once daily (looked at the bottle)     empagliflozin (Jardiance)  10mg at night     He wonders if he could just take Jardiance- there was a period of time when he was OUT of Januvia and just on Jardiance and felt his sugars were under control     Advised he meet with MTM to discuss, they would be an excellent resource (pt's goal is to get \"off\" of medications, if possible)     Placed referral     Spoke with pharmacy to update that pt confirms he takes 50mg of Januvia. He will plan to  this refill and take until he meets with MTM.       "

## 2019-08-15 NOTE — TELEPHONE ENCOUNTER
[facility] pharmacy called with question regarding patient's Januvia dose.      Patient states he takes Januvia 100mg daily.     Last RX 7-3-19 Januvia 50mg once daily.  Rx was profiled and now patient is needing to fill, however states he takes 100mg daily.     Last RX from previous PCP, Dr Pedraza on 19:  Januvia 100m/2 tablet daily.        Left message on answering machine for patient to call back.    Plan to review patient's current dose of Januvia and recommend a follow up OV with PCP to review meds.  Then, will call [facility] pharmacy with update.        Denita BATES RN,BSN

## 2019-08-19 ENCOUNTER — TELEPHONE (OUTPATIENT)
Dept: FAMILY MEDICINE | Facility: CLINIC | Age: 54
End: 2019-08-19

## 2019-08-19 NOTE — TELEPHONE ENCOUNTER
MTM referral from: [facility] visit (referral by provider)    MTM referral outreach attempt #2 on August 19, 2019 at 11:30 AM      Outcome: Patient not reachable after several attempts, will route to MTM Pharmacist/Provider as an FYI. Thank you for the referral.    Concha Nath, MTM coordinator Intern

## 2019-10-02 DIAGNOSIS — G25.81 RESTLESS LEGS SYNDROME (RLS): Chronic | ICD-10-CM

## 2019-10-02 DIAGNOSIS — F41.9 ANXIETY: Chronic | ICD-10-CM

## 2019-10-03 NOTE — TELEPHONE ENCOUNTER
Controlled Substance Refill Request for clonazepam  Problem List Complete:  Yes    Last Written Prescription Date:  05/07/19  Last Fill Quantity: 30,   # refills: 2    THE MOST RECENT OFFICE VISIT MUST BE WITHIN THE PAST 3 MONTHS. AT LEAST ONE FACE TO FACE VISIT MUST OCCUR EVERY 6 MONTHS. ADDITIONAL VISITS CAN BE VIRTUAL.  (THIS STATEMENT SHOULD BE DELETED.)    Last Office Visit with [facility] primary care provider: 05/07/19    Future Office visit:     Controlled substance agreement:   Encounter-Level CSA:    There are no encounter-level csa.     Patient-Level CSA:    There are no patient-level csa.         Last Urine Drug Screen: No results found for: CDAUT, No results found for: COMDAT, No results found for: THC13, PCP13, COC13, MAMP13, OPI13, AMP13, BZO13, TCA13, MTD13, BAR13, OXY13, PPX13, BUP13     Processing:      [email or web address]   checked in past 3 months?  No, route to RN     Cassia Petit MA

## 2019-10-04 NOTE — TELEPHONE ENCOUNTER
[place] reviewed:  not reviewed/not due - Please review    [place]  [email or web address]    Routing refill request to provider for review/approval because:  Drug not on the [facility] refill protocol   RN unable to review  under this provider.    LETY HernandezN, RN  [employer] [facility]

## 2019-10-07 RX ORDER — CLONAZEPAM 0.5 MG/1
TABLET ORAL
Qty: 30 TABLET | Refills: 2 | Status: SHIPPED | OUTPATIENT
Start: 2019-10-07 | End: 2020-01-15

## 2019-12-31 DIAGNOSIS — J31.0 CHRONIC RHINITIS: ICD-10-CM

## 2019-12-31 DIAGNOSIS — K59.01 SLOW TRANSIT CONSTIPATION: ICD-10-CM

## 2019-12-31 DIAGNOSIS — E11.9 TYPE 2 DIABETES MELLITUS WITHOUT COMPLICATION, WITHOUT LONG-TERM CURRENT USE OF INSULIN (H): Chronic | ICD-10-CM

## 2019-12-31 DIAGNOSIS — N40.0 BENIGN PROSTATIC HYPERPLASIA, PRESENCE OF LOWER URINARY TRACT SYMPTOMS UNSPECIFIED: ICD-10-CM

## 2019-12-31 DIAGNOSIS — E53.8 VITAMIN B12 DEFICIENCY (NON ANEMIC): ICD-10-CM

## 2019-12-31 NOTE — TELEPHONE ENCOUNTER
"    tamsulosin (FLOMAX) 0.4 MG capsule 90 capsule 3 11/30/2018     Last Written Prescription Date:  11/30/2018  Last Fill Quantity: 90,  # refills: 3     senna (SENOKOT) 8.6 MG tablet 100 tablet 5 11/5/2018         Last Written Prescription Date:  11/05/2018  Last Fill Quantity: 100,  # refills: 5   Last office visit: 5/7/2019 with prescribing provider:     Future Office Visit:   Next 5 appointments (look out 90 days)    Noah 15, 2020 11:30 AM CST  [facility] [address] [facility] [address] [phone]           Requested Prescriptions   Pending Prescriptions Disp Refills     tamsulosin (FLOMAX) 0.4 MG capsule [Pharmacy Med Name: TAMSULOSIN HCL 0.4MG CAPS] 90 capsule 3     Sig: TAKE ONE CAPSULE BY MOUTH ONCE DAILY       Alpha Blockers Passed - 12/31/2019 10:14 AM        Passed - Blood pressure under 140/90 in past 12 months     BP Readings from Last 3 Encounters:   05/07/19 115/67   12/24/18 126/80   11/05/18 125/86                 Passed - Recent (12 mo) or future (30 days) visit within the authorizing provider's specialty     Patient has had an office visit with the authorizing provider or a provider within the authorizing providers department within the previous 12 mos or has a future within next 30 days. See \"Patient Info\" tab in inbasket, or \"Choose Columns\" in Meds & Orders section of the refill encounter.              Passed - Patient does not have Tadalafil, Vardenafil, or Sildenafil on their medication list        Passed - Medication is active on med list        Passed - Patient is 18 years of age or older        SM SENNA LAXATIVE 8.6 MG tablet [Pharmacy Med Name: SM SENNA LAXATIVE 8.6MG TABS] 100 tablet 4     Sig: TAKE TWO TABLETS BY MOUTH ONCE DAILY       There is no refill protocol information for this order          "

## 2020-01-02 RX ORDER — LANOLIN ALCOHOL/MO/W.PET/CERES
1000 CREAM (GRAM) TOPICAL DAILY
Qty: 100 TABLET | Refills: 0 | Status: SHIPPED | OUTPATIENT
Start: 2020-01-02 | End: 2020-07-22

## 2020-01-02 RX ORDER — SENNOSIDES 8.6 MG/1
TABLET ORAL
Qty: 180 TABLET | Refills: 3 | Status: ON HOLD | OUTPATIENT
Start: 2020-01-02 | End: 2021-03-23

## 2020-01-02 RX ORDER — TAMSULOSIN HYDROCHLORIDE 0.4 MG/1
CAPSULE ORAL
Qty: 90 CAPSULE | Refills: 3 | Status: SHIPPED | OUTPATIENT
Start: 2020-01-02 | End: 2021-04-07

## 2020-01-02 RX ORDER — FLUTICASONE PROPIONATE 50 MCG
2 SPRAY, SUSPENSION (ML) NASAL DAILY
Qty: 16 G | Refills: 0 | Status: SHIPPED | OUTPATIENT
Start: 2020-01-02 | End: 2020-03-28

## 2020-01-02 NOTE — TELEPHONE ENCOUNTER
Routing refill request to provider for review/approval because:  Last rxd by Milo.  Added in pharm comments to schedule.  Please authorize if appropriate.  Thanks,  Rocio Ortiz RN

## 2020-01-02 NOTE — TELEPHONE ENCOUNTER
"Prescription approved per [facility] Refill Protocol.  Lois Thomas RN    Last office visit: 5/7/2019 with prescribing provider:  Yes   Future Office Visit:   Next 5 appointments (look out 90 days)    Noah 15, 2020 11:30 AM CST  PHYSICAL with Lyle Quintana MD  [facility] [address] [facility] [address]  [phone]         Requested Prescriptions   Signed Prescriptions Disp Refills    fluticasone (FLONASE) 50 MCG/ACT nasal spray 16 g 0     Sig: Spray 2 sprays into both nostrils daily       Inhaled Steroids Protocol Passed - 12/31/2019  2:24 PM        Passed - Patient is age 12 or older        Passed - Recent (12 mo) or future (30 days) visit within the authorizing provider's specialty     Patient has had an office visit with the authorizing provider or a provider within the authorizing providers department within the previous 12 mos or has a future within next 30 days. See \"Patient Info\" tab in inbasket, or \"Choose Columns\" in Meds & Orders section of the refill encounter.              Passed - Medication is active on med list       sitagliptin (JANUVIA) 50 MG tablet 90 tablet 0     Sig: Take 1 tablet (50 mg) by mouth daily       DPP4 Inhibitors Protocol Failed - 12/31/2019  2:24 PM        Failed - Blood pressure less than 140/90 in past 6 months     BP Readings from Last 3 Encounters:   05/07/19 115/67   12/24/18 126/80   11/05/18 125/86                 Failed - LDL on file in past 12 months     Recent Labs   Lab Test 10/16/18   LDL 76             Failed - Microalbumin on file in past 12 months     Recent Labs   Lab Test 11/03/17  0913   MICROL 13   UMALCR 9.41             Failed - HgbA1C in past 3 or 6 months     If HgbA1C is 8 or greater, it needs to be on file within the past 3 months.  If less than 8, must be on file within the past 6 months.     Recent Labs   Lab Test 05/07/19  1502   A1C 6.9*             Failed - Normal serum creatinine in past 12 months     Recent " "Labs   Lab Test 11/03/17  0912   CR 0.78             Passed - Medication is active on med list        Passed - Patient is age 18 or older        Passed - Recent (6 mo) or future (30 days) visit within the authorizing provider's specialty     Patient had office visit in the last 6 months or has a visit in the next 30 days with authorizing provider.  See \"Patient Info\" tab in inbasket, or \"Choose Columns\" in Meds & Orders section of the refill encounter.           cyanocobalamin (VITAMIN B-12) 1000 MCG tablet 100 tablet 0     Sig: Take 1 tablet (1,000 mcg) by mouth daily       Vitamin Supplements (Adult) Protocol Passed - 12/31/2019  2:24 PM        Passed - High dose Vitamin D not ordered        Passed - Recent (12 mo) or future (30 days) visit within the authorizing provider's specialty     Patient has had an office visit with the authorizing provider or a provider within the authorizing providers department within the previous 12 mos or has a future within next 30 days. See \"Patient Info\" tab in inbasket, or \"Choose Columns\" in Meds & Orders section of the refill encounter.              Passed - Medication is active on med list        "

## 2020-01-15 ENCOUNTER — OFFICE VISIT (OUTPATIENT)
Dept: FAMILY MEDICINE | Facility: CLINIC | Age: 55
End: 2020-01-15
Payer: COMMERCIAL

## 2020-01-15 VITALS
OXYGEN SATURATION: 97 % | TEMPERATURE: 97.2 F | BODY MASS INDEX: 32.75 KG/M2 | WEIGHT: 262 LBS | HEART RATE: 72 BPM | SYSTOLIC BLOOD PRESSURE: 121 MMHG | DIASTOLIC BLOOD PRESSURE: 81 MMHG

## 2020-01-15 DIAGNOSIS — D50.0 IRON DEFICIENCY ANEMIA DUE TO CHRONIC BLOOD LOSS: ICD-10-CM

## 2020-01-15 DIAGNOSIS — E53.8 VITAMIN B12 DEFICIENCY (NON ANEMIC): ICD-10-CM

## 2020-01-15 DIAGNOSIS — F41.9 ANXIETY: ICD-10-CM

## 2020-01-15 DIAGNOSIS — I25.810 CORONARY ARTERY DISEASE INVOLVING AUTOLOGOUS ARTERY CORONARY BYPASS GRAFT WITHOUT ANGINA PECTORIS: ICD-10-CM

## 2020-01-15 DIAGNOSIS — Z00.00 ROUTINE GENERAL MEDICAL EXAMINATION AT A HEALTH CARE FACILITY: ICD-10-CM

## 2020-01-15 DIAGNOSIS — Z12.5 SCREENING FOR PROSTATE CANCER: ICD-10-CM

## 2020-01-15 DIAGNOSIS — E78.5 HYPERLIPIDEMIA LDL GOAL <100: ICD-10-CM

## 2020-01-15 DIAGNOSIS — F14.11 HISTORY OF COCAINE ABUSE (H): ICD-10-CM

## 2020-01-15 DIAGNOSIS — R35.1 BENIGN PROSTATIC HYPERPLASIA WITH NOCTURIA: ICD-10-CM

## 2020-01-15 DIAGNOSIS — K21.9 GASTROESOPHAGEAL REFLUX DISEASE WITHOUT ESOPHAGITIS: ICD-10-CM

## 2020-01-15 DIAGNOSIS — E78.5 HYPERLIPIDEMIA, UNSPECIFIED HYPERLIPIDEMIA TYPE: ICD-10-CM

## 2020-01-15 DIAGNOSIS — Z12.11 SPECIAL SCREENING FOR MALIGNANT NEOPLASMS, COLON: ICD-10-CM

## 2020-01-15 DIAGNOSIS — L20.84 INTRINSIC ECZEMA: ICD-10-CM

## 2020-01-15 DIAGNOSIS — E11.59 TYPE 2 DIABETES MELLITUS WITH OTHER CIRCULATORY COMPLICATION, WITHOUT LONG-TERM CURRENT USE OF INSULIN (H): Primary | ICD-10-CM

## 2020-01-15 DIAGNOSIS — G25.0 BENIGN ESSENTIAL TREMOR: ICD-10-CM

## 2020-01-15 DIAGNOSIS — G25.81 RESTLESS LEGS SYNDROME (RLS): Chronic | ICD-10-CM

## 2020-01-15 DIAGNOSIS — Z87.891 HISTORY OF TOBACCO ABUSE: ICD-10-CM

## 2020-01-15 DIAGNOSIS — N40.1 BENIGN PROSTATIC HYPERPLASIA WITH NOCTURIA: ICD-10-CM

## 2020-01-15 DIAGNOSIS — E55.9 VITAMIN D DEFICIENCY: ICD-10-CM

## 2020-01-15 LAB
ERYTHROCYTE [DISTWIDTH] IN BLOOD BY AUTOMATED COUNT: 13.6 % (ref 10–15)
HBA1C MFR BLD: 6.6 % (ref 0–5.6)
HCT VFR BLD AUTO: 44.5 % (ref 40–53)
HGB BLD-MCNC: 14.9 G/DL (ref 13.3–17.7)
MCH RBC QN AUTO: 31.2 PG (ref 26.5–33)
MCHC RBC AUTO-ENTMCNC: 33.5 G/DL (ref 31.5–36.5)
MCV RBC AUTO: 93 FL (ref 78–100)
PLATELET # BLD AUTO: 230 10E9/L (ref 150–450)
RBC # BLD AUTO: 4.78 10E12/L (ref 4.4–5.9)
VIT B12 SERPL-MCNC: 1041 PG/ML (ref 193–986)
WBC # BLD AUTO: 8.1 10E9/L (ref 4–11)

## 2020-01-15 PROCEDURE — G0438 PPPS, INITIAL VISIT: HCPCS | Performed by: INTERNAL MEDICINE

## 2020-01-15 PROCEDURE — 36415 COLL VENOUS BLD VENIPUNCTURE: CPT | Performed by: INTERNAL MEDICINE

## 2020-01-15 PROCEDURE — 82607 VITAMIN B-12: CPT | Performed by: INTERNAL MEDICINE

## 2020-01-15 PROCEDURE — 83036 HEMOGLOBIN GLYCOSYLATED A1C: CPT | Performed by: INTERNAL MEDICINE

## 2020-01-15 PROCEDURE — 84443 ASSAY THYROID STIM HORMONE: CPT | Performed by: INTERNAL MEDICINE

## 2020-01-15 PROCEDURE — 82306 VITAMIN D 25 HYDROXY: CPT | Performed by: INTERNAL MEDICINE

## 2020-01-15 PROCEDURE — 85027 COMPLETE CBC AUTOMATED: CPT | Performed by: INTERNAL MEDICINE

## 2020-01-15 PROCEDURE — G0103 PSA SCREENING: HCPCS | Performed by: INTERNAL MEDICINE

## 2020-01-15 PROCEDURE — 99207 C FOOT EXAM  NO CHARGE: CPT | Performed by: INTERNAL MEDICINE

## 2020-01-15 PROCEDURE — 80053 COMPREHEN METABOLIC PANEL: CPT | Performed by: INTERNAL MEDICINE

## 2020-01-15 PROCEDURE — 83550 IRON BINDING TEST: CPT | Performed by: INTERNAL MEDICINE

## 2020-01-15 PROCEDURE — 83540 ASSAY OF IRON: CPT | Performed by: INTERNAL MEDICINE

## 2020-01-15 PROCEDURE — 82043 UR ALBUMIN QUANTITATIVE: CPT | Performed by: INTERNAL MEDICINE

## 2020-01-15 PROCEDURE — 82274 ASSAY TEST FOR BLOOD FECAL: CPT | Performed by: INTERNAL MEDICINE

## 2020-01-15 PROCEDURE — 80061 LIPID PANEL: CPT | Performed by: INTERNAL MEDICINE

## 2020-01-15 RX ORDER — TRIAMCINOLONE ACETONIDE 1 MG/G
CREAM TOPICAL
Qty: 80 G | Refills: 1 | Status: SHIPPED | OUTPATIENT
Start: 2020-01-15 | End: 2022-02-08

## 2020-01-15 RX ORDER — CLONAZEPAM 0.5 MG/1
TABLET ORAL
Qty: 90 TABLET | Refills: 1 | Status: SHIPPED | OUTPATIENT
Start: 2020-01-15 | End: 2020-08-10

## 2020-01-15 RX ORDER — PRAMIPEXOLE DIHYDROCHLORIDE 0.75 MG/1
0.75 TABLET ORAL AT BEDTIME
Qty: 90 TABLET | Refills: 3 | Status: SHIPPED | OUTPATIENT
Start: 2020-01-15 | End: 2020-12-01

## 2020-01-15 ASSESSMENT — ACTIVITIES OF DAILY LIVING (ADL): CURRENT_FUNCTION: NO ASSISTANCE NEEDED
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
## 2020-01-15 NOTE — LETTER
[facility]  [address] [facility]  [address]  [place]  [address]  Tel: [phone]    February 4, 2020    Javier Markham  [address]    [facility] [address]      Alexx,    Enclosed your lab reports from your recent wellness exam.  Vitamin D is important for bone health your vitamin D was slightly low at 25, anything greater than 30 would be considered normal.  I would recommend adding a vitamin D3 supplement at thousand international units daily.  This is available over-the-counter at the drugstore.  The iron iron-binding capacity showed that your iron stores are normal, no sign of iron deficiency.  The PSA is a sensitive indicator for prostate cancer your PSA was in a very good normal range, no sign of prostate cancer.  The TSH is a sensitive indicator of thyroid function and your TSH is in a very good normal range.    The lipid profile showed that your total cholesterol was 135, better than 189 from a year ago.  The triglycerides which are related to carbohydrates in your diet were 179, much better than 373 from a year ago and anything less than 150 would be completely normal.  The HDL or good cholesterol was 39, slightly better than 38 from a year ago and anything greater than 39 would be completely normal.  The HDL cholesterol is intimately related to regular aerobic activity.  There is an inherited tendency which causes your triglycerides to be elevated and your HDL to be low.  Because of this inborn error and metabolism you need to be more aggressive and watching your carbohydrates and getting plenty of regular aerobic activity.  The most important factor is the LDL or bad cholesterol which was 60, better than 76 from a year ago and anything less than 70 is very good.    The comprehensive metabolic panel showed that your minerals and electrolytes, kidney function and liver function tests were all normal.  I would consider your glucose at 100 to be the upper limits of normal.  The  random urine albumin is a sensitive indicator of kidney function and your test was completely normal.  Vitamin B12 is important for multiple bodily functions and your vitamin B12 is in a very good range.  No changes in your supplements are necessary.    Your hemoglobin A1c was 6.6 better than 7.2 from a year ago.  Keep up the good work.    If you have any questions regarding these reports please let me know otherwise I recommend coming back to see me in 6 months.    Thanks,     Lyle Quintana MD / ZULEIKA COSME                                                                                      Enclosure: Lab Results  Results for orders placed or performed in visit on 01/15/20   CBC with platelets     Status: None   Result Value Ref Range    WBC 8.1 4.0 - 11.0 10e9/L    RBC Count 4.78 4.4 - 5.9 10e12/L    Hemoglobin 14.9 13.3 - 17.7 g/dL    Hematocrit 44.5 40.0 - 53.0 %    MCV 93 78 - 100 fl    MCH 31.2 26.5 - 33.0 pg    MCHC 33.5 31.5 - 36.5 g/dL    RDW 13.6 10.0 - 15.0 %    Platelet Count 230 150 - 450 10e9/L   Comprehensive metabolic panel     Status: Abnormal   Result Value Ref Range    Sodium 140 133 - 144 mmol/L    Potassium 4.1 3.4 - 5.3 mmol/L    Chloride 109 94 - 109 mmol/L    Carbon Dioxide 22 20 - 32 mmol/L    Anion Gap 9 3 - 14 mmol/L    Glucose 100 (H) 70 - 99 mg/dL    Urea Nitrogen 6 (L) 7 - 30 mg/dL    Creatinine 0.98 0.66 - 1.25 mg/dL    GFR Estimate 85 >60 mL/min/[1.73_m2]    GFR Estimate If Black >90 >60 mL/min/[1.73_m2]    Calcium 8.4 (L) 8.5 - 10.1 mg/dL    Bilirubin Total 0.3 0.2 - 1.3 mg/dL    Albumin 3.6 3.4 - 5.0 g/dL    Protein Total 6.3 (L) 6.8 - 8.8 g/dL    Alkaline Phosphatase 92 40 - 150 U/L    ALT 39 0 - 70 U/L    AST 18 0 - 45 U/L   Lipid panel reflex to direct LDL Fasting     Status: Abnormal   Result Value Ref Range    Cholesterol 135 <200 mg/dL    Triglycerides 179 (H) <150 mg/dL    HDL Cholesterol 39 (L) >39 mg/dL    LDL Cholesterol Calculated 60 <100 mg/dL    Non HDL Cholesterol 96  <130 mg/dL   Vitamin D Deficiency     Status: None   Result Value Ref Range    Vitamin D Deficiency screening 25 20 - 75 ug/L   TSH with free T4 reflex     Status: None   Result Value Ref Range    TSH 1.34 0.40 - 4.00 mU/L   Iron and iron binding capacity     Status: None   Result Value Ref Range    Iron 84 35 - 180 ug/dL    Iron Binding Cap 381 240 - 430 ug/dL    Iron Saturation Index 22 15 - 46 %   Vitamin B12     Status: Abnormal   Result Value Ref Range    Vitamin B12 1,041 (H) 193 - 986 pg/mL   Albumin Random Urine Quantitative with Creat Ratio     Status: None   Result Value Ref Range    Creatinine Urine 273 mg/dL    Albumin Urine mg/L 22 mg/L    Albumin Urine mg/g Cr 7.99 0 - 17 mg/g Cr   Hemoglobin A1c     Status: Abnormal   Result Value Ref Range    Hemoglobin A1C 6.6 (H) 0 - 5.6 %   Prostate spec antigen screen     Status: None   Result Value Ref Range    PSA 1.42 0 - 4 ug/L   Results for orders placed or performed in visit on 01/15/20   Fecal colorectal cancer screen (FIT)     Status: None   Result Value Ref Range    Occult Blood Scn FIT Negative NEG^Negative

## 2020-01-16 LAB
ALBUMIN SERPL-MCNC: 3.6 G/DL (ref 3.4–5)
ALP SERPL-CCNC: 92 U/L (ref 40–150)
ALT SERPL W P-5'-P-CCNC: 39 U/L (ref 0–70)
ANION GAP SERPL CALCULATED.3IONS-SCNC: 9 MMOL/L (ref 3–14)
AST SERPL W P-5'-P-CCNC: 18 U/L (ref 0–45)
BILIRUB SERPL-MCNC: 0.3 MG/DL (ref 0.2–1.3)
BUN SERPL-MCNC: 6 MG/DL (ref 7–30)
CALCIUM SERPL-MCNC: 8.4 MG/DL (ref 8.5–10.1)
CHLORIDE SERPL-SCNC: 109 MMOL/L (ref 94–109)
CHOLEST SERPL-MCNC: 135 MG/DL
CO2 SERPL-SCNC: 22 MMOL/L (ref 20–32)
CREAT SERPL-MCNC: 0.98 MG/DL (ref 0.66–1.25)
CREAT UR-MCNC: 273 MG/DL
DEPRECATED CALCIDIOL+CALCIFEROL SERPL-MC: 25 UG/L (ref 20–75)
GFR SERPL CREATININE-BSD FRML MDRD: 85 ML/MIN/{1.73_M2}
GLUCOSE SERPL-MCNC: 100 MG/DL (ref 70–99)
HDLC SERPL-MCNC: 39 MG/DL
IRON SATN MFR SERPL: 22 % (ref 15–46)
IRON SERPL-MCNC: 84 UG/DL (ref 35–180)
LDLC SERPL CALC-MCNC: 60 MG/DL
MICROALBUMIN UR-MCNC: 22 MG/L
MICROALBUMIN/CREAT UR: 7.99 MG/G CR (ref 0–17)
NONHDLC SERPL-MCNC: 96 MG/DL
POTASSIUM SERPL-SCNC: 4.1 MMOL/L (ref 3.4–5.3)
PROT SERPL-MCNC: 6.3 G/DL (ref 6.8–8.8)
PSA SERPL-ACNC: 1.42 UG/L (ref 0–4)
SODIUM SERPL-SCNC: 140 MMOL/L (ref 133–144)
TIBC SERPL-MCNC: 381 UG/DL (ref 240–430)
TRIGL SERPL-MCNC: 179 MG/DL
TSH SERPL DL<=0.005 MIU/L-ACNC: 1.34 MU/L (ref 0.4–4)

## 2020-01-19 LAB — HEMOCCULT STL QL IA: NEGATIVE

## 2020-03-27 DIAGNOSIS — L85.3 DRY SKIN: ICD-10-CM

## 2020-03-27 DIAGNOSIS — J31.0 CHRONIC RHINITIS: ICD-10-CM

## 2020-03-27 NOTE — TELEPHONE ENCOUNTER
"fluticasone (FLONASE) 50 MCG/ACT nasal spray  16 g  0  1/2/2020   No    Sig - Route: Spray 2 sprays into both nostrils daily      Last Written Prescription Date:  01/02/2020  Last Fill Quantity: 16 g,  # refills: 0   Last office visit: 1/15/2020 with prescribing provider:     Future Office Visit:      Requested Prescriptions   Pending Prescriptions Disp Refills     fluticasone (FLONASE) 50 MCG/ACT nasal spray [Pharmacy Med Name: FLUTICASONE NASAL SPRAY] 16 g 0     Sig: SPRAY 2 SPRAYS IN BOTH NOSTRILS ONCE DAILY       Nasal Allergy Protocol Passed - 3/27/2020  4:58 PM        Passed - Patient is age 12 or older        Passed - Recent (12 mo) or future (30 days) visit within the authorizing provider's specialty     Patient has had an office visit with the authorizing provider or a provider within the authorizing providers department within the previous 12 mos or has a future within next 30 days. See \"Patient Info\" tab in inbasket, or \"Choose Columns\" in Meds & Orders section of the refill encounter.              Passed - Medication is active on med list             "

## 2020-03-28 RX ORDER — LANOLIN ALCOHOL/MO/W.PET/CERES
CREAM (GRAM) TOPICAL
Qty: 226 G | Refills: 3 | Status: SHIPPED | OUTPATIENT
Start: 2020-03-28

## 2020-03-28 RX ORDER — FLUTICASONE PROPIONATE 50 MCG
SPRAY, SUSPENSION (ML) NASAL
Qty: 16 G | Refills: 0 | Status: SHIPPED | OUTPATIENT
Start: 2020-03-28 | End: 2020-04-27

## 2020-04-27 ENCOUNTER — TELEPHONE (OUTPATIENT)
Dept: FAMILY MEDICINE | Facility: CLINIC | Age: 55
End: 2020-04-27

## 2020-04-27 DIAGNOSIS — I25.10 CORONARY ARTERY DISEASE INVOLVING NATIVE CORONARY ARTERY OF NATIVE HEART WITHOUT ANGINA PECTORIS: Chronic | ICD-10-CM

## 2020-04-27 DIAGNOSIS — G25.81 RESTLESS LEGS SYNDROME (RLS): Chronic | ICD-10-CM

## 2020-04-27 DIAGNOSIS — E11.40 TYPE 2 DIABETES MELLITUS WITH DIABETIC NEUROPATHY, WITHOUT LONG-TERM CURRENT USE OF INSULIN (H): ICD-10-CM

## 2020-04-27 DIAGNOSIS — F41.9 ANXIETY: ICD-10-CM

## 2020-04-27 DIAGNOSIS — J31.0 CHRONIC RHINITIS: ICD-10-CM

## 2020-04-27 NOTE — TELEPHONE ENCOUNTER
Patient requesting 90 days for all meds requested, including the flonase. Thank you    Cassia Petit MA

## 2020-04-27 NOTE — TELEPHONE ENCOUNTER
Panel Management Review      Patient has the following on his problem list:     Diabetes    ASA: Passed    Last A1C  Lab Results   Component Value Date    A1C 6.6 01/15/2020    A1C 6.9 05/07/2019    A1C 7.2 10/16/2018    A1C 9.0 01/15/2018    A1C 9.6 11/03/2017     A1C tested: Passed    Last LDL:    Lab Results   Component Value Date    CHOL 135 01/15/2020     Lab Results   Component Value Date    HDL 39 01/15/2020     Lab Results   Component Value Date    LDL 60 01/15/2020     Lab Results   Component Value Date    TRIG 179 01/15/2020     Lab Results   Component Value Date    CHOLHDLRATIO 6.2 12/10/2014     Lab Results   Component Value Date    NHDL 96 01/15/2020       Is the patient on a Statin? YES             Is the patient on Aspirin? YES    Medications     [facility]MG CoA Reductase Inhibitors     rosuvastatin (CRESTOR) 40 MG tablet       Salicylates     aspirin (ASA) 81 MG EC tablet             Last three blood pressure readings:  BP Readings from Last 3 Encounters:   01/15/20 121/81   05/07/19 115/67   12/24/18 126/80       Date of last diabetes office visit: 01/15/2020     Tobacco History:     History   Smoking Status     Former Smoker     Packs/day: 0.50     Years: 25.00     Types: Cigarettes     Quit date: 11/1/2015   Smokeless Tobacco     Never Used           Composite cancer screening  Chart review shows that this patient is due/due soon for the following eye exam and flu test  Summary:    Patient is due/failing the following:   EYE EXAM and flu test    Action needed:   Did patient get eye exam in past 12 months and flu shot for 2019-20 season    Type of outreach:    Phone, spoke to patient.  eye exam was scheduled but due to COVID-19 and will rescheduled    Questions for provider review:    None                                                                                                                                    Cassia Petit MA       Chart routed to  .

## 2020-04-29 RX ORDER — FLUTICASONE PROPIONATE 50 MCG
2 SPRAY, SUSPENSION (ML) NASAL DAILY
Qty: 48 G | Refills: 1 | Status: ON HOLD | OUTPATIENT
Start: 2020-04-29 | End: 2020-07-31

## 2020-04-29 RX ORDER — GABAPENTIN 300 MG/1
CAPSULE ORAL
Qty: 810 CAPSULE | Refills: 3 | Status: CANCELLED | OUTPATIENT
Start: 2020-04-29

## 2020-04-29 RX ORDER — CLONAZEPAM 0.5 MG/1
TABLET ORAL
Qty: 90 TABLET | Refills: 1 | Status: CANCELLED | OUTPATIENT
Start: 2020-04-29

## 2020-04-29 NOTE — TELEPHONE ENCOUNTER
Pending Prescriptions:                       Disp   Refills    fluticasone (FLONASE) 50 MCG/ACT nasal sp*16 g   0            Sig: Spray 2 sprays into both nostrils daily    gabapentin (NEURONTIN) 300 MG capsule     810 ca*3            Sig: TAKE THREE CAPSULES BY MOUTH THREE TIMES A DAY    clonazePAM (KLONOPIN) 0.5 MG tablet       90 tab*1            Sig: TAKE ONE TABLET BY MOUTH EVERY NIGHT AT BEDTIME           AS NEEDED FOR RESTLESS LEGS    aspirin (ASA) 81 MG EC tablet             90 tab*3            Sig: Take 1 tablet (81 mg) by mouth daily    Gabapentin      Last Written Prescription Date:  05/07/2019  Last Fill Quantity: 810,   # refills: 3  Last Office Visit: 01/15/2020  Future Office visit:       Routing refill request to provider for review/approval because:  Drug not on the [facility], [facility] or  [facility] refill protocol or controlled substance    Clonazepam      Last Written Prescription Date:  01/15/2020  Last Fill Quantity: 90,   # refills: 1  Last Office Visit: 01/15/2020  Future Office visit:       Routing refill request to provider for review/approval because:  Drug not on the [facility], [facility] or  [facility] refill protocol or controlled substance

## 2020-04-29 NOTE — TELEPHONE ENCOUNTER
Prescription approved per [facility] Refill Protocol Fluticasone and ASA.    Spoke to pharmacy because he recently received 3 month supply Gabapentin on 3/13/20. NOT DUE FOR REFILL UNTIL June.  Refill available for Clonazepam, but not able to fill until Friday, 5/3/2020.     MyChart not set up.  Will call patient with the above information.    Zahraa Garg RN on 4/29/2020 at 12:08 PM

## 2020-05-22 ENCOUNTER — TRANSFERRED RECORDS (OUTPATIENT)
Dept: HEALTH INFORMATION MANAGEMENT | Facility: CLINIC | Age: 55
End: 2020-05-22

## 2020-05-22 LAB — RETINOPATHY: NEGATIVE

## 2020-06-09 DIAGNOSIS — E11.40 TYPE 2 DIABETES MELLITUS WITH DIABETIC NEUROPATHY, WITHOUT LONG-TERM CURRENT USE OF INSULIN (H): ICD-10-CM

## 2020-06-09 NOTE — TELEPHONE ENCOUNTER
gabapentin (NEURONTIN) 300 MG capsule  810 capsule  3  5/7/2019        Last Written Prescription Date:  5/7/2019  Last Fill: 810,   # refills: 3  Last Office Visit: 1/15/2020  Future Office visit: Unknown       Routing refill request to provider for review/approval because:  Drug not on the [facility], [facility] or [facility] refill protocol or controlled substance

## 2020-06-09 NOTE — TELEPHONE ENCOUNTER
Routing refill request to provider for review/approval because:  Drug not on the [facility] refill protocol   Ping FRIEDMAN RN

## 2020-06-10 RX ORDER — GABAPENTIN 300 MG/1
CAPSULE ORAL
Qty: 810 CAPSULE | Refills: 3 | Status: SHIPPED | OUTPATIENT
Start: 2020-06-10 | End: 2021-06-04

## 2020-06-17 LAB
ALT SERPL-CCNC: 50 IU/L (ref 8–45)
CHOLEST SERPL-MCNC: 248 MG/DL (ref 100–199)
HBA1C MFR BLD: 7.4 % (ref 0–5.7)
HDLC SERPL-MCNC: 42 MG/DL
LDLC SERPL CALC-MCNC: 137 MG/DL
NONHDLC SERPL-MCNC: 206 MG/DL
TRIGL SERPL-MCNC: 588 MG/DL

## 2020-06-22 ENCOUNTER — TRANSFERRED RECORDS (OUTPATIENT)
Dept: HEALTH INFORMATION MANAGEMENT | Facility: CLINIC | Age: 55
End: 2020-06-22

## 2020-07-02 ENCOUNTER — OFFICE VISIT (OUTPATIENT)
Dept: SURGERY | Facility: CLINIC | Age: 55
End: 2020-07-02
Payer: COMMERCIAL

## 2020-07-02 ENCOUNTER — PREP FOR PROCEDURE (OUTPATIENT)
Dept: SURGERY | Facility: CLINIC | Age: 55
End: 2020-07-02

## 2020-07-02 VITALS
WEIGHT: 270 LBS | SYSTOLIC BLOOD PRESSURE: 130 MMHG | HEIGHT: 75 IN | BODY MASS INDEX: 33.57 KG/M2 | DIASTOLIC BLOOD PRESSURE: 78 MMHG | HEART RATE: 90 BPM

## 2020-07-02 DIAGNOSIS — K40.90 RIGHT INGUINAL HERNIA: Primary | ICD-10-CM

## 2020-07-02 DIAGNOSIS — Z11.59 ENCOUNTER FOR SCREENING FOR OTHER VIRAL DISEASES: Primary | ICD-10-CM

## 2020-07-02 PROCEDURE — 99204 OFFICE O/P NEW MOD 45 MIN: CPT | Performed by: SURGERY

## 2020-07-02 ASSESSMENT — MIFFLIN-ST. JEOR: SCORE: 2135.34

## 2020-07-02 NOTE — PROGRESS NOTES
[facility]  Surgery Consultation    Primary care provider:  Lyle Quintana [phone]    HPI: This patient is a 55-year-old gentleman known to me from prior open left inguinal hernia repair with mesh in 2016 who presents as a self-referral for possible right inguinal hernia.  He states that for several months he has been experiencing intense discomfort in his right inguinal area.  He also has significant discomfort radiating down into the right testicle.  This is worse with any type of physical activity.  He also feels some radiation of pain down the medial thigh.  He has this discomfort with coughing and sneezing.  Also with straining to have a bowel movement or heavy lifting.  He has had no signs or symptoms to suggest incarceration or strangulation.  No GI or bowel obstruction symptoms.    PMH:   has a past medical history of Anxiety, CAD (coronary artery disease) (Dec 2014), DM2 (diabetes mellitus, type 2) (H) (Dx June 2015), GERD (gastroesophageal reflux disease), Heart attack (H) (12/2014, 6/2015, 7/4/2015), History of cocaine abuse (H), History of tobacco abuse, Hyperlipidemia, Hyperlipidemia LDL goal <100, Incomplete RBBB, Inguinal hernia, Numbness and tingling, Prediabetes (Dec 2014), Restless leg syndrome, S/P CABG x 3 (Dec 2014), Severe obesity (BMI 35.0-35.9 with comorbidity) (H) (12/24/2018), Sleep apnea, and Stented coronary artery.  PSH:    has a past surgical history that includes NONSPECIFIC PROCEDURE (age 15); CABG, VEIN, THREE (12/23/2014); Herniorrhaphy inguinal (Left, 9/20/2016); DRUG-ELUTING STENTS, SINGLE (10/19/2018); and Stent (07/2015).  Social History:   reports that he quit smoking about 4 years ago. His smoking use included cigarettes. He has a 12.50 pack-year smoking history. He has never used smokeless tobacco. He reports that he does not drink alcohol or use drugs.  Family History:  family history includes Breast Cancer in his maternal grandmother and mother;  "Coronary Artery Disease (age of onset: 52) in his father; Dementia in his mother; Diabetes in his maternal grandfather and mother; Heart Disease in his paternal uncle; Macular Degeneration in his mother.  Medications/Allergies: Home medications and allergies reviewed.    ROS:  The 10 point Review of Systems is negative other than noted in the HPI.    Physical Exam:  /78   Pulse 90   Ht 1.905 m (6' 3\")   Wt 122.5 kg (270 lb)   BMI 33.75 kg/m    GENERAL: Generally appears well.  Psych: Alert and Oriented.  Normal affect  Eyes: Sclera clear  Respiratory:  Lungs clear to ausculation bilaterally with good air excursion  Cardiovascular:  Regular Rate and Rhythm with no murmurs gallops or rubs, normal peripheral pulses  GI: Abdomen Non Distended Non-Tender  No hernias palpated..  Groin- I examined the patient in both the standing and supine positions. Right Groin- Small likely indirect inguinal hernia.  Hernia was easily reduciable.  Tenderness palpated in groin. Left Groin- No hernia Palpated.  No tenderness on palpation. No scrotal or testicle abnormalities.  Lymphatic/Hematologic/Immune:  No femoral or cervical lymphadenopathy.  Integumentary:  No rashes  Neurological: grossly intact     All new lab and imaging data was reviewed.     Impression and Plan:  Patient is a 55 year old male with right inguinal hernia    PLAN: Plan for outpatient robotic assisted repair.  I discussed the pathophysiology of hernias and options for repair including laparoscopic VS open.  The risks associated with the procedure including, but not limited to, recurrence, nerve entrapment or injury, persistence of pain, injury to the bowel/bladder, infertility, hematoma, mesh migration, mesh infection, MI, and PE were discussed with the patient. He indicated understanding of the discussion, asked appropriate questions, and provided consent. Signs and symptoms of incarceration were discussed. If these develop in the interim, he promises to " call or go straight to the ER. I have provided the patient with an information pamphlet.      Thank you very much for this consult.    Haim Green M.D.  [facility]  [phone]    Please route or send letter to:  Primary Care Provider (PCP) and Referring Provider

## 2020-07-06 ENCOUNTER — TELEPHONE (OUTPATIENT)
Dept: SURGERY | Facility: CLINIC | Age: 55
End: 2020-07-06

## 2020-07-06 NOTE — TELEPHONE ENCOUNTER
Type of surgery: Robotic right inguinal hernia repair  Location of surgery: [facility]  Date and time of surgery: 7/31/20 at 7:30am  Surgeon: Dr. Haim Green  Pre-Op Appt Date: Patient to schedule  Post-Op Appt Date: Patient to schedule   Packet sent out: Yes  Pre-cert/Authorization completed:  Not Applicable  Date: 7/6/20

## 2020-07-13 DIAGNOSIS — I25.810 CORONARY ARTERY DISEASE INVOLVING CORONARY BYPASS GRAFT OF NATIVE HEART WITHOUT ANGINA PECTORIS: Chronic | ICD-10-CM

## 2020-07-14 DIAGNOSIS — K21.9 GASTROESOPHAGEAL REFLUX DISEASE WITHOUT ESOPHAGITIS: Chronic | ICD-10-CM

## 2020-07-15 RX ORDER — METOPROLOL SUCCINATE 25 MG/1
TABLET, EXTENDED RELEASE ORAL
Qty: 180 TABLET | Refills: 3 | Status: SHIPPED | OUTPATIENT
Start: 2020-07-15 | End: 2021-10-11

## 2020-07-16 NOTE — TELEPHONE ENCOUNTER
Routing refill request to provider for review/approval because:  Not on [facility] protocol is on plavix and but not on protonix   Please authorize if appropriate.  Thanks,  Rocio Ortiz RN

## 2020-07-17 DIAGNOSIS — E55.9 VITAMIN D DEFICIENCY: ICD-10-CM

## 2020-07-17 DIAGNOSIS — E61.1 IRON DEFICIENCY: ICD-10-CM

## 2020-07-20 RX ORDER — CHOLECALCIFEROL (VITAMIN D3) 50 MCG
TABLET ORAL
Qty: 100 TABLET | Refills: 0 | Status: SHIPPED | OUTPATIENT
Start: 2020-07-20 | End: 2020-10-27

## 2020-07-20 RX ORDER — FERROUS SULFATE 325(65) MG
TABLET ORAL
Qty: 90 TABLET | Refills: 0 | Status: SHIPPED | OUTPATIENT
Start: 2020-07-20 | End: 2020-10-27

## 2020-07-21 DIAGNOSIS — F41.9 ANXIETY: Chronic | ICD-10-CM

## 2020-07-21 DIAGNOSIS — E11.59 TYPE 2 DIABETES MELLITUS WITH OTHER CIRCULATORY COMPLICATION, WITHOUT LONG-TERM CURRENT USE OF INSULIN (H): ICD-10-CM

## 2020-07-21 DIAGNOSIS — I25.810 CORONARY ARTERY DISEASE INVOLVING CORONARY BYPASS GRAFT OF NATIVE HEART, ANGINA PRESENCE UNSPECIFIED: ICD-10-CM

## 2020-07-22 DIAGNOSIS — E53.8 VITAMIN B12 DEFICIENCY (NON ANEMIC): ICD-10-CM

## 2020-07-22 RX ORDER — LANOLIN ALCOHOL/MO/W.PET/CERES
CREAM (GRAM) TOPICAL
Qty: 100 TABLET | Refills: 0 | Status: SHIPPED | OUTPATIENT
Start: 2020-07-22 | End: 2021-10-11

## 2020-07-22 RX ORDER — HYDROXYZINE HYDROCHLORIDE 25 MG/1
TABLET, FILM COATED ORAL
Qty: 120 TABLET | Refills: 5 | Status: SHIPPED | OUTPATIENT
Start: 2020-07-22 | End: 2021-07-28

## 2020-07-22 RX ORDER — EMPAGLIFLOZIN 10 MG/1
TABLET, FILM COATED ORAL
Qty: 90 TABLET | Refills: 3 | Status: SHIPPED | OUTPATIENT
Start: 2020-07-22 | End: 2021-07-28

## 2020-07-23 ENCOUNTER — OFFICE VISIT (OUTPATIENT)
Dept: FAMILY MEDICINE | Facility: CLINIC | Age: 55
End: 2020-07-23
Payer: COMMERCIAL

## 2020-07-23 VITALS
DIASTOLIC BLOOD PRESSURE: 92 MMHG | BODY MASS INDEX: 34.94 KG/M2 | OXYGEN SATURATION: 97 % | HEIGHT: 75 IN | HEART RATE: 105 BPM | SYSTOLIC BLOOD PRESSURE: 142 MMHG | TEMPERATURE: 97.1 F | WEIGHT: 281 LBS

## 2020-07-23 DIAGNOSIS — E78.5 HYPERLIPIDEMIA, UNSPECIFIED HYPERLIPIDEMIA TYPE: Chronic | ICD-10-CM

## 2020-07-23 DIAGNOSIS — J31.0 CHRONIC RHINITIS: ICD-10-CM

## 2020-07-23 DIAGNOSIS — I25.810 CORONARY ARTERY DISEASE INVOLVING AUTOLOGOUS ARTERY CORONARY BYPASS GRAFT WITHOUT ANGINA PECTORIS: Chronic | ICD-10-CM

## 2020-07-23 DIAGNOSIS — I10 BENIGN ESSENTIAL HYPERTENSION: Chronic | ICD-10-CM

## 2020-07-23 DIAGNOSIS — E11.59 TYPE 2 DIABETES MELLITUS WITH OTHER CIRCULATORY COMPLICATION, WITHOUT LONG-TERM CURRENT USE OF INSULIN (H): ICD-10-CM

## 2020-07-23 DIAGNOSIS — Z01.818 PREOP GENERAL PHYSICAL EXAM: Primary | ICD-10-CM

## 2020-07-23 DIAGNOSIS — I25.810 CORONARY ARTERY DISEASE INVOLVING CORONARY BYPASS GRAFT OF NATIVE HEART WITHOUT ANGINA PECTORIS: Chronic | ICD-10-CM

## 2020-07-23 LAB
ERYTHROCYTE [DISTWIDTH] IN BLOOD BY AUTOMATED COUNT: 13.4 % (ref 10–15)
HBA1C MFR BLD: 7.1 % (ref 0–5.6)
HCT VFR BLD AUTO: 42.8 % (ref 40–53)
HGB BLD-MCNC: 15.5 G/DL (ref 13.3–17.7)
MCH RBC QN AUTO: 32.4 PG (ref 26.5–33)
MCHC RBC AUTO-ENTMCNC: 36.2 G/DL (ref 31.5–36.5)
MCV RBC AUTO: 89 FL (ref 78–100)
PLATELET # BLD AUTO: 216 10E9/L (ref 150–450)
RBC # BLD AUTO: 4.79 10E12/L (ref 4.4–5.9)
WBC # BLD AUTO: 9.5 10E9/L (ref 4–11)

## 2020-07-23 PROCEDURE — 83036 HEMOGLOBIN GLYCOSYLATED A1C: CPT | Performed by: INTERNAL MEDICINE

## 2020-07-23 PROCEDURE — 99215 OFFICE O/P EST HI 40 MIN: CPT | Performed by: INTERNAL MEDICINE

## 2020-07-23 PROCEDURE — 36415 COLL VENOUS BLD VENIPUNCTURE: CPT | Performed by: INTERNAL MEDICINE

## 2020-07-23 PROCEDURE — 85027 COMPLETE CBC AUTOMATED: CPT | Performed by: INTERNAL MEDICINE

## 2020-07-23 PROCEDURE — 80048 BASIC METABOLIC PNL TOTAL CA: CPT | Performed by: INTERNAL MEDICINE

## 2020-07-23 PROCEDURE — 93000 ELECTROCARDIOGRAM COMPLETE: CPT | Performed by: INTERNAL MEDICINE

## 2020-07-23 RX ORDER — FLUTICASONE PROPIONATE 50 MCG
2 SPRAY, SUSPENSION (ML) NASAL DAILY
Qty: 54.6 ML | Refills: 3 | Status: SHIPPED | OUTPATIENT
Start: 2020-07-23 | End: 2022-01-28

## 2020-07-23 RX ORDER — NITROGLYCERIN 0.4 MG/1
TABLET SUBLINGUAL
Qty: 25 TABLET | Refills: 1 | Status: SHIPPED | OUTPATIENT
Start: 2020-07-23

## 2020-07-23 ASSESSMENT — MIFFLIN-ST. JEOR: SCORE: 2185.24

## 2020-07-23 NOTE — PROGRESS NOTES
[facility]  [address] [facility] [address]  [phone]  Dept: [phone]    PRE-OP EVALUATION:  Today's date: 2020    Javier Markham (: 1965) presents for pre-operative evaluation assessment as requested by Haim Allen MD.  He requires evaluation and anesthesia risk assessment prior to undergoing surgery/procedure for treatment of Right inguinal hernia.    Proposed Surgery/ Procedure: ROBOTIC ASSITED RIGHT INGUINAL HERNIA REPAIR   Date of Surgery/ Procedure: 2020  Time of Surgery/ Procedure: 7:30 AM  Hospital/Surgical Facility: [facility]  Surgery Fax Number: Note does not need to be faxed, will be available electronically in Epic.  Primary Physician: Lyle Quintana  Type of Anesthesia Anticipated: General    Preoperative Questionnaire:   YES - HAVE YOU EVER HAD A HEART ATTACK OR STROKE? 3 heart attacks  YES - HAVE YOU EVER HAD SURGERY ON YOUR HEART OR BLOOD VESSELS, SUCH AS A STENT, CORONARY (HEART) BYPASS, OR SURGERY ON AN ARTERY IN THE HEAD, NECK, HEART, OR LEGS? stents  YES - DO YOU HAVE CHEST PAIN WHEN YOU ARE PHYSICALLY ACTIVE? Occasionally   No - Do you have a history of heart failure?  No - Do you currently have a cold, bronchitis, or symptoms of other respiratory (head and chest) infections?  YES - DO YOU HAVE A COUGH, SHORTNESS OF BREATH, OR WHEEZING? Slight cough   No - Do you or anyone in your family have a history of blood clots?  No - Do you or anyone in your family have a serious bleeding problem, such as long-lasting bleeding after surgeries or cuts?  YES - HAVE YOU EVERY HAD ANEMIA OR BEEN TOLD TO TAKE IRON PILLS? Currently takes iron pills   No - Have you had any abnormal blood loss such as black, tarry or bloody stools, or abnormal vaginal bleeding?  YES - HAVE YOU EVER HAD A BLOOD TRANSFUSION? When he had heart surgery   Yes - Are you willing to have a blood transfusion if it is medically needed before, during, or  after your surgery?  No - Have you or anyone in your family ever had problems with anesthesia (sedation for surgery)?  No - Do you have sleep apnea, excessive snoring, or daytime drowsiness?   No - Do you have any artifical heart valves or other implanted medical devices, such as a pacemaker, defibrillator, or continuous glucose monitor?  No - Do you have any artifical joints?  No - Are you allergic to latex?  No - Is there any chance that you may be pregnant?    Patient has a Health Care Directive or Living Will:  NO    HPI:     HPI related to upcoming procedure:   55-year-old white male with adult onset diabetes, coronary artery disease status post CABG, hyperlipidemia and hypertension who presents for preoperative evaluation prior to his right inguinal hernia.          MEDICAL HISTORY:     Patient Active Problem List    Diagnosis Date Noted     Right inguinal hernia 07/02/2020     Priority: Medium     Added automatically from request for surgery [id number]       Hyperlipidemia LDL goal <100      Priority: Medium     chronic hypertriglyceredemia, regular elevation 300-400       Severe obesity (BMI 35.0-35.9 with comorbidity) (H) 12/24/2018     Priority: Medium     BMI 37.5       Tinnitus, left 12/01/2018     Priority: Medium     Right knee pain, unspecified chronicity 05/31/2017     Priority: Medium     Benign essential tremor 05/31/2017     Priority: Medium     History of tobacco abuse 09/02/2016     Priority: Medium     Incomplete RBBB      Priority: Medium     Benign essential hypertension; goal < 140/90      Priority: Medium     Class 1 obesity with serious comorbidity and body mass index (BMI) of 34.0 to 34.9 in adult, unspecified obesity type      Priority: Medium     BPH (benign prostatic hypertrophy) 08/12/2015     Priority: Medium     Health Care Home 07/09/2015     Priority: Medium     Restless legs syndrome (RLS) 06/18/2015     Priority: Medium     Anxiety      Priority: Medium     Type 2 diabetes mellitus  with circulatory disorder, without long-term current use of insulin (H) 06/15/2015     Priority: Medium     Hyperlipidemia      Priority: Medium     Coronary artery disease involving autologous artery coronary bypass graft without angina pectoris 12/01/2014     Priority: Medium     History of cocaine use in remission 11/12/2014     Priority: Medium     Esophageal reflux 02/02/2009     Priority: Medium      Past Medical History:   Diagnosis Date     Anxiety      CAD (coronary artery disease) Dec 2014    s/p CABGx3 Dec 2014 and later RCA stent July 2015     DM2 (diabetes mellitus, type 2) (H) Dx June 2015    A1c 6.5% at time of dx     GERD (gastroesophageal reflux disease)      Heart attack (H) 12/2014, 6/2015, 7/4/2015    x3     History of cocaine abuse (H)     Smoked crack cocaine (remission since Nov 2014)     History of tobacco abuse      Hyperlipidemia      Hyperlipidemia LDL goal <100     chronic hypertriglyceredemia, regular elevation 300-400     Incomplete RBBB      Inguinal hernia     Left     Numbness and tingling     diabetic neuropathy     Prediabetes Dec 2014     Restless leg syndrome      S/P CABG x 3 Dec 2014     Severe obesity (BMI 35.0-35.9 with comorbidity) (H) 12/24/2018    BMI 37.5     Sleep apnea     mild, does not use CPAP     Stented coronary artery      Past Surgical History:   Procedure Laterality Date     C CABG, VEIN, THREE  12/23/2014    [facility]     C NONSPECIFIC PROCEDURE  age 15    both feet bone spur removal in the arch of the foot     [facility] DRUG-ELUTING STENTS, SINGLE  10/19/2018    Left circumflex at [facility]     HERNIORRHAPHY INGUINAL Left 9/20/2016    Procedure: HERNIORRHAPHY INGUINAL;  Surgeon: Haim Green MD;  Location:  [facility]     STENT  07/2015    RCA stent     Current Outpatient Medications   Medication Sig Dispense Refill     albuterol (PROAIR HFA/PROVENTIL HFA/VENTOLIN HFA) 108 (90 Base) MCG/ACT inhaler Inhale 2 puffs into the lungs every 4 hours as needed for shortness of  breath / dyspnea or wheezing 8.5 g 3     Alcohol Swabs (B-D SINGLE USE SWABS REGULAR) PADS USE AS NEEDED 100 each 3     aspirin (ASA) 81 MG EC tablet Take 1 tablet (81 mg) by mouth daily 90 tablet 3     blood glucose monitoring (NO BRAND SPECIFIED) meter device kit Use to test blood sugar 1 times daily or as directed. ONE TOUCH VERIO. 1 kit 0     clonazePAM (KLONOPIN) 0.5 MG tablet TAKE ONE TABLET BY MOUTH EVERY NIGHT AT BEDTIME AS NEEDED FOR RESTLESS LEGS 90 tablet 1     clopidogrel (PLAVIX) 75 MG tablet Take 1 tablet (75 mg) by mouth daily 90 tablet 3     cyanocobalamin (VITAMIN B-12) 1000 MCG tablet Take 1 tablet by mouth daily 100 tablet 0     ezetimibe (ZETIA) 10 MG tablet Take 1 tablet (10 mg) by mouth At Bedtime 90 tablet 1     fenofibrate 54 MG tablet Take 1 tablet (54 mg) by mouth daily 1 tablet 0     ferrous sulfate (FEROSUL) 325 (65 Fe) MG tablet TAKE ONE TABLET BY MOUTH EVERY MORNING WITH BREAKFAST 90 tablet 0     fluticasone (FLONASE) 50 MCG/ACT nasal spray Spray 2 sprays into both nostrils daily 48 g 1     gabapentin (NEURONTIN) 300 MG capsule TAKE THREE CAPSULES BY MOUTH THREE TIMES A  capsule 3     hydrOXYzine (ATARAX) 25 MG tablet TAKE ONE TO TWO TABLETS BY MOUTH EVERY 6 HOURS AS NEEDED FOR ANXIETY 120 tablet 5     ibuprofen (ADVIL,MOTRIN) 800 MG tablet Take 1 tablet (800 mg) by mouth every 8 hours as needed for moderate pain 60 tablet 1     JARDIANCE 10 MG TABS tablet TAKE ONE TABLET BY MOUTH ONCE DAILY 90 tablet 3     loratadine (CLARITIN) 10 MG tablet Take 1 tablet (10 mg) by mouth daily 90 tablet 3     MAPAP 500 MG tablet TAKE TWO TABLETS BY MOUTH EVERY 8 HOURS AS NEEDED FOR MILD PAIN 100 tablet 5     metFORMIN (GLUCOPHAGE) 500 MG tablet TAKE TWO TABLETS BY MOUTH TWICE A DAY WITH MEALS 360 tablet 3     metoprolol succinate ER (TOPROL-XL) 25 MG 24 hr tablet TAKE ONE TABLET BY MOUTH TWICE A  tablet 3     nitroGLYcerin (NITROSTAT) 0.4 MG sublingual tablet PLACE 1 TABLET UNDER THE  TONGUE AT THE ONSET OF CHEST PAIN. MAY REPEAT EVERY 5 MINUTES AS NEEDED FOR A TOTAL OF 3 DOSES. 25 tablet 1     omega-3 acid ethyl esters (LOVAZA) 1 g capsule Take 2 capsules (2 g) by mouth 2 times daily 360 capsule 3     omeprazole (PRILOSEC) 20 MG DR capsule TAKE ONE CAPSULE BY MOUTH TWO TIMES A DAY, 30 TO 60 MINUTES BEFORE A MEAL. 180 capsule 3     ONE TOUCH VERIO IQ test strip TEST ONCE DAILY OR AS DIRECTED 100 each 5     ONETOUCH DELICA LANCETS 33G MISC 1 Act by Device route 2 times daily 100 each 5     polyethylene glycol (MIRALAX/GLYCOLAX) powder Take 17 g by mouth daily as needed for constipation 500 g 5     pramipexole (MIRAPEX) 0.75 MG tablet Take 1 tablet (0.75 mg) by mouth At Bedtime 90 tablet 3     rosuvastatin (CRESTOR) 40 MG tablet Take 1 tablet (40 mg) by mouth daily       sitagliptin (JANUVIA) 50 MG tablet Take 1 tablet (50 mg) by mouth daily 90 tablet 0     Skin Protectants, Misc. (HYDROCERIN) CREA APPLY TOPICALLY AS NEEDED FOR DRY SKIN 226 g 3     SM SENNA LAXATIVE 8.6 MG tablet TAKE TWO TABLETS BY MOUTH ONCE DAILY 180 tablet 3     sodium chloride (DEEP SEA NASAL SPRAY) 0.65 % nasal spray SPRAY 1 SPRAY IN EACH NOSTRIL FOUR TIMES A DAY AS NEEDED FOR CONGESTION 44 mL 3     tamsulosin (FLOMAX) 0.4 MG capsule TAKE ONE CAPSULE BY MOUTH ONCE DAILY 90 capsule 3     triamcinolone (KENALOG) 0.1 % external cream Apply sparingly once or twice per day as needed to affected area until the skin is better, then stop; REPEAT AS NEEDED 80 g 1     vitamin D3 (CHOLECALCIFEROL) 50 mcg (2000 units) tablet TAKE ONE TABLET BY MOUTH ONCE DAILY 100 tablet 0     OTC products: None, except as noted above    Allergies   Allergen Reactions     Flumist [Influenza Vaccine Live]      Vomiting, felt like he had the flu.     Perfume      Soap      Perfume in soap-reaction excema      Latex Allergy: NO    Social History     Tobacco Use     Smoking status: Former Smoker     Packs/day: 0.50     Years: 25.00     Pack years: 12.50      "Types: Cigarettes     Last attempt to quit: 2015     Years since quittin.7     Smokeless tobacco: Never Used   Substance Use Topics     Alcohol use: No     Alcohol/week: 0.0 standard drinks     History   Drug Use No     Comment: Past coccaine 2 weeks ago as of 12; no h/o IVDA. Smoked cocaine (did not snort)       REVIEW OF SYSTEMS:   CONSTITUTIONAL: NEGATIVE for fever, chills, change in weight  INTEGUMENTARY/SKIN: NEGATIVE for worrisome rashes, moles or lesions  EYES: NEGATIVE for vision changes or irritation  ENT/MOUTH: NEGATIVE for ear, mouth and throat problems  RESP: NEGATIVE for significant cough or SOB  BREAST: NEGATIVE for masses, tenderness or discharge  CV: NEGATIVE for chest pain, palpitations or peripheral edema  GI: NEGATIVE for nausea, abdominal pain, heartburn, or change in bowel habits  : NEGATIVE for frequency, dysuria, or hematuria  MUSCULOSKELETAL: NEGATIVE for significant arthralgias or myalgia  NEURO: NEGATIVE for weakness, dizziness or paresthesias  ENDOCRINE: NEGATIVE for temperature intolerance, skin/hair changes  HEME: NEGATIVE for bleeding problems  PSYCHIATRIC: NEGATIVE for changes in mood or affect    EXAM:   BP (!) 142/92 (Patient Position: Sitting)   Pulse 105   Temp 97.1  F (36.2  C) (Temporal)   Ht 1.905 m (6' 3\")   Wt 127.5 kg (281 lb)   SpO2 97%   BMI 35.12 kg/m      GENERAL APPEARANCE: Obese healthy, alert and no distress     EYES: EOMI,  PERRL     HENT: ear canals and TM's normal and nose and mouth without ulcers or lesions     NECK: no adenopathy, no asymmetry, masses, or scars and thyroid normal to palpation     RESP: lungs clear to auscultation - no rales, rhonchi or wheezes     CV: regular rates and rhythm, normal S1 S2, no S3 or S4 and no murmur, click or rub     ABDOMEN:  soft, nontender, no HSM or masses and bowel sounds normal  Small right inguinal hernia noted     MS: extremities normal- no gross deformities noted, no evidence of inflammation in " joints, FROM in all extremities.  Trace to 1+ pretibial edema bilaterally moderate right varicose veins which are nontender and without any cords     SKIN: no suspicious lesions bilateral wrist dermatitis related to dryness or irritant from previous soaps.  Started new routine with Dove soap for sensitive skin with as needed compounded Eucerin/triamcinolone and Eucerin cream.     NEURO: Normal strength and tone, sensory exam grossly normal, mentation intact and speech normal     PSYCH: mentation appears normal. and affect normal/bright     LYMPHATICS: No cervical axillary or inguinal adenopathy    DIAGNOSTICS:       Recent Labs EKG, BMP, CBC, hemoglobin A1c       IMPRESSION:       The proposed surgical procedure is considered LOW risk.    REVISED CARDIAC RISK INDEX  The patient has the following serious cardiovascular risks for perioperative complications such as (MI, PE, VFib and 3  AV Block):  No serious cardiac risks  INTERPRETATION: 0 risks: Class I (very low risk - 0.4% complication rate)    The patient has the following additional risks for perioperative complications:  No identified additional risks      ICD-10-CM    1. Preop general physical exam  Z01.818        RECOMMENDATIONS:   Patient is starting lisinopril 10 mg nightly.      --Patient is to take all scheduled medications on the day of surgery EXCEPT for modifications listed below.  Aspirin, ibuprofen, omega-3 and Plavix on hold for 7 days    The only medications scheduled for taking on the day of surgery would be Neurontin, metoprolol and Prilosec.  He is holding all other medications.    APPROVAL GIVEN to proceed with proposed procedure, without further diagnostic evaluation       Signed Electronically by: Lyle Quintana MD    Copy of this evaluation report is provided to requesting physician.    [facility] Preop Guidelines    Revised Cardiac Risk Index

## 2020-07-24 LAB
ANION GAP SERPL CALCULATED.3IONS-SCNC: 5 MMOL/L (ref 3–14)
BUN SERPL-MCNC: 17 MG/DL (ref 7–30)
CALCIUM SERPL-MCNC: 9.5 MG/DL (ref 8.5–10.1)
CHLORIDE SERPL-SCNC: 103 MMOL/L (ref 94–109)
CO2 SERPL-SCNC: 26 MMOL/L (ref 20–32)
CREAT SERPL-MCNC: 1.06 MG/DL (ref 0.66–1.25)
GFR SERPL CREATININE-BSD FRML MDRD: 77 ML/MIN/{1.73_M2}
GLUCOSE SERPL-MCNC: 110 MG/DL (ref 70–99)
POTASSIUM SERPL-SCNC: 4 MMOL/L (ref 3.4–5.3)
SODIUM SERPL-SCNC: 134 MMOL/L (ref 133–144)

## 2020-07-24 RX ORDER — LISINOPRIL 10 MG/1
10 TABLET ORAL DAILY
Qty: 30 TABLET | Refills: 1 | Status: SHIPPED | OUTPATIENT
Start: 2020-07-24 | End: 2020-10-27

## 2020-07-28 ENCOUNTER — HOSPITAL ENCOUNTER (OUTPATIENT)
Dept: LAB | Facility: CLINIC | Age: 55
Discharge: HOME | End: 2020-07-28
Attending: SURGERY | Admitting: SURGERY
Payer: COMMERCIAL

## 2020-07-28 DIAGNOSIS — Z11.59 ENCOUNTER FOR SCREENING FOR OTHER VIRAL DISEASES: ICD-10-CM

## 2020-07-28 PROCEDURE — U0003 INFECTIOUS AGENT DETECTION BY NUCLEIC ACID (DNA OR RNA); SEVERE ACUTE RESPIRATORY SYNDROME CORONAVIRUS 2 (SARS-COV-2) (CORONAVIRUS DISEASE [COVID-19]), AMPLIFIED PROBE TECHNIQUE, MAKING USE OF HIGH THROUGHPUT TECHNOLOGIES AS DESCRIBED BY CMS-2020-01-R: HCPCS | Performed by: SURGERY

## 2020-07-30 LAB
SARS-COV-2 RNA SPEC QL NAA+PROBE: NOT DETECTED
SPECIMEN SOURCE: NORMAL

## 2020-07-31 ENCOUNTER — SURGERY (OUTPATIENT)
Age: 55
End: 2020-07-31
Payer: COMMERCIAL

## 2020-07-31 ENCOUNTER — ANESTHESIA (OUTPATIENT)
Dept: SURGERY | Facility: CLINIC | Age: 55
End: 2020-07-31
Payer: COMMERCIAL

## 2020-07-31 ENCOUNTER — APPOINTMENT (OUTPATIENT)
Dept: SURGERY | Facility: PHYSICIAN GROUP | Age: 55
End: 2020-07-31
Payer: COMMERCIAL

## 2020-07-31 ENCOUNTER — ANESTHESIA EVENT (OUTPATIENT)
Dept: SURGERY | Facility: CLINIC | Age: 55
End: 2020-07-31
Payer: COMMERCIAL

## 2020-07-31 ENCOUNTER — HOSPITAL ENCOUNTER (OUTPATIENT)
Facility: CLINIC | Age: 55
Discharge: HOME | End: 2020-07-31
Attending: SURGERY | Admitting: SURGERY
Payer: COMMERCIAL

## 2020-07-31 VITALS
WEIGHT: 289.1 LBS | HEIGHT: 75 IN | OXYGEN SATURATION: 94 % | TEMPERATURE: 97.8 F | HEART RATE: 71 BPM | DIASTOLIC BLOOD PRESSURE: 67 MMHG | BODY MASS INDEX: 35.94 KG/M2 | SYSTOLIC BLOOD PRESSURE: 124 MMHG | RESPIRATION RATE: 16 BRPM

## 2020-07-31 DIAGNOSIS — K40.90 RIGHT INGUINAL HERNIA: ICD-10-CM

## 2020-07-31 DIAGNOSIS — I25.810 CORONARY ARTERY DISEASE INVOLVING CORONARY BYPASS GRAFT OF NATIVE HEART WITHOUT ANGINA PECTORIS: Chronic | ICD-10-CM

## 2020-07-31 DIAGNOSIS — G89.18 POSTOPERATIVE PAIN: Primary | ICD-10-CM

## 2020-07-31 LAB
GLUCOSE BLDC GLUCOMTR-MCNC: 164 MG/DL (ref 70–99)
GLUCOSE BLDC GLUCOMTR-MCNC: 195 MG/DL (ref 70–99)
GLUCOSE BLDC GLUCOMTR-MCNC: 230 MG/DL (ref 70–99)
GLUCOSE BLDC GLUCOMTR-MCNC: 253 MG/DL (ref 70–99)
POTASSIUM SERPL-SCNC: 4.2 MMOL/L (ref 3.4–5.3)

## 2020-07-31 PROCEDURE — C1765 ADHESION BARRIER: HCPCS | Performed by: SURGERY

## 2020-07-31 PROCEDURE — C1781 MESH (IMPLANTABLE): HCPCS | Performed by: SURGERY

## 2020-07-31 PROCEDURE — 82962 GLUCOSE BLOOD TEST: CPT

## 2020-07-31 PROCEDURE — 37000009 ZZH ANESTHESIA TECHNICAL FEE, EACH ADDTL 15 MIN: Performed by: SURGERY

## 2020-07-31 PROCEDURE — 25000128 H RX IP 250 OP 636: Performed by: ANESTHESIOLOGY

## 2020-07-31 PROCEDURE — 25800030 ZZH RX IP 258 OP 636: Performed by: ANESTHESIOLOGY

## 2020-07-31 PROCEDURE — 37000008 ZZH ANESTHESIA TECHNICAL FEE, 1ST 30 MIN: Performed by: SURGERY

## 2020-07-31 PROCEDURE — 25800030 ZZH RX IP 258 OP 636: Performed by: NURSE ANESTHETIST, CERTIFIED REGISTERED

## 2020-07-31 PROCEDURE — 25000131 ZZH RX MED GY IP 250 OP 636 PS 637: Performed by: ANESTHESIOLOGY

## 2020-07-31 PROCEDURE — 25000566 ZZH SEVOFLURANE, EA 15 MIN: Performed by: SURGERY

## 2020-07-31 PROCEDURE — 36000087 ZZH SURGERY LEVEL 8 EA 15 ADDTL MIN: Performed by: SURGERY

## 2020-07-31 PROCEDURE — 25000125 ZZHC RX 250: Performed by: NURSE ANESTHETIST, CERTIFIED REGISTERED

## 2020-07-31 PROCEDURE — 27210794 ZZH OR GENERAL SUPPLY STERILE: Performed by: SURGERY

## 2020-07-31 PROCEDURE — 71000027 ZZH RECOVERY PHASE 2 EACH 15 MINS: Performed by: SURGERY

## 2020-07-31 PROCEDURE — 49650 LAP ING HERNIA REPAIR INIT: CPT | Mod: RT | Performed by: SURGERY

## 2020-07-31 PROCEDURE — 49650 LAP ING HERNIA REPAIR INIT: CPT | Mod: RT | Performed by: PHYSICIAN ASSISTANT

## 2020-07-31 PROCEDURE — 25000128 H RX IP 250 OP 636: Performed by: NURSE ANESTHETIST, CERTIFIED REGISTERED

## 2020-07-31 PROCEDURE — 71000012 ZZH RECOVERY PHASE 1 LEVEL 1 FIRST HR: Performed by: SURGERY

## 2020-07-31 PROCEDURE — 25000125 ZZHC RX 250: Performed by: SURGERY

## 2020-07-31 PROCEDURE — S2900 ROBOTIC SURGICAL SYSTEM: HCPCS | Performed by: PHYSICIAN ASSISTANT

## 2020-07-31 PROCEDURE — 84132 ASSAY OF SERUM POTASSIUM: CPT | Performed by: ANESTHESIOLOGY

## 2020-07-31 PROCEDURE — 25000132 ZZH RX MED GY IP 250 OP 250 PS 637: Performed by: PHYSICIAN ASSISTANT

## 2020-07-31 PROCEDURE — S2900 ROBOTIC SURGICAL SYSTEM: HCPCS | Performed by: SURGERY

## 2020-07-31 PROCEDURE — 40000170 ZZH STATISTIC PRE-PROCEDURE ASSESSMENT II: Performed by: SURGERY

## 2020-07-31 PROCEDURE — 36415 COLL VENOUS BLD VENIPUNCTURE: CPT | Performed by: ANESTHESIOLOGY

## 2020-07-31 PROCEDURE — 25000128 H RX IP 250 OP 636: Performed by: SURGERY

## 2020-07-31 PROCEDURE — 36000085 ZZH SURGERY LEVEL 8 1ST 30 MIN: Performed by: SURGERY

## 2020-07-31 PROCEDURE — 71000013 ZZH RECOVERY PHASE 1 LEVEL 1 EA ADDTL HR: Performed by: SURGERY

## 2020-07-31 DEVICE — MESH PROGRIP LAPAROSCOPIC 5.9X3.9" PARIETEX SELF-FIX LPG1510: Type: IMPLANTABLE DEVICE | Site: INGUINAL | Status: FUNCTIONAL

## 2020-07-31 RX ORDER — DEXAMETHASONE SODIUM PHOSPHATE 4 MG/ML
4 INJECTION, SOLUTION INTRA-ARTICULAR; INTRALESIONAL; INTRAMUSCULAR; INTRAVENOUS; SOFT TISSUE EVERY 10 MIN PRN
Status: DISCONTINUED | OUTPATIENT
Start: 2020-07-31 | End: 2020-07-31 | Stop reason: HOSPADM

## 2020-07-31 RX ORDER — FENTANYL CITRATE 50 UG/ML
INJECTION, SOLUTION INTRAMUSCULAR; INTRAVENOUS PRN
Status: DISCONTINUED | OUTPATIENT
Start: 2020-07-31 | End: 2020-07-31

## 2020-07-31 RX ORDER — OXYCODONE HYDROCHLORIDE 5 MG/1
5 TABLET ORAL
Status: COMPLETED | OUTPATIENT
Start: 2020-07-31 | End: 2020-07-31

## 2020-07-31 RX ORDER — ACETAMINOPHEN 325 MG/1
650 TABLET ORAL
Status: DISCONTINUED | OUTPATIENT
Start: 2020-07-31 | End: 2020-07-31 | Stop reason: HOSPADM

## 2020-07-31 RX ORDER — LABETALOL HYDROCHLORIDE 5 MG/ML
10 INJECTION, SOLUTION INTRAVENOUS
Status: DISCONTINUED | OUTPATIENT
Start: 2020-07-31 | End: 2020-07-31 | Stop reason: HOSPADM

## 2020-07-31 RX ORDER — LIDOCAINE HYDROCHLORIDE 20 MG/ML
INJECTION, SOLUTION INFILTRATION; PERINEURAL PRN
Status: DISCONTINUED | OUTPATIENT
Start: 2020-07-31 | End: 2020-07-31

## 2020-07-31 RX ORDER — MEPERIDINE HYDROCHLORIDE 25 MG/ML
12.5 INJECTION INTRAMUSCULAR; INTRAVENOUS; SUBCUTANEOUS
Status: DISCONTINUED | OUTPATIENT
Start: 2020-07-31 | End: 2020-07-31 | Stop reason: HOSPADM

## 2020-07-31 RX ORDER — MAGNESIUM HYDROXIDE 1200 MG/15ML
LIQUID ORAL PRN
Status: DISCONTINUED | OUTPATIENT
Start: 2020-07-31 | End: 2020-07-31 | Stop reason: HOSPADM

## 2020-07-31 RX ORDER — CEFAZOLIN SODIUM 1 G/3ML
1 INJECTION, POWDER, FOR SOLUTION INTRAMUSCULAR; INTRAVENOUS SEE ADMIN INSTRUCTIONS
Status: DISCONTINUED | OUTPATIENT
Start: 2020-07-31 | End: 2020-07-31 | Stop reason: HOSPADM

## 2020-07-31 RX ORDER — FENTANYL CITRATE 50 UG/ML
25-50 INJECTION, SOLUTION INTRAMUSCULAR; INTRAVENOUS
Status: DISCONTINUED | OUTPATIENT
Start: 2020-07-31 | End: 2020-07-31 | Stop reason: HOSPADM

## 2020-07-31 RX ORDER — SODIUM CHLORIDE, SODIUM LACTATE, POTASSIUM CHLORIDE, CALCIUM CHLORIDE 600; 310; 30; 20 MG/100ML; MG/100ML; MG/100ML; MG/100ML
INJECTION, SOLUTION INTRAVENOUS CONTINUOUS PRN
Status: DISCONTINUED | OUTPATIENT
Start: 2020-07-31 | End: 2020-07-31

## 2020-07-31 RX ORDER — SODIUM CHLORIDE, SODIUM LACTATE, POTASSIUM CHLORIDE, CALCIUM CHLORIDE 600; 310; 30; 20 MG/100ML; MG/100ML; MG/100ML; MG/100ML
INJECTION, SOLUTION INTRAVENOUS CONTINUOUS
Status: DISCONTINUED | OUTPATIENT
Start: 2020-07-31 | End: 2020-07-31 | Stop reason: HOSPADM

## 2020-07-31 RX ORDER — OXYCODONE HYDROCHLORIDE 5 MG/1
5-10 TABLET ORAL EVERY 4 HOURS PRN
Qty: 20 TABLET | Refills: 0 | Status: SHIPPED | OUTPATIENT
Start: 2020-07-31 | End: 2021-02-22

## 2020-07-31 RX ORDER — AMOXICILLIN 250 MG
1-2 CAPSULE ORAL 2 TIMES DAILY
Qty: 30 TABLET | Refills: 0 | Status: SHIPPED | OUTPATIENT
Start: 2020-07-31

## 2020-07-31 RX ORDER — BUPIVACAINE HYDROCHLORIDE AND EPINEPHRINE 5; 5 MG/ML; UG/ML
INJECTION, SOLUTION PERINEURAL PRN
Status: DISCONTINUED | OUTPATIENT
Start: 2020-07-31 | End: 2020-07-31 | Stop reason: HOSPADM

## 2020-07-31 RX ORDER — KETOROLAC TROMETHAMINE 30 MG/ML
30 INJECTION, SOLUTION INTRAMUSCULAR; INTRAVENOUS EVERY 6 HOURS PRN
Status: DISCONTINUED | OUTPATIENT
Start: 2020-07-31 | End: 2020-07-31 | Stop reason: HOSPADM

## 2020-07-31 RX ORDER — PROPOFOL 10 MG/ML
INJECTION, EMULSION INTRAVENOUS PRN
Status: DISCONTINUED | OUTPATIENT
Start: 2020-07-31 | End: 2020-07-31

## 2020-07-31 RX ORDER — ONDANSETRON 2 MG/ML
INJECTION INTRAMUSCULAR; INTRAVENOUS PRN
Status: DISCONTINUED | OUTPATIENT
Start: 2020-07-31 | End: 2020-07-31

## 2020-07-31 RX ORDER — ONDANSETRON 2 MG/ML
4 INJECTION INTRAMUSCULAR; INTRAVENOUS EVERY 30 MIN PRN
Status: DISCONTINUED | OUTPATIENT
Start: 2020-07-31 | End: 2020-07-31 | Stop reason: HOSPADM

## 2020-07-31 RX ORDER — EPHEDRINE SULFATE 50 MG/ML
INJECTION, SOLUTION INTRAMUSCULAR; INTRAVENOUS; SUBCUTANEOUS PRN
Status: DISCONTINUED | OUTPATIENT
Start: 2020-07-31 | End: 2020-07-31

## 2020-07-31 RX ORDER — LIDOCAINE 40 MG/G
CREAM TOPICAL
Status: DISCONTINUED | OUTPATIENT
Start: 2020-07-31 | End: 2020-07-31 | Stop reason: HOSPADM

## 2020-07-31 RX ORDER — CEFAZOLIN SODIUM IN 0.9 % NACL 3 G/100 ML
3 INTRAVENOUS SOLUTION, PIGGYBACK (ML) INTRAVENOUS
Status: COMPLETED | OUTPATIENT
Start: 2020-07-31 | End: 2020-07-31

## 2020-07-31 RX ORDER — HYDROMORPHONE HYDROCHLORIDE 1 MG/ML
.3-.5 INJECTION, SOLUTION INTRAMUSCULAR; INTRAVENOUS; SUBCUTANEOUS EVERY 10 MIN PRN
Status: DISCONTINUED | OUTPATIENT
Start: 2020-07-31 | End: 2020-07-31 | Stop reason: HOSPADM

## 2020-07-31 RX ORDER — ONDANSETRON 4 MG/1
4 TABLET, ORALLY DISINTEGRATING ORAL EVERY 30 MIN PRN
Status: DISCONTINUED | OUTPATIENT
Start: 2020-07-31 | End: 2020-07-31 | Stop reason: HOSPADM

## 2020-07-31 RX ORDER — NALOXONE HYDROCHLORIDE 0.4 MG/ML
.1-.4 INJECTION, SOLUTION INTRAMUSCULAR; INTRAVENOUS; SUBCUTANEOUS
Status: DISCONTINUED | OUTPATIENT
Start: 2020-07-31 | End: 2020-07-31 | Stop reason: HOSPADM

## 2020-07-31 RX ORDER — CLOPIDOGREL BISULFATE 75 MG/1
75 TABLET ORAL DAILY
Qty: 90 TABLET | Refills: 3 | COMMUNITY
Start: 2020-08-01 | End: 2020-08-10

## 2020-07-31 RX ORDER — HYDRALAZINE HYDROCHLORIDE 20 MG/ML
2.5-5 INJECTION INTRAMUSCULAR; INTRAVENOUS EVERY 10 MIN PRN
Status: DISCONTINUED | OUTPATIENT
Start: 2020-07-31 | End: 2020-07-31 | Stop reason: HOSPADM

## 2020-07-31 RX ORDER — ACETAMINOPHEN 325 MG/1
650 TABLET ORAL EVERY 4 HOURS PRN
Qty: 50 TABLET | Refills: 0 | COMMUNITY
Start: 2020-07-31

## 2020-07-31 RX ADMIN — SODIUM CHLORIDE 2 UNITS: 9 INJECTION, SOLUTION INTRAVENOUS at 11:11

## 2020-07-31 RX ADMIN — DEXMEDETOMIDINE HYDROCHLORIDE 8 MCG: 100 INJECTION, SOLUTION INTRAVENOUS at 08:08

## 2020-07-31 RX ADMIN — MIDAZOLAM 2 MG: 1 INJECTION INTRAMUSCULAR; INTRAVENOUS at 07:32

## 2020-07-31 RX ADMIN — LIDOCAINE HYDROCHLORIDE 100 MG: 20 INJECTION, SOLUTION INFILTRATION; PERINEURAL at 07:37

## 2020-07-31 RX ADMIN — KETOROLAC TROMETHAMINE 30 MG: 30 INJECTION, SOLUTION INTRAMUSCULAR at 10:15

## 2020-07-31 RX ADMIN — HYDROMORPHONE HYDROCHLORIDE 0.5 MG: 1 INJECTION, SOLUTION INTRAMUSCULAR; INTRAVENOUS; SUBCUTANEOUS at 10:42

## 2020-07-31 RX ADMIN — PHENYLEPHRINE HYDROCHLORIDE 100 MCG: 10 INJECTION INTRAVENOUS at 07:52

## 2020-07-31 RX ADMIN — SODIUM CHLORIDE, POTASSIUM CHLORIDE, SODIUM LACTATE AND CALCIUM CHLORIDE: 600; 310; 30; 20 INJECTION, SOLUTION INTRAVENOUS at 07:31

## 2020-07-31 RX ADMIN — SODIUM CHLORIDE, POTASSIUM CHLORIDE, SODIUM LACTATE AND CALCIUM CHLORIDE: 600; 310; 30; 20 INJECTION, SOLUTION INTRAVENOUS at 10:47

## 2020-07-31 RX ADMIN — BUPIVACAINE HYDROCHLORIDE AND EPINEPHRINE BITARTRATE 37 ML: 5; .005 INJECTION, SOLUTION PERINEURAL at 09:01

## 2020-07-31 RX ADMIN — ONDANSETRON 4 MG: 2 INJECTION INTRAMUSCULAR; INTRAVENOUS at 09:01

## 2020-07-31 RX ADMIN — DEXMEDETOMIDINE HYDROCHLORIDE 12 MCG: 100 INJECTION, SOLUTION INTRAVENOUS at 08:00

## 2020-07-31 RX ADMIN — ROCURONIUM BROMIDE 10 MG: 10 INJECTION INTRAVENOUS at 08:46

## 2020-07-31 RX ADMIN — ROCURONIUM BROMIDE 50 MG: 10 INJECTION INTRAVENOUS at 07:37

## 2020-07-31 RX ADMIN — SUGAMMADEX 300 MG: 100 INJECTION, SOLUTION INTRAVENOUS at 09:02

## 2020-07-31 RX ADMIN — ROCURONIUM BROMIDE 40 MG: 10 INJECTION INTRAVENOUS at 08:08

## 2020-07-31 RX ADMIN — Medication 10 MG: at 08:04

## 2020-07-31 RX ADMIN — Medication 3 G: at 07:51

## 2020-07-31 RX ADMIN — SODIUM CHLORIDE 2 UNITS: 9 INJECTION, SOLUTION INTRAVENOUS at 11:57

## 2020-07-31 RX ADMIN — OXYCODONE HYDROCHLORIDE 5 MG: 5 TABLET ORAL at 10:25

## 2020-07-31 RX ADMIN — SODIUM CHLORIDE 1000 ML: 900 IRRIGANT IRRIGATION at 08:17

## 2020-07-31 RX ADMIN — PROPOFOL 290 MG: 10 INJECTION, EMULSION INTRAVENOUS at 07:37

## 2020-07-31 RX ADMIN — FENTANYL CITRATE 50 MCG: 0.05 INJECTION, SOLUTION INTRAMUSCULAR; INTRAVENOUS at 10:02

## 2020-07-31 RX ADMIN — FENTANYL CITRATE 100 MCG: 50 INJECTION, SOLUTION INTRAMUSCULAR; INTRAVENOUS at 07:37

## 2020-07-31 RX ADMIN — HYDROMORPHONE HYDROCHLORIDE 0.5 MG: 1 INJECTION, SOLUTION INTRAMUSCULAR; INTRAVENOUS; SUBCUTANEOUS at 08:02

## 2020-07-31 ASSESSMENT — LIFESTYLE VARIABLES: TOBACCO_USE: 1

## 2020-07-31 ASSESSMENT — MIFFLIN-ST. JEOR: SCORE: 2221.98

## 2020-07-31 NOTE — BRIEF OP NOTE
[facility]    Brief Operative Note    Pre-operative diagnosis: Right inguinal hernia [K40.90]  Post-operative diagnosis Right inguinal hernia    Procedure: Procedure(s):  ROBOTIC ASSITED RIGHT INGUINAL HERNIA REPAIR WITH MESH  Surgeon: Surgeon(s) and Role:     * Haim Green MD - Primary     * Michelle Rae PA-C - Assisting  Anesthesia: General   Estimated blood loss: 5 mL    Specimens: * No specimens in log *  Findings:   Right inguinal fat-containing hernia. Reduced and Progrip mesh placed..  Complications: None.  Implants:   Implant Name Type Inv. Item Serial No.  Lot No. LRB No. Used Action   MESH PROGRIP LAPAROSCOPIC 5.9X3.9&quot; PARIETEX SELF-FIX [id number] Mesh MESH PROGRIP LAPAROSCOPIC 5.9X3.9&quot; PARIETEX SELF-FIX [id number]  [facility] [id number] Right 1 Implanted

## 2020-07-31 NOTE — OR NURSING
Discharge instructions called to [place] and instructions given to pt.  No questions or concerns.  Pt denies pain or discomfort.  Pt awaiting transportation home.

## 2020-07-31 NOTE — DISCHARGE INSTRUCTIONS
Today you received Toradol, an antiinflammatory medication similar to Ibuprofen.  You should not take other antiinflammatory medication, such as Ibuprofen, Motrin, Advil, Aleve, Naprosyn, etc until 4:15pm.     [facility]  Discharge Instructions: Post-Operative Robotic Inguinal Hernia    ACTIVITY    Take frequent, short walks and increase your activity gradually.      Avoid strenuous physical activity or heavy lifting greater than 15 lbs. for 3-4 weeks.  You may climb stairs.    You may drive without restrictions when you are not using any prescription pain medication and feel comfortable in a car.    You may return to work/school when you are comfortable without any prescription pain medication.    WOUND CARE    You may remove your outer dressing or Band-Aids and shower 48 hours after the surgery.  Pat your incisions dry and leave them open to air.  Re-apply dressing (Band-Aids or gauze/tape) as needed for comfort or drainage.    You may have steri-strips (looks like white tape) on your incision.  You may peel off the steri-strips 2 weeks after your surgery if they have not peeled off on their own.     Do not soak your incisions in a tub or pool for 2 weeks.     Do not apply any lotions, creams, or ointments to your incisions.    A ridge under your incisions is normal and will gradually resolve.    DIET    Start with liquids, then gradually resume your regular diet as tolerated.     Drink plenty of fluids to stay hydrated.    PAIN    Expect some tenderness and discomfort at the incision site(s).  Use the prescribed pain medication at your discretion.  Expect gradual resolution of your pain over several days.     You may take Tylenol 500 - 1000 mg every 6 hours as needed for pain - do not exceed 4,000 mg of tylenol (acetaminophen) from all sources in 24 hours.    You may take Ibuprofen 400 - 600 mg every 6 hours as needed for pain - do not exceed 2,400 mg of ibuprofen from all  sources in 24 hours. Do not use Ibuprofen for any longer than necessary or take if you have been told not to by another medical provider.    You may alternate Ibuprofen and Tylenol every 3 hours as needed for pain. Reserve the narcotic prescription for refractory pain.    Do not drink alcohol or drive while you are taking pain medications.    You may apply ice to your incisions in 20 minute intervals as needed for the next 48 hours.  After that time, consider switching to heat if you prefer.    EXPECTATIONS    You may notice air in your scrotum and/or penis after the surgery.  This is due to the gas used during the surgery.  You can expect some swelling and bruising involving the scrotum and/or penis as well as numbness.  These symptoms are expected and should gradually resolve in the next few days.  You may use ice to help with the swelling and try placing a rolled hand towel below your scrotum to help alleviate scrotal discomfort.  If you develop significant testicular or penile pain, please call our office and speak with a nurse.    Pain medications can cause constipation.  Limit use when possible.  Take over the counter stool softener/stimulant, such as Colace or Senna, 1-2 times a day with plenty of water.  You may take a mild over the counter laxative, such as Miralax or a suppository, as needed.  You may take 1 oz. (2 tablespoons) Milk of Magnesia the evening following surgery to encourage bowel movement.  You may discontinue these medications once you are having regular bowel movements and/or are no longer taking your narcotic pain medication.     You may have shoulder or upper back discomfort due to the gas used in surgery.  This is temporary and should resolve in 48-72 hours.  Short, frequent walks may help with this.    FOLLOW UP    Our office will contact you approximately 2 weeks to check on your progress and answer any questions you may have.  If you are doing well, you will not need to return for a  follow up appointment.  If any concerns are identified over the phone, we will help you make an appointment to see a provider.     If you have not received a phone call, have any questions or concerns, or would like to be seen, please call us at [phone] and ask to speak with our nurse.  We are located at [address] [place] [address]  [place] [address].    CALL OUR OFFICE [phone] IF YOU HAVE:     Chills or fever above 101 F.    Increased redness, warmth, or drainage at your incisions.    Significant bleeding.    Pain not relieved by your pain medication or rest.    Increasing pain after the first 48 hours.    Any other concerns or questions.          Same Day Surgery Discharge Instructions for  Sedation and General Anesthesia       It's not unusual to feel dizzy, light-headed or faint for up to 24 hours after surgery or while taking pain medication.  If you have these symptoms: sit for a few minutes before standing and have someone assist you when you get up to walk or use the bathroom.      You should rest and relax for the next 24 hours. We recommend you make arrangements to have an adult stay with you for at least 24 hours after your discharge.  Avoid hazardous and strenuous activity.      DO NOT DRIVE any vehicle or operate mechanical equipment for 24 hours following the end of your surgery.  Even though you may feel normal, your reactions may be affected by the medication you have received.      Do not drink alcoholic beverages for 24 hours following surgery.       Slowly progress to your regular diet as you feel able. It's not unusual to feel nauseated and/or vomit after receiving anesthesia.  If you develop these symptoms, drink clear liquids (apple juice, ginger ale, broth, 7-up, etc. ) until you feel better.  If your nausea and vomiting persists for 24 hours, please notify your surgeon.        All narcotic pain medications, along with inactivity and anesthesia, can cause constipation.  Drinking plenty of liquids and increasing fiber intake will help.      For any questions of a medical nature, call your surgeon.      Do not make important decisions for 24 hours.      If you had general anesthesia, you may have a sore throat for a couple of days related to the breathing tube used during surgery.  You may use Cepacol lozenges to help with this discomfort.  If it worsens or if you develop a fever, contact your surgeon.       If you feel your pain is not well managed with the pain medications prescribed by your surgeon, please contact your surgeon's office to let them know so they can address your concerns.       CoVid 19 Information    We want to give you information regarding Covid. Please consult your primary care provider with any questions you might have.     Patient who have symptoms (cough, fever, or shortness of breath), need to isolate for 7 days from when symptoms started OR 72 hours after fever resolves (without fever reducing medications) AND improvement of respiratory symptoms (whichever is longer).      Isolate yourself at home (in own room/own bathroom if possible)    Do Not allow any visitors    Do Not go to work or school    Do Not go to Rastaf[employer],  centers, shopping, or other public places.    Do Not shake hands.    Avoid close and intimate contact with others (hugging, kissing).    Follow CDC recommendations for household cleaning of frequently touched services.     After the initial 7 days, continue to isolate yourself from household members as much as possible. To continue decrease the risk of community spread and exposure, you and any members of your household should limit activities in public for 14 days after starting home isolation.     You can reference the following CDC link for helpful home isolation/care tips:  https://www.cdc.gov/coronavirus/2019-ncov/downloads/10Things.pdf    Protect Others:    Cover Your Mouth and Nose with a mask, disposable tissue or wash cloth  to avoid spreading germs to others.    Wash your hands and face frequently with soap and water    Call Your Primary Doctor If: Breathing difficulty develops or you become worse.    For more information about COVID19 and options for caring for yourself at home, please visit the CDC website at https://www.cdc.gov/coronavirus/2019-ncov/about/steps-when-sick.html  For more options for care at [facility], please visit our website at [email or web address]        **If you have concerns or questions about your procedure,    please contact Dr. Green at  [phone]**

## 2020-07-31 NOTE — ANESTHESIA PREPROCEDURE EVALUATION
Anesthesia Pre-Procedure Evaluation    Patient: Javier Markham   MRN: [id number] : 1965          Preoperative Diagnosis: Right inguinal hernia [K40.90]    Procedure(s):  ROBOTIC ASSITED RIGHT INGUINAL HERNIA REPAIR    Past Medical History:   Diagnosis Date     Anxiety      CAD (coronary artery disease) Dec 2014    s/p CABGx3 Dec 2014 and later RCA stent 2015     DM2 (diabetes mellitus, type 2) (H) Dx 2015    A1c 6.5% at time of dx     GERD (gastroesophageal reflux disease)      Heart attack (H) 2014, 2015, 7/4/2015    x3     History of cocaine abuse (H)     Smoked crack cocaine (remission since 2014)     History of tobacco abuse      Hyperlipidemia      Hyperlipidemia LDL goal <100     chronic hypertriglyceredemia, regular elevation 300-400     Incomplete RBBB      Inguinal hernia     Left     Numbness and tingling     diabetic neuropathy     Prediabetes Dec 2014     Restless leg syndrome      S/P CABG x 3 Dec 2014     Severe obesity (BMI 35.0-35.9 with comorbidity) (H) 2018    BMI 37.5     Sleep apnea     mild, does not use CPAP     Stented coronary artery      Past Surgical History:   Procedure Laterality Date     C CABG, VEIN, THREE  2014    [facility]     C NONSPECIFIC PROCEDURE  age 15    both feet bone spur removal in the arch of the foot     [facility] DRUG-ELUTING STENTS, SINGLE  10/19/2018    Left circumflex at [facility]     HERNIORRHAPHY INGUINAL Left 2016    Procedure: HERNIORRHAPHY INGUINAL;  Surgeon: Haim Green MD;  Location:  [facility]     STENT  2015    RCA stent       Anesthesia Evaluation     . Pt has had prior anesthetic.     No history of anesthetic complications          ROS/MED HX    ENT/Pulmonary:     (+)sleep apnea, tobacco use, Past use uses CPAP , . .    Neurologic: Comment: RLS      Cardiovascular:     (+) Dyslipidemia, hypertension--CAD, -past MI,CABG-stent,2 . : . . . :. .       METS/Exercise Tolerance:     Hematologic:         Musculoskeletal:     "     GI/Hepatic:     (+) GERD Asymptomatic on medication,       Renal/Genitourinary:  - ROS Renal section negative       Endo:     (+) type II DM Obesity, .   (-) Type I DM   Psychiatric: Comment: H/o cocaine abuse, none x 5 years    (+) psychiatric history anxiety      Infectious Disease:         Malignancy:         Other:                          Physical Exam  Normal systems: pulmonary    Airway   Mallampati: II  TM distance: >3 FB  Neck ROM: full    Dental   (+) caps and missing  Comment: Poor dentition    Cardiovascular   Rhythm and rate: regular      Pulmonary             Lab Results   Component Value Date    WBC 9.5 07/23/2020    HGB 15.5 07/23/2020    HCT 42.8 07/23/2020     07/23/2020     07/23/2020    POTASSIUM 4.0 07/23/2020    CHLORIDE 103 07/23/2020    CO2 26 07/23/2020    BUN 17 07/23/2020    CR 1.06 07/23/2020     (H) 07/23/2020    KATINA 9.5 07/23/2020    MAG 2.3 06/14/2015    ALBUMIN 3.6 01/15/2020    PROTTOTAL 6.3 (L) 01/15/2020    ALT 50 (H) 06/17/2020    AST 18 01/15/2020    ALKPHOS 92 01/15/2020    BILITOTAL 0.3 01/15/2020    TSH 1.34 01/15/2020       Preop Vitals  BP Readings from Last 3 Encounters:   07/23/20 (!) 142/92   07/02/20 130/78   01/15/20 121/81    Pulse Readings from Last 3 Encounters:   07/23/20 105   07/02/20 90   01/15/20 72      Resp Readings from Last 3 Encounters:   01/25/18 16   09/14/17 18   01/16/17 20    SpO2 Readings from Last 3 Encounters:   07/23/20 97%   01/15/20 97%   05/07/19 97%      Temp Readings from Last 1 Encounters:   07/23/20 36.2  C (97.1  F) (Temporal)    Ht Readings from Last 1 Encounters:   07/23/20 1.905 m (6' 3\")      Wt Readings from Last 1 Encounters:   07/23/20 127.5 kg (281 lb)    Estimated body mass index is 35.12 kg/m  as calculated from the following:    Height as of 7/23/20: 1.905 m (6' 3\").    Weight as of 7/23/20: 127.5 kg (281 lb).       Anesthesia Plan      History & Physical Review  History and physical reviewed and " following examination; no interval change.    ASA Status:  3 .    NPO Status:  > 8 hours    Plan for General with Intravenous and Propofol induction. Maintenance will be Balanced.    PONV prophylaxis:  Ondansetron (or other 5HT-3)  Additional equipment: Videolaryngoscope        Postoperative Care  Postoperative pain management:  Multi-modal analgesia.      Consents  Anesthetic plan, risks, benefits and alternatives discussed with:  Patient..                 Parmjit Jaquez MD

## 2020-07-31 NOTE — ANESTHESIA POSTPROCEDURE EVALUATION
Patient: Javier Markham    Procedure(s):  ROBOTIC ASSITED RIGHT INGUINAL HERNIA REPAIR WITH MESH    Diagnosis:Right inguinal hernia [K40.90]  Diagnosis Additional Information: No value filed.    Anesthesia Type:  General    Note:  Anesthesia Post Evaluation    Patient location during evaluation: PACU  Patient participation: Able to fully participate in evaluation  Level of consciousness: awake and alert  Pain management: adequate  Airway patency: patent  Cardiovascular status: acceptable  Respiratory status: acceptable and unassisted  Hydration status: acceptable  PONV: none             Last vitals:  Vitals:    07/31/20 0930 07/31/20 0945 07/31/20 1000   BP: 135/77 126/79 118/69   Pulse: 75 71 74   Resp: 12 16 16   Temp:      SpO2: 94% 97% 94%         Electronically Signed By: Parmjit Jaquez MD  July 31, 2020  10:19 AM

## 2020-07-31 NOTE — ANESTHESIA CARE TRANSFER NOTE
Patient: Javier Markham    Procedure(s):  ROBOTIC ASSITED RIGHT INGUINAL HERNIA REPAIR WITH MESH    Diagnosis: Right inguinal hernia [K40.90]  Diagnosis Additional Information: No value filed.    Anesthesia Type:   General     Note:  Airway :Face Mask  Patient transferred to:PACU  Comments: Neuromuscular blockade reversed after TOF 4/4, spontaneous respirations, adequate tidal volumes, followed commands to voice, oropharynx suctioned with soft flexible catheter, extubated atraumatically, extubated with suction, airway patent after extubation.  Oxygen via facemask at 6 liters per minute to PACU. Oxygen tubing connected to wall O2 in PACU, SpO2, NiBP, and EKG monitors and alarms on and functioning, Ran Hugger warmer connected to patient gown, report on patient's clinical status given to PACU RN, RN questions answered.   Handoff Report: Identifed the Patient, Identified the Reponsible Provider, Reviewed the pertinent medical history, Discussed the surgical course, Reviewed Intra-OP anesthesia mangement and issues during anesthesia, Set expectations for post-procedure period and Allowed opportunity for questions and acknowledgement of understanding      Vitals: (Last set prior to Anesthesia Care Transfer)    CRNA VITALS  7/31/2020 0839 - 7/31/2020 0917      7/31/2020             SpO2:  94 %    Resp Rate (observed):  10                Electronically Signed By: STEPHANIE Nagel CRNA  July 31, 2020  9:17 AM

## 2020-07-31 NOTE — OP NOTE
Preoperative diagnosis: Right inguinal hernia    Postoperative diagnosis: Right inguinal hernia    Procedure: Robotic assisted laparoscopic transabdominal preperitoneal right inguinal hernia repair with mesh    Surgeon: Haim Green MD    Assistant: Michelle Rae PA-C, Physician assistant first assistant was necessary during the performance of this procedure for expertise in patient positioning, prepping, draping, trocar placement, camera management, retraction and exposure, and suctioning.    Anesthesia: General endotracheal    Estimated blood loss: 5        Indication for procedure: This is a 55-year-old gentleman who presented to my office with symptomatic inguinal hernia.  We had an exhaustive discussion regarding therapeutic options.  It was ultimately elected to proceed with robotic assisted laparoscopic repair.  The potential risks of bleeding, infection, neurovascular injury to the vas deferens or testicle, bowel or bladder injury, recurrent hernia, chronic pain were all reviewed in great detail.  The patient's questions were thoroughly answered.  Informed consent was provided.    Procedure: After informed consent was obtained the patient was taken to the operating room and placed supine in the operating room table.  Following the induction of adequate general endotracheal anesthesia, the patient's abdomen was shaved, prepped and draped in usual fashion.  A surgeon initiated timeout was then completed.  Appropriate IV antibiotics were administered for surgical prophylaxis.  Local anesthetic was then injected at the site above the umbilicus at the midline.  A skin incision was made at this location and the Veress needle was passed into an intra-abdominal position.  The intra-abdominal position of the needle was confirmed using the saline drop test.  A carbon dioxide pneumoperitoneum was then established.  After adequate insufflation the insufflation needle was removed and replaced with an 8 mm robotic  port.  Under direct vision after the infiltration of local anesthetic 2 additional 8 mm ports were placed at the level of the first port in the right and left abdomen.  The robot was then uneventfully docked.  I assumed control of the surgeon console.  Starting on the right side the peritoneum was grasped well above the inguinal canal and incised.*I then began dissecting into the preperitoneal plane.  This dissection was carried down into the inguinal canal.  Peritoneum was mobilized well down below Fransico's ligament as well as laterally to expose the transverse abdominis.  The spermatic cord and its contents were skeletonized.  Moderate sized indirect hernia was present and reduced.  I then elected to repair this utilizing a 10 x 15 cm piece of pro- mesh.  This mesh was introduced into the abdominal cavity and placed appropriately within the inguinal canal.  With the mesh in good position the peritoneum was sutured closed using 2.0 strata fix suture.  The robot was then undocked.  The trochars were opened and the carbon dioxide was massaged from the abdomen.  Trochars were subsequently removed and the skin incisions were closed with subcuticular 4-0 Vicryl sutures.  Sponge, needle and instrument counts were correct.  Patient tolerated this well.  He was awakened in the operating room, extubated and taken to the recovery room in stable condition.    Haim Green MD

## 2020-08-08 DIAGNOSIS — I25.810 CORONARY ARTERY DISEASE INVOLVING CORONARY BYPASS GRAFT OF NATIVE HEART WITHOUT ANGINA PECTORIS: Chronic | ICD-10-CM

## 2020-08-08 DIAGNOSIS — F41.9 ANXIETY: ICD-10-CM

## 2020-08-08 DIAGNOSIS — G25.81 RESTLESS LEGS SYNDROME (RLS): Chronic | ICD-10-CM

## 2020-08-08 DIAGNOSIS — J30.2 SEASONAL ALLERGIES: ICD-10-CM

## 2020-08-10 RX ORDER — LORATADINE 10 MG/1
TABLET ORAL
Qty: 90 TABLET | Refills: 1 | Status: SHIPPED | OUTPATIENT
Start: 2020-08-10 | End: 2021-04-07

## 2020-08-10 RX ORDER — CLOPIDOGREL BISULFATE 75 MG/1
TABLET ORAL
Qty: 90 TABLET | Refills: 3 | Status: SHIPPED | OUTPATIENT
Start: 2020-08-10 | End: 2022-01-28

## 2020-08-10 RX ORDER — CLONAZEPAM 0.5 MG/1
TABLET ORAL
Qty: 90 TABLET | Refills: 1 | Status: SHIPPED | OUTPATIENT
Start: 2020-08-10 | End: 2021-01-24

## 2020-08-10 NOTE — TELEPHONE ENCOUNTER
Patient is followed by [facility] NEL[facility] for ongoing prescription of benzodiazepines.  All refills should be approved by this provider, or covering partner.    Medication(s): Clonazepam.   Maximum quantity per month: 90  Clinic visit frequency required: Q 3 months     Controlled substance agreement on file: No  Benzodiazepine use reviewed by psychiatry:  No    Last [facility] website verification:  done on 8/10/2020 GM  [email or web address]    Routing refill request to provider for review/approval because:  PPI on record    Please review and authorize if appropriate.    Thank you,   Gaurang BAUER RN

## 2020-08-14 ENCOUNTER — TELEPHONE (OUTPATIENT)
Facility: CLINIC | Age: 55
End: 2020-08-14

## 2020-08-14 NOTE — TELEPHONE ENCOUNTER
[facility]  Post op call note - ROBOTIC INGUINAL HERNIA REPAIR  August 14, 2020       Javier Markham was called for an update regarding his recovery.  He underwent a robotic right inguinal hernia repair by Dr. Green on 7/31.  Today he tells me he is doing well.  He did have significant scrotal swelling following the surgery which has since improved.  Alexx does take ibuprofen for the right groin pain he has with movement.  He does have minimal pain with rest but this has also been improving especially over the last couple days.  He is eating a normal diet and his bowels are regular.  Alexx does have nausea but this is his baseline prior to surgery and he follows with his PCP for this. The patient states he is slowly resuming normal activity.  He states his wounds are healing well and the steri strips are off.  He denies any erythema or drainage at his wounds.  The patient denies fever/chills, changes in BM, or wound concerns.     He was instructed to continue to keep wounds clean but may soak the incisions since they are intact.  He was advised to advance his activity as tolerated with no heavy lifting > 20 lbs or strenuous exercise x 2 weeks.  The patient states all of his questions were answered and he understands our discussion.  He agrees to follow up as needed and to call our office with any concerns.      Michelle Rae PA-C  [facility]  [phone]        Please route or send letter to:  Primary Care Provider (PCP)

## 2020-08-28 DIAGNOSIS — E78.5 HYPERLIPIDEMIA, UNSPECIFIED HYPERLIPIDEMIA TYPE: Chronic | ICD-10-CM

## 2020-08-28 DIAGNOSIS — E11.9 TYPE 2 DIABETES MELLITUS WITHOUT COMPLICATION, WITHOUT LONG-TERM CURRENT USE OF INSULIN (H): Chronic | ICD-10-CM

## 2020-09-01 RX ORDER — SITAGLIPTIN 50 MG/1
TABLET, FILM COATED ORAL
Qty: 90 TABLET | Refills: 0 | Status: SHIPPED | OUTPATIENT
Start: 2020-09-01 | End: 2020-12-01

## 2020-09-01 RX ORDER — OMEGA-3-ACID ETHYL ESTERS 1 G/1
CAPSULE, LIQUID FILLED ORAL
Qty: 360 CAPSULE | Refills: 3 | Status: SHIPPED | OUTPATIENT
Start: 2020-09-01 | End: 2022-01-28

## 2020-09-23 NOTE — TELEPHONE ENCOUNTER
"sitagliptin (JANUVIA) 100 MG tablet  Last Written Prescription Date:  11/5/18  Last Fill Quantity: 1,  # refills: 0   Last office visit: No previous visit found with prescribing provider:  Milo    Future Office Visit:   Next 5 appointments (look out 90 days)    May 07, 2019  2:00 PM CDT  Office Visit with Lyle Quintana MD  [facility] [address] [facility] [id number] [phone]             Requested Prescriptions   Pending Prescriptions Disp Refills     sitagliptin (JANUVIA) 100 MG tablet 1 tablet 0     Sig: Take 0.5 tablets (50 mg) by mouth daily       DPP4 Inhibitors Protocol Failed - 4/30/2019  8:55 PM        Failed - Microalbumin on file in past 12 months     Recent Labs   Lab Test 11/03/17  0913   MICROL 13   UMALCR 9.41             Failed - HgbA1C in past 3 or 6 months     If HgbA1C is 8 or greater, it needs to be on file within the past 3 months.  If less than 8, must be on file within the past 6 months.     Recent Labs   Lab Test 10/16/18   A1C 7.2*             Failed - Normal serum creatinine in past 12 months     Recent Labs   Lab Test 11/03/17  0912   CR 0.78             Passed - Blood pressure less than 140/90 in past 6 months     BP Readings from Last 3 Encounters:   12/24/18 126/80   11/05/18 125/86   01/25/18 122/80                 Passed - LDL on file in past 12 months     Recent Labs   Lab Test 10/16/18   LDL 76             Passed - Medication is active on med list        Passed - Patient is age 18 or older        Passed - Recent (6 mo) or future (30 days) visit within the authorizing provider's specialty     Patient had office visit in the last 6 months or has a visit in the next 30 days with authorizing provider.  See \"Patient Info\" tab in inbasket, or \"Choose Columns\" in Meds & Orders section of the refill encounter.              "
Prince gil in 5 days, signed for 1 mo supply  Sandra Carolina RN    
[FreeTextEntry1] : Today's CBC (On 9/23/20) wbc 3.02  Hb 8.6  plt 28K  ANC 2.47\par \par On 9/2/20 wbc 12.8 Hb 8.8 plt 166\par On 8/17/20 wbc 1.48 ANC 1100 Hb 9.3 plt 74\par On 7/29/20) wbc 13.2 hb 9.1 plt 264\par On 7/20/20 wbc 0.83  hb 7.2 plt 2K\par On 6/22/20 wbc 6.2 normal diff, Hb 9.9 plt 344\par ON 6/16/20 wbc 2.2 Hb 8.3 plt 210\par On 5/15/20 wbc 135k/ul, Hb 2.9 plt 16\par \par RADIOLOGY\par On 6/5/20 CT head Mixed attenuated subdural trauma involving the bifrontal region are again identified. Both subdural hematomas appear slightly less conspicuous which is compatible with expected evolutionary changes. These both demonstrate acute and chronic components. The subdural hematoma on the left side measures approximately 0.5 cm in widest diameter and the subdural hematoma on the right side measures approximately 0.5 cm widest diameter. No significant shift or herniation is seen.\par \par Evaluation of the brain parenchyma appears unchanged when compared with the prior exam.\par \par Evaluation of the osseous structures with the appropriate window appears unremarkable\par \par The visualized sinuses and mastoid abdomen respect clear.\par \par IMPRESSION: Mixed attenuation subdural hematomas again seen as described above.\par \par PATHOLOGY\par On 5/18/20 BM bx\par Final Diagnosis:\par 1, 2. Bone marrow biopsy and bone marrow aspirate\par - Acute myeloid leukemia with mutated NPM1\par \par Diagnostic Note:\par Please note findings of a normal female karyotype, negative FLT3-\par ITD mutation analysis and [facility] One genomic findings of IDH2\par R140Q, NPM1 W288fs*12, CEBPA F6*, and DNMT3A W601fs*50. Based on\par the additional findings, AML is further classified to AML with\par mutated NPM1.\par \par

## 2020-09-30 NOTE — NURSING NOTE
"Chief Complaint   Patient presents with     Balance/ Vestibular     dizziness feels off balance, roof of mouth sore,  pain left throat when swallows nausea, 1 week. Pt wonders if has strep.        Initial /85 (BP Location: Right arm, Patient Position: Chair, Cuff Size: Adult Large)  Pulse 93  Temp 98.6  F (37  C) (Oral)  Ht 6' 1.75\" (1.873 m)  Wt 293 lb (132.9 kg)  SpO2 96%  BMI 37.87 kg/m2 Estimated body mass index is 37.87 kg/(m^2) as calculated from the following:    Height as of this encounter: 6' 1.75\" (1.873 m).    Weight as of this encounter: 293 lb (132.9 kg).  Medication Reconciliation: complete  " SOCIAL WORK/CASEMANAGEMENT TRANSITION OF CARE [id number] [place]  [facility] [address], [place], [phone]): per public benefits pt is hcap and they are filing for medicaid for pt. I have provided the papers to pt and will get back in the a.m. and fax to public benefits. Will assess pt in a.m. as well.  [facility]  9/30/2020

## 2020-11-09 NOTE — PROGRESS NOTES
Patient was offered choice of vendor and chose [facility].  Patient Javier Markham was set up at [place] on February 8, 2018. Patient received a [employer] DreamStation Auto. Pressures were set at 5-15 cm H2O.   Patient s ramp is 5 cm H2O for Auto and FLEX/EPR is A Flex.  Patient received a Resmed Mask name: AIRFIT [id number]  Nasal mask Size Large, heated tubing and heated humidifier.  Patient is enrolled in the [employer] Program and does need to meet compliance. Patient has a follow up on 4/4/2018 with Dr. Welsh.    Bucky Hanley   
Skin normal color for race, warm, dry and intact. No evidence of rash. no abscess

## 2020-11-11 ENCOUNTER — TRANSFERRED RECORDS (OUTPATIENT)
Dept: HEALTH INFORMATION MANAGEMENT | Facility: CLINIC | Age: 55
End: 2020-11-11

## 2020-11-27 ENCOUNTER — TELEPHONE (OUTPATIENT)
Dept: FAMILY MEDICINE | Facility: CLINIC | Age: 55
End: 2020-11-27

## 2020-11-27 DIAGNOSIS — E11.59 TYPE 2 DIABETES MELLITUS WITH OTHER CIRCULATORY COMPLICATION, WITHOUT LONG-TERM CURRENT USE OF INSULIN (H): Primary | ICD-10-CM

## 2020-11-27 DIAGNOSIS — G25.81 RESTLESS LEGS SYNDROME (RLS): Chronic | ICD-10-CM

## 2020-11-27 DIAGNOSIS — E11.9 TYPE 2 DIABETES MELLITUS WITHOUT COMPLICATION, WITHOUT LONG-TERM CURRENT USE OF INSULIN (H): Chronic | ICD-10-CM

## 2020-11-27 NOTE — TELEPHONE ENCOUNTER
Patient asked for a new RX for verio test strips    Jackie Wright  Pharmacy Technician  [facility]  [phone]    Thank you

## 2020-12-01 DIAGNOSIS — E11.40 TYPE 2 DIABETES MELLITUS WITH DIABETIC NEUROPATHY, WITHOUT LONG-TERM CURRENT USE OF INSULIN (H): ICD-10-CM

## 2020-12-01 DIAGNOSIS — E11.9 TYPE 2 DIABETES MELLITUS WITHOUT COMPLICATION, WITHOUT LONG-TERM CURRENT USE OF INSULIN (H): Chronic | ICD-10-CM

## 2020-12-01 DIAGNOSIS — G25.81 RESTLESS LEGS SYNDROME (RLS): Chronic | ICD-10-CM

## 2020-12-01 RX ORDER — PRAMIPEXOLE DIHYDROCHLORIDE 0.75 MG/1
0.75 TABLET ORAL AT BEDTIME
Qty: 90 TABLET | Refills: 0 | Status: SHIPPED | OUTPATIENT
Start: 2020-12-01 | End: 2021-03-03

## 2020-12-01 RX ORDER — ISOPROPYL ALCOHOL 0.75 G/1
SWAB TOPICAL
Qty: 200 EACH | Refills: 0 | Status: SHIPPED | OUTPATIENT
Start: 2020-12-01 | End: 2020-12-04

## 2020-12-01 RX ORDER — LANCETS 33 GAUGE
1 EACH MISCELLANEOUS
Qty: 200 EACH | Refills: 0 | Status: SHIPPED | OUTPATIENT
Start: 2020-12-01 | End: 2020-12-04

## 2020-12-03 DIAGNOSIS — E11.40 TYPE 2 DIABETES MELLITUS WITH DIABETIC NEUROPATHY, WITHOUT LONG-TERM CURRENT USE OF INSULIN (H): ICD-10-CM

## 2020-12-04 RX ORDER — SITAGLIPTIN 50 MG/1
TABLET, FILM COATED ORAL
Qty: 90 TABLET | Refills: 0 | Status: SHIPPED | OUTPATIENT
Start: 2020-12-04 | End: 2021-03-04

## 2020-12-04 RX ORDER — PRAMIPEXOLE DIHYDROCHLORIDE 0.75 MG/1
TABLET ORAL
Qty: 90 TABLET | Refills: 3 | Status: SHIPPED | OUTPATIENT
Start: 2020-12-04 | End: 2021-03-03

## 2020-12-04 RX ORDER — LANCETS 33 GAUGE
1 EACH MISCELLANEOUS
Qty: 200 EACH | Refills: 0 | Status: SHIPPED | OUTPATIENT
Start: 2020-12-04 | End: 2024-06-14

## 2020-12-04 RX ORDER — ISOPROPYL ALCOHOL 0.75 G/1
SWAB TOPICAL
Qty: 200 EACH | Refills: 0 | Status: SHIPPED | OUTPATIENT
Start: 2020-12-04

## 2021-01-20 DIAGNOSIS — F41.9 ANXIETY: ICD-10-CM

## 2021-01-20 DIAGNOSIS — E11.40 TYPE 2 DIABETES MELLITUS WITH DIABETIC NEUROPATHY, WITHOUT LONG-TERM CURRENT USE OF INSULIN (H): ICD-10-CM

## 2021-01-20 DIAGNOSIS — G25.81 RESTLESS LEGS SYNDROME (RLS): Chronic | ICD-10-CM

## 2021-01-23 NOTE — TELEPHONE ENCOUNTER
Failed protocol.  please route to  team if patient needs an appointment     Blanca STONERN BSN  [facility]  [phone]

## 2021-01-24 RX ORDER — CLONAZEPAM 0.5 MG/1
TABLET ORAL
Qty: 90 TABLET | Refills: 1 | Status: SHIPPED | OUTPATIENT
Start: 2021-01-24 | End: 2021-08-02

## 2021-02-22 ENCOUNTER — OFFICE VISIT (OUTPATIENT)
Dept: SURGERY | Facility: CLINIC | Age: 56
End: 2021-02-22
Payer: COMMERCIAL

## 2021-02-22 VITALS
HEART RATE: 78 BPM | DIASTOLIC BLOOD PRESSURE: 60 MMHG | SYSTOLIC BLOOD PRESSURE: 112 MMHG | WEIGHT: 290 LBS | BODY MASS INDEX: 36.06 KG/M2 | HEIGHT: 75 IN

## 2021-02-22 DIAGNOSIS — N43.3 HYDROCELE IN ADULT: Primary | ICD-10-CM

## 2021-02-22 PROCEDURE — 99212 OFFICE O/P EST SF 10 MIN: CPT | Performed by: SURGERY

## 2021-02-22 ASSESSMENT — MIFFLIN-ST. JEOR: SCORE: 2221.06

## 2021-02-22 NOTE — PROGRESS NOTES
Patient presents for evaluation of right testicular mass.  He underwent robotic assisted laparoscopic right inguinal hernia repair with mesh in July 2020.  He states that over the course the last several months he has noted the development of a mass around his right testicle.  This changes and varies somewhat in size.  It is mildly uncomfortable.  He has had no GI symptoms.    On examination: There is no evidence of inguinal hernia.  He has a moderate right-sided hydrocele.    I discussed with him what a hydrocele was.  I think given the prior inguinal hernia repair he would be best served by a transscrotal approach to this.  Referral will be made to urology.  This was discussed with him.  He was in agreement with this plan.  Total time spent was 10 minutes with greater than 50% in face-to-face consultation.

## 2021-03-03 ENCOUNTER — OFFICE VISIT (OUTPATIENT)
Dept: FAMILY MEDICINE | Facility: CLINIC | Age: 56
End: 2021-03-03
Payer: COMMERCIAL

## 2021-03-03 VITALS
HEIGHT: 75 IN | TEMPERATURE: 97.2 F | HEART RATE: 103 BPM | DIASTOLIC BLOOD PRESSURE: 87 MMHG | OXYGEN SATURATION: 97 % | SYSTOLIC BLOOD PRESSURE: 131 MMHG | BODY MASS INDEX: 35.96 KG/M2 | WEIGHT: 289.2 LBS

## 2021-03-03 DIAGNOSIS — R79.9 ABNORMAL FINDING OF BLOOD CHEMISTRY, UNSPECIFIED: ICD-10-CM

## 2021-03-03 DIAGNOSIS — E11.9 TYPE 2 DIABETES MELLITUS WITHOUT COMPLICATION, WITHOUT LONG-TERM CURRENT USE OF INSULIN (H): Chronic | ICD-10-CM

## 2021-03-03 DIAGNOSIS — Z76.89 ENCOUNTER TO ESTABLISH CARE: Primary | ICD-10-CM

## 2021-03-03 DIAGNOSIS — Z87.891 HISTORY OF TOBACCO ABUSE: Chronic | ICD-10-CM

## 2021-03-03 LAB — HBA1C MFR BLD: 8 % (ref 0–5.6)

## 2021-03-03 PROCEDURE — 99214 OFFICE O/P EST MOD 30 MIN: CPT | Performed by: INTERNAL MEDICINE

## 2021-03-03 PROCEDURE — 36415 COLL VENOUS BLD VENIPUNCTURE: CPT | Performed by: INTERNAL MEDICINE

## 2021-03-03 PROCEDURE — 83036 HEMOGLOBIN GLYCOSYLATED A1C: CPT | Performed by: INTERNAL MEDICINE

## 2021-03-03 PROCEDURE — 80048 BASIC METABOLIC PNL TOTAL CA: CPT | Performed by: INTERNAL MEDICINE

## 2021-03-03 ASSESSMENT — MIFFLIN-ST. JEOR: SCORE: 2217.43

## 2021-03-03 NOTE — LETTER
March 4, 2021      Javier Markham  [address]    [facility] [address]        Dear ,    We are writing to inform you of your test results.    Your blood sugars have increased significantly.  Please do what she can to reduce the sugars and sweets in your diet.  Your hemoglobin A1c has increased from 7.1-8.0.  I would recommend you increase your Januvia up to 100 mg as well.  I will change the prescriptions so that when you need a refill it will reflect this dose change.     Resulted Orders   Hemoglobin A1c   Result Value Ref Range    Hemoglobin A1C 8.0 (H) 0 - 5.6 %      Comment:      Reviewed: OK with previous  Normal <5.7% Prediabetes 5.7-6.4%  Diabetes 6.5% or higher - adopted from ADA   consensus guidelines.     Basic metabolic panel  (Ca, Cl, CO2, Creat, Gluc, K, Na, BUN)   Result Value Ref Range    Sodium 135 133 - 144 mmol/L    Potassium 4.0 3.4 - 5.3 mmol/L    Chloride 105 94 - 109 mmol/L    Carbon Dioxide 21 20 - 32 mmol/L    Anion Gap 9 3 - 14 mmol/L    Glucose 128 (H) 70 - 99 mg/dL    Urea Nitrogen 12 7 - 30 mg/dL    Creatinine 0.91 0.66 - 1.25 mg/dL    GFR Estimate >90 >60 mL/min/[1.73_m2]      Comment:      Non  GFR Calc  Starting 12/18/2018, serum creatinine based estimated GFR (eGFR) will be   calculated using the Chronic Kidney Disease Epidemiology Collaboration   (CKD-EPI) equation.      GFR Estimate If Black >90 >60 mL/min/[1.73_m2]      Comment:       GFR Calc  Starting 12/18/2018, serum creatinine based estimated GFR (eGFR) will be   calculated using the Chronic Kidney Disease Epidemiology Collaboration   (CKD-EPI) equation.      Calcium 9.7 8.5 - 10.1 mg/dL       If you have any questions or concerns, please call the clinic at the number listed above.       Sincerely,      Rajeev Oconnor MD

## 2021-03-03 NOTE — PATIENT INSTRUCTIONS
The most critical aspects of your arterial health are blood pressure, blood sugar, and tobacco use.  You need to locate each of these separately and get them all under control.    We will evaluate your hemoglobin A1c today.  Do your best to reduce your weight and exercise regularly.  Additionally, try to get off the cigarettes.    We will see again in 6 months time.    Regards  Rajeev

## 2021-03-03 NOTE — PROGRESS NOTES
"    Assessment & Plan     Encounter to establish care  We will reevaluate his hemoglobin A1c today.  - Hemoglobin A1c  - Basic metabolic panel  (Ca, Cl, CO2, Creat, Gluc, K, Na, BUN)    Abnormal finding of blood chemistry, unspecified   Patient with a history of diabetes.  Need to reassess hemoglobin A1c  - Hemoglobin A1c    History of tobacco abuse  Patient is aware of the dangers of smoking particularly given his arterial disease    Hypertension.  Systolic and diastolic pressures are higher than goal.  We will recheck his blood pressure today and his electrolytes.  If need be will increase his lisinopril to 20 mg daily       BMI:   Estimated body mass index is 36.15 kg/m  as calculated from the following:    Height as of this encounter: 1.905 m (6' 3\").    Weight as of this encounter: 131.2 kg (289 lb 3.2 oz).   Weight 289.  Patient motivated to exercise and lose weight.  He has elevation in blood pressure and blood sugar.    See Patient Instructions    Return in about 6 months (around 9/3/2021) for Routine preventive.    Rajeev Oconnor MD  [facility] CHRISTOFER PA is a 56 year old who presents for the following health issues     HPI patient presents for new patient visit.  He has a history of diabetes, coronary artery disease, hyperlipidemia, tobacco dependence, hypertension, anxiety, and chronic benzodiazepine utilization in conjunction with anxiety and restless leg syndrome.  The patient also has a history of untreated obstructive sleep apnea.    He presents clinic today for new visit.  Working through the various issues.  He states that he feels pretty good and does not want to change any of his medications.  I mentioned to him my concern about chronic utilization of clonazepam particularly in an individual is on treated obstructive sleep apnea.  I mentioned to the patient that this could worsen his underlying sleep apnea.  Despite this he does not want anything more done claims " "overall he is feeling pretty good.  His sleep is fractured.  He wakes up 3-4 times per night to urinate some of those times.  He gets up early in the morning and then usually falls asleep for another hour or 2.    Denies chest pain no significant shortness of breath.  Finally has his cholesterol under control with medications.      New Patient/Transfer of Care  Recheck medication    New Patient/Transfer of Care    Review of Systems   Constitutional, HEENT, cardiovascular, pulmonary, gi and gu systems are negative, except as otherwise noted.      Objective    /87 (BP Location: Right arm, Cuff Size: Adult Large)   Pulse 103   Temp 97.2  F (36.2  C) (Tympanic)   Ht 1.905 m (6' 3\")   Wt 131.2 kg (289 lb 3.2 oz)   SpO2 97%   BMI 36.15 kg/m    Body mass index is 36.15 kg/m .  Physical Exam   GENERAL: healthy, alert and no distress  EYES: Eyes grossly normal to inspection, PERRL and conjunctivae and sclerae normal  RESP: lungs clear to auscultation - no rales, rhonchi or wheezes  CV: regular rate and rhythm, normal S1 S2, no S3 or S4, no murmur, click or rub, no peripheral edema and peripheral pulses strong  MS: no gross musculoskeletal defects noted, no edema  SKIN: no suspicious lesions or rashes  NEURO: Normal strength and tone, mentation intact and speech normal  PSYCH: mentation appears normal, affect normal/bright    Admission on 07/31/2020, Discharged on 07/31/2020   Component Date Value Ref Range Status     Potassium 07/31/2020 4.2  3.4 - 5.3 mmol/L Final     Glucose 07/31/2020 195* 70 - 99 mg/dL Final     Glucose 07/31/2020 253* 70 - 99 mg/dL Final     Glucose 07/31/2020 230* 70 - 99 mg/dL Final     Glucose 07/31/2020 164* 70 - 99 mg/dL Final               "

## 2021-03-04 LAB
ANION GAP SERPL CALCULATED.3IONS-SCNC: 9 MMOL/L (ref 3–14)
BUN SERPL-MCNC: 12 MG/DL (ref 7–30)
CALCIUM SERPL-MCNC: 9.7 MG/DL (ref 8.5–10.1)
CHLORIDE SERPL-SCNC: 105 MMOL/L (ref 94–109)
CO2 SERPL-SCNC: 21 MMOL/L (ref 20–32)
CREAT SERPL-MCNC: 0.91 MG/DL (ref 0.66–1.25)
GFR SERPL CREATININE-BSD FRML MDRD: >90 ML/MIN/{1.73_M2}
GLUCOSE SERPL-MCNC: 128 MG/DL (ref 70–99)
POTASSIUM SERPL-SCNC: 4 MMOL/L (ref 3.4–5.3)
SODIUM SERPL-SCNC: 135 MMOL/L (ref 133–144)

## 2021-03-05 ENCOUNTER — TRANSFERRED RECORDS (OUTPATIENT)
Dept: HEALTH INFORMATION MANAGEMENT | Facility: CLINIC | Age: 56
End: 2021-03-05

## 2021-03-09 ENCOUNTER — OFFICE VISIT (OUTPATIENT)
Dept: UROLOGY | Facility: CLINIC | Age: 56
End: 2021-03-09
Payer: COMMERCIAL

## 2021-03-09 VITALS
HEART RATE: 94 BPM | HEIGHT: 75 IN | BODY MASS INDEX: 35.81 KG/M2 | SYSTOLIC BLOOD PRESSURE: 146 MMHG | WEIGHT: 288 LBS | DIASTOLIC BLOOD PRESSURE: 84 MMHG | OXYGEN SATURATION: 98 %

## 2021-03-09 DIAGNOSIS — N43.3 RIGHT HYDROCELE: Primary | ICD-10-CM

## 2021-03-09 PROCEDURE — 99203 OFFICE O/P NEW LOW 30 MIN: CPT | Performed by: UROLOGY

## 2021-03-09 RX ORDER — CEFAZOLIN SODIUM 1 G
1 VIAL (EA) INJECTION SEE ADMIN INSTRUCTIONS
Status: CANCELLED | OUTPATIENT
Start: 2021-03-09

## 2021-03-09 ASSESSMENT — PAIN SCALES - GENERAL: PAINLEVEL: NO PAIN (0)

## 2021-03-09 ASSESSMENT — MIFFLIN-ST. JEOR: SCORE: 2211.99

## 2021-03-09 NOTE — NURSING NOTE
Chief Complaint   Patient presents with     right hydrocele     since last July   Patient states he has a hard time sitting down at times.  Marysol Baker LPN

## 2021-03-09 NOTE — LETTER
3/9/2021       RE: Javier Markham  [address]  [address]  [facility] [address]     Dear Colleague,    Thank you for referring your patient, Javier Markham, to the [facility] at [facility]. Please see a copy of my visit note below.    History: It is a great pleasure to see this very pleasant 56-year-old gentleman in initial consultation today at the request of his physicians.  He has an increasingly uncomfortable swelling on the right side of the scrotum.  .  This is continuing to get larger.  He had bilateral hernia surgery in the recent past.  He does have a very significant cardiovascular history having had multiple coronary artery bypass procedures and stent placements.  He also has type 2 diabetes.  He is on both Plavix and baby aspirin.  Full past medical and surgical history as listed below    Past Medical History:   Diagnosis Date     Anxiety      CAD (coronary artery disease) Dec 2014    s/p CABGx3 Dec 2014 and later RCA stent July 2015     DM2 (diabetes mellitus, type 2) (H) Dx June 2015    A1c 6.5% at time of dx     Epididymitis, right      GERD (gastroesophageal reflux disease)      Heart attack (H) 12/2014, 6/2015, 7/4/2015    x3     Hernia, abdominal      History of cocaine abuse (H)     Smoked crack cocaine (remission since Nov 2014)     History of tobacco abuse      Hyperlipidemia      Hyperlipidemia LDL goal <100     chronic hypertriglyceredemia, regular elevation 300-400     Incomplete RBBB      Inguinal hernia     Left     Numbness and tingling     diabetic neuropathy     Prediabetes Dec 2014     Restless leg syndrome      S/P CABG x 3 Dec 2014     Severe obesity (BMI 35.0-35.9 with comorbidity) (H) 12/24/2018    BMI 37.5     Sleep apnea     mild, does not use CPAP     Stented coronary artery        Past Surgical History:   Procedure Laterality Date     C CABG, VEIN, THREE  12/23/2014    [facility]     DAVINCI XI  HERNIORRHAPHY INGUINAL Right 2020    Procedure: ROBOTIC ASSITED RIGHT INGUINAL HERNIA REPAIR WITH MESH;  Surgeon: Haim Green MD;  Location: [facility] OR     HERNIORRHAPHY INGUINAL Left 2016    Procedure: HERNIORRHAPHY INGUINAL;  Surgeon: Haim Green MD;  Location: [facility]     STENT  2015    RCA stent     ZZC NONSPECIFIC PROCEDURE  age 15    both feet bone spur removal in the arch of the foot     ZZHC DRUG-ELUTING STENTS, SINGLE  10/19/2018    Left circumflex at [facility]       Family History   Problem Relation Age of Onset     Diabetes Mother         type 2     Breast Cancer Mother      Macular Degeneration Mother      Dementia Mother      Coronary Artery Disease Father 52     Heart Disease Paternal Uncle      Diabetes Maternal Grandfather      Breast Cancer Maternal Grandmother      Prostate Cancer No family hx of        Social History     Socioeconomic History     Marital status: Single     Spouse name: Not on file     Number of children: 2     Years of education: Not on file     Highest education level: Not on file   Occupational History     Occupation: nurse assistant     Employer: [employer]   Social Needs     Financial resource strain: Not on file     Food insecurity     Worry: Not on file     Inability: Not on file     Transportation needs     Medical: Not on file     Non-medical: Not on file   Tobacco Use     Smoking status: Former Smoker     Packs/day: 0.50     Years: 25.00     Pack years: 12.50     Types: Cigarettes     Quit date: 2015     Years since quittin.3     Smokeless tobacco: Never Used   Substance and Sexual Activity     Alcohol use: No     Alcohol/week: 0.0 standard drinks     Drug use: No     Comment: Past coccaine 2 weeks ago as of 12; no h/o IVDA. Smoked cocaine (did not snort)     Sexual activity: Yes     Partners: Female   Lifestyle     Physical activity     Days per week: Not on file     Minutes per session: Not on file     Stress: Not on  file   Relationships     Social connections     Talks on phone: Not on file     Gets together: Not on file     Attends Denominational service: Not on file     Active member of club or organization: Not on file     Attends meetings of clubs or organizations: Not on file     Relationship status: Not on file     Intimate partner violence     Fear of current or ex partner: Not on file     Emotionally abused: Not on file     Physically abused: Not on file     Forced sexual activity: Not on file   Other Topics Concern     Parent/sibling w/ CABG, MI or angioplasty before 65F 55M? Not Asked   Social History Narrative     Not on file       Current Outpatient Medications   Medication Sig Dispense Refill     acetaminophen (TYLENOL) 325 MG tablet Take 2 tablets (650 mg) by mouth every 4 hours as needed for mild pain 50 tablet 0     albuterol (PROAIR HFA/PROVENTIL HFA/VENTOLIN HFA) 108 (90 Base) MCG/ACT inhaler Inhale 2 puffs into the lungs every 4 hours as needed for shortness of breath / dyspnea or wheezing 8.5 g 3     Alcohol Swabs (B-D SINGLE USE SWABS REGULAR) PADS USE AS NEEDED 200 each 0     aspirin (ASA) 81 MG EC tablet Take 1 tablet (81 mg) by mouth daily 90 tablet 3     blood glucose (NO BRAND SPECIFIED) test strip Use to test blood sugar 2 times daily or as directed. 200 strip 11     blood glucose monitoring (NO BRAND SPECIFIED) meter device kit Use to test blood sugar 1 times daily or as directed. ONE TOUCH VERIO. 1 kit 0     clonazePAM (KLONOPIN) 0.5 MG tablet TAKE ONE TABLET BY MOUTH EVERY NIGHT AT BEDTIME AS NEEDED FOR RESTLESS LEGS 90 tablet 1     clopidogrel (PLAVIX) 75 MG tablet TAKE ONE TABLET BY MOUTH ONCE DAILY 90 tablet 3     cyanocobalamin (VITAMIN B-12) 1000 MCG tablet Take 1 tablet by mouth daily 100 tablet 0     ezetimibe (ZETIA) 10 MG tablet Take 1 tablet (10 mg) by mouth At Bedtime 90 tablet 1     ferrous sulfate (FEROSUL) 325 (65 Fe) MG tablet TAKE ONE TABLET BY MOUTH EVERY MORNING WITH BREAKFAST 90  tablet 2     fluticasone (FLONASE) 50 MCG/ACT nasal spray Spray 2 sprays into both nostrils daily 54.6 mL 3     gabapentin (NEURONTIN) 300 MG capsule TAKE THREE CAPSULES BY MOUTH THREE TIMES A  capsule 3     hydrOXYzine (ATARAX) 25 MG tablet TAKE ONE TO TWO TABLETS BY MOUTH EVERY 6 HOURS AS NEEDED FOR ANXIETY 120 tablet 5     ibuprofen (ADVIL,MOTRIN) 800 MG tablet Take 1 tablet (800 mg) by mouth every 8 hours as needed for moderate pain 60 tablet 1     JARDIANCE 10 MG TABS tablet TAKE ONE TABLET BY MOUTH ONCE DAILY 90 tablet 3     lisinopril (ZESTRIL) 10 MG tablet TAKE ONE TABLET BY MOUTH ONCE DAILY 90 tablet 2     loratadine (CLARITIN) 10 MG tablet TAKE ONE TABLET BY MOUTH ONCE DAILY 90 tablet 1     MAPAP 500 MG tablet TAKE TWO TABLETS BY MOUTH EVERY 8 HOURS AS NEEDED FOR MILD PAIN 100 tablet 5     metFORMIN (GLUCOPHAGE) 500 MG tablet TAKE TWO TABLETS BY MOUTH TWO TIMES A DAY WITH MEALS 360 tablet 0     metoprolol succinate ER (TOPROL-XL) 25 MG 24 hr tablet TAKE ONE TABLET BY MOUTH TWICE A  tablet 3     nitroGLYcerin (NITROSTAT) 0.4 MG sublingual tablet PLACE 1 TABLET UNDER THE TONGUE AT THE ONSET OF CHEST PAIN. MAY REPEAT EVERY 5 MINUTES AS NEEDED FOR A TOTAL OF 3 DOSES. 25 tablet 1     omega-3 acid ethyl esters (LOVAZA) 1 g capsule TAKE TWO CAPSULES BY MOUTH TWO TIMES A  capsule 3     omeprazole (PRILOSEC) 20 MG DR capsule TAKE ONE CAPSULE BY MOUTH TWO TIMES A DAY, 30 TO 60 MINUTES BEFORE A MEAL. 180 capsule 3     ONE TOUCH VERIO IQ test strip TEST ONCE DAILY OR AS DIRECTED 100 each 5     OneTouch Delica Lancets 33G MISC 1 Act by Device route 2 times daily 200 each 0     polyethylene glycol (MIRALAX/GLYCOLAX) powder Take 17 g by mouth daily as needed for constipation 500 g 5     pramipexole (MIRAPEX) 0.75 MG tablet TAKE ONE TABLET BY MOUTH EVERY NIGHT AT BEDTIME 90 tablet 0     rosuvastatin (CRESTOR) 40 MG tablet Take 1 tablet (40 mg) by mouth daily       senna-docusate (SENOKOT-S/PERICOLACE)  "8.6-50 MG tablet Take 1-2 tablets by mouth 2 times daily 30 tablet 0     sitagliptin (JANUVIA) 100 MG tablet Take 1 tablet (100 mg) by mouth daily 90 tablet 3     sitagliptin (JANUVIA) 50 MG tablet Take 1 tablet (50 mg) by mouth daily 90 tablet 0     Skin Protectants, Misc. (HYDROCERIN) CREA APPLY TOPICALLY AS NEEDED FOR DRY SKIN 226 g 3     SM SENNA LAXATIVE 8.6 MG tablet TAKE TWO TABLETS BY MOUTH ONCE DAILY 180 tablet 3     sodium chloride (DEEP SEA NASAL SPRAY) 0.65 % nasal spray SPRAY 1 SPRAY IN EACH NOSTRIL FOUR TIMES A DAY AS NEEDED FOR CONGESTION 44 mL 3     tamsulosin (FLOMAX) 0.4 MG capsule TAKE ONE CAPSULE BY MOUTH ONCE DAILY 90 capsule 3     triamcinolone (KENALOG) 0.1 % external cream Apply sparingly once or twice per day as needed to affected area until the skin is better, then stop; REPEAT AS NEEDED 80 g 1     vitamin D3 (CHOLECALCIFEROL) 50 mcg (2000 units) tablet TAKE ONE TABLET BY MOUTH ONCE DAILY 100 tablet 2       Review Of Systems:  Skin: negative  Eyes: negative  Ears/Nose/Throat: Tinnitus  Respiratory: No shortness of breath, dyspnea on exertion, cough, or hemoptysis.  There is a history of tobacco abuse  Cardiovascular: Coronary artery disease  Gastrointestinal: reflux  Genitourinary: negative  Musculoskeletal: back pain  Neurologic: Benign essential tremor  Psychiatric: History of cocaine use  Hematologic/Lymphatic/Immunologic: negative  Endocrine: diabetes    Exam:  BP (!) 146/84 (BP Location: Left arm, Patient Position: Sitting, Cuff Size: Adult Regular)   Pulse 94   Ht 1.905 m (6' 3\")   Wt 130.6 kg (288 lb)   SpO2 98%   BMI 36.00 kg/m      General Impression: Pleasant gentleman who is not in distress and is otherwise well oriented in time place and person.    Mental status.  Normal    HEENT: There is no clinical evidence of jaundice on examination of conjunctiva.  Extraocular eye movements normal.  Mucous membranes unremarkable.      Skin: Skin otherwise normal to examination    Lymph " Nodes: There is no inguinal lymphadenopathy    Respiratory System: The respiratory cycle is normal    Cardiovascular: There is no significant pitting peripheral edema    Abdominal: The abdomen is considerably obese.  There is no abdominal tenderness.  There is well-healed surgical scars on the left side and the left lower quadrant healed puncture abby from surgery in the upper abdomen.  There is no evidence of inguinal hernia    Extremities: Extremities otherwise unremarkable    Back and Flank: No evidence of kyphosis, scoliosis or lordosis    Genital: Penis uncircumcised with normal size meatus.  Left testicle and epididymis are normal.  There is a considerable swelling on the right side of the scrotum which transilluminates easily consistent with a hydrocele    Rectal: Not examined    Neurologic: There is no evidence of trauma and there is no focal abnormal clinical neurological signs in the central, or peripheral nervous systems    Impression: The patient has a benign hydrocele on the right side which is becoming increasingly uncomfortable and is now considerable size.  Given the clear evidence of transillumination I do not see evidence for doing an ultrasound of the scrotum.  I did explain the procedure of hydrocele excision to him in detail today including the technique itself, the potential side effects associated with it including significant swelling for several weeks afterwards and potential complications which included hematoma and even loss of testicle which fortunately is very rare.  He does have some significant medical issues, and will require to discontinue aspirin, fish oil and anti-inflammatory medication for 10 days prior to the procedure and Plavix for 5 days prior to the procedure.  These will be resumed after the surgery.  I carefully went over the entire situation with the patient in detail today.  I reviewed records, labs and other pertinent studies.  We had a wide-ranging discussion of all  "issues related to the situation.  I addressed and answered many questions      Plan: Right hydrocele excision with spermatic cord block and general anesthesia    \"This dictation was performed with voice recognition software and may contain errors,  omissions and inadvertent word substitution.\"        Blaise Altamirano MD      "

## 2021-03-09 NOTE — PROGRESS NOTES
History: It is a great pleasure to see this very pleasant 56-year-old gentleman in initial consultation today at the request of his physicians.  He has an increasingly uncomfortable swelling on the right side of the scrotum.  .  This is continuing to get larger.  He had bilateral hernia surgery in the recent past.  He does have a very significant cardiovascular history having had multiple coronary artery bypass procedures and stent placements.  He also has type 2 diabetes.  He is on both Plavix and baby aspirin.  Full past medical and surgical history as listed below    Past Medical History:   Diagnosis Date     Anxiety      CAD (coronary artery disease) Dec 2014    s/p CABGx3 Dec 2014 and later RCA stent July 2015     DM2 (diabetes mellitus, type 2) (H) Dx June 2015    A1c 6.5% at time of dx     Epididymitis, right      GERD (gastroesophageal reflux disease)      Heart attack (H) 12/2014, 6/2015, 7/4/2015    x3     Hernia, abdominal      History of cocaine abuse (H)     Smoked crack cocaine (remission since Nov 2014)     History of tobacco abuse      Hyperlipidemia      Hyperlipidemia LDL goal <100     chronic hypertriglyceredemia, regular elevation 300-400     Incomplete RBBB      Inguinal hernia     Left     Numbness and tingling     diabetic neuropathy     Prediabetes Dec 2014     Restless leg syndrome      S/P CABG x 3 Dec 2014     Severe obesity (BMI 35.0-35.9 with comorbidity) (H) 12/24/2018    BMI 37.5     Sleep apnea     mild, does not use CPAP     Stented coronary artery        Past Surgical History:   Procedure Laterality Date     C CABG, VEIN, THREE  12/23/2014    [facility]     DAVINCI XI HERNIORRHAPHY INGUINAL Right 7/31/2020    Procedure: ROBOTIC ASSITED RIGHT INGUINAL HERNIA REPAIR WITH MESH;  Surgeon: Haim Green MD;  Location:  OR     HERNIORRHAPHY INGUINAL Left 9/20/2016    Procedure: HERNIORRHAPHY INGUINAL;  Surgeon: Haim Green MD;  Location:  [facility]     STENT  07/2015    RCA  stent     ZZC NONSPECIFIC PROCEDURE  age 15    both feet bone spur removal in the arch of the foot     ZZHC DRUG-ELUTING STENTS, SINGLE  10/19/2018    Left circumflex at [facility]       Family History   Problem Relation Age of Onset     Diabetes Mother         type 2     Breast Cancer Mother      Macular Degeneration Mother      Dementia Mother      Coronary Artery Disease Father 52     Heart Disease Paternal Uncle      Diabetes Maternal Grandfather      Breast Cancer Maternal Grandmother      Prostate Cancer No family hx of        Social History     Socioeconomic History     Marital status: Single     Spouse name: Not on file     Number of children: 2     Years of education: Not on file     Highest education level: Not on file   Occupational History     Occupation: nurse assistant     Employer: [employer]   Social Needs     Financial resource strain: Not on file     Food insecurity     Worry: Not on file     Inability: Not on file     Transportation needs     Medical: Not on file     Non-medical: Not on file   Tobacco Use     Smoking status: Former Smoker     Packs/day: 0.50     Years: 25.00     Pack years: 12.50     Types: Cigarettes     Quit date: 2015     Years since quittin.3     Smokeless tobacco: Never Used   Substance and Sexual Activity     Alcohol use: No     Alcohol/week: 0.0 standard drinks     Drug use: No     Comment: Past coccaine 2 weeks ago as of 12; no h/o IVDA. Smoked cocaine (did not snort)     Sexual activity: Yes     Partners: Female   Lifestyle     Physical activity     Days per week: Not on file     Minutes per session: Not on file     Stress: Not on file   Relationships     Social connections     Talks on phone: Not on file     Gets together: Not on file     Attends Latter-day service: Not on file     Active member of club or organization: Not on file     Attends meetings of clubs or organizations: Not on file     Relationship status: Not on file     Intimate partner  violence     Fear of current or ex partner: Not on file     Emotionally abused: Not on file     Physically abused: Not on file     Forced sexual activity: Not on file   Other Topics Concern     Parent/sibling w/ CABG, MI or angioplasty before 65F 55M? Not Asked   Social History Narrative     Not on file       Current Outpatient Medications   Medication Sig Dispense Refill     acetaminophen (TYLENOL) 325 MG tablet Take 2 tablets (650 mg) by mouth every 4 hours as needed for mild pain 50 tablet 0     albuterol (PROAIR HFA/PROVENTIL HFA/VENTOLIN HFA) 108 (90 Base) MCG/ACT inhaler Inhale 2 puffs into the lungs every 4 hours as needed for shortness of breath / dyspnea or wheezing 8.5 g 3     Alcohol Swabs (B-D SINGLE USE SWABS REGULAR) PADS USE AS NEEDED 200 each 0     aspirin (ASA) 81 MG EC tablet Take 1 tablet (81 mg) by mouth daily 90 tablet 3     blood glucose (NO BRAND SPECIFIED) test strip Use to test blood sugar 2 times daily or as directed. 200 strip 11     blood glucose monitoring (NO BRAND SPECIFIED) meter device kit Use to test blood sugar 1 times daily or as directed. ONE TOUCH VERIO. 1 kit 0     clonazePAM (KLONOPIN) 0.5 MG tablet TAKE ONE TABLET BY MOUTH EVERY NIGHT AT BEDTIME AS NEEDED FOR RESTLESS LEGS 90 tablet 1     clopidogrel (PLAVIX) 75 MG tablet TAKE ONE TABLET BY MOUTH ONCE DAILY 90 tablet 3     cyanocobalamin (VITAMIN B-12) 1000 MCG tablet Take 1 tablet by mouth daily 100 tablet 0     ezetimibe (ZETIA) 10 MG tablet Take 1 tablet (10 mg) by mouth At Bedtime 90 tablet 1     ferrous sulfate (FEROSUL) 325 (65 Fe) MG tablet TAKE ONE TABLET BY MOUTH EVERY MORNING WITH BREAKFAST 90 tablet 2     fluticasone (FLONASE) 50 MCG/ACT nasal spray Spray 2 sprays into both nostrils daily 54.6 mL 3     gabapentin (NEURONTIN) 300 MG capsule TAKE THREE CAPSULES BY MOUTH THREE TIMES A  capsule 3     hydrOXYzine (ATARAX) 25 MG tablet TAKE ONE TO TWO TABLETS BY MOUTH EVERY 6 HOURS AS NEEDED FOR ANXIETY 120 tablet  5     ibuprofen (ADVIL,MOTRIN) 800 MG tablet Take 1 tablet (800 mg) by mouth every 8 hours as needed for moderate pain 60 tablet 1     JARDIANCE 10 MG TABS tablet TAKE ONE TABLET BY MOUTH ONCE DAILY 90 tablet 3     lisinopril (ZESTRIL) 10 MG tablet TAKE ONE TABLET BY MOUTH ONCE DAILY 90 tablet 2     loratadine (CLARITIN) 10 MG tablet TAKE ONE TABLET BY MOUTH ONCE DAILY 90 tablet 1     MAPAP 500 MG tablet TAKE TWO TABLETS BY MOUTH EVERY 8 HOURS AS NEEDED FOR MILD PAIN 100 tablet 5     metFORMIN (GLUCOPHAGE) 500 MG tablet TAKE TWO TABLETS BY MOUTH TWO TIMES A DAY WITH MEALS 360 tablet 0     metoprolol succinate ER (TOPROL-XL) 25 MG 24 hr tablet TAKE ONE TABLET BY MOUTH TWICE A  tablet 3     nitroGLYcerin (NITROSTAT) 0.4 MG sublingual tablet PLACE 1 TABLET UNDER THE TONGUE AT THE ONSET OF CHEST PAIN. MAY REPEAT EVERY 5 MINUTES AS NEEDED FOR A TOTAL OF 3 DOSES. 25 tablet 1     omega-3 acid ethyl esters (LOVAZA) 1 g capsule TAKE TWO CAPSULES BY MOUTH TWO TIMES A  capsule 3     omeprazole (PRILOSEC) 20 MG DR capsule TAKE ONE CAPSULE BY MOUTH TWO TIMES A DAY, 30 TO 60 MINUTES BEFORE A MEAL. 180 capsule 3     ONE TOUCH VERIO IQ test strip TEST ONCE DAILY OR AS DIRECTED 100 each 5     OneTouch Delica Lancets 33G MISC 1 Act by Device route 2 times daily 200 each 0     polyethylene glycol (MIRALAX/GLYCOLAX) powder Take 17 g by mouth daily as needed for constipation 500 g 5     pramipexole (MIRAPEX) 0.75 MG tablet TAKE ONE TABLET BY MOUTH EVERY NIGHT AT BEDTIME 90 tablet 0     rosuvastatin (CRESTOR) 40 MG tablet Take 1 tablet (40 mg) by mouth daily       senna-docusate (SENOKOT-S/PERICOLACE) 8.6-50 MG tablet Take 1-2 tablets by mouth 2 times daily 30 tablet 0     sitagliptin (JANUVIA) 100 MG tablet Take 1 tablet (100 mg) by mouth daily 90 tablet 3     sitagliptin (JANUVIA) 50 MG tablet Take 1 tablet (50 mg) by mouth daily 90 tablet 0     Skin Protectants, Misc. (HYDROCERIN) CREA APPLY TOPICALLY AS NEEDED FOR DRY  "SKIN 226 g 3     SM SENNA LAXATIVE 8.6 MG tablet TAKE TWO TABLETS BY MOUTH ONCE DAILY 180 tablet 3     sodium chloride (DEEP SEA NASAL SPRAY) 0.65 % nasal spray SPRAY 1 SPRAY IN EACH NOSTRIL FOUR TIMES A DAY AS NEEDED FOR CONGESTION 44 mL 3     tamsulosin (FLOMAX) 0.4 MG capsule TAKE ONE CAPSULE BY MOUTH ONCE DAILY 90 capsule 3     triamcinolone (KENALOG) 0.1 % external cream Apply sparingly once or twice per day as needed to affected area until the skin is better, then stop; REPEAT AS NEEDED 80 g 1     vitamin D3 (CHOLECALCIFEROL) 50 mcg (2000 units) tablet TAKE ONE TABLET BY MOUTH ONCE DAILY 100 tablet 2       Review Of Systems:  Skin: negative  Eyes: negative  Ears/Nose/Throat: Tinnitus  Respiratory: No shortness of breath, dyspnea on exertion, cough, or hemoptysis.  There is a history of tobacco abuse  Cardiovascular: Coronary artery disease  Gastrointestinal: reflux  Genitourinary: negative  Musculoskeletal: back pain  Neurologic: Benign essential tremor  Psychiatric: History of cocaine use  Hematologic/Lymphatic/Immunologic: negative  Endocrine: diabetes    Exam:  BP (!) 146/84 (BP Location: Left arm, Patient Position: Sitting, Cuff Size: Adult Regular)   Pulse 94   Ht 1.905 m (6' 3\")   Wt 130.6 kg (288 lb)   SpO2 98%   BMI 36.00 kg/m      General Impression: Pleasant gentleman who is not in distress and is otherwise well oriented in time place and person.    Mental status.  Normal    HEENT: There is no clinical evidence of jaundice on examination of conjunctiva.  Extraocular eye movements normal.  Mucous membranes unremarkable.      Skin: Skin otherwise normal to examination    Lymph Nodes: There is no inguinal lymphadenopathy    Respiratory System: The respiratory cycle is normal    Cardiovascular: There is no significant pitting peripheral edema    Abdominal: The abdomen is considerably obese.  There is no abdominal tenderness.  There is well-healed surgical scars on the left side and the left lower " "quadrant healed puncture abby from surgery in the upper abdomen.  There is no evidence of inguinal hernia    Extremities: Extremities otherwise unremarkable    Back and Flank: No evidence of kyphosis, scoliosis or lordosis    Genital: Penis uncircumcised with normal size meatus.  Left testicle and epididymis are normal.  There is a considerable swelling on the right side of the scrotum which transilluminates easily consistent with a hydrocele    Rectal: Not examined    Neurologic: There is no evidence of trauma and there is no focal abnormal clinical neurological signs in the central, or peripheral nervous systems    Impression: The patient has a benign hydrocele on the right side which is becoming increasingly uncomfortable and is now considerable size.  Given the clear evidence of transillumination I do not see evidence for doing an ultrasound of the scrotum.  I did explain the procedure of hydrocele excision to him in detail today including the technique itself, the potential side effects associated with it including significant swelling for several weeks afterwards and potential complications which included hematoma and even loss of testicle which fortunately is very rare.  He does have some significant medical issues, and will require to discontinue aspirin, fish oil and anti-inflammatory medication for 10 days prior to the procedure and Plavix for 5 days prior to the procedure.  These will be resumed after the surgery.  I carefully went over the entire situation with the patient in detail today.  I reviewed records, labs and other pertinent studies.  We had a wide-ranging discussion of all issues related to the situation.  I addressed and answered many questions      Plan: Right hydrocele excision with spermatic cord block and general anesthesia    \"This dictation was performed with voice recognition software and may contain errors,  omissions and inadvertent word substitution.\"    "

## 2021-03-10 ENCOUNTER — IMMUNIZATION (OUTPATIENT)
Dept: NURSING | Facility: CLINIC | Age: 56
End: 2021-03-10
Payer: COMMERCIAL

## 2021-03-10 PROCEDURE — 91303 PR COVID VAC JANSSEN AD26 0.5ML: CPT

## 2021-03-10 PROCEDURE — 0031A PR COVID VAC JANSSEN AD26 0.5ML: CPT

## 2021-03-11 DIAGNOSIS — Z11.59 ENCOUNTER FOR SCREENING FOR OTHER VIRAL DISEASES: ICD-10-CM

## 2021-03-11 PROBLEM — N43.3 RIGHT HYDROCELE: Status: ACTIVE | Noted: 2021-03-11

## 2021-03-19 DIAGNOSIS — Z11.59 ENCOUNTER FOR SCREENING FOR OTHER VIRAL DISEASES: ICD-10-CM

## 2021-03-19 LAB
LABORATORY COMMENT REPORT: NORMAL
SARS-COV-2 RNA RESP QL NAA+PROBE: NEGATIVE
SARS-COV-2 RNA RESP QL NAA+PROBE: NORMAL
SPECIMEN SOURCE: NORMAL
SPECIMEN SOURCE: NORMAL

## 2021-03-19 PROCEDURE — 87635 SARS-COV-2 COVID-19 AMP PRB: CPT | Performed by: UROLOGY

## 2021-03-22 ENCOUNTER — ANESTHESIA EVENT (OUTPATIENT)
Dept: SURGERY | Facility: CLINIC | Age: 56
End: 2021-03-22
Payer: COMMERCIAL

## 2021-03-23 ENCOUNTER — HOSPITAL ENCOUNTER (OUTPATIENT)
Facility: CLINIC | Age: 56
Discharge: HOME | End: 2021-03-23
Attending: UROLOGY | Admitting: UROLOGY
Payer: COMMERCIAL

## 2021-03-23 ENCOUNTER — ANESTHESIA (OUTPATIENT)
Dept: SURGERY | Facility: CLINIC | Age: 56
End: 2021-03-23
Payer: COMMERCIAL

## 2021-03-23 VITALS
SYSTOLIC BLOOD PRESSURE: 123 MMHG | BODY MASS INDEX: 35.73 KG/M2 | TEMPERATURE: 98.2 F | WEIGHT: 287.4 LBS | DIASTOLIC BLOOD PRESSURE: 85 MMHG | RESPIRATION RATE: 16 BRPM | OXYGEN SATURATION: 95 % | HEIGHT: 75 IN | HEART RATE: 77 BPM

## 2021-03-23 DIAGNOSIS — N43.3 RIGHT HYDROCELE: ICD-10-CM

## 2021-03-23 PROCEDURE — 710N000012 HC RECOVERY PHASE 2, PER MINUTE: Performed by: UROLOGY

## 2021-03-23 PROCEDURE — 93005 ELECTROCARDIOGRAM TRACING: CPT

## 2021-03-23 PROCEDURE — 250N000011 HC RX IP 250 OP 636: Performed by: UROLOGY

## 2021-03-23 PROCEDURE — 999N000141 HC STATISTIC PRE-PROCEDURE NURSING ASSESSMENT: Performed by: UROLOGY

## 2021-03-23 PROCEDURE — 93010 ELECTROCARDIOGRAM REPORT: CPT | Performed by: INTERNAL MEDICINE

## 2021-03-23 PROCEDURE — 258N000003 HC RX IP 258 OP 636: Performed by: NURSE ANESTHETIST, CERTIFIED REGISTERED

## 2021-03-23 PROCEDURE — 88304 TISSUE EXAM BY PATHOLOGIST: CPT | Mod: TC | Performed by: UROLOGY

## 2021-03-23 PROCEDURE — 55040 REMOVAL OF HYDROCELE: CPT | Mod: RT | Performed by: UROLOGY

## 2021-03-23 PROCEDURE — 250N000011 HC RX IP 250 OP 636: Performed by: NURSE ANESTHETIST, CERTIFIED REGISTERED

## 2021-03-23 PROCEDURE — 370N000017 HC ANESTHESIA TECHNICAL FEE, PER MIN: Performed by: UROLOGY

## 2021-03-23 PROCEDURE — 250N000009 HC RX 250: Performed by: NURSE ANESTHETIST, CERTIFIED REGISTERED

## 2021-03-23 PROCEDURE — 999N000054 HC STATISTIC EKG NON-CHARGEABLE

## 2021-03-23 PROCEDURE — 88304 TISSUE EXAM BY PATHOLOGIST: CPT | Mod: 26 | Performed by: PATHOLOGY

## 2021-03-23 PROCEDURE — 250N000009 HC RX 250: Performed by: UROLOGY

## 2021-03-23 PROCEDURE — 360N000075 HC SURGERY LEVEL 2, PER MIN: Performed by: UROLOGY

## 2021-03-23 PROCEDURE — 710N000009 HC RECOVERY PHASE 1, LEVEL 1, PER MIN: Performed by: UROLOGY

## 2021-03-23 PROCEDURE — 272N000001 HC OR GENERAL SUPPLY STERILE: Performed by: UROLOGY

## 2021-03-23 PROCEDURE — 250N000025 HC SEVOFLURANE, PER MIN: Performed by: UROLOGY

## 2021-03-23 RX ORDER — NALOXONE HYDROCHLORIDE 0.4 MG/ML
0.4 INJECTION, SOLUTION INTRAMUSCULAR; INTRAVENOUS; SUBCUTANEOUS
Status: DISCONTINUED | OUTPATIENT
Start: 2021-03-23 | End: 2021-03-23 | Stop reason: HOSPADM

## 2021-03-23 RX ORDER — KETOROLAC TROMETHAMINE 10 MG/1
10 TABLET, FILM COATED ORAL EVERY 6 HOURS PRN
Qty: 10 TABLET | Refills: 0 | Status: SHIPPED | OUTPATIENT
Start: 2021-03-23 | End: 2023-03-16

## 2021-03-23 RX ORDER — ONDANSETRON 2 MG/ML
4 INJECTION INTRAMUSCULAR; INTRAVENOUS EVERY 30 MIN PRN
Status: DISCONTINUED | OUTPATIENT
Start: 2021-03-23 | End: 2021-03-23 | Stop reason: HOSPADM

## 2021-03-23 RX ORDER — BUPIVACAINE HYDROCHLORIDE 2.5 MG/ML
INJECTION, SOLUTION INFILTRATION; PERINEURAL PRN
Status: DISCONTINUED | OUTPATIENT
Start: 2021-03-23 | End: 2021-03-23 | Stop reason: HOSPADM

## 2021-03-23 RX ORDER — CEFAZOLIN SODIUM 1 G/3ML
1 INJECTION, POWDER, FOR SOLUTION INTRAMUSCULAR; INTRAVENOUS SEE ADMIN INSTRUCTIONS
Status: DISCONTINUED | OUTPATIENT
Start: 2021-03-23 | End: 2021-03-23 | Stop reason: HOSPADM

## 2021-03-23 RX ORDER — AZELASTINE 1 MG/ML
1 SPRAY, METERED NASAL EVERY EVENING
COMMUNITY

## 2021-03-23 RX ORDER — PROPOFOL 10 MG/ML
INJECTION, EMULSION INTRAVENOUS PRN
Status: DISCONTINUED | OUTPATIENT
Start: 2021-03-23 | End: 2021-03-23

## 2021-03-23 RX ORDER — PHENOL 1.4 %
10 AEROSOL, SPRAY (ML) MUCOUS MEMBRANE
COMMUNITY

## 2021-03-23 RX ORDER — ONDANSETRON 4 MG/1
4 TABLET, ORALLY DISINTEGRATING ORAL EVERY 30 MIN PRN
Status: DISCONTINUED | OUTPATIENT
Start: 2021-03-23 | End: 2021-03-23 | Stop reason: HOSPADM

## 2021-03-23 RX ORDER — DEXAMETHASONE SODIUM PHOSPHATE 4 MG/ML
INJECTION, SOLUTION INTRA-ARTICULAR; INTRALESIONAL; INTRAMUSCULAR; INTRAVENOUS; SOFT TISSUE PRN
Status: DISCONTINUED | OUTPATIENT
Start: 2021-03-23 | End: 2021-03-23

## 2021-03-23 RX ORDER — CEFAZOLIN SODIUM IN 0.9 % NACL 3 G/100 ML
3 INTRAVENOUS SOLUTION, PIGGYBACK (ML) INTRAVENOUS
Status: COMPLETED | OUTPATIENT
Start: 2021-03-23 | End: 2021-03-23

## 2021-03-23 RX ORDER — NALOXONE HYDROCHLORIDE 0.4 MG/ML
0.2 INJECTION, SOLUTION INTRAMUSCULAR; INTRAVENOUS; SUBCUTANEOUS
Status: DISCONTINUED | OUTPATIENT
Start: 2021-03-23 | End: 2021-03-23 | Stop reason: HOSPADM

## 2021-03-23 RX ORDER — SODIUM CHLORIDE, SODIUM LACTATE, POTASSIUM CHLORIDE, CALCIUM CHLORIDE 600; 310; 30; 20 MG/100ML; MG/100ML; MG/100ML; MG/100ML
INJECTION, SOLUTION INTRAVENOUS CONTINUOUS
Status: DISCONTINUED | OUTPATIENT
Start: 2021-03-23 | End: 2021-03-23 | Stop reason: HOSPADM

## 2021-03-23 RX ORDER — HYDROMORPHONE HYDROCHLORIDE 1 MG/ML
.3-.5 INJECTION, SOLUTION INTRAMUSCULAR; INTRAVENOUS; SUBCUTANEOUS EVERY 10 MIN PRN
Status: DISCONTINUED | OUTPATIENT
Start: 2021-03-23 | End: 2021-03-23 | Stop reason: HOSPADM

## 2021-03-23 RX ORDER — ONDANSETRON 2 MG/ML
INJECTION INTRAMUSCULAR; INTRAVENOUS PRN
Status: DISCONTINUED | OUTPATIENT
Start: 2021-03-23 | End: 2021-03-23

## 2021-03-23 RX ORDER — LIDOCAINE HYDROCHLORIDE 20 MG/ML
INJECTION, SOLUTION INFILTRATION; PERINEURAL PRN
Status: DISCONTINUED | OUTPATIENT
Start: 2021-03-23 | End: 2021-03-23

## 2021-03-23 RX ORDER — SODIUM CHLORIDE, SODIUM LACTATE, POTASSIUM CHLORIDE, CALCIUM CHLORIDE 600; 310; 30; 20 MG/100ML; MG/100ML; MG/100ML; MG/100ML
INJECTION, SOLUTION INTRAVENOUS CONTINUOUS PRN
Status: DISCONTINUED | OUTPATIENT
Start: 2021-03-23 | End: 2021-03-23

## 2021-03-23 RX ORDER — FENTANYL CITRATE 50 UG/ML
25-50 INJECTION, SOLUTION INTRAMUSCULAR; INTRAVENOUS
Status: DISCONTINUED | OUTPATIENT
Start: 2021-03-23 | End: 2021-03-23 | Stop reason: HOSPADM

## 2021-03-23 RX ORDER — MAGNESIUM HYDROXIDE 1200 MG/15ML
LIQUID ORAL PRN
Status: DISCONTINUED | OUTPATIENT
Start: 2021-03-23 | End: 2021-03-23 | Stop reason: HOSPADM

## 2021-03-23 RX ORDER — GLYCOPYRROLATE 0.2 MG/ML
INJECTION, SOLUTION INTRAMUSCULAR; INTRAVENOUS PRN
Status: DISCONTINUED | OUTPATIENT
Start: 2021-03-23 | End: 2021-03-23

## 2021-03-23 RX ORDER — NEOSTIGMINE METHYLSULFATE 1 MG/ML
VIAL (ML) INJECTION PRN
Status: DISCONTINUED | OUTPATIENT
Start: 2021-03-23 | End: 2021-03-23

## 2021-03-23 RX ORDER — FENTANYL CITRATE 50 UG/ML
INJECTION, SOLUTION INTRAMUSCULAR; INTRAVENOUS PRN
Status: DISCONTINUED | OUTPATIENT
Start: 2021-03-23 | End: 2021-03-23

## 2021-03-23 RX ORDER — IBUPROFEN 200 MG
200 TABLET ORAL EVERY 4 HOURS PRN
COMMUNITY

## 2021-03-23 RX ADMIN — HYDROMORPHONE HYDROCHLORIDE 0.5 MG: 1 INJECTION, SOLUTION INTRAMUSCULAR; INTRAVENOUS; SUBCUTANEOUS at 09:23

## 2021-03-23 RX ADMIN — PROPOFOL 350 MG: 10 INJECTION, EMULSION INTRAVENOUS at 08:48

## 2021-03-23 RX ADMIN — ROCURONIUM BROMIDE 20 MG: 10 INJECTION INTRAVENOUS at 08:56

## 2021-03-23 RX ADMIN — PHENYLEPHRINE HYDROCHLORIDE 100 MCG: 10 INJECTION INTRAVENOUS at 09:10

## 2021-03-23 RX ADMIN — ONDANSETRON 4 MG: 2 INJECTION INTRAMUSCULAR; INTRAVENOUS at 09:42

## 2021-03-23 RX ADMIN — PHENYLEPHRINE HYDROCHLORIDE 200 MCG: 10 INJECTION INTRAVENOUS at 09:13

## 2021-03-23 RX ADMIN — PROPOFOL 50 MG: 10 INJECTION, EMULSION INTRAVENOUS at 08:56

## 2021-03-23 RX ADMIN — DEXAMETHASONE SODIUM PHOSPHATE 4 MG: 4 INJECTION, SOLUTION INTRA-ARTICULAR; INTRALESIONAL; INTRAMUSCULAR; INTRAVENOUS; SOFT TISSUE at 09:13

## 2021-03-23 RX ADMIN — DEXMEDETOMIDINE HYDROCHLORIDE 12 MCG: 100 INJECTION, SOLUTION INTRAVENOUS at 09:47

## 2021-03-23 RX ADMIN — MIDAZOLAM 2 MG: 1 INJECTION INTRAMUSCULAR; INTRAVENOUS at 08:44

## 2021-03-23 RX ADMIN — ROCURONIUM BROMIDE 50 MG: 10 INJECTION INTRAVENOUS at 08:48

## 2021-03-23 RX ADMIN — NEOSTIGMINE METHYLSULFATE 5 MG: 1 INJECTION, SOLUTION INTRAVENOUS at 09:42

## 2021-03-23 RX ADMIN — DEXMEDETOMIDINE HYDROCHLORIDE 8 MCG: 100 INJECTION, SOLUTION INTRAVENOUS at 09:55

## 2021-03-23 RX ADMIN — LIDOCAINE HYDROCHLORIDE 100 MG: 20 INJECTION, SOLUTION INFILTRATION; PERINEURAL at 08:48

## 2021-03-23 RX ADMIN — FENTANYL CITRATE 100 MCG: 50 INJECTION, SOLUTION INTRAMUSCULAR; INTRAVENOUS at 08:48

## 2021-03-23 RX ADMIN — SODIUM CHLORIDE, POTASSIUM CHLORIDE, SODIUM LACTATE AND CALCIUM CHLORIDE: 600; 310; 30; 20 INJECTION, SOLUTION INTRAVENOUS at 08:42

## 2021-03-23 RX ADMIN — GLYCOPYRROLATE 0.6 MG: 0.2 INJECTION, SOLUTION INTRAMUSCULAR; INTRAVENOUS at 09:42

## 2021-03-23 RX ADMIN — PHENYLEPHRINE HYDROCHLORIDE 200 MCG: 10 INJECTION INTRAVENOUS at 09:28

## 2021-03-23 RX ADMIN — PROPOFOL 70 MG: 10 INJECTION, EMULSION INTRAVENOUS at 09:45

## 2021-03-23 RX ADMIN — Medication 3 G: at 09:03

## 2021-03-23 RX ADMIN — PHENYLEPHRINE HYDROCHLORIDE 200 MCG: 10 INJECTION INTRAVENOUS at 09:40

## 2021-03-23 RX ADMIN — SODIUM CHLORIDE, POTASSIUM CHLORIDE, SODIUM LACTATE AND CALCIUM CHLORIDE: 600; 310; 30; 20 INJECTION, SOLUTION INTRAVENOUS at 09:55

## 2021-03-23 SDOH — HEALTH STABILITY: MENTAL HEALTH: HOW OFTEN DO YOU HAVE A DRINK CONTAINING ALCOHOL?: NEVER

## 2021-03-23 ASSESSMENT — ENCOUNTER SYMPTOMS: SEIZURES: 0

## 2021-03-23 ASSESSMENT — MIFFLIN-ST. JEOR: SCORE: 2209.27

## 2021-03-23 ASSESSMENT — COPD QUESTIONNAIRES: COPD: 0

## 2021-03-23 NOTE — ANESTHESIA PREPROCEDURE EVALUATION
Anesthesia Pre-Procedure Evaluation    Patient: Javier Markham   MRN: [id number] : 1965        Preoperative Diagnosis: Right hydrocele [N43.3]   Procedure : Procedure(s):  Right HYDROCELECTOMY, SCROTAL APPROACH with spermatic cord block     Past Medical History:   Diagnosis Date     Anxiety      Ascending aorta dilation (H)      CAD (coronary artery disease) Dec 2014    s/p CABGx3 Dec 2014 and later RCA stent 2015     Diabetes mellitus (H)      DM2 (diabetes mellitus, type 2) (H) Dx 2015    A1c 6.5% at time of dx     Epididymitis, right      Essential tremor      GERD (gastroesophageal reflux disease)      Heart attack (H) 2014, 2015, 7/4/2015    x3     Hernia, abdominal      History of cocaine abuse (H)     Smoked crack cocaine (remission since 2014)     History of tobacco abuse      Hyperlipidemia      Hyperlipidemia LDL goal <100     chronic hypertriglyceredemia, regular elevation 300-400     Incomplete RBBB      Inguinal hernia     Left     Numbness and tingling     diabetic neuropathy     Prediabetes Dec 2014     Restless leg syndrome      S/P CABG x 3 Dec 2014     Severe obesity (BMI 35.0-35.9 with comorbidity) (H) 2018    BMI 37.5     Sleep apnea     mild, does not use CPAP     Stented coronary artery       Past Surgical History:   Procedure Laterality Date     C CABG, VEIN, THREE  2014    [facility]     DAVINCI XI HERNIORRHAPHY INGUINAL Right 2020    Procedure: ROBOTIC ASSITED RIGHT INGUINAL HERNIA REPAIR WITH MESH;  Surgeon: Haim Green MD;  Location:  OR     HERNIORRHAPHY INGUINAL Left 2016    Procedure: HERNIORRHAPHY INGUINAL;  Surgeon: Haim Green MD;  Location:  SD     STENT  2015    RCA stent     [facility] NONSPECIFIC PROCEDURE  age 15    both feet bone spur removal in the arch of the foot     [facility]  [facility]    Left circumflex at [facility]      Allergies   Allergen Reactions     Flumist [Influenza Vaccine  Live]      Vomiting, felt like he had the flu.     Perfume      Soap      Perfume in soap-reaction excema      Social History     Tobacco Use     Smoking status: Former Smoker     Packs/day: 0.50     Years: 25.00     Pack years: 12.50     Types: Cigarettes     Quit date: 2015     Years since quittin.3     Smokeless tobacco: Never Used   Substance Use Topics     Alcohol use: No     Alcohol/week: 0.0 standard drinks     Frequency: Never      Wt Readings from Last 1 Encounters:   21 130.4 kg (287 lb 6.4 oz)        Anesthesia Evaluation   Pt has had prior anesthetic.     No history of anesthetic complications       ROS/MED HX  ENT/Pulmonary:     (+) sleep apnea (mild), doesn't use CPAP,  (-) COPD, allergic rhinitis and vocal abnormalities   Neurologic:    (-) no seizures and no CVA   Cardiovascular:     (+) hypertension--CAD -past MI CABG-date: CABG x3 . stent-most recent .  (-) pacemaker, pacemaker and ICD   METS/Exercise Tolerance:     Hematologic:       Musculoskeletal:       GI/Hepatic:     (+) GERD (well controlled on omeprazole), Asymptomatic on medication,     Renal/Genitourinary:    (-) renal disease and History of transplant   Endo:     (+) type II DM, Obesity,  (-) thyroid disease   Psychiatric/Substance Use:       Infectious Disease:       Malignancy:       Other:            Physical Exam    Airway        Mallampati: III   TM distance: > 3 FB   Neck ROM: full   Mouth opening: > 3 cm    Respiratory Devices and Support         Dental  no notable dental history         Cardiovascular   cardiovascular exam normal          Pulmonary   pulmonary exam normal                OUTSIDE LABS:  CBC:   Lab Results   Component Value Date    WBC 9.5 2020    WBC 8.1 01/15/2020    HGB 15.5 2020    HGB 14.9 01/15/2020    HCT 42.8 2020    HCT 44.5 01/15/2020     2020     01/15/2020     BMP:   Lab Results   Component Value Date     2021     2020     POTASSIUM 4.0 03/03/2021    POTASSIUM 4.2 07/31/2020    CHLORIDE 105 03/03/2021    CHLORIDE 103 07/23/2020    CO2 21 03/03/2021    CO2 26 07/23/2020    BUN 12 03/03/2021    BUN 17 07/23/2020    CR 0.91 03/03/2021    CR 1.06 07/23/2020     (H) 03/03/2021     (H) 07/23/2020     COAGS: No results found for: PTT, INR, FIBR  POC:   Lab Results   Component Value Date     (H) 07/31/2020     HEPATIC:   Lab Results   Component Value Date    ALBUMIN 3.6 01/15/2020    PROTTOTAL 6.3 (L) 01/15/2020    ALT 50 (H) 06/17/2020    AST 18 01/15/2020    ALKPHOS 92 01/15/2020    BILITOTAL 0.3 01/15/2020     OTHER:   Lab Results   Component Value Date    A1C 8.0 (H) 03/03/2021    KATINA 9.7 03/03/2021    MAG 2.3 06/14/2015    TSH 1.34 01/15/2020       Anesthesia Plan    ASA Status:  3      Anesthesia Type: General.     - Airway: LMA   Induction: Intravenous, Propofol.      Techniques and Equipment:     - Airway: Video-Laryngoscope (available)         Consents    Anesthesia Plan(s) and associated risks, benefits, and realistic alternatives discussed. Questions answered and patient/representative(s) expressed understanding.     - Discussed with:  Patient      - Extended Intubation/Ventilatory Support Discussed: No.      - Patient is DNR/DNI Status: No    Use of blood products discussed: No .     Postoperative Care    Pain management: IV analgesics, Oral pain medications.   PONV prophylaxis: Ondansetron (or other 5HT-3), Dexamethasone or Solumedrol     Comments:                Wanda Johnson

## 2021-03-23 NOTE — ANESTHESIA PROCEDURE NOTES
Airway       Patient location during procedure: OR  Staff -        Anesthesiologist:  Kavon Frost MD       CRNA: Lacey Quinn APRN CRNA       Performed By: anesthesiologist  Consent for Airway        Urgency: elective  Indications and Patient Condition       Indications for airway management: daniel-procedural       Induction type:intravenous       Mask difficulty assessment: 2 - vent by mask + OA or adjuvant +/- NMBA    Final Airway Details       Final airway type: endotracheal airway       Successful airway: ETT - single and Oral  Endotracheal Airway Details        ETT size (mm): 8.0       Cuffed: yes       Successful intubation technique: video laryngoscopy       VL Blade Size: Glidescope 4       Grade View of Cords: 1       Adjucts: stylet       Position: Right       Measured from: gums/teeth       Secured at (cm): 22       Bite block used: None    Post intubation assessment        Placement verified by: capnometry, equal breath sounds and chest rise        Number of attempts at approach: 1       Secured with: pink tape       Ease of procedure: easy       Dentition: Intact and Unchanged (Baseline poor dentition with missing teeth. Unchanged with videolaryngoscopy)    Medication(s) Administered   Medication Administration Time: 3/23/2021 8:51 AM

## 2021-03-23 NOTE — ANESTHESIA CARE TRANSFER NOTE
Patient: Javier Markham    Procedure(s):  Right HYDROCELECTOMY, SCROTAL APPROACH with spermatic cord block    Diagnosis: Right hydrocele [N43.3]  Diagnosis Additional Information: No value filed.    Anesthesia Type:   General     Note:    Oropharynx: oropharynx clear of all foreign objects and spontaneously breathing  Level of Consciousness: awake  Oxygen Supplementation: face mask  Level of Supplemental Oxygen (L/min / FiO2): 10  Independent Airway: airway patency satisfactory and stable  Dentition: dentition unchanged  Vital Signs Stable: post-procedure vital signs reviewed and stable  Report to RN Given: handoff report given  Patient transferred to: PACU  Comments: Neuromuscular blockade reversed after TOF 4/4, spontaneous respirations, adequate tidal volumes, followed commands to voice, oropharynx suctioned with soft flexible catheter, extubated atraumatically, extubated with suction, airway patent after extubation.  Oxygen via facemask at 10 liters per minute to PACU. Oxygen tubing connected to wall O2 in PACU, SpO2, NiBP, and EKG monitors and alarms on and functioning, report on patient's clinical status given to PACU RN, RN questions answered. Advised incentive spirometry to open airways and encourage cough.     Handoff Report: Identifed the Patient, Identified the Reponsible Provider, Reviewed the pertinent medical history, Discussed the surgical course, Reviewed Intra-OP anesthesia mangement and issues during anesthesia, Set expectations for post-procedure period and Allowed opportunity for questions and acknowledgement of understanding      Vitals: (Last set prior to Anesthesia Care Transfer)  CRNA VITALS  3/23/2021 0926 - 3/23/2021 1008      3/23/2021             Resp Rate (set):  10        Electronically Signed By: STEPHANIE Dudley CRNA  March 23, 2021  10:08 AM

## 2021-03-23 NOTE — DISCHARGE INSTRUCTIONS
Same Day Surgery Discharge Instructions for  Sedation and General Anesthesia       It's not unusual to feel dizzy, light-headed or faint for up to 24 hours after surgery or while taking pain medication.  If you have these symptoms: sit for a few minutes before standing and have someone assist you when you get up to walk or use the bathroom.      You should rest and relax for the next 24 hours. We recommend you make arrangements to have an adult stay with you for at least 24 hours after your discharge.  Avoid hazardous and strenuous activity.      DO NOT DRIVE any vehicle or operate mechanical equipment for 24 hours following the end of your surgery.  Even though you may feel normal, your reactions may be affected by the medication you have received.      Do not drink alcoholic beverages for 24 hours following surgery.       Slowly progress to your regular diet as you feel able. It's not unusual to feel nauseated and/or vomit after receiving anesthesia.  If you develop these symptoms, drink clear liquids (apple juice, ginger ale, broth, 7-up, etc. ) until you feel better.  If your nausea and vomiting persists for 24 hours, please notify your surgeon.        All narcotic pain medications, along with inactivity and anesthesia, can cause constipation. Drinking plenty of liquids and increasing fiber intake will help.      For any questions of a medical nature, call your surgeon.      Do not make important decisions for 24 hours.      If you had general anesthesia, you may have a sore throat for a couple of days related to the breathing tube used during surgery.  You may use Cepacol lozenges to help with this discomfort.  If it worsens or if you develop a fever, contact your surgeon.       If you feel your pain is not well managed with the pain medications prescribed by your surgeon, please contact your surgeon's office to let them know so they can address your concerns.       CoVid 19 Information    We want to give you  information regarding Covid. Please consult your primary care provider with any questions you might have.     Patient who have symptoms (cough, fever, or shortness of breath), need to isolate for 7 days from when symptoms started OR 72 hours after fever resolves (without fever reducing medications) AND improvement of respiratory symptoms (whichever is longer).      Isolate yourself at home (in own room/own bathroom if possible)    Do Not allow any visitors    Do Not go to work or school    Do Not go to [employer],  centers, shopping, or other public places.    Do Not shake hands.    Avoid close and intimate contact with others (hugging, kissing).    Follow CDC recommendations for household cleaning of frequently touched services.     After the initial 7 days, continue to isolate yourself from household members as much as possible. To continue decrease the risk of community spread and exposure, you and any members of your household should limit activities in public for 14 days after starting home isolation.     You can reference the following CDC link for helpful home isolation/care tips:  https://www.cdc.gov/coronavirus/2019-ncov/downloads/10Things.pdf    Protect Others:    Cover Your Mouth and Nose with a mask, disposable tissue or wash cloth to avoid spreading germs to others.    Wash your hands and face frequently with soap and water    Call Your Primary Doctor If: Breathing difficulty develops or you become worse.    For more information about COVID19 and options for caring for yourself at home, please visit the CDC website at https://www.cdc.gov/coronavirus/2019-ncov/about/steps-when-sick.html  For more options for care at [facility], please visit our website at [email or web address]        **If you have questions or concerns about your procedure,   call Dr. Altamirano at [phone]**

## 2021-03-23 NOTE — OP NOTE
Procedure Date: 03/23/2021      PREOPERATIVE DIAGNOSIS:  Right hydrocele.      POSTOPERATIVE DIAGNOSIS:  Right hydrocele.      PROCEDURES PERFORMED:  Right hydrocele excision with a regional spermatic cord block.      SURGEON:  Blaise Altamirano MD      ANESTHESIA:  General.      INDICATIONS FOR PROCEDURE:  This very pleasant 56-year-old gentleman has a large right-sided scrotal swelling which is becoming increasingly uncomfortable for him.  It transilluminates easily and is consistent with a hydrocele.      DESCRIPTION OF PROCEDURE:  The patient was brought to the operative suite and after being placed in the supine position on the operating table and after the induction of anesthesia with the lower abdomen and genitalia shaved, prepped and draped in customary fashion.      A timeout was then called.      I made a transverse incision on the right side of the scrotum and extended this down to the surface of the tunica vaginalis.  I was able to mobilize the tunica intact with the testicle inside it through the incision and then dissect tissue away from the spermatic cord, connecting it.  I then gently opened the hydrocele sac and drained all the fluid, approximately 200 mL.  I opened the sac longitudinally with scissors and then everted it around the spermatic cord.  I excised the excess tunica urine, which was sent for histological analysis.  I very carefully and meticulously coagulated all bleeding points.  Finally, before returning the testicle to the scrotum, I did inject 20 mL of 0.25% Marcaine into the spermatic cord close to the superficial inguinal ring.  After carefully inspecting the inside of the scrotum for bleeding and any significant areas coagulated, I returned the testicle to the scrotum.  The wound was then closed in layers with 2-0 running suture to the dartos muscle and 4-0 subcuticular Vicryl to the scrotal skin.  The tunica was closed around the spermatic cord with a running 2-0 Vicryl suture, also.        The wound was then cleaned and dressed.  Fluffs, scrotal support, ice pack will be put in place and the patient was taken to the recovery room, having tolerated the procedure well.        CONCLUSION:  The patient will go home today.  Will take Toradol for pain.  Will avoid heavy lifting for 2 weeks of anything beyond 10 pounds.  I will see him in the office in 6 weeks' time to evaluate his progress.         WILBERTO WILLOUGHBY MD             D: 2021   T: 2021   MT: JESSICA      Name:     CHRISTY ZIMMER   MRN:      [id number]        Account:        [id number]   :      1965           Procedure Date: 2021      Document: [id number]

## 2021-03-23 NOTE — OR NURSING
Dr Altamirano at bedside, restart Plavix and ASA after 4 days.  Dr Altamirano would like patient to void before discharge.

## 2021-03-23 NOTE — OR NURSING
Discharge instructions reviewed with [place] over the phone in presence of pt. All questions answered. No further concerns at this point. Teach back method used to ensure patient and family/friend understanding. Medication sent home. IV removed. All belongings returned. Pt voided without difficulty.

## 2021-03-23 NOTE — ANESTHESIA POSTPROCEDURE EVALUATION
Patient: Javier Markham    Procedure(s):  Right HYDROCELECTOMY, SCROTAL APPROACH with spermatic cord block    Diagnosis:Right hydrocele [N43.3]  Diagnosis Additional Information: No value filed.    Anesthesia Type:  General    Note:  Disposition: Outpatient   Postop Pain Control: Uneventful            Sign Out: Well controlled pain   PONV: No   Neuro/Psych: Uneventful            Sign Out: Acceptable/Baseline neuro status   Airway/Respiratory: Uneventful            Sign Out: Acceptable/Baseline resp. status   CV/Hemodynamics: Uneventful            Sign Out: Acceptable CV status   Other NRE: NONE   DID A NON-ROUTINE EVENT OCCUR? No    Event details/Postop Comments:  Pt doing well prior to discharge home.  Pt advised to sleep sitting upright in a recliner, or ensure that he remains lateral during sleep.  Only mild STEPHAN, but precautionary.           Last vitals:  Vitals:    03/23/21 1100 03/23/21 1107 03/23/21 1205   BP: 130/75  123/85   Pulse: 84 85 77   Resp: 15 16 16   Temp:  36.4  C (97.5  F) 36.8  C (98.2  F)   SpO2: 92% 93% 95%       Last vitals prior to Anesthesia Care Transfer:  CRNA VITALS  3/23/2021 0926 - 3/23/2021 1026      3/23/2021             NIBP:  100/65    Ht Rate:  86    SpO2:  96 %          Electronically Signed By: Kavon Frost MD  March 23, 2021  1:26 PM

## 2021-03-23 NOTE — OR NURSING
Dr Frost at bedside, patient's BPs soft 89/61, 81/57, new orders to open fluids, pressure bag applied to LR, Ok to bolus with 750ml LR, BP now 100/63.  Patient's O2 sats in high 80s on 11L simple face mask, patient encouraged to cough, lungs clear, IS preformed IS [id number] now O2 95% on 11L simple face mask.  Will continue to monitor.

## 2021-03-24 DIAGNOSIS — N43.3 RIGHT HYDROCELE: Primary | ICD-10-CM

## 2021-03-24 LAB — COPATH REPORT: NORMAL

## 2021-03-24 RX ORDER — OXYCODONE HYDROCHLORIDE 5 MG/1
5 TABLET ORAL EVERY 6 HOURS PRN
Qty: 12 TABLET | Refills: 0 | Status: SHIPPED | OUTPATIENT
Start: 2021-03-24 | End: 2021-03-27

## 2021-03-24 NOTE — OR NURSING
Patient states he is having discomfort despite toradol. Pt states he will call clinic to see if he could get additional pain medication. Pt states otherwise he has no concerns and is doing okay.

## 2021-03-25 LAB — INTERPRETATION ECG - MUSE: NORMAL

## 2021-03-31 ENCOUNTER — TELEPHONE (OUTPATIENT)
Dept: UROLOGY | Facility: CLINIC | Age: 56
End: 2021-03-31

## 2021-03-31 NOTE — TELEPHONE ENCOUNTER
"[facility] Call Center    Phone Message    May a detailed message be left on voicemail: yes     Reason for Call: Other: Pt calling because he had surgery a week ago and he has some concerns regarding that surgery. he reports that he thinks \"something isn't right.\" He would like a call back to discuss his symptoms and what he should do. Call at [phone], his other # is temporarily not working.    Action Taken: Message routed to:  [facility] uro    Travel Screening: Not Applicable                                                                      "

## 2021-04-07 DIAGNOSIS — N40.0 BENIGN PROSTATIC HYPERPLASIA, PRESENCE OF LOWER URINARY TRACT SYMPTOMS UNSPECIFIED: ICD-10-CM

## 2021-04-07 DIAGNOSIS — K59.01 SLOW TRANSIT CONSTIPATION: ICD-10-CM

## 2021-04-07 DIAGNOSIS — J30.2 SEASONAL ALLERGIES: ICD-10-CM

## 2021-04-07 DIAGNOSIS — E11.40 TYPE 2 DIABETES MELLITUS WITH DIABETIC NEUROPATHY, WITHOUT LONG-TERM CURRENT USE OF INSULIN (H): ICD-10-CM

## 2021-04-07 RX ORDER — TAMSULOSIN HYDROCHLORIDE 0.4 MG/1
CAPSULE ORAL
Qty: 90 CAPSULE | Refills: 3 | Status: SHIPPED | OUTPATIENT
Start: 2021-04-07 | End: 2022-05-03

## 2021-04-07 RX ORDER — SENNOSIDES 8.6 MG
TABLET ORAL
Qty: 180 TABLET | Refills: 3 | Status: SHIPPED | OUTPATIENT
Start: 2021-04-07 | End: 2023-03-16

## 2021-04-07 RX ORDER — LORATADINE 10 MG/1
TABLET ORAL
Qty: 90 TABLET | Refills: 3 | Status: SHIPPED | OUTPATIENT
Start: 2021-04-07 | End: 2021-10-11

## 2021-04-07 NOTE — TELEPHONE ENCOUNTER
Please refill Senna as appropriate.    Name from pharmacy: SENNA 8.6MG TABS          Will file in chart as: sennosides (SENOKOT) 8.6 MG tablet    The original prescription was discontinued on 3/23/2021 by Nuvia Villanueva for the following reason: Medication Reconciliation Clean Up. Renewing this prescription may not be appropriate.     Zeynep Lagos RN  [facility]

## 2021-04-07 NOTE — TELEPHONE ENCOUNTER
Lab Results   Component Value Date    A1C 8.0 03/03/2021    A1C 7.1 07/23/2020    A1C 7.4 06/17/2020    A1C 6.6 01/15/2020    A1C 6.9 05/07/2019     BP Readings from Last 3 Encounters:   03/23/21 123/85   03/09/21 (!) 146/84   03/03/21 131/87     Refilled per [facility] protocol.  Zeynep Lagos RN

## 2021-04-12 ENCOUNTER — TELEPHONE (OUTPATIENT)
Dept: UROLOGY | Facility: CLINIC | Age: 56
End: 2021-04-12

## 2021-04-12 NOTE — TELEPHONE ENCOUNTER
[facility] Call Center    Phone Message    May a detailed message be left on voicemail: yes     Reason for Call: Other: Pt would like a call back to discuss in person appt asap     Action Taken: Message routed to:  [facility] urology    Travel Screening: Not Applicable

## 2021-04-14 NOTE — TELEPHONE ENCOUNTER
Called [facility]. No ID on VM. Left general message requesting call back. Will try again later to reach ptKarin Mendez RN

## 2021-04-14 NOTE — TELEPHONE ENCOUNTER
[facility] Call Center    Phone Message    May a detailed message be left on voicemail: yes     Reason for Call: Other: [facility] calling again to follow up on his request to speak with someone on his care team and to request an in clinic appointment to discuss the surgery that was done by Dr. Altamirano.  He states he did get a new phone recently and that may have caused the communication issue and he will be more vigilant for the return call.  Please call him back to discuss.    Action Taken: Message routed to:  [facility]  Urology    Travel Screening: Not Applicable

## 2021-05-10 ENCOUNTER — OFFICE VISIT (OUTPATIENT)
Dept: UROLOGY | Facility: CLINIC | Age: 56
End: 2021-05-10
Payer: COMMERCIAL

## 2021-05-10 VITALS
DIASTOLIC BLOOD PRESSURE: 60 MMHG | HEART RATE: 90 BPM | SYSTOLIC BLOOD PRESSURE: 100 MMHG | BODY MASS INDEX: 35.43 KG/M2 | HEIGHT: 75 IN | WEIGHT: 285 LBS

## 2021-05-10 DIAGNOSIS — N43.3 RIGHT HYDROCELE: Primary | ICD-10-CM

## 2021-05-10 LAB
ALBUMIN UR-MCNC: 30 MG/DL
APPEARANCE UR: CLEAR
BILIRUB UR QL STRIP: NEGATIVE
COLOR UR AUTO: YELLOW
GLUCOSE UR STRIP-MCNC: >=1000 MG/DL
HGB UR QL STRIP: NEGATIVE
KETONES UR STRIP-MCNC: 15 MG/DL
LEUKOCYTE ESTERASE UR QL STRIP: NEGATIVE
NITRATE UR QL: NEGATIVE
PH UR STRIP: 5.5 PH (ref 5–7)
SOURCE: ABNORMAL
SP GR UR STRIP: 1.02 (ref 1–1.03)
UROBILINOGEN UR STRIP-ACNC: 0.2 EU/DL (ref 0.2–1)

## 2021-05-10 PROCEDURE — 99213 OFFICE O/P EST LOW 20 MIN: CPT | Performed by: UROLOGY

## 2021-05-10 PROCEDURE — 81003 URINALYSIS AUTO W/O SCOPE: CPT | Performed by: UROLOGY

## 2021-05-10 ASSESSMENT — MIFFLIN-ST. JEOR: SCORE: 2198.38

## 2021-05-10 ASSESSMENT — PAIN SCALES - GENERAL: PAINLEVEL: MODERATE PAIN (4)

## 2021-05-10 NOTE — LETTER
5/10/2021       RE: Javier Markham  [address]  [facility] [address]     Dear Colleague,    Thank you for referring your patient, Javier Markham, to the [facility] at [facility]. Please see a copy of my visit note below.    History: It is a great pleasure to see this very pleasant 56-year-old gentleman in follow-up consultation today.  He had a right scrotal hydrocelectomy in March of this year for a large hydrocele on the right side.  It is now approximately 6 weeks since surgery and there are still considerable swelling on the right side.  He does have a very significant cardiac history as noted below and is on Plavix and aspirin, as well as other significant medical issues        Past Medical History:   Diagnosis Date     Anxiety      Ascending aorta dilation (H)      CAD (coronary artery disease) Dec 2014    s/p CABGx3 Dec 2014 and later RCA stent July 2015     Diabetes mellitus (H)      DM2 (diabetes mellitus, type 2) (H) Dx June 2015    A1c 6.5% at time of dx     Epididymitis, right      Essential tremor      GERD (gastroesophageal reflux disease)      Heart attack (H) 12/2014, 6/2015, 7/4/2015    x3     Hernia, abdominal      History of cocaine abuse (H)     Smoked crack cocaine (remission since Nov 2014)     History of tobacco abuse      Hyperlipidemia      Hyperlipidemia LDL goal <100     chronic hypertriglyceredemia, regular elevation 300-400     Incomplete RBBB      Inguinal hernia     Left     Numbness and tingling     diabetic neuropathy     Prediabetes Dec 2014     Restless leg syndrome      S/P CABG x 3 Dec 2014     Severe obesity (BMI 35.0-35.9 with comorbidity) (H) 12/24/2018    BMI 37.5     Sleep apnea     mild, does not use CPAP     Stented coronary artery        Social History     Socioeconomic History     Marital status: Single     Spouse name: None     Number of children: 2     Years of education: None      Highest education level: None   Occupational History     Occupation: nurse assistant     Employer: [employer]   Social Needs     Financial resource strain: None     Food insecurity     Worry: None     Inability: None     Transportation needs     Medical: None     Non-medical: None   Tobacco Use     Smoking status: Former Smoker     Packs/day: 0.50     Years: 25.00     Pack years: 12.50     Types: Cigarettes     Quit date: 2015     Years since quittin.5     Smokeless tobacco: Never Used   Substance and Sexual Activity     Alcohol use: No     Alcohol/week: 0.0 standard drinks     Frequency: Never     Drug use: No     Comment: Past coccaine 2 weeks ago as of 12; no h/o IVDA. Smoked cocaine (did not snort)     Sexual activity: Yes     Partners: Female   Lifestyle     Physical activity     Days per week: None     Minutes per session: None     Stress: None   Relationships     Social connections     Talks on phone: None     Gets together: None     Attends Adventism service: None     Active member of club or organization: None     Attends meetings of clubs or organizations: None     Relationship status: None     Intimate partner violence     Fear of current or ex partner: None     Emotionally abused: None     Physically abused: None     Forced sexual activity: None   Other Topics Concern     Parent/sibling w/ CABG, MI or angioplasty before 65F 55M? Not Asked   Social History Narrative     None       Past Surgical History:   Procedure Laterality Date     C CABG, VEIN, THREE  2014    [facility] HERNIORRHAPHY INGUINAL Right 2020    Procedure: ROBOTIC ASSITED RIGHT INGUINAL HERNIA REPAIR WITH MESH;  Surgeon: Haim Green MD;  Location:  OR     HERNIORRHAPHY INGUINAL Left 2016    Procedure: HERNIORRHAPHY INGUINAL;  Surgeon: Haim Green MD;  Location:  [facility]     HYDROCELECTOMY SCROTAL Right 3/23/2021    Procedure: Right HYDROCELECTOMY, SCROTAL  APPROACH with spermatic cord block;  Surgeon: Blaise Altamirano MD;  Location: [facility] OR     STENT  07/2015    RCA stent     [facility] NONSPECIFIC PROCEDURE  age 15    both feet bone spur removal in the arch of the foot     [facility]  [facility]    Left circumflex at [facility]       Family History   Problem Relation Age of Onset     Diabetes Mother         type 2     Breast Cancer Mother      Macular Degeneration Mother      Dementia Mother      Coronary Artery Disease Father 52     Heart Disease Paternal Uncle      Diabetes Maternal Grandfather      Breast Cancer Maternal Grandmother      Prostate Cancer No family hx of          Current Outpatient Medications:      acetaminophen (TYLENOL) 325 MG tablet, Take 2 tablets (650 mg) by mouth every 4 hours as needed for mild pain, Disp: 50 tablet, Rfl: 0     albuterol (PROAIR HFA/PROVENTIL HFA/VENTOLIN HFA) 108 (90 Base) MCG/ACT inhaler, Inhale 2 puffs into the lungs every 4 hours as needed for shortness of breath / dyspnea or wheezing, Disp: 8.5 g, Rfl: 3     Alcohol Swabs (B-D SINGLE USE SWABS REGULAR) PADS, USE AS NEEDED, Disp: 200 each, Rfl: 0     aspirin (ASA) 81 MG EC tablet, Take 1 tablet (81 mg) by mouth daily, Disp: 90 tablet, Rfl: 3     azelastine (ASTELIN) 0.1 % nasal spray, Spray 1 spray into both nostrils every evening, Disp: , Rfl:      blood glucose (NO BRAND SPECIFIED) test strip, Use to test blood sugar 2 times daily or as directed., Disp: 200 strip, Rfl: 11     blood glucose monitoring (NO BRAND SPECIFIED) meter device kit, Use to test blood sugar 1 times daily or as directed. ONE TOUCH VERIO., Disp: 1 kit, Rfl: 0     clonazePAM (KLONOPIN) 0.5 MG tablet, TAKE ONE TABLET BY MOUTH EVERY NIGHT AT BEDTIME AS NEEDED FOR RESTLESS LEGS, Disp: 90 tablet, Rfl: 1     clopidogrel (PLAVIX) 75 MG tablet, TAKE ONE TABLET BY MOUTH ONCE DAILY, Disp: 90 tablet, Rfl: 3     cyanocobalamin (VITAMIN B-12) 1000 MCG tablet, Take 1 tablet by mouth daily, Disp:  100 tablet, Rfl: 0     evolocumab (REPATHA) 140 MG/ML prefilled autoinjector, Inject 140 mg Subcutaneous every 14 days, Disp: , Rfl:      ezetimibe (ZETIA) 10 MG tablet, Take 1 tablet (10 mg) by mouth At Bedtime, Disp: 90 tablet, Rfl: 1     ferrous sulfate (FEROSUL) 325 (65 Fe) MG tablet, TAKE ONE TABLET BY MOUTH EVERY MORNING WITH BREAKFAST (Patient taking differently: Take 325 mg by mouth every evening ), Disp: 90 tablet, Rfl: 2     fluticasone (FLONASE) 50 MCG/ACT nasal spray, Spray 2 sprays into both nostrils daily, Disp: 54.6 mL, Rfl: 3     gabapentin (NEURONTIN) 300 MG capsule, TAKE THREE CAPSULES BY MOUTH THREE TIMES A DAY, Disp: 810 capsule, Rfl: 3     hydrOXYzine (ATARAX) 25 MG tablet, TAKE ONE TO TWO TABLETS BY MOUTH EVERY 6 HOURS AS NEEDED FOR ANXIETY, Disp: 120 tablet, Rfl: 5     ibuprofen (ADVIL/MOTRIN) 200 MG tablet, Take 200 mg by mouth every 4 hours as needed for mild pain, Disp: , Rfl:      JARDIANCE 10 MG TABS tablet, TAKE ONE TABLET BY MOUTH ONCE DAILY, Disp: 90 tablet, Rfl: 3     ketorolac (TORADOL) 10 MG tablet, Take 1 tablet (10 mg) by mouth every 6 hours as needed, Disp: 10 tablet, Rfl: 0     lisinopril (ZESTRIL) 10 MG tablet, TAKE ONE TABLET BY MOUTH ONCE DAILY, Disp: 90 tablet, Rfl: 2     loratadine (CLARITIN) 10 MG tablet, TAKE ONE TABLET BY MOUTH ONCE DAILY, Disp: 90 tablet, Rfl: 3     Melatonin 10 MG TABS tablet, Take 10 mg by mouth nightly as needed for sleep, Disp: , Rfl:      metFORMIN (GLUCOPHAGE) 500 MG tablet, TAKE TWO TABLETS BY MOUTH TWO TIMES A DAY WITH MEALS, Disp: 360 tablet, Rfl: 1     metoprolol succinate ER (TOPROL-XL) 25 MG 24 hr tablet, TAKE ONE TABLET BY MOUTH TWICE A DAY, Disp: 180 tablet, Rfl: 3     nitroGLYcerin (NITROSTAT) 0.4 MG sublingual tablet, PLACE 1 TABLET UNDER THE TONGUE AT THE ONSET OF CHEST PAIN. MAY REPEAT EVERY 5 MINUTES AS NEEDED FOR A TOTAL OF 3 DOSES., Disp: 25 tablet, Rfl: 1     omega-3 acid ethyl esters (LOVAZA) 1 g capsule, TAKE TWO CAPSULES BY  MOUTH TWO TIMES A DAY, Disp: 360 capsule, Rfl: 3     omeprazole (PRILOSEC) 20 MG DR capsule, TAKE ONE CAPSULE BY MOUTH TWO TIMES A DAY, 30 TO 60 MINUTES BEFORE A MEAL., Disp: 180 capsule, Rfl: 3     ONE TOUCH VERIO IQ test strip, TEST ONCE DAILY OR AS DIRECTED, Disp: 100 each, Rfl: 5     OneTouch Delica Lancets 33G MISC, 1 Act by Device route 2 times daily, Disp: 200 each, Rfl: 0     polyethylene glycol (MIRALAX/GLYCOLAX) powder, Take 17 g by mouth daily as needed for constipation, Disp: 500 g, Rfl: 5     pramipexole (MIRAPEX) 0.75 MG tablet, TAKE ONE TABLET BY MOUTH EVERY NIGHT AT BEDTIME, Disp: 90 tablet, Rfl: 0     rosuvastatin (CRESTOR) 40 MG tablet, Take 1 tablet (40 mg) by mouth daily, Disp: , Rfl:      senna-docusate (SENOKOT-S/PERICOLACE) 8.6-50 MG tablet, Take 1-2 tablets by mouth 2 times daily (Patient taking differently: Take 1 tablet by mouth daily ), Disp: 30 tablet, Rfl: 0     sennosides (SENOKOT) 8.6 MG tablet, TAKE TWO TABLETS BY MOUTH ONCE DAILY, Disp: 180 tablet, Rfl: 3     sitagliptin (JANUVIA) 50 MG tablet, Take 1 tablet (50 mg) by mouth daily, Disp: 90 tablet, Rfl: 0     Skin Protectants, Misc. (HYDROCERIN) CREA, APPLY TOPICALLY AS NEEDED FOR DRY SKIN, Disp: 226 g, Rfl: 3     sodium chloride (DEEP SEA NASAL SPRAY) 0.65 % nasal spray, SPRAY 1 SPRAY IN EACH NOSTRIL FOUR TIMES A DAY AS NEEDED FOR CONGESTION, Disp: 44 mL, Rfl: 3     tamsulosin (FLOMAX) 0.4 MG capsule, TAKE ONE CAPSULE BY MOUTH ONCE DAILY, Disp: 90 capsule, Rfl: 3     triamcinolone (KENALOG) 0.1 % external cream, Apply sparingly once or twice per day as needed to affected area until the skin is better, then stop; REPEAT AS NEEDED, Disp: 80 g, Rfl: 1     vitamin D3 (CHOLECALCIFEROL) 50 mcg (2000 units) tablet, TAKE ONE TABLET BY MOUTH ONCE DAILY, Disp: 100 tablet, Rfl: 2    10 point ROS of systems including Constitutional, Eyes, Respiratory, Cardiovascular, Gastroenterology, Genitourinary, Integumentary, Muscularskeletal, Psychiatric  "and Neurologic were all negative except for pertinent positives noted in my HPI.    Examination:   /60 (BP Location: Left arm, Patient Position: Sitting, Cuff Size: Adult Regular)   Pulse 90   Ht 1.905 m (6' 3\")   Wt 129.3 kg (285 lb)   BMI 35.62 kg/m    General Impression: Very pleasant patient in no acute distress, well-oriented in time place and person and quite conversational  Mental Status: Normal  HEENT: Extraocular movements intact.  No clinical evidence of jaundice on examination of eyes.  Mucous membranes are unremarkable  Skin: Warm.  No other abnormalities  Respiratory System: Unlabored on room air.  Respiratory cycle normal  Lymph Nodes: There were no significant enlarged inguinal lymph nodes  Back/Flank Tenderness: There is no flank tenderness  Cardiovascular System: No significant peripheral pitting edema  Abdominal Examination: Moderately obese abdomen, no significant evidence of hernia.    Extremities: Extremities otherwise unremarkable  Genitial: Penis uncircumcised with normal size meatus.  The left testicle is easily palpable although it is a small amount of fluid around the left testicle.  On the right side there is still significant swelling about the size of a golf ball which is likely around the testicle although it does not transilluminate well.  It is not tender  Rectal Examination: Not examined  Neurologic System: There are no significant acute abnormal neurological signs in the central or peripheral nervous systems    Impression: I am uncertain as to the etiology of the swelling.  Certainly this seems a little tenderness there although it could still be related to epididymitis.  I am going to need to arrange for an ultrasound of the scrotum to ascertain the etiology of this and then have further discussions about his management.  I did carefully explain this to him; it is also possible this is still gradually declining in size as part of the healing process and therefore I would " "like to be conservative if we can    Plan: Ultrasound of scrotum and see me after    Time: 15 minutes greater than 50% in discussion and consultation    \"This dictation was performed with voice recognition software and may contain errors,  omissions and inadvertent word substitution.\"      Blaise Altamirano MD      "

## 2021-05-10 NOTE — PROGRESS NOTES
History: It is a great pleasure to see this very pleasant 56-year-old gentleman in follow-up consultation today.  He had a right scrotal hydrocelectomy in March of this year for a large hydrocele on the right side.  It is now approximately 6 weeks since surgery and there are still considerable swelling on the right side.  He does have a very significant cardiac history as noted below and is on Plavix and aspirin, as well as other significant medical issues        Past Medical History:   Diagnosis Date     Anxiety      Ascending aorta dilation (H)      CAD (coronary artery disease) Dec 2014    s/p CABGx3 Dec 2014 and later RCA stent July 2015     Diabetes mellitus (H)      DM2 (diabetes mellitus, type 2) (H) Dx June 2015    A1c 6.5% at time of dx     Epididymitis, right      Essential tremor      GERD (gastroesophageal reflux disease)      Heart attack (H) 12/2014, 6/2015, 7/4/2015    x3     Hernia, abdominal      History of cocaine abuse (H)     Smoked crack cocaine (remission since Nov 2014)     History of tobacco abuse      Hyperlipidemia      Hyperlipidemia LDL goal <100     chronic hypertriglyceredemia, regular elevation 300-400     Incomplete RBBB      Inguinal hernia     Left     Numbness and tingling     diabetic neuropathy     Prediabetes Dec 2014     Restless leg syndrome      S/P CABG x 3 Dec 2014     Severe obesity (BMI 35.0-35.9 with comorbidity) (H) 12/24/2018    BMI 37.5     Sleep apnea     mild, does not use CPAP     Stented coronary artery        Social History     Socioeconomic History     Marital status: Single     Spouse name: None     Number of children: 2     Years of education: None     Highest education level: None   Occupational History     Occupation: nurse assistant     Employer: [employer]   Social Needs     Financial resource strain: None     Food insecurity     Worry: None     Inability: None     Transportation needs     Medical: None     Non-medical: None   Tobacco Use      Smoking status: Former Smoker     Packs/day: 0.50     Years: 25.00     Pack years: 12.50     Types: Cigarettes     Quit date: 2015     Years since quittin.5     Smokeless tobacco: Never Used   Substance and Sexual Activity     Alcohol use: No     Alcohol/week: 0.0 standard drinks     Frequency: Never     Drug use: No     Comment: Past coccaine 2 weeks ago as of 12; no h/o IVDA. Smoked cocaine (did not snort)     Sexual activity: Yes     Partners: Female   Lifestyle     Physical activity     Days per week: None     Minutes per session: None     Stress: None   Relationships     Social connections     Talks on phone: None     Gets together: None     Attends Mandaeism service: None     Active member of club or organization: None     Attends meetings of clubs or organizations: None     Relationship status: None     Intimate partner violence     Fear of current or ex partner: None     Emotionally abused: None     Physically abused: None     Forced sexual activity: None   Other Topics Concern     Parent/sibling w/ CABG, MI or angioplasty before 65F 55M? Not Asked   Social History Narrative     None       Past Surgical History:   Procedure Laterality Date     C CABG, VEIN, THREE  2014    [facility]     [facility] HERNIORRHAPHY INGUINAL Right 2020    Procedure: ROBOTIC ASSITED RIGHT INGUINAL HERNIA REPAIR WITH MESH;  Surgeon: Haim Green MD;  Location:  OR     HERNIORRHAPHY INGUINAL Left 2016    Procedure: HERNIORRHAPHY INGUINAL;  Surgeon: Haim Green MD;  Location:  [facility]     HYDROCELECTOMY SCROTAL Right 3/23/2021    Procedure: Right HYDROCELECTOMY, SCROTAL APPROACH with spermatic cord block;  Surgeon: Blaise Altamirano MD;  Location:  OR     STENT  2015    RCA stent     [facility] NONSPECIFIC PROCEDURE  age 15    both feet bone spur removal in the arch of the foot     [facility] DRUG-ELUTING STENTS, SINGLE  10/19/2018    Left circumflex at [facility]       Family History    Problem Relation Age of Onset     Diabetes Mother         type 2     Breast Cancer Mother      Macular Degeneration Mother      Dementia Mother      Coronary Artery Disease Father 52     Heart Disease Paternal Uncle      Diabetes Maternal Grandfather      Breast Cancer Maternal Grandmother      Prostate Cancer No family hx of          Current Outpatient Medications:      acetaminophen (TYLENOL) 325 MG tablet, Take 2 tablets (650 mg) by mouth every 4 hours as needed for mild pain, Disp: 50 tablet, Rfl: 0     albuterol (PROAIR HFA/PROVENTIL HFA/VENTOLIN HFA) 108 (90 Base) MCG/ACT inhaler, Inhale 2 puffs into the lungs every 4 hours as needed for shortness of breath / dyspnea or wheezing, Disp: 8.5 g, Rfl: 3     Alcohol Swabs (B-D SINGLE USE SWABS REGULAR) PADS, USE AS NEEDED, Disp: 200 each, Rfl: 0     aspirin (ASA) 81 MG EC tablet, Take 1 tablet (81 mg) by mouth daily, Disp: 90 tablet, Rfl: 3     azelastine (ASTELIN) 0.1 % nasal spray, Spray 1 spray into both nostrils every evening, Disp: , Rfl:      blood glucose (NO BRAND SPECIFIED) test strip, Use to test blood sugar 2 times daily or as directed., Disp: 200 strip, Rfl: 11     blood glucose monitoring (NO BRAND SPECIFIED) meter device kit, Use to test blood sugar 1 times daily or as directed. ONE TOUCH VERIO., Disp: 1 kit, Rfl: 0     clonazePAM (KLONOPIN) 0.5 MG tablet, TAKE ONE TABLET BY MOUTH EVERY NIGHT AT BEDTIME AS NEEDED FOR RESTLESS LEGS, Disp: 90 tablet, Rfl: 1     clopidogrel (PLAVIX) 75 MG tablet, TAKE ONE TABLET BY MOUTH ONCE DAILY, Disp: 90 tablet, Rfl: 3     cyanocobalamin (VITAMIN B-12) 1000 MCG tablet, Take 1 tablet by mouth daily, Disp: 100 tablet, Rfl: 0     evolocumab (REPATHA) 140 MG/ML prefilled autoinjector, Inject 140 mg Subcutaneous every 14 days, Disp: , Rfl:      ezetimibe (ZETIA) 10 MG tablet, Take 1 tablet (10 mg) by mouth At Bedtime, Disp: 90 tablet, Rfl: 1     ferrous sulfate (FEROSUL) 325 (65 Fe) MG tablet, TAKE ONE TABLET BY MOUTH  EVERY MORNING WITH BREAKFAST (Patient taking differently: Take 325 mg by mouth every evening ), Disp: 90 tablet, Rfl: 2     fluticasone (FLONASE) 50 MCG/ACT nasal spray, Spray 2 sprays into both nostrils daily, Disp: 54.6 mL, Rfl: 3     gabapentin (NEURONTIN) 300 MG capsule, TAKE THREE CAPSULES BY MOUTH THREE TIMES A DAY, Disp: 810 capsule, Rfl: 3     hydrOXYzine (ATARAX) 25 MG tablet, TAKE ONE TO TWO TABLETS BY MOUTH EVERY 6 HOURS AS NEEDED FOR ANXIETY, Disp: 120 tablet, Rfl: 5     ibuprofen (ADVIL/MOTRIN) 200 MG tablet, Take 200 mg by mouth every 4 hours as needed for mild pain, Disp: , Rfl:      JARDIANCE 10 MG TABS tablet, TAKE ONE TABLET BY MOUTH ONCE DAILY, Disp: 90 tablet, Rfl: 3     ketorolac (TORADOL) 10 MG tablet, Take 1 tablet (10 mg) by mouth every 6 hours as needed, Disp: 10 tablet, Rfl: 0     lisinopril (ZESTRIL) 10 MG tablet, TAKE ONE TABLET BY MOUTH ONCE DAILY, Disp: 90 tablet, Rfl: 2     loratadine (CLARITIN) 10 MG tablet, TAKE ONE TABLET BY MOUTH ONCE DAILY, Disp: 90 tablet, Rfl: 3     Melatonin 10 MG TABS tablet, Take 10 mg by mouth nightly as needed for sleep, Disp: , Rfl:      metFORMIN (GLUCOPHAGE) 500 MG tablet, TAKE TWO TABLETS BY MOUTH TWO TIMES A DAY WITH MEALS, Disp: 360 tablet, Rfl: 1     metoprolol succinate ER (TOPROL-XL) 25 MG 24 hr tablet, TAKE ONE TABLET BY MOUTH TWICE A DAY, Disp: 180 tablet, Rfl: 3     nitroGLYcerin (NITROSTAT) 0.4 MG sublingual tablet, PLACE 1 TABLET UNDER THE TONGUE AT THE ONSET OF CHEST PAIN. MAY REPEAT EVERY 5 MINUTES AS NEEDED FOR A TOTAL OF 3 DOSES., Disp: 25 tablet, Rfl: 1     omega-3 acid ethyl esters (LOVAZA) 1 g capsule, TAKE TWO CAPSULES BY MOUTH TWO TIMES A DAY, Disp: 360 capsule, Rfl: 3     omeprazole (PRILOSEC) 20 MG DR capsule, TAKE ONE CAPSULE BY MOUTH TWO TIMES A DAY, 30 TO 60 MINUTES BEFORE A MEAL., Disp: 180 capsule, Rfl: 3     ONE TOUCH VERIO IQ test strip, TEST ONCE DAILY OR AS DIRECTED, Disp: 100 each, Rfl: 5     OneTouch Delica Lancets 33G  "MISC, 1 Act by Device route 2 times daily, Disp: 200 each, Rfl: 0     polyethylene glycol (MIRALAX/GLYCOLAX) powder, Take 17 g by mouth daily as needed for constipation, Disp: 500 g, Rfl: 5     pramipexole (MIRAPEX) 0.75 MG tablet, TAKE ONE TABLET BY MOUTH EVERY NIGHT AT BEDTIME, Disp: 90 tablet, Rfl: 0     rosuvastatin (CRESTOR) 40 MG tablet, Take 1 tablet (40 mg) by mouth daily, Disp: , Rfl:      senna-docusate (SENOKOT-S/PERICOLACE) 8.6-50 MG tablet, Take 1-2 tablets by mouth 2 times daily (Patient taking differently: Take 1 tablet by mouth daily ), Disp: 30 tablet, Rfl: 0     sennosides (SENOKOT) 8.6 MG tablet, TAKE TWO TABLETS BY MOUTH ONCE DAILY, Disp: 180 tablet, Rfl: 3     sitagliptin (JANUVIA) 50 MG tablet, Take 1 tablet (50 mg) by mouth daily, Disp: 90 tablet, Rfl: 0     Skin Protectants, Misc. (HYDROCERIN) CREA, APPLY TOPICALLY AS NEEDED FOR DRY SKIN, Disp: 226 g, Rfl: 3     sodium chloride (DEEP SEA NASAL SPRAY) 0.65 % nasal spray, SPRAY 1 SPRAY IN EACH NOSTRIL FOUR TIMES A DAY AS NEEDED FOR CONGESTION, Disp: 44 mL, Rfl: 3     tamsulosin (FLOMAX) 0.4 MG capsule, TAKE ONE CAPSULE BY MOUTH ONCE DAILY, Disp: 90 capsule, Rfl: 3     triamcinolone (KENALOG) 0.1 % external cream, Apply sparingly once or twice per day as needed to affected area until the skin is better, then stop; REPEAT AS NEEDED, Disp: 80 g, Rfl: 1     vitamin D3 (CHOLECALCIFEROL) 50 mcg (2000 units) tablet, TAKE ONE TABLET BY MOUTH ONCE DAILY, Disp: 100 tablet, Rfl: 2    10 point ROS of systems including Constitutional, Eyes, Respiratory, Cardiovascular, Gastroenterology, Genitourinary, Integumentary, Muscularskeletal, Psychiatric and Neurologic were all negative except for pertinent positives noted in my HPI.    Examination:   /60 (BP Location: Left arm, Patient Position: Sitting, Cuff Size: Adult Regular)   Pulse 90   Ht 1.905 m (6' 3\")   Wt 129.3 kg (285 lb)   BMI 35.62 kg/m    General Impression: Very pleasant patient in no " "acute distress, well-oriented in time place and person and quite conversational  Mental Status: Normal  HEENT: Extraocular movements intact.  No clinical evidence of jaundice on examination of eyes.  Mucous membranes are unremarkable  Skin: Warm.  No other abnormalities  Respiratory System: Unlabored on room air.  Respiratory cycle normal  Lymph Nodes: There were no significant enlarged inguinal lymph nodes  Back/Flank Tenderness: There is no flank tenderness  Cardiovascular System: No significant peripheral pitting edema  Abdominal Examination: Moderately obese abdomen, no significant evidence of hernia.    Extremities: Extremities otherwise unremarkable  Genitial: Penis uncircumcised with normal size meatus.  The left testicle is easily palpable although it is a small amount of fluid around the left testicle.  On the right side there is still significant swelling about the size of a golf ball which is likely around the testicle although it does not transilluminate well.  It is not tender  Rectal Examination: Not examined  Neurologic System: There are no significant acute abnormal neurological signs in the central or peripheral nervous systems    Impression: I am uncertain as to the etiology of the swelling.  Certainly this seems a little tenderness there although it could still be related to epididymitis.  I am going to need to arrange for an ultrasound of the scrotum to ascertain the etiology of this and then have further discussions about his management.  I did carefully explain this to him; it is also possible this is still gradually declining in size as part of the healing process and therefore I would like to be conservative if we can    Plan: Ultrasound of scrotum and see me after    Time: 15 minutes greater than 50% in discussion and consultation    \"This dictation was performed with voice recognition software and may contain errors,  omissions and inadvertent word substitution.\"      "

## 2021-05-10 NOTE — NURSING NOTE
Chief Complaint   Patient presents with     hydrocele     post op, right side. Patient feels like surgery did not work.   Patient still has bulging in scrotal area.  Marysol Baker LPN

## 2021-05-17 ENCOUNTER — OFFICE VISIT (OUTPATIENT)
Dept: FAMILY MEDICINE | Facility: CLINIC | Age: 56
End: 2021-05-17
Payer: COMMERCIAL

## 2021-05-17 VITALS
WEIGHT: 279 LBS | TEMPERATURE: 97.3 F | OXYGEN SATURATION: 97 % | HEART RATE: 84 BPM | BODY MASS INDEX: 34.69 KG/M2 | HEIGHT: 75 IN | SYSTOLIC BLOOD PRESSURE: 109 MMHG | DIASTOLIC BLOOD PRESSURE: 73 MMHG

## 2021-05-17 DIAGNOSIS — E11.59 TYPE 2 DIABETES MELLITUS WITH OTHER CIRCULATORY COMPLICATION, WITHOUT LONG-TERM CURRENT USE OF INSULIN (H): ICD-10-CM

## 2021-05-17 DIAGNOSIS — L08.9 LEFT FOOT INFECTION: Primary | ICD-10-CM

## 2021-05-17 PROCEDURE — 99214 OFFICE O/P EST MOD 30 MIN: CPT | Performed by: NURSE PRACTITIONER

## 2021-05-17 RX ORDER — CEPHALEXIN 500 MG/1
500 CAPSULE ORAL 3 TIMES DAILY
Qty: 21 CAPSULE | Refills: 0 | Status: SHIPPED | OUTPATIENT
Start: 2021-05-17 | End: 2021-05-24

## 2021-05-17 ASSESSMENT — MIFFLIN-ST. JEOR: SCORE: 2171.17

## 2021-05-17 NOTE — PROGRESS NOTES
"    Assessment & Plan   Problem List Items Addressed This Visit     Type 2 diabetes mellitus with circulatory disorder, without long-term current use of insulin (H)      Other Visit Diagnoses     Left foot infection    -  Primary    Relevant Medications    cephALEXin (KEFLEX) 500 MG capsule    Other Relevant Orders    Orthopedic & Spine  Referral         Pt started with left great toe pain that then turned to redness and swelling. He then ripped off his toenail and that improved the pain. The redness has improved but symptoms continue. He also has a new blister as well as some excoriation. Will initiate antibiotics today. Discussed wound cleaning at home. Recommended prompt follow-up with podiatry as he is diabetic and at high risk for infection. He needs to go to the ED or seek prompt attention for worsening symptoms or fevers.     STEPHANIE Gay CNP  [facility] is a 56 year old who presents for the following health issues     HPI       Left Great Toe pain    Started with an infected toenail   He pulled it off with pliers and right away the pain went away   His foot has gotten better but still isn't right. Continues with redness and a blister   Had an open area between the toes that he cleaned and thinks it is a litle better.   No fevers       Review of Systems   Detailed as above         Objective    /73 (BP Location: Right arm, Patient Position: Chair, Cuff Size: Adult Large)   Pulse 84   Temp 97.3  F (36.3  C) (Temporal)   Ht 1.905 m (6' 3\")   Wt 126.6 kg (279 lb)   SpO2 97%   BMI 34.87 kg/m    Body mass index is 34.87 kg/m .  Physical Exam  Constitutional:       Appearance: Normal appearance.   Skin:     General: Skin is warm and dry.      Comments: 1cm indiabeter blister like lesion of medial 1st MTP  Lateral MTP is excoriated   Absent 1st great toenail.   Redness of great toe extending just past MTP    Neurological:      Mental Status: " He is alert.

## 2021-05-18 ENCOUNTER — OFFICE VISIT (OUTPATIENT)
Dept: UROLOGY | Facility: CLINIC | Age: 56
End: 2021-05-18
Payer: COMMERCIAL

## 2021-05-18 VITALS
DIASTOLIC BLOOD PRESSURE: 80 MMHG | SYSTOLIC BLOOD PRESSURE: 120 MMHG | WEIGHT: 276 LBS | OXYGEN SATURATION: 95 % | HEIGHT: 75 IN | BODY MASS INDEX: 34.32 KG/M2 | HEART RATE: 93 BPM

## 2021-05-18 DIAGNOSIS — Z87.438 HISTORY OF HYDROCELE: Primary | ICD-10-CM

## 2021-05-18 PROCEDURE — 99024 POSTOP FOLLOW-UP VISIT: CPT | Performed by: UROLOGY

## 2021-05-18 ASSESSMENT — MIFFLIN-ST. JEOR: SCORE: 2157.56

## 2021-05-18 ASSESSMENT — PAIN SCALES - GENERAL: PAINLEVEL: NO PAIN (0)

## 2021-05-18 NOTE — PROGRESS NOTES
This very pleasant gentleman who is 56 and had a very large hydrocele excised about 7 weeks ago and still got considerable swelling. I was unable to transilluminate this well with the light I had before and came back today to use a strong light to translating the discriminate the scrotum.  The transillumination did show some transillumination and I was able to aspirate about 10 cc of clear fluid. However I did not try and over do this. I suspect this is deflated to some degree, but I am going to recommend he continue to be patient as it often does take many weeks for the swelling to decline.  I would recommend I reexamine him in 4 weeks to evaluate his progress

## 2021-05-18 NOTE — LETTER
5/18/2021       RE: Javier Markham  [address]  [facility] [address]     Dear Colleague,    Thank you for referring your patient, Javier Markham, to the [facility] at [facility]. Please see a copy of my visit note below.    This very pleasant gentleman who is 56 and had a very large hydrocele excised about 7 weeks ago and still got considerable swelling. I was unable to transilluminate this well with the light I had before and came back today to use a strong light to translating the discriminate the scrotum.  The transillumination did show some transillumination and I was able to aspirate about 10 cc of clear fluid. However I did not try and over do this. I suspect this is deflated to some degree, but I am going to recommend he continue to be patient as it often does take many weeks for the swelling to decline.  I would recommend I reexamine him in 4 weeks to evaluate his progress    Blaise Altamirano MD

## 2021-06-01 DIAGNOSIS — E11.40 TYPE 2 DIABETES MELLITUS WITH DIABETIC NEUROPATHY, WITHOUT LONG-TERM CURRENT USE OF INSULIN (H): ICD-10-CM

## 2021-06-04 RX ORDER — GABAPENTIN 300 MG/1
CAPSULE ORAL
Qty: 810 CAPSULE | Refills: 0 | Status: SHIPPED | OUTPATIENT
Start: 2021-06-04 | End: 2021-08-31

## 2021-06-04 NOTE — TELEPHONE ENCOUNTER
Routing refill request to provider for review/approval because:  Drug not on the [facility] refill protocol     Pending Prescriptions:                       Disp   Refills    gabapentin (NEURONTIN) 300 MG capsule [Ph*810 ca*3            Sig: TAKE THREE CAPSULES BY MOUTH THREE TIMES A DAY    Last Written Prescription Date:  6/2020  Last Fill Quantity: 810,  # refills: 3   Last office visit: 5/17/2021 with stephanie Garcia in March* Due for preventative in Sept.  Pended 3 mo  Future Office Visit:   Next 5 appointments (look out 90 days)    Jun 21, 2021  1:15 PM  Return Visit with Blaise Altamirano MD  [facility] [place] ([facility] [place] ) [address]  [address]  [facility] [id number] [phone]           Sandra Garcia RN  [facility]

## 2021-06-09 ENCOUNTER — OFFICE VISIT (OUTPATIENT)
Dept: PODIATRY | Facility: CLINIC | Age: 56
End: 2021-06-09
Attending: NURSE PRACTITIONER
Payer: COMMERCIAL

## 2021-06-09 VITALS
HEART RATE: 82 BPM | SYSTOLIC BLOOD PRESSURE: 132 MMHG | WEIGHT: 276 LBS | DIASTOLIC BLOOD PRESSURE: 72 MMHG | BODY MASS INDEX: 34.5 KG/M2

## 2021-06-09 DIAGNOSIS — B35.1 ONYCHOMYCOSIS: ICD-10-CM

## 2021-06-09 DIAGNOSIS — L08.9 LEFT FOOT INFECTION: ICD-10-CM

## 2021-06-09 DIAGNOSIS — R23.4 ESCHAR OF FOOT: ICD-10-CM

## 2021-06-09 DIAGNOSIS — E11.59 TYPE 2 DIABETES MELLITUS WITH OTHER CIRCULATORY COMPLICATION, WITHOUT LONG-TERM CURRENT USE OF INSULIN (H): ICD-10-CM

## 2021-06-09 DIAGNOSIS — S90.821A BLISTER OF RIGHT FOOT, INITIAL ENCOUNTER: Primary | ICD-10-CM

## 2021-06-09 PROBLEM — I77.810 ASCENDING AORTA DILATATION (H): Status: ACTIVE | Noted: 2017-01-12

## 2021-06-09 PROCEDURE — 99203 OFFICE O/P NEW LOW 30 MIN: CPT | Mod: 25 | Performed by: PODIATRIST

## 2021-06-09 PROCEDURE — 11720 DEBRIDE NAIL 1-5: CPT | Performed by: PODIATRIST

## 2021-06-09 RX ORDER — CEPHALEXIN 500 MG/1
1000 CAPSULE ORAL 3 TIMES DAILY
Qty: 60 CAPSULE | Refills: 0 | Status: SHIPPED | OUTPATIENT
Start: 2021-06-09 | End: 2022-01-28

## 2021-06-09 NOTE — PATIENT INSTRUCTIONS
We wish you continued good healing. If you have any questions or concerns, please do not hesitate to contact us at [phone]    [facility] [employer] (secure e-mail communication and access to your chart) to send a message or to make an appointment.    Please remember to call and schedule a follow up appointment if one was recommended at your earliest convenience.     +++OF MARCH 2020+++ LOCATION AND HOURS HAVE CHANGED    PLEASE CALL CLINICS TO VERIFY DAYS AND TIMES  PODIATRY CLINIC HOURS  TELEPHONE NUMBER    Dr. Frank MÉNDEZPJONAS [facility]        Clinics:  [facility]   Tuesday 1PM-6PM  [place]  Wednesday 745AM-330PM  [place]  Thursday/Friday 745AM-230PM  [place] [facility] APPOINTMENTS  [phone]    [place] APPOINTMENTS  [phone]          If you need a medication refill, please contact us you may need lab work and/or a follow up visit prior to your refill (i.e. Antifungal medications).    If MRI needed please call Imaging at [phone] or [phone]    HOW DO I GET MY KNEE SCOOTER? Knee scooters can be picked up at ANY Medical Supply stores with your knee scooter Prescription.  OR    Bring your signed prescription to an [facility] showroom.

## 2021-06-09 NOTE — PROGRESS NOTES
Subjective:    Pt is seen today in consult from Radha Joyce with the c/c of left foot first interspace infection.  This started approximately a month ago.  After about a week patient presented to clinic.  He was started on Keflex.  He has finished this.  He states the antibiotic helped.  The erythema and edema decreased but after stopping medication it is come back slightly.  He denies any history of trauma.  He denies stepping on any foreign bodies.  He also states that he has a new blister on his right hallux.  The patient has numbness and tingling in his feet.  He smokes a few cigars a day.  He is currently not working.    ROS:  A 10-point review of systems was performed and is positive for that noted in the HPI and as seen below.  All other areas are negative.          Allergies   Allergen Reactions     Perfume      Soap      Perfume in soap-reaction excema       Current Outpatient Medications   Medication Sig Dispense Refill     cephALEXin (KEFLEX) 500 MG capsule Take 2 capsules (1,000 mg) by mouth 3 times daily 60 capsule 0     acetaminophen (TYLENOL) 325 MG tablet Take 2 tablets (650 mg) by mouth every 4 hours as needed for mild pain 50 tablet 0     albuterol (PROAIR HFA/PROVENTIL HFA/VENTOLIN HFA) 108 (90 Base) MCG/ACT inhaler Inhale 2 puffs into the lungs every 4 hours as needed for shortness of breath / dyspnea or wheezing 8.5 g 3     Alcohol Swabs (B-D SINGLE USE SWABS REGULAR) PADS USE AS NEEDED 200 each 0     aspirin (ASA) 81 MG EC tablet Take 1 tablet (81 mg) by mouth daily 90 tablet 3     azelastine (ASTELIN) 0.1 % nasal spray Spray 1 spray into both nostrils every evening       blood glucose (NO BRAND SPECIFIED) test strip Use to test blood sugar 2 times daily or as directed. 200 strip 11     blood glucose monitoring (NO BRAND SPECIFIED) meter device kit Use to test blood sugar 1 times daily or as directed. ONE TOUCH VERIO. 1 kit 0     clonazePAM (KLONOPIN) 0.5 MG tablet TAKE ONE TABLET BY MOUTH  EVERY NIGHT AT BEDTIME AS NEEDED FOR RESTLESS LEGS 90 tablet 1     clopidogrel (PLAVIX) 75 MG tablet TAKE ONE TABLET BY MOUTH ONCE DAILY 90 tablet 3     cyanocobalamin (VITAMIN B-12) 1000 MCG tablet Take 1 tablet by mouth daily 100 tablet 0     evolocumab (REPATHA) 140 MG/ML prefilled autoinjector Inject 140 mg Subcutaneous every 14 days       ezetimibe (ZETIA) 10 MG tablet Take 1 tablet (10 mg) by mouth At Bedtime 90 tablet 1     ferrous sulfate (FEROSUL) 325 (65 Fe) MG tablet TAKE ONE TABLET BY MOUTH EVERY MORNING WITH BREAKFAST (Patient taking differently: Take 325 mg by mouth every evening ) 90 tablet 2     fluticasone (FLONASE) 50 MCG/ACT nasal spray Spray 2 sprays into both nostrils daily 54.6 mL 3     gabapentin (NEURONTIN) 300 MG capsule TAKE THREE CAPSULES BY MOUTH THREE TIMES A  capsule 0     hydrOXYzine (ATARAX) 25 MG tablet TAKE ONE TO TWO TABLETS BY MOUTH EVERY 6 HOURS AS NEEDED FOR ANXIETY 120 tablet 5     ibuprofen (ADVIL/MOTRIN) 200 MG tablet Take 200 mg by mouth every 4 hours as needed for mild pain       JARDIANCE 10 MG TABS tablet TAKE ONE TABLET BY MOUTH ONCE DAILY 90 tablet 3     ketorolac (TORADOL) 10 MG tablet Take 1 tablet (10 mg) by mouth every 6 hours as needed 10 tablet 0     lisinopril (ZESTRIL) 10 MG tablet TAKE ONE TABLET BY MOUTH ONCE DAILY 90 tablet 2     loratadine (CLARITIN) 10 MG tablet TAKE ONE TABLET BY MOUTH ONCE DAILY 90 tablet 3     Melatonin 10 MG TABS tablet Take 10 mg by mouth nightly as needed for sleep       metFORMIN (GLUCOPHAGE) 500 MG tablet TAKE TWO TABLETS BY MOUTH TWO TIMES A DAY WITH MEALS 360 tablet 1     metoprolol succinate ER (TOPROL-XL) 25 MG 24 hr tablet TAKE ONE TABLET BY MOUTH TWICE A  tablet 3     nitroGLYcerin (NITROSTAT) 0.4 MG sublingual tablet PLACE 1 TABLET UNDER THE TONGUE AT THE ONSET OF CHEST PAIN. MAY REPEAT EVERY 5 MINUTES AS NEEDED FOR A TOTAL OF 3 DOSES. 25 tablet 1     omega-3 acid ethyl esters (LOVAZA) 1 g capsule TAKE TWO  CAPSULES BY MOUTH TWO TIMES A  capsule 3     omeprazole (PRILOSEC) 20 MG DR capsule TAKE ONE CAPSULE BY MOUTH TWO TIMES A DAY, 30 TO 60 MINUTES BEFORE A MEAL. 180 capsule 3     ONE TOUCH VERIO IQ test strip TEST ONCE DAILY OR AS DIRECTED 100 each 5     OneTouch Delica Lancets 33G MISC 1 Act by Device route 2 times daily 200 each 0     polyethylene glycol (MIRALAX/GLYCOLAX) powder Take 17 g by mouth daily as needed for constipation 500 g 5     pramipexole (MIRAPEX) 0.75 MG tablet TAKE ONE TABLET BY MOUTH EVERY NIGHT AT BEDTIME 90 tablet 0     rosuvastatin (CRESTOR) 40 MG tablet Take 1 tablet (40 mg) by mouth daily       senna-docusate (SENOKOT-S/PERICOLACE) 8.6-50 MG tablet Take 1-2 tablets by mouth 2 times daily (Patient taking differently: Take 1 tablet by mouth daily ) 30 tablet 0     sennosides (SENOKOT) 8.6 MG tablet TAKE TWO TABLETS BY MOUTH ONCE DAILY 180 tablet 3     sitagliptin (JANUVIA) 50 MG tablet Take 1 tablet (50 mg) by mouth daily 90 tablet 0     Skin Protectants, Misc. (HYDROCERIN) CREA APPLY TOPICALLY AS NEEDED FOR DRY SKIN 226 g 3     sodium chloride (DEEP SEA NASAL SPRAY) 0.65 % nasal spray SPRAY 1 SPRAY IN EACH NOSTRIL FOUR TIMES A DAY AS NEEDED FOR CONGESTION 44 mL 3     tamsulosin (FLOMAX) 0.4 MG capsule TAKE ONE CAPSULE BY MOUTH ONCE DAILY 90 capsule 3     triamcinolone (KENALOG) 0.1 % external cream Apply sparingly once or twice per day as needed to affected area until the skin is better, then stop; REPEAT AS NEEDED 80 g 1     vitamin D3 (CHOLECALCIFEROL) 50 mcg (2000 units) tablet TAKE ONE TABLET BY MOUTH ONCE DAILY 100 tablet 2       Patient Active Problem List   Diagnosis     Esophageal reflux     History of cocaine use in remission     Hyperlipidemia     Anxiety     Restless legs syndrome (RLS)     Type 2 diabetes mellitus with circulatory disorder, without long-term current use of insulin (H)     Health USP     Coronary artery disease involving autologous artery coronary  bypass graft without angina pectoris     BPH (benign prostatic hypertrophy)     Class 1 obesity with serious comorbidity and body mass index (BMI) of 34.0 to 34.9 in adult, unspecified obesity type     Benign essential hypertension; goal < 140/90     History of tobacco abuse     Incomplete RBBB     Right knee pain, unspecified chronicity     Benign essential tremor     Tinnitus, left     Severe obesity (BMI 35.0-35.9 with comorbidity) (H)     Hyperlipidemia LDL goal <100     Right inguinal hernia     Right hydrocele     S/P CABG x 3     NSTEMI (non-ST elevated myocardial infarction) (H)     Mixed, or nondependent drug abuse     Ascending aorta dilatation (H)     ACP (advance care planning)       Past Medical History:   Diagnosis Date     Anxiety      Ascending aorta dilation (H)      CAD (coronary artery disease) Dec 2014    s/p CABGx3 Dec 2014 and later RCA stent July 2015     Diabetes mellitus (H)      DM2 (diabetes mellitus, type 2) (H) Dx June 2015    A1c 6.5% at time of dx     Epididymitis, right      Essential tremor      GERD (gastroesophageal reflux disease)      Heart attack (H) 12/2014, 6/2015, 7/4/2015    x3     Hernia, abdominal      History of cocaine abuse (H)     Smoked crack cocaine (remission since Nov 2014)     History of tobacco abuse      Hyperlipidemia      Hyperlipidemia LDL goal <100     chronic hypertriglyceredemia, regular elevation 300-400     Incomplete RBBB      Inguinal hernia     Left     Numbness and tingling     diabetic neuropathy     Prediabetes Dec 2014     Restless leg syndrome      S/P CABG x 3 Dec 2014     Severe obesity (BMI 35.0-35.9 with comorbidity) (H) 12/24/2018    BMI 37.5     Sleep apnea     mild, does not use CPAP     Stented coronary artery        Past Surgical History:   Procedure Laterality Date     C CABG, VEIN, THREE  12/23/2014    Adam XIE HERNIORRHAPHY INGUINAL Right 7/31/2020    Procedure: ROBOTIC ASSITED RIGHT INGUINAL HERNIA REPAIR WITH MESH;   Surgeon: Haim Green MD;  Location:  OR     HERNIORRHAPHY INGUINAL Left 2016    Procedure: HERNIORRHAPHY INGUINAL;  Surgeon: Haim Green MD;  Location:  [facility]     HYDROCELECTOMY SCROTAL Right 3/23/2021    Procedure: Right HYDROCELECTOMY, SCROTAL APPROACH with spermatic cord block;  Surgeon: Blaise Altamirano MD;  Location: [facility] OR     STENT  2015    RCA stent     ZZC NONSPECIFIC PROCEDURE  age 15    both feet bone spur removal in the arch of the foot     ZZHC DRUG-ELUTING STENTS, SINGLE  10/19/2018    Left circumflex at [facility]       Family History   Problem Relation Age of Onset     Diabetes Mother         type 2     Breast Cancer Mother      Macular Degeneration Mother      Dementia Mother      Coronary Artery Disease Father 52     Heart Disease Paternal Uncle      Diabetes Maternal Grandfather      Breast Cancer Maternal Grandmother      Prostate Cancer No family hx of        Social History     Tobacco Use     Smoking status: Former Smoker     Packs/day: 0.50     Years: 25.00     Pack years: 12.50     Types: Cigarettes     Quit date: 2015     Years since quittin.6     Smokeless tobacco: Never Used   Substance Use Topics     Alcohol use: No     Alcohol/week: 0.0 standard drinks     Frequency: Never         Exam:    Vitals: /72   Pulse 82   Wt 125.2 kg (276 lb)   BMI 34.50 kg/m    BMI: Body mass index is 34.5 kg/m .  Height: Data Unavailable    Constitutional/ general:  Pt is in no apparent distress, appears well-nourished.  Cooperative with history and physical exam.     Psych:  The patient answered questions appropriately.  Normal affect.  Seems to have reasonable expectations, in terms of treatment.     Eyes:  Visual scanning/ tracking without deficit.     Ears:  Response to auditory stimuli is normal.  negative hearing aid devices.  Auricles in proper alignment.     Lymphatic:  Popliteal lymph nodes not enlarged.     Lungs:  Non labored breathing, non labored  speech. No cough.  No audible wheezing. Even, quiet breathing.       Vascular:  positive pedal pulses bilaterally for both the DP and PT arteries.  CFT < 3 sec.  positive ankle edema.  negative pedal hair growth.    Neuro:  Alert and oriented x 3. Coordinated gait.  Light touch sensation is intact to the L4, L5, S1 distributions. No obvious deficits.  No evidence of neurological-based weakness, spasticity, or contracture in the lower extremities.  Monofilament absent to midfoot bilaterally    Derm:   Skin has normal texture and turgor noted.  On the plantar right hallux superficial blister noted.  No erythema or edema here.  On left foot hallux and second nail gone.  Nailbeds healthy with no signs of infection.  Eschar small left first interspace.  There is still slight edema and erythema around the left first interspace especially dorsally.  No crepitus with palpation.  No pain with palpation.      A:  Diabetes mellitus with peripheral neuropathy   Left foot cellulitis with eschar  Right foot blister  Onychomycosis    P:  Discussed with patient right foot blister superficial.  We remove the loose skin from this.  He will dressed with a Band-Aid until healed.  With a  we manually debrided all his mycotic nails.  Discussed wound almost healed on left first interspace and small eschar.  He will just continue to watch this.  We will place patient on additional prescription of Keflex 1000 mg every 8 hours for the next 10 days.  We encourage smoking cessation.  Encouraged good control of his sugars.  Discussed importance of watching his feet daily because he has peripheral neuropathy and was lost some of his protective sensation.  Discussed better fitting shoes.  Wrote him a prescription for diabetic shoes and inserts.  RTC as needed.  Thank you for allowing me participate in the care of this patient.        Frank Adkins, WILLIAM, FACFAS

## 2021-06-09 NOTE — LETTER
6/9/2021         RE: Javier Markham  [address]  [facility] [address]        Dear Colleague,    Thank you for referring your patient, Javier Markham, to the [facility]. Please see a copy of my visit note below.    Subjective:    Pt is seen today in consult from Radha Joyec with the c/c of left foot first interspace infection.  This started approximately a month ago.  After about a week patient presented to clinic.  He was started on Keflex.  He has finished this.  He states the antibiotic helped.  The erythema and edema decreased but after stopping medication it is come back slightly.  He denies any history of trauma.  He denies stepping on any foreign bodies.  He also states that he has a new blister on his right hallux.  The patient has numbness and tingling in his feet.  He smokes a few cigars a day.  He is currently not working.    ROS:  A 10-point review of systems was performed and is positive for that noted in the HPI and as seen below.  All other areas are negative.          Allergies   Allergen Reactions     Perfume      Soap      Perfume in soap-reaction excema       Current Outpatient Medications   Medication Sig Dispense Refill     cephALEXin (KEFLEX) 500 MG capsule Take 2 capsules (1,000 mg) by mouth 3 times daily 60 capsule 0     acetaminophen (TYLENOL) 325 MG tablet Take 2 tablets (650 mg) by mouth every 4 hours as needed for mild pain 50 tablet 0     albuterol (PROAIR HFA/PROVENTIL HFA/VENTOLIN HFA) 108 (90 Base) MCG/ACT inhaler Inhale 2 puffs into the lungs every 4 hours as needed for shortness of breath / dyspnea or wheezing 8.5 g 3     Alcohol Swabs (B-D SINGLE USE SWABS REGULAR) PADS USE AS NEEDED 200 each 0     aspirin (ASA) 81 MG EC tablet Take 1 tablet (81 mg) by mouth daily 90 tablet 3     azelastine (ASTELIN) 0.1 % nasal spray Spray 1 spray into both nostrils every evening       blood glucose (NO BRAND SPECIFIED) test strip Use to test  blood sugar 2 times daily or as directed. 200 strip 11     blood glucose monitoring (NO BRAND SPECIFIED) meter device kit Use to test blood sugar 1 times daily or as directed. ONE TOUCH VERIO. 1 kit 0     clonazePAM (KLONOPIN) 0.5 MG tablet TAKE ONE TABLET BY MOUTH EVERY NIGHT AT BEDTIME AS NEEDED FOR RESTLESS LEGS 90 tablet 1     clopidogrel (PLAVIX) 75 MG tablet TAKE ONE TABLET BY MOUTH ONCE DAILY 90 tablet 3     cyanocobalamin (VITAMIN B-12) 1000 MCG tablet Take 1 tablet by mouth daily 100 tablet 0     evolocumab (REPATHA) 140 MG/ML prefilled autoinjector Inject 140 mg Subcutaneous every 14 days       ezetimibe (ZETIA) 10 MG tablet Take 1 tablet (10 mg) by mouth At Bedtime 90 tablet 1     ferrous sulfate (FEROSUL) 325 (65 Fe) MG tablet TAKE ONE TABLET BY MOUTH EVERY MORNING WITH BREAKFAST (Patient taking differently: Take 325 mg by mouth every evening ) 90 tablet 2     fluticasone (FLONASE) 50 MCG/ACT nasal spray Spray 2 sprays into both nostrils daily 54.6 mL 3     gabapentin (NEURONTIN) 300 MG capsule TAKE THREE CAPSULES BY MOUTH THREE TIMES A  capsule 0     hydrOXYzine (ATARAX) 25 MG tablet TAKE ONE TO TWO TABLETS BY MOUTH EVERY 6 HOURS AS NEEDED FOR ANXIETY 120 tablet 5     ibuprofen (ADVIL/MOTRIN) 200 MG tablet Take 200 mg by mouth every 4 hours as needed for mild pain       JARDIANCE 10 MG TABS tablet TAKE ONE TABLET BY MOUTH ONCE DAILY 90 tablet 3     ketorolac (TORADOL) 10 MG tablet Take 1 tablet (10 mg) by mouth every 6 hours as needed 10 tablet 0     lisinopril (ZESTRIL) 10 MG tablet TAKE ONE TABLET BY MOUTH ONCE DAILY 90 tablet 2     loratadine (CLARITIN) 10 MG tablet TAKE ONE TABLET BY MOUTH ONCE DAILY 90 tablet 3     Melatonin 10 MG TABS tablet Take 10 mg by mouth nightly as needed for sleep       metFORMIN (GLUCOPHAGE) 500 MG tablet TAKE TWO TABLETS BY MOUTH TWO TIMES A DAY WITH MEALS 360 tablet 1     metoprolol succinate ER (TOPROL-XL) 25 MG 24 hr tablet TAKE ONE TABLET BY MOUTH TWICE A DAY  180 tablet 3     nitroGLYcerin (NITROSTAT) 0.4 MG sublingual tablet PLACE 1 TABLET UNDER THE TONGUE AT THE ONSET OF CHEST PAIN. MAY REPEAT EVERY 5 MINUTES AS NEEDED FOR A TOTAL OF 3 DOSES. 25 tablet 1     omega-3 acid ethyl esters (LOVAZA) 1 g capsule TAKE TWO CAPSULES BY MOUTH TWO TIMES A  capsule 3     omeprazole (PRILOSEC) 20 MG DR capsule TAKE ONE CAPSULE BY MOUTH TWO TIMES A DAY, 30 TO 60 MINUTES BEFORE A MEAL. 180 capsule 3     ONE TOUCH VERIO IQ test strip TEST ONCE DAILY OR AS DIRECTED 100 each 5     OneTouch Delica Lancets 33G MISC 1 Act by Device route 2 times daily 200 each 0     polyethylene glycol (MIRALAX/GLYCOLAX) powder Take 17 g by mouth daily as needed for constipation 500 g 5     pramipexole (MIRAPEX) 0.75 MG tablet TAKE ONE TABLET BY MOUTH EVERY NIGHT AT BEDTIME 90 tablet 0     rosuvastatin (CRESTOR) 40 MG tablet Take 1 tablet (40 mg) by mouth daily       senna-docusate (SENOKOT-S/PERICOLACE) 8.6-50 MG tablet Take 1-2 tablets by mouth 2 times daily (Patient taking differently: Take 1 tablet by mouth daily ) 30 tablet 0     sennosides (SENOKOT) 8.6 MG tablet TAKE TWO TABLETS BY MOUTH ONCE DAILY 180 tablet 3     sitagliptin (JANUVIA) 50 MG tablet Take 1 tablet (50 mg) by mouth daily 90 tablet 0     Skin Protectants, Misc. (HYDROCERIN) CREA APPLY TOPICALLY AS NEEDED FOR DRY SKIN 226 g 3     sodium chloride (DEEP SEA NASAL SPRAY) 0.65 % nasal spray SPRAY 1 SPRAY IN EACH NOSTRIL FOUR TIMES A DAY AS NEEDED FOR CONGESTION 44 mL 3     tamsulosin (FLOMAX) 0.4 MG capsule TAKE ONE CAPSULE BY MOUTH ONCE DAILY 90 capsule 3     triamcinolone (KENALOG) 0.1 % external cream Apply sparingly once or twice per day as needed to affected area until the skin is better, then stop; REPEAT AS NEEDED 80 g 1     vitamin D3 (CHOLECALCIFEROL) 50 mcg (2000 units) tablet TAKE ONE TABLET BY MOUTH ONCE DAILY 100 tablet 2       Patient Active Problem List   Diagnosis     Esophageal reflux     History of cocaine use in  remission     Hyperlipidemia     Anxiety     Restless legs syndrome (RLS)     Type 2 diabetes mellitus with circulatory disorder, without long-term current use of insulin (H)     [facility]     Coronary artery disease involving autologous artery coronary bypass graft without angina pectoris     BPH (benign prostatic hypertrophy)     Class 1 obesity with serious comorbidity and body mass index (BMI) of 34.0 to 34.9 in adult, unspecified obesity type     Benign essential hypertension; goal < 140/90     History of tobacco abuse     Incomplete RBBB     Right knee pain, unspecified chronicity     Benign essential tremor     Tinnitus, left     Severe obesity (BMI 35.0-35.9 with comorbidity) (H)     Hyperlipidemia LDL goal <100     Right inguinal hernia     Right hydrocele     S/P CABG x 3     NSTEMI (non-ST elevated myocardial infarction) (H)     Mixed, or nondependent drug abuse     Ascending aorta dilatation (H)     ACP (advance care planning)       Past Medical History:   Diagnosis Date     Anxiety      Ascending aorta dilation (H)      CAD (coronary artery disease) Dec 2014    s/p CABGx3 Dec 2014 and later RCA stent July 2015     Diabetes mellitus (H)      DM2 (diabetes mellitus, type 2) (H) Dx June 2015    A1c 6.5% at time of dx     Epididymitis, right      Essential tremor      GERD (gastroesophageal reflux disease)      Heart attack (H) 12/2014, 6/2015, 7/4/2015    x3     Hernia, abdominal      History of cocaine abuse (H)     Smoked crack cocaine (remission since Nov 2014)     History of tobacco abuse      Hyperlipidemia      Hyperlipidemia LDL goal <100     chronic hypertriglyceredemia, regular elevation 300-400     Incomplete RBBB      Inguinal hernia     Left     Numbness and tingling     diabetic neuropathy     Prediabetes Dec 2014     Restless leg syndrome      S/P CABG x 3 Dec 2014     Severe obesity (BMI 35.0-35.9 with comorbidity) (H) 12/24/2018    BMI 37.5     Sleep apnea     mild, does not use CPAP      Stented coronary artery        Past Surgical History:   Procedure Laterality Date     C CABG, VEIN, THREE  2014    [facility]     DAVINCI XI HERNIORRHAPHY INGUINAL Right 2020    Procedure: ROBOTIC ASSITED RIGHT INGUINAL HERNIA REPAIR WITH MESH;  Surgeon: Haim Green MD;  Location:  OR     HERNIORRHAPHY INGUINAL Left 2016    Procedure: HERNIORRHAPHY INGUINAL;  Surgeon: Haim Green MD;  Location:  [facility]     HYDROCELECTOMY SCROTAL Right 3/23/2021    Procedure: Right HYDROCELECTOMY, SCROTAL APPROACH with spermatic cord block;  Surgeon: Blaise Altamirano MD;  Location: [facility] OR     STENT  2015    RCA stent     ZZC NONSPECIFIC PROCEDURE  age 15    both feet bone spur removal in the arch of the foot     ZZHC DRUG-ELUTING STENTS, SINGLE  10/19/2018    Left circumflex at [facility]       Family History   Problem Relation Age of Onset     Diabetes Mother         type 2     Breast Cancer Mother      Macular Degeneration Mother      Dementia Mother      Coronary Artery Disease Father 52     Heart Disease Paternal Uncle      Diabetes Maternal Grandfather      Breast Cancer Maternal Grandmother      Prostate Cancer No family hx of        Social History     Tobacco Use     Smoking status: Former Smoker     Packs/day: 0.50     Years: 25.00     Pack years: 12.50     Types: Cigarettes     Quit date: 2015     Years since quittin.6     Smokeless tobacco: Never Used   Substance Use Topics     Alcohol use: No     Alcohol/week: 0.0 standard drinks     Frequency: Never         Exam:    Vitals: /72   Pulse 82   Wt 125.2 kg (276 lb)   BMI 34.50 kg/m    BMI: Body mass index is 34.5 kg/m .  Height: Data Unavailable    Constitutional/ general:  Pt is in no apparent distress, appears well-nourished.  Cooperative with history and physical exam.     Psych:  The patient answered questions appropriately.  Normal affect.  Seems to have reasonable expectations, in terms of treatment.      Eyes:  Visual scanning/ tracking without deficit.     Ears:  Response to auditory stimuli is normal.  negative hearing aid devices.  Auricles in proper alignment.     Lymphatic:  Popliteal lymph nodes not enlarged.     Lungs:  Non labored breathing, non labored speech. No cough.  No audible wheezing. Even, quiet breathing.       Vascular:  positive pedal pulses bilaterally for both the DP and PT arteries.  CFT < 3 sec.  positive ankle edema.  negative pedal hair growth.    Neuro:  Alert and oriented x 3. Coordinated gait.  Light touch sensation is intact to the L4, L5, S1 distributions. No obvious deficits.  No evidence of neurological-based weakness, spasticity, or contracture in the lower extremities.  Monofilament absent to midfoot bilaterally    Derm:   Skin has normal texture and turgor noted.  On the plantar right hallux superficial blister noted.  No erythema or edema here.  On left foot hallux and second nail gone.  Nailbeds healthy with no signs of infection.  Eschar small left first interspace.  There is still slight edema and erythema around the left first interspace especially dorsally.  No crepitus with palpation.  No pain with palpation.      A:  Diabetes mellitus with peripheral neuropathy   Left foot cellulitis with eschar  Right foot blister  Onychomycosis    P:  Discussed with patient right foot blister superficial.  We remove the loose skin from this.  He will dressed with a Band-Aid until healed.  With a  we manually debrided all his mycotic nails.  Discussed wound almost healed on left first interspace and small eschar.  He will just continue to watch this.  We will place patient on additional prescription of Keflex 1000 mg every 8 hours for the next 10 days.  We encourage smoking cessation.  Encouraged good control of his sugars.  Discussed importance of watching his feet daily because he has peripheral neuropathy and was lost some of his protective sensation.  Discussed better  fitting shoes.  Wrote him a prescription for diabetic shoes and inserts.  RTC as needed.  Thank you for allowing me participate in the care of this patient.        Frank Adkins DPM, [facility]                Again, thank you for allowing me to participate in the care of your patient.        Sincerely,        Frank Adkins DPM

## 2021-07-28 DIAGNOSIS — K21.9 GASTROESOPHAGEAL REFLUX DISEASE WITHOUT ESOPHAGITIS: Chronic | ICD-10-CM

## 2021-07-28 DIAGNOSIS — I25.810 CORONARY ARTERY DISEASE INVOLVING CORONARY BYPASS GRAFT OF NATIVE HEART, ANGINA PRESENCE UNSPECIFIED: ICD-10-CM

## 2021-07-28 DIAGNOSIS — G25.81 RESTLESS LEGS SYNDROME (RLS): Chronic | ICD-10-CM

## 2021-07-28 DIAGNOSIS — E11.59 TYPE 2 DIABETES MELLITUS WITH OTHER CIRCULATORY COMPLICATION, WITHOUT LONG-TERM CURRENT USE OF INSULIN (H): ICD-10-CM

## 2021-07-28 DIAGNOSIS — F41.9 ANXIETY: ICD-10-CM

## 2021-07-28 DIAGNOSIS — E11.9 TYPE 2 DIABETES MELLITUS WITHOUT COMPLICATION, WITHOUT LONG-TERM CURRENT USE OF INSULIN (H): Chronic | ICD-10-CM

## 2021-07-28 RX ORDER — HYDROXYZINE HYDROCHLORIDE 25 MG/1
TABLET, FILM COATED ORAL
Qty: 120 TABLET | Refills: 5 | Status: SHIPPED | OUTPATIENT
Start: 2021-07-28 | End: 2023-02-01

## 2021-07-28 NOTE — TELEPHONE ENCOUNTER
Prescription approved per [facility] Refill Protocol.  Graciela Cote RN  [facility]

## 2021-07-28 NOTE — TELEPHONE ENCOUNTER
Routing refill request to provider for review/approval because:    Lab Results   Component Value Date    A1C 8.0 03/03/2021    A1C 7.1 07/23/2020    A1C 7.4 06/17/2020    A1C 6.6 01/15/2020    A1C 6.9 05/07/2019     NO CLOPIDOGREL MEDICATION ON ORDER.     Klonopin last approved 1-24-21 with 90 tablets and one refill.     Graciela Cote RN  [facility] Triage

## 2021-07-30 RX ORDER — CLONAZEPAM 0.5 MG/1
TABLET ORAL
Qty: 90 TABLET | Refills: 1 | OUTPATIENT
Start: 2021-07-30

## 2021-07-30 RX ORDER — EMPAGLIFLOZIN 10 MG/1
TABLET, FILM COATED ORAL
Qty: 90 TABLET | Refills: 3 | Status: SHIPPED | OUTPATIENT
Start: 2021-07-30 | End: 2022-09-22

## 2021-07-30 RX ORDER — SITAGLIPTIN 50 MG/1
TABLET, FILM COATED ORAL
Qty: 90 TABLET | Refills: 0 | Status: SHIPPED | OUTPATIENT
Start: 2021-07-30 | End: 2022-05-03

## 2021-07-30 NOTE — TELEPHONE ENCOUNTER
"Called patient, Patient using for \"both\", anxiety and RLS. Patient states. \"it helps me, my condition, I cant stress enough how much these medications, the gabapentin, the klonopin, the Mirapex helps\". \"without these medications it makes my life a living hell\".    Pended new order for klonopin, please review.   Preferred pharmacy:    [facility], [place] - [address] [facility]    Cleveland Mathews RN  [facility]    "

## 2021-08-02 DIAGNOSIS — G25.81 RESTLESS LEGS SYNDROME (RLS): Chronic | ICD-10-CM

## 2021-08-02 DIAGNOSIS — F41.9 ANXIETY: ICD-10-CM

## 2021-08-02 RX ORDER — CLONAZEPAM 0.5 MG/1
TABLET ORAL
Qty: 90 TABLET | Refills: 1 | Status: SHIPPED | OUTPATIENT
Start: 2021-08-02 | End: 2022-01-28

## 2021-08-03 RX ORDER — CLONAZEPAM 0.5 MG/1
TABLET ORAL
Qty: 90 TABLET | Refills: 1 | OUTPATIENT
Start: 2021-08-03

## 2021-08-03 NOTE — TELEPHONE ENCOUNTER
clonazePAM (KLONOPIN) 0.5 MG tablet 90 tablet 1 8/2/2021  No   Sig: TAKE ONE TABLET BY MOUTH EVERY NIGHT AT BEDTIME AS NEEDED FOR RESTLESS LEGS   Sent to pharmacy as: clonazePAM 0.5 MG Oral Tablet (klonoPIN)   Class: E-Prescribe   Order: [id number]   E-Prescribing Status: Receipt confirmed by pharmacy (8/2/2021 10:46 AM CDT)   Printout Tracking    External Result Report   Medication Administration Instructions    TAKE ONE TABLET BY MOUTH EVERY NIGHT AT BEDTIME AS NEEDED FOR RESTLESS LEGS   Pharmacy    [place] [id number]     Rx refused. Duplicate/early request. Pharmacy notified.    Beth SKAGGS RN

## 2021-08-30 DIAGNOSIS — E11.40 TYPE 2 DIABETES MELLITUS WITH DIABETIC NEUROPATHY, WITHOUT LONG-TERM CURRENT USE OF INSULIN (H): ICD-10-CM

## 2021-08-31 RX ORDER — GABAPENTIN 300 MG/1
CAPSULE ORAL
Qty: 810 CAPSULE | Refills: 2 | Status: SHIPPED | OUTPATIENT
Start: 2021-08-31 | End: 2022-05-27

## 2021-08-31 NOTE — TELEPHONE ENCOUNTER
gabapentin (NEURONTIN) 300 MG capsule 810 capsule 0 6/4/2021  No   Sig: TAKE THREE CAPSULES BY MOUTH THREE TIMES A DAY   Sent to pharmacy as: Gabapentin 300 MG Oral Capsule (NEURONTIN)   Class: E-Prescribe   Order: [id number]   E-Prescribing Status: Receipt confirmed by pharmacy (6/4/2021  1:43 PM CDT)     Last OV 5/17/21 - team   [place] OV with PCP 3/3/21     Routing refill request to provider for review/approval because:  Drug not on the [facility] refill protocol

## 2021-10-08 DIAGNOSIS — J30.2 SEASONAL ALLERGIES: ICD-10-CM

## 2021-10-08 DIAGNOSIS — I25.810 CORONARY ARTERY DISEASE INVOLVING CORONARY BYPASS GRAFT OF NATIVE HEART WITHOUT ANGINA PECTORIS: Chronic | ICD-10-CM

## 2021-10-08 DIAGNOSIS — I25.10 CORONARY ARTERY DISEASE INVOLVING NATIVE CORONARY ARTERY OF NATIVE HEART WITHOUT ANGINA PECTORIS: Chronic | ICD-10-CM

## 2021-10-08 DIAGNOSIS — E53.8 VITAMIN B12 DEFICIENCY (NON ANEMIC): ICD-10-CM

## 2021-10-11 RX ORDER — LANOLIN ALCOHOL/MO/W.PET/CERES
CREAM (GRAM) TOPICAL
Qty: 100 TABLET | Refills: 0 | Status: SHIPPED | OUTPATIENT
Start: 2021-10-11 | End: 2022-05-03

## 2021-10-11 RX ORDER — ASPIRIN 81 MG/1
TABLET, COATED ORAL
Qty: 90 TABLET | Refills: 0 | Status: SHIPPED | OUTPATIENT
Start: 2021-10-11 | End: 2022-01-28

## 2021-10-11 RX ORDER — METOPROLOL SUCCINATE 25 MG/1
TABLET, EXTENDED RELEASE ORAL
Qty: 180 TABLET | Refills: 0 | Status: SHIPPED | OUTPATIENT
Start: 2021-10-11 | End: 2022-07-29

## 2021-10-11 RX ORDER — LORATADINE 10 MG/1
TABLET ORAL
Qty: 90 TABLET | Refills: 0 | Status: SHIPPED | OUTPATIENT
Start: 2021-10-11 | End: 2022-07-28

## 2021-10-20 DIAGNOSIS — E61.1 IRON DEFICIENCY: ICD-10-CM

## 2021-10-20 RX ORDER — FERROUS SULFATE 325(65) MG
325 TABLET ORAL EVERY EVENING
Qty: 90 TABLET | Refills: 3 | Status: SHIPPED | OUTPATIENT
Start: 2021-10-20 | End: 2023-02-01

## 2021-10-20 NOTE — TELEPHONE ENCOUNTER
Routing refill request to provider for review/approval because:  Hgb or hematocrit not on file

## 2021-11-30 ENCOUNTER — TRANSFERRED RECORDS (OUTPATIENT)
Dept: HEALTH INFORMATION MANAGEMENT | Facility: CLINIC | Age: 56
End: 2021-11-30
Payer: COMMERCIAL

## 2021-11-30 LAB — HEMOCCULT STL QL IA: NEGATIVE

## 2021-12-01 ENCOUNTER — TRANSFERRED RECORDS (OUTPATIENT)
Dept: HEALTH INFORMATION MANAGEMENT | Facility: CLINIC | Age: 56
End: 2021-12-01
Payer: COMMERCIAL

## 2022-01-26 ENCOUNTER — NURSE TRIAGE (OUTPATIENT)
Dept: NURSING | Facility: CLINIC | Age: 57
End: 2022-01-26
Payer: COMMERCIAL

## 2022-01-26 DIAGNOSIS — J31.0 CHRONIC RHINITIS: ICD-10-CM

## 2022-01-26 DIAGNOSIS — E78.5 HYPERLIPIDEMIA, UNSPECIFIED HYPERLIPIDEMIA TYPE: Chronic | ICD-10-CM

## 2022-01-26 DIAGNOSIS — I25.10 CORONARY ARTERY DISEASE INVOLVING NATIVE CORONARY ARTERY OF NATIVE HEART WITHOUT ANGINA PECTORIS: Chronic | ICD-10-CM

## 2022-01-26 DIAGNOSIS — I10 BENIGN ESSENTIAL HYPERTENSION: Chronic | ICD-10-CM

## 2022-01-26 DIAGNOSIS — I25.810 CORONARY ARTERY DISEASE INVOLVING CORONARY BYPASS GRAFT OF NATIVE HEART WITHOUT ANGINA PECTORIS: Chronic | ICD-10-CM

## 2022-01-26 DIAGNOSIS — F41.9 ANXIETY: ICD-10-CM

## 2022-01-26 DIAGNOSIS — E11.40 TYPE 2 DIABETES MELLITUS WITH DIABETIC NEUROPATHY, WITHOUT LONG-TERM CURRENT USE OF INSULIN (H): ICD-10-CM

## 2022-01-26 DIAGNOSIS — G25.81 RESTLESS LEGS SYNDROME (RLS): Chronic | ICD-10-CM

## 2022-01-26 NOTE — TELEPHONE ENCOUNTER
Alexx is calling and states that yesterday started feeling pain on thigh just above the knee on right leg.  Patient also noticed some lumps on leg.  Area feels kind of warm and pink.  Varicose veins are also on the same leg.  Patient does not want to go to  Ed and patient has to arrange a ride to get to clinic.  Theresa smith is requesting an in person clinic visit.  Patient denies fever cough and shortness of breath.      COVID 19 Nurse Triage Plan/Patient Instructions    Please be aware that novel coronavirus (COVID-19) may be circulating in the community. If you develop symptoms such as fever, cough, or SOB or if you have concerns about the presence of another infection including coronavirus (COVID-19), please contact your health care provider or visit [email or web address].     Disposition/Instructions    In-Person Visit with provider recommended. Reference Visit Selection Guide.    Thank you for taking steps to prevent the spread of this virus.  o Limit your contact with others.  o Wear a simple mask to cover your cough.  o Wash your hands well and often.    Resources    [facility]: About COVID-19: [email or web address]    CDC: What to Do If You're Sick: www.cdc.gov/coronavirus/2019-ncov/about/steps-when-sick.html    CDC: Ending Home Isolation: www.cdc.gov/coronavirus/2019-ncov/hcp/disposition-in-home-patients.html     CDC: Caring for Someone: www.cdc.gov/coronavirus/2019-ncov/if-you-are-sick/care-for-someone.html     [facility]: Interim Guidance for Hospital Discharge to Home: www.health.[facility].[place]/diseases/coronavirus/hcp/hospdischarge.pdf    [facility] clinical trials (COVID-19 research studies): [email or web address]     Below are the COVID-19 hotlines at the [place] Department of Health ([facility]). Interpreters are available.   o For health questions: Call 392-446-3357 or 1-279.552.9534 (7 a.m. to 7 p.m.)  o For questions about schools and childcare: Call  [phone] or 1-297.764.8494 (7 a.m. to 7 p.m.)                       Reason for Disposition    Patient wants to be seen    Additional Information    Negative: Sounds like a life-threatening emergency to the triager    Negative: Difficulty breathing at rest    Negative: Entire foot is cool or blue in comparison to other side    Negative: SEVERE swelling (e.g., swelling extends above knee, entire leg is swollen, weeping fluid)    Negative: Thigh or calf pain and only 1 side and present > 1 hour    Negative: Thigh, calf, or ankle swelling in only one leg    Negative: Thigh, calf, or ankle swelling in both legs, but one side is definitely more swollen (Exception: longstanding difference between legs)    Negative: Cast on leg or ankle and has increasing pain    Negative: Can't walk or can barely stand (new onset)    Negative: Fever and red area (or area very tender to touch)    Negative: Patient sounds very sick or weak to the triager    Negative: Swelling of face, arm or hands (Exception: slight puffiness of fingers during hot weather)    Negative: Pregnant > 20 weeks and sudden weight gain (i.e., > 2 lbs, 1 kg in one week)    Negative: MODERATE swelling of both ankles (e.g., swelling extends up to the knees) AND new onset or worsening    Negative: Difficulty breathing with exertion AND worsening or new onset    Negative: Looks like a boil, infected sore, deep ulcer, or other infected rash (spreading redness, pus)    Protocols used: LEG SWELLING AND EDEMA-A-OH

## 2022-01-27 NOTE — PROGRESS NOTES
Assessment and Plan:     (L02.415) Abscess of right lower extremity  (primary encounter diagnosis)  Comment: very large abscess right medial thigh, symptoms started 4 days ago, had some chills this am but afebrile  Plan: needs I and D and IV abx and likely admission, discussed w/patient and sent to [facility] ED immediately, I notified the ED of his impending arrival    (E11.59) Type 2 diabetes mellitus with other circulatory complication, without long-term current use of insulin (H)  Comment: on metformin, jardiance and januvia  Plan: cont above      Malgorzata Mathew PA-C        Subjective   EMBER is a 57 year old who presents for the following health issues     HPI       Mass/lump in side of thigh above right knee - noticed on Tuesday and has gotten worse since.     Onset Tuesday--4 days ago, noticed pain on medial right thigh, now red and painful and swollen, has been getting worse  No truama that he can recall  Lost right toenail 2-3 weeks ago, wonders if infected, red, no purulent discharge, painful at times but wonders   He felt flushed this am and wondered if he had a fever  Blood sugars have been running around 150s      Review of Systems   See above      Objective    /78 (BP Location: Right arm, Patient Position: Sitting, Cuff Size: Adult Large)   Pulse 91   Temp 96.8  F (36  C) (Temporal)   Resp 16   Wt 126.9 kg (279 lb 11.2 oz)   SpO2 96%   BMI 34.96 kg/m    Body mass index is 34.96 kg/m .     Physical Exam     GENERAL: healthy, alert and no distress  RESP: lungs clear to auscultation - no rales, no rhonchi, no wheezes  CV: regular rates and rhythm, normal S1 S2, no S3 or S4 and no murmur, no click or rub   MS: extremities- right medial thigh with large abscess that measures approx 2 inches x 5 inches, very tender with surrounding induration and erythema, no open/draining sores, left big toenail is gone from mid distal point, big toe is non-tender and without any swelling or  drainage

## 2022-01-28 ENCOUNTER — HOSPITAL ENCOUNTER (EMERGENCY)
Facility: CLINIC | Age: 57
Discharge: HOME | End: 2022-01-28
Attending: EMERGENCY MEDICINE | Admitting: EMERGENCY MEDICINE
Payer: COMMERCIAL

## 2022-01-28 ENCOUNTER — TELEPHONE (OUTPATIENT)
Dept: FAMILY MEDICINE | Facility: CLINIC | Age: 57
End: 2022-01-28

## 2022-01-28 ENCOUNTER — OFFICE VISIT (OUTPATIENT)
Dept: FAMILY MEDICINE | Facility: CLINIC | Age: 57
End: 2022-01-28
Payer: COMMERCIAL

## 2022-01-28 VITALS
SYSTOLIC BLOOD PRESSURE: 149 MMHG | RESPIRATION RATE: 16 BRPM | TEMPERATURE: 98.1 F | WEIGHT: 280 LBS | OXYGEN SATURATION: 97 % | DIASTOLIC BLOOD PRESSURE: 78 MMHG | HEIGHT: 75 IN | BODY MASS INDEX: 34.82 KG/M2

## 2022-01-28 VITALS
BODY MASS INDEX: 34.96 KG/M2 | TEMPERATURE: 96.8 F | OXYGEN SATURATION: 96 % | SYSTOLIC BLOOD PRESSURE: 119 MMHG | WEIGHT: 279.7 LBS | HEART RATE: 91 BPM | DIASTOLIC BLOOD PRESSURE: 78 MMHG | RESPIRATION RATE: 16 BRPM

## 2022-01-28 DIAGNOSIS — L02.415 ABSCESS OF RIGHT LOWER EXTREMITY: Primary | ICD-10-CM

## 2022-01-28 DIAGNOSIS — E11.59 TYPE 2 DIABETES MELLITUS WITH OTHER CIRCULATORY COMPLICATION, WITHOUT LONG-TERM CURRENT USE OF INSULIN (H): ICD-10-CM

## 2022-01-28 DIAGNOSIS — L03.115 CELLULITIS OF RIGHT LOWER EXTREMITY: ICD-10-CM

## 2022-01-28 PROCEDURE — 250N000013 HC RX MED GY IP 250 OP 250 PS 637: Performed by: EMERGENCY MEDICINE

## 2022-01-28 PROCEDURE — 99283 EMERGENCY DEPT VISIT LOW MDM: CPT

## 2022-01-28 PROCEDURE — 99213 OFFICE O/P EST LOW 20 MIN: CPT | Performed by: PHYSICIAN ASSISTANT

## 2022-01-28 RX ORDER — CLOPIDOGREL BISULFATE 75 MG/1
TABLET ORAL
Qty: 90 TABLET | Refills: 0 | Status: SHIPPED | OUTPATIENT
Start: 2022-01-28 | End: 2022-02-08

## 2022-01-28 RX ORDER — CLONAZEPAM 0.5 MG/1
TABLET ORAL
Qty: 90 TABLET | Refills: 1 | Status: SHIPPED | OUTPATIENT
Start: 2022-01-28 | End: 2022-05-04

## 2022-01-28 RX ORDER — LISINOPRIL 10 MG/1
TABLET ORAL
Qty: 90 TABLET | Refills: 0 | Status: SHIPPED | OUTPATIENT
Start: 2022-01-28 | End: 2022-05-19

## 2022-01-28 RX ORDER — FLUTICASONE PROPIONATE 50 MCG
SPRAY, SUSPENSION (ML) NASAL
Qty: 48 ML | Refills: 0 | Status: SHIPPED | OUTPATIENT
Start: 2022-01-28 | End: 2023-01-09

## 2022-01-28 RX ORDER — OMEGA-3-ACID ETHYL ESTERS 1 G/1
CAPSULE, LIQUID FILLED ORAL
Qty: 360 CAPSULE | Refills: 0 | Status: SHIPPED | OUTPATIENT
Start: 2022-01-28 | End: 2023-01-09

## 2022-01-28 RX ORDER — ASPIRIN 81 MG/1
TABLET, COATED ORAL
Qty: 90 TABLET | Refills: 3 | Status: SHIPPED | OUTPATIENT
Start: 2022-01-28 | End: 2022-05-03

## 2022-01-28 RX ADMIN — AMOXICILLIN AND CLAVULANATE POTASSIUM 1 TABLET: 875; 125 TABLET, FILM COATED ORAL at 10:18

## 2022-01-28 ASSESSMENT — MIFFLIN-ST. JEOR: SCORE: 2170.7

## 2022-01-28 ASSESSMENT — ENCOUNTER SYMPTOMS
WOUND: 1
FEVER: 0

## 2022-01-28 ASSESSMENT — PAIN SCALES - GENERAL: PAINLEVEL: EXTREME PAIN (9)

## 2022-01-28 NOTE — ED TRIAGE NOTES
Right upper inner leg pain redness swelling since Tuesday - pt saw PMD today told to come to ER to rule out infection and DVT   Pt denies any trauma or open sores

## 2022-01-28 NOTE — ED PROVIDER NOTES
History   Chief Complaint:  Leg Pain    The history is provided by the patient.      Javier Markham is a 57 year old male with history of MI, diabetes mellitus, hyperlipidemia, and obesity who presents with pain and swelling to his medial right thigh. Approximately 1 week ago, the patient reports damaging the toenail to his right big toe. A few days later, he noted onset of pain and swelling to his medial right thigh. He called and made an appointment with his primary care provider this week, however his doctor called in so the patient did not attend his appointment. Persisting symptoms prompted concern and his doctor advising that he be seen in the ED, prompting his presentation at this time. He has taken both Tylenol and ibuprofen for symptom relief. The patient has cardiac history, notably having had open heart surgery in the past and 2 stent placements. He has other history of hernia surgery. The patient has diabetes mellitus and notes his blood glucose levels have been fine lately. He has no history of problems with infections. He has no history of blood clots. The patient denies any fever or other symptoms.    Review of Systems   Constitutional: Negative for fever.   Skin: Positive for rash and wound.   All other systems reviewed and are negative.    Allergies:  Perfume  Soap    Medications:  Albuterol  Aspirin  Azelastine  Clonazepam  Clopidogrel  Cyanocobalamin  Evolocumab  Ezetimibe  Ferrous sulfate  Fluticasone  Gabapentin  Hydroxyzine  Januvia  Jardiance  Ketorolac  Lisinopril  Loratadine  Metformin  Metoprolol succinate  Nitroglycerin  Omeprazole  Polyethylene glycol  Pramipexole  Rosuvastatin  Senna docusate  Sennosides  Sodium chloride  Tamsulosin  Cholecalciferol  Lofibra    Past Medical History:     Anxiety  Ascending aorta dilation  CAD  Type 2 diabetes mellitus  Epididymitis, right  Essential tremor  GERD  Myocardial infarction  Abdominal hernia  Cocaine abuse  Tobacco  "abuse  Hyperlipidemia  Incomplete RBBB  Inguinal hernia  Severe obesity  Sleep apnea  Stented coronary artery  RLS  BPH  Tinnitus  Benign essential tremor  Right hydrocele  NSTEMI    Past Surgical History:    CABG x3  Davinci herniorrhaphy inguinal, bilateral  Hydrocelectomy scrotal, right  RCA stent  Feet procedure  Left circumflex, drug eluting stent    Family History:    Type 2 diabetes mellitus  Breast cancer  Macular degeneration  Dementia  CAD  Myocardial infarction  CHF    Social History:  The patient presented alone.  The patient arrived in a private vehicle.    Physical Exam     Patient Vitals for the past 24 hrs:   BP Temp Temp src Resp SpO2 Height Weight   01/28/22 0932 (!) 149/78 98.1  F (36.7  C) Oral 16 97 % 1.905 m (6' 3\") 127 kg (280 lb)     Physical Exam    Constitutional: Middle aged white male sitting supine. No respiratory distress.  HENT: No signs of trauma.   Eyes: EOM are normal. Pupils are equal, round, and reactive to light.   Neck: Normal range of motion. No JVD present. No cervical adenopathy.  Cardiovascular: Regular rhythm.  Exam reveals no gallop and no friction rub.    No murmur heard.  Pulmonary/Chest: Bilateral breath sounds normal. No wheezes, rhonchi or rales. Sternotomy incision in chest.  Abdominal: Soft. No tenderness. No rebound or guarding.   Musculoskeletal: Right leg 2+ femoral pulses. Trace dorsal pedal, posterior tibial pulses. Right medial distal thigh redness and induration approximately 5x10 cm. No palpable fluctuance. On the right leg, big toenail is absent but no surrounding redness or draining. No ascending lymphangitic streaks.  Lymphadenopathy: No inguinal lymphadenopathy.   Neurological: Alert and oriented to person, place, and time. Normal strength. Coordination normal.   Skin: Skin is warm and dry.     Emergency Department Course     Emergency Department Course:     Reviewed:  I reviewed nursing notes, vitals, past medical history, Care Everywhere and " ELIZABETH.    Assessments:  0942 I obtained history and examined the patient as noted above. I performed bedside ultrasound. I believe that they are safe for discharge at this time.    Interventions:  1018 Augmentin 1 tablet PO    Disposition:  The patient was discharged to home.     Impression & Plan     Medical Decision Making:    Javier Markham is a 57 year old male who comes in with redness of his medial thigh for 3 days. No fever or chills. No injury. He did lose his big toenail on that side a few weeks ago. He has never had an abscess or a blood clot. When I examined him, there was an indurated area with no definite fluctuants. I used a bedside ultrasound probe and did not find any fluid collection to drain. This is most consistent with cellulitis. This does not seem consistent with deep vein thrombosis, superficial thrombophlebitis, or necrotizing fasciitis. I have started him on antibiotics. He should use warm compresses and follow-up with primary care provider on Monday and if symptoms change or worsen in the meantime recheck in the ED.    Diagnosis:    ICD-10-CM    1. Cellulitis of right lower extremity  L03.115      Discharge Medications:  New Prescriptions    AMOXICILLIN-CLAVULANATE (AUGMENTIN) 875-125 MG TABLET    Take 1 tablet by mouth 2 times daily     Scribe Disclosure:  I, Joaquina Ramey, am serving as a scribe at 9:35 AM on 1/28/2022 to document services personally performed by Tree Pollock MD based on my observations and the provider's statements to me.       Tree Pollock MD  01/28/22 1030

## 2022-01-28 NOTE — TELEPHONE ENCOUNTER
What type of discharge? Emergency Department  Risk of Hospital admission or ED visit: 83%  Is a TCM episode required? No  When should the patient follow up with PCP? within 30 days of discharge.    Beth Rocha RN  [facility]

## 2022-01-28 NOTE — TELEPHONE ENCOUNTER
Routing refill request to provider for review/approval because:  Drug not on the G refill protocol     Pending Prescriptions:                       Disp   Refills    clonazePAM (KLONOPIN) 0.5 MG tablet [Phar*90 tab*1            Sig: TAKE ONE TABLET BY MOUTH EVERY NIGHT AT BEDTIME           AS NEEDED FOR RESTLESS LEG SYNDROME    Signed Prescriptions:                        Disp   Refills    ASPIRIN LOW DOSE 81 MG EC tablet           90 tab*3        Sig: TAKE ONE TABLET BY MOUTH ONCE DAILY  Authorizing Provider: CHECO RIVERA  Ordering User: PATRICIA LARKIN    Last Written Prescription Date:  8/2/21  Last Fill Quantity: 90,  # refills: 1   Last office visit: 5/17/2021 with prescribing provider:  1/28/22   Future Office Visit:   Next 5 appointments (look out 90 days)    Jan 28, 2022  9:00 AM  (Arrive by 8:40 AM)  Provider Visit with Malgorzata Mathew PA-C  [facility] ([facility] ) [address] [facility], [address]  [facility] [address]  [phone]           Patricia Larkin RN  [facility] Triage Nurse

## 2022-01-28 NOTE — TELEPHONE ENCOUNTER
"Appointments in Next Year    Jan 28, 2022  9:00 AM  (Arrive by 8:40 AM)  Provider Visit with Malgorzata Mathew PA-C  [facility] ([facility] ) [phone]        Last OV 5/17/21 - Radha   Last visit with PCP 3/3/21     Routing refill request to provider for review/approval because:  1) Plavix - fails protocol due to: pt taking PPI also, Hgb and platelets not current   2) Lovaza - ALT and lipids not current   3) Metformin - A1C not current     Flonase - Prescription approved per [facility] Refill Protocol.            lisinopril (ZESTRIL) 10 MG tablet [Pharmacy Med Name: LISINOPRIL 10MG TABS] 90 tablet 2     Sig: TAKE ONE TABLET BY MOUTH ONCE DAILY       ACE Inhibitors (Including Combos) Protocol Passed - 1/26/2022  2:28 PM        Passed - Blood pressure under 140/90 in past 12 months     BP Readings from Last 3 Encounters:   07/01/21 120/70   06/09/21 132/72   05/18/21 120/80                 Passed - Recent (12 mo) or future (30 days) visit within the authorizing provider's specialty     Patient has had an office visit with the authorizing provider or a provider within the authorizing providers department within the previous 12 mos or has a future within next 30 days. See \"Patient Info\" tab in inbasket, or \"Choose Columns\" in Meds & Orders section of the refill encounter.              Passed - Medication is active on med list        Passed - Patient is age 18 or older        Passed - Normal serum creatinine on file in past 12 months     Recent Labs   Lab Test 03/03/21  1505   CR 0.91       Ok to refill medication if creatinine is low          Passed - Normal serum potassium on file in past 12 months     Recent Labs   Lab Test 03/03/21  1505   POTASSIUM 4.0                  "

## 2022-02-02 NOTE — PROGRESS NOTES
"  Assessment & Plan     Cellulitis of right lower extremity  Inner aspect of right thigh with some induration.  No obvious abscess by palpation some tenderness.  Concerned about the lack of change of this.  At this juncture I would recommend a trial of clindamycin.  I discussed with the patient should he have worsening of his symptoms he needs to get IV treatment and cultures as well as a repeat ultrasound to rule out abscess formation.    I will evaluate a CBC today and have him return to clinic next Tuesday if possible.    Patient needs a physical examination at the end of March or early April  - CBC with platelets and differential; Future  - clindamycin (CLEOCIN) 300 MG capsule; Take 1 capsule (300 mg) by mouth 4 times daily  - meloxicam (MOBIC) 15 MG tablet; Take 1 tablet (15 mg) by mouth daily       BMI:   Estimated body mass index is 34.87 kg/m  as calculated from the following:    Height as of this encounter: 1.905 m (6' 3\").    Weight as of this encounter: 126.6 kg (279 lb).       See Patient Instructions  Patient Instructions   Alexx;  For the cellulitis I like to switch the antibiotic given the fact that your progress is slow.  We will switch over to clindamycin 300 mg every 6 hours.  Please try not to miss any doses.  This antibiotic is much more effective against lower extremity wounds and MRSA than is Augmentin.    If you have worsening of your symptoms despite this, you need to return to the emergency department and possibly get IV antibiotics to treat this.    Today I will check your white blood cell count.  Prescribe clindamycin and meloxicam for pain.    Return to clinic next Tuesday for follow-up    Regards  Rajeev      Return in about 5 days (around 2/8/2022) for Follow up.    Rajeev Oconnor MD  [facility] CHRISTOFER PA is a 57 year old who presents for the following health issues     HPI patient recently seen in the emergency department secondary to a cellulitis.  He " "returns to clinic for follow-up.  He has been on 5 days of Augmentin and has not noticed any significant difference.  Perhaps a slight diminished in redness.  Continues to have pain and induration.    Denies any other significant symptoms.  He is concerned because his mother had MSSA of her extremity and had a partial amputation.  The patient carries with him a diagnosis of type 2 diabetes and obesity.    ED/UC Followup:    Facility:  [facility]  Date of visit: 1/28/22  Reason for visit: Right lower Extremity Cellulitis  Current Status: at home           Review of Systems   Constitutional, HEENT, cardiovascular, pulmonary, gi and gu systems are negative, except as otherwise noted.      Objective    /78 (BP Location: Left arm, Patient Position: Chair, Cuff Size: Adult Large)   Pulse 90   Temp 97.2  F (36.2  C) (Temporal)   Resp 18   Ht 1.905 m (6' 3\")   Wt 126.6 kg (279 lb)   SpO2 98%   BMI 34.87 kg/m    Body mass index is 34.87 kg/m .  Physical Exam   Right inner thigh area of induration is approximately 8 x 6 cm in size erythema over this region not extending to the groin.  Tenderness noted.  Induration noted no fluctuance    Transferred Records on 11/30/2021   Component Date Value Ref Range Status     Occult Blood Scn FIT 11/30/2021 Negative  Negative Final                 "

## 2022-02-03 ENCOUNTER — OFFICE VISIT (OUTPATIENT)
Dept: FAMILY MEDICINE | Facility: CLINIC | Age: 57
End: 2022-02-03
Payer: COMMERCIAL

## 2022-02-03 VITALS
SYSTOLIC BLOOD PRESSURE: 124 MMHG | OXYGEN SATURATION: 98 % | TEMPERATURE: 97.2 F | DIASTOLIC BLOOD PRESSURE: 78 MMHG | HEIGHT: 75 IN | WEIGHT: 279 LBS | RESPIRATION RATE: 18 BRPM | HEART RATE: 90 BPM | BODY MASS INDEX: 34.69 KG/M2

## 2022-02-03 DIAGNOSIS — L03.115 CELLULITIS OF RIGHT LOWER EXTREMITY: Primary | ICD-10-CM

## 2022-02-03 PROCEDURE — 99214 OFFICE O/P EST MOD 30 MIN: CPT | Performed by: INTERNAL MEDICINE

## 2022-02-03 RX ORDER — CLINDAMYCIN HCL 300 MG
300 CAPSULE ORAL 4 TIMES DAILY
Qty: 40 CAPSULE | Refills: 0 | Status: SHIPPED | OUTPATIENT
Start: 2022-02-03 | End: 2022-02-08

## 2022-02-03 RX ORDER — MELOXICAM 15 MG/1
15 TABLET ORAL DAILY
Qty: 10 TABLET | Refills: 0 | Status: SHIPPED | OUTPATIENT
Start: 2022-02-03

## 2022-02-03 ASSESSMENT — PAIN SCALES - GENERAL: PAINLEVEL: EXTREME PAIN (8)

## 2022-02-03 ASSESSMENT — MIFFLIN-ST. JEOR: SCORE: 2166.17

## 2022-02-03 NOTE — PATIENT INSTRUCTIONS
Alexx;  For the cellulitis I like to switch the antibiotic given the fact that your progress is slow.  We will switch over to clindamycin 300 mg every 6 hours.  Please try not to miss any doses.  This antibiotic is much more effective against lower extremity wounds and MRSA than is Augmentin.    If you have worsening of your symptoms despite this, you need to return to the emergency department and possibly get IV antibiotics to treat this.    Today I will check your white blood cell count.  Prescribe clindamycin and meloxicam for pain.    Return to clinic next Tuesday for follow-up    Regards  Rajeev

## 2022-02-07 DIAGNOSIS — I25.810 CORONARY ARTERY DISEASE INVOLVING CORONARY BYPASS GRAFT OF NATIVE HEART WITHOUT ANGINA PECTORIS: Chronic | ICD-10-CM

## 2022-02-08 ENCOUNTER — OFFICE VISIT (OUTPATIENT)
Dept: FAMILY MEDICINE | Facility: CLINIC | Age: 57
End: 2022-02-08
Payer: COMMERCIAL

## 2022-02-08 VITALS
DIASTOLIC BLOOD PRESSURE: 86 MMHG | TEMPERATURE: 97.1 F | SYSTOLIC BLOOD PRESSURE: 134 MMHG | WEIGHT: 284 LBS | RESPIRATION RATE: 18 BRPM | HEART RATE: 78 BPM | OXYGEN SATURATION: 97 % | BODY MASS INDEX: 35.31 KG/M2 | HEIGHT: 75 IN

## 2022-02-08 DIAGNOSIS — L20.84 INTRINSIC ECZEMA: ICD-10-CM

## 2022-02-08 DIAGNOSIS — L03.115 CELLULITIS OF RIGHT LOWER EXTREMITY: ICD-10-CM

## 2022-02-08 PROCEDURE — 99213 OFFICE O/P EST LOW 20 MIN: CPT | Performed by: INTERNAL MEDICINE

## 2022-02-08 RX ORDER — CLINDAMYCIN HCL 300 MG
300 CAPSULE ORAL 4 TIMES DAILY
Qty: 20 CAPSULE | Refills: 0 | Status: SHIPPED | OUTPATIENT
Start: 2022-02-08 | End: 2023-03-16

## 2022-02-08 RX ORDER — TRIAMCINOLONE ACETONIDE 1 MG/G
CREAM TOPICAL
Qty: 80 G | Refills: 1 | Status: SHIPPED | OUTPATIENT
Start: 2022-02-08

## 2022-02-08 RX ORDER — CLOPIDOGREL BISULFATE 75 MG/1
TABLET ORAL
Qty: 90 TABLET | Refills: 3 | Status: SHIPPED | OUTPATIENT
Start: 2022-02-08 | End: 2023-06-28

## 2022-02-08 ASSESSMENT — MIFFLIN-ST. JEOR: SCORE: 2188.85

## 2022-02-08 ASSESSMENT — PAIN SCALES - GENERAL: PAINLEVEL: NO PAIN (0)

## 2022-02-08 NOTE — TELEPHONE ENCOUNTER
Routing refill request to provider for review/approval because:  Plavix Failed  No active PPI on record unless is Protonix  Normal HGB on file in past 12 months        Recent Labs   Lab Test 07/23/20  1746   HGB 15.5     Normal Platelets on file in past 12 months        Recent Labs   Lab Test 07/23/20  1746        Last office vist: 2/3/2022    Pended 90 day supply & 3 refills. Please Review.    Next office visit: none scheduled    Lenore Antony RN  [facility]

## 2022-02-08 NOTE — PROGRESS NOTES
Assessment & Plan     Intrinsic eczema  Kenalog effective. Will refill.  - triamcinolone (KENALOG) 0.1 % external cream; Apply sparingly once or twice per day as needed to affected area until the skin is better, then stop; REPEAT AS NEEDED    Cellulitis of right lower extremity  Much improved with clindamycin.  - clindamycin (CLEOCIN) 300 MG capsule; Take 1 capsule (300 mg) by mouth 4 times daily           No follow-ups on file.    Rajeev Oconnor MD  [facility] CHRISTOFER PA is a 57 year old who presents for the following health issues     HPI Doing much better. Pain, swelling, and redness significantly improved. No fever.  Tolerating antibiotics without difficulty.    Touch of eczema, interdigital.    Chief Complaint   Patient presents with     Follow Up     f/u on right leg       Past Medical History:   Diagnosis Date     Anxiety      Ascending aorta dilation (H)      CAD (coronary artery disease) Dec 2014    s/p CABGx3 Dec 2014 and later RCA stent July 2015     Diabetes mellitus (H)      DM2 (diabetes mellitus, type 2) (H) Dx June 2015    A1c 6.5% at time of dx     Epididymitis, right      Essential tremor      GERD (gastroesophageal reflux disease)      Heart attack (H) 12/2014, 6/2015, 7/4/2015    x3     Hernia, abdominal      History of cocaine abuse (H)     Smoked crack cocaine (remission since Nov 2014)     History of tobacco abuse      Hyperlipidemia      Hyperlipidemia LDL goal <100     chronic hypertriglyceredemia, regular elevation 300-400     Incomplete RBBB      Inguinal hernia     Left     Numbness and tingling     diabetic neuropathy     Prediabetes Dec 2014     Restless leg syndrome      S/P CABG x 3 Dec 2014     Severe obesity (BMI 35.0-35.9 with comorbidity) (H) 12/24/2018    BMI 37.5     Sleep apnea     mild, does not use CPAP     Stented coronary artery            Review of Systems   Constitutional, HEENT, cardiovascular, pulmonary, gi and gu systems are negative,  "except as otherwise noted.      Objective    /86 (BP Location: Right arm, Patient Position: Sitting, Cuff Size: Adult Regular)   Pulse 78   Temp 97.1  F (36.2  C) (Temporal)   Resp 18   Ht 1.905 m (6' 3\")   Wt 128.8 kg (284 lb)   SpO2 97%   BMI 35.50 kg/m    Body mass index is 35.5 kg/m .  Physical Exam   GENERAL: healthy, alert and no distress  SKIN: Right medial thigh, diminished erythema, no 2 by 5 CM.    Transferred Records on 11/30/2021   Component Date Value Ref Range Status     Occult Blood Scn FIT 11/30/2021 Negative  Negative Final               "

## 2022-02-28 ENCOUNTER — TELEPHONE (OUTPATIENT)
Dept: FAMILY MEDICINE | Facility: CLINIC | Age: 57
End: 2022-02-28

## 2022-02-28 NOTE — TELEPHONE ENCOUNTER
Reason for call:  Other   Patient called regarding (reason for call): call back  Additional comments: Pt needs documentation for a  animal. Please contact pt with any questions.     Phone number to reach patient:  Cell number on file:    Telephone Information:   Mobile [phone]       Best Time:  Anytime    Can we leave a detailed message on this number?  YES    Travel screening: Not Applicable

## 2022-03-01 NOTE — TELEPHONE ENCOUNTER
Called and spoke to patient - scheduled appt to see Dr. Oconnor on 3/3/2022 at 11: 00 am.    Zuri MTZ MA on 3/1/2022 at 11:11 AM

## 2022-03-01 NOTE — TELEPHONE ENCOUNTER
Patient called wanting an appointment with primary for leg infection and a  form filled out. First available was April at this time and would like something sooner if possible.

## 2022-03-07 NOTE — PROGRESS NOTES
Assessment & Plan     Screening for HIV (human immunodeficiency virus)  As a part of normal screening    Type 2 diabetes mellitus with other circulatory complication, without long-term current use of insulin (H)  Would like to assess a hemoglobin A1c today and a basic metabolic panel.  Recent lipid panel was good.  - HEMOGLOBIN A1C; Future  - BASIC METABOLIC PANEL; Future    Screening for hyperlipidemia  Has had his lipids evaluated elsewhere.  Very low LDL levels    Coronary artery disease involving autologous artery coronary bypass graft without angina pectoris  As above    Benign essential hypertension  Blood pressure reasonably controlled continue to follow carefully    Phlebitis  Right lower extremity.  Started with cellulitic region.  The cellulitis responded to antibiotics.  Now he has prominence of a right lower extremity vein in the medial aspect of his upper leg  - US Lower Extremity Venous Duplex Right; Future    Acne, unspecified acne type  Axillary likely exacerbated by the utilization of antiperspirants.  Minocycline will be prescribed  - minocycline (DYNACIN) 100 MG tablet; Take 1 tablet (100 mg) by mouth daily    Abnormal coagulation profile     - US Lower Extremity Venous Duplex Right; Future             See Patient Instructions    Return in about 6 months (around 9/8/2022) for Follow up.    Rajeev Oconnor MD  [facility] CHRISTOFER PA is a 57 year old who presents for the following health issues     HPI patient has a complicated past medical history as follows  Past Medical History:   Diagnosis Date     Anxiety      Ascending aorta dilation (H)      CAD (coronary artery disease) Dec 2014    s/p CABGx3 Dec 2014 and later RCA stent July 2015     Diabetes mellitus (H)      DM2 (diabetes mellitus, type 2) (H) Dx June 2015    A1c 6.5% at time of dx     Epididymitis, right      Essential tremor      GERD (gastroesophageal reflux disease)      Heart attack (H) 12/2014,  "6/2015, 7/4/2015    x3     Hernia, abdominal      History of cocaine abuse (H)     Smoked crack cocaine (remission since Nov 2014)     History of tobacco abuse      Hyperlipidemia      Hyperlipidemia LDL goal <100     chronic hypertriglyceredemia, regular elevation 300-400     Incomplete RBBB      Inguinal hernia     Left     Numbness and tingling     diabetic neuropathy     Prediabetes Dec 2014     Restless leg syndrome      S/P CABG x 3 Dec 2014     Severe obesity (BMI 35.0-35.9 with comorbidity) (H) 12/24/2018    BMI 37.5     Sleep apnea     mild, does not use CPAP     Stented coronary artery      He presents to clinic today for follow-up.  His right leg cellulitis has essentially resolved but he notes a linear region of induration.  This area is nontender.    Patient would like a letter to obtain a  animal.  He has a history of anxiety and depression.  Obviously this would be a great addition and I have filled out a letter to this regard.    He has been noticing some discharge and pus from the right axilla.  This has been going on for months duration.  Has utilize spray antiperspirant historically.  He and I discussed the importance of utilization of deodorant or a nonocclusive antiperspirant not containing aluminum.    Follow up leg infection  Forms for  animal  Check axillary lesions       Review of Systems   Constitutional, HEENT, cardiovascular, pulmonary, gi and gu systems are negative, except as otherwise noted.      Objective    BP (!) 141/86   Pulse 88   Temp 98.7  F (37.1  C) (Oral)   Ht 1.905 m (6' 3\")   Wt 129.3 kg (285 lb)   SpO2 96%   BMI 35.62 kg/m    Body mass index is 35.62 kg/m .  Physical Exam   GENERAL: healthy, alert and no distress  EYES: Eyes grossly normal to inspection, PERRL and conjunctivae and sclerae normal  HENT: ear canals and TM's normal, nose and mouth without ulcers or lesions  NECK: no adenopathy, no asymmetry, masses, or scars and thyroid normal to " palpation  RESP: lungs clear to auscultation - no rales, rhonchi or wheezes  CV: regular rate and rhythm, normal S1 S2, no S3 or S4, no murmur, click or rub, no peripheral edema and peripheral pulses strong  ABDOMEN: soft, nontender, no hepatosplenomegaly, no masses and bowel sounds normal  MS: no gross musculoskeletal defects noted, no edema  SKIN: no suspicious lesions or rashes  NEURO: Normal strength and tone, mentation intact and speech normal  PSYCH: mentation appears normal, affect normal/bright

## 2022-03-08 ENCOUNTER — OFFICE VISIT (OUTPATIENT)
Dept: FAMILY MEDICINE | Facility: CLINIC | Age: 57
End: 2022-03-08
Payer: COMMERCIAL

## 2022-03-08 VITALS
HEIGHT: 75 IN | HEART RATE: 88 BPM | BODY MASS INDEX: 35.43 KG/M2 | TEMPERATURE: 98.7 F | WEIGHT: 285 LBS | SYSTOLIC BLOOD PRESSURE: 141 MMHG | DIASTOLIC BLOOD PRESSURE: 86 MMHG | OXYGEN SATURATION: 96 %

## 2022-03-08 DIAGNOSIS — L70.9 ACNE, UNSPECIFIED ACNE TYPE: ICD-10-CM

## 2022-03-08 DIAGNOSIS — Z11.4 SCREENING FOR HIV (HUMAN IMMUNODEFICIENCY VIRUS): ICD-10-CM

## 2022-03-08 DIAGNOSIS — I10 BENIGN ESSENTIAL HYPERTENSION: ICD-10-CM

## 2022-03-08 DIAGNOSIS — I80.9 PHLEBITIS: Primary | ICD-10-CM

## 2022-03-08 DIAGNOSIS — L03.115 CELLULITIS OF RIGHT LOWER EXTREMITY: ICD-10-CM

## 2022-03-08 DIAGNOSIS — Z13.220 SCREENING FOR HYPERLIPIDEMIA: ICD-10-CM

## 2022-03-08 DIAGNOSIS — E11.59 TYPE 2 DIABETES MELLITUS WITH OTHER CIRCULATORY COMPLICATION, WITHOUT LONG-TERM CURRENT USE OF INSULIN (H): ICD-10-CM

## 2022-03-08 DIAGNOSIS — R79.1 ABNORMAL COAGULATION PROFILE: ICD-10-CM

## 2022-03-08 DIAGNOSIS — I25.810 CORONARY ARTERY DISEASE INVOLVING AUTOLOGOUS ARTERY CORONARY BYPASS GRAFT WITHOUT ANGINA PECTORIS: ICD-10-CM

## 2022-03-08 LAB
BASOPHILS # BLD AUTO: 0 10E3/UL (ref 0–0.2)
BASOPHILS NFR BLD AUTO: 0 %
EOSINOPHIL # BLD AUTO: 0.1 10E3/UL (ref 0–0.7)
EOSINOPHIL NFR BLD AUTO: 1 %
ERYTHROCYTE [DISTWIDTH] IN BLOOD BY AUTOMATED COUNT: 13.7 % (ref 10–15)
HBA1C MFR BLD: 8.9 % (ref 0–5.6)
HCT VFR BLD AUTO: 46.9 % (ref 40–53)
HGB BLD-MCNC: 15.8 G/DL (ref 13.3–17.7)
LYMPHOCYTES # BLD AUTO: 1.4 10E3/UL (ref 0.8–5.3)
LYMPHOCYTES NFR BLD AUTO: 17 %
MCH RBC QN AUTO: 31.7 PG (ref 26.5–33)
MCHC RBC AUTO-ENTMCNC: 33.7 G/DL (ref 31.5–36.5)
MCV RBC AUTO: 94 FL (ref 78–100)
MONOCYTES # BLD AUTO: 0.8 10E3/UL (ref 0–1.3)
MONOCYTES NFR BLD AUTO: 10 %
NEUTROPHILS # BLD AUTO: 5.8 10E3/UL (ref 1.6–8.3)
NEUTROPHILS NFR BLD AUTO: 71 %
PLATELET # BLD AUTO: 216 10E3/UL (ref 150–450)
RBC # BLD AUTO: 4.99 10E6/UL (ref 4.4–5.9)
WBC # BLD AUTO: 8.2 10E3/UL (ref 4–11)

## 2022-03-08 PROCEDURE — 99214 OFFICE O/P EST MOD 30 MIN: CPT | Performed by: INTERNAL MEDICINE

## 2022-03-08 PROCEDURE — 80048 BASIC METABOLIC PNL TOTAL CA: CPT | Performed by: INTERNAL MEDICINE

## 2022-03-08 PROCEDURE — 36415 COLL VENOUS BLD VENIPUNCTURE: CPT | Performed by: INTERNAL MEDICINE

## 2022-03-08 PROCEDURE — 83036 HEMOGLOBIN GLYCOSYLATED A1C: CPT | Performed by: INTERNAL MEDICINE

## 2022-03-08 PROCEDURE — 85025 COMPLETE CBC W/AUTO DIFF WBC: CPT | Performed by: INTERNAL MEDICINE

## 2022-03-08 RX ORDER — MINOCYCLINE HYDROCHLORIDE 100 MG/1
100 TABLET ORAL DAILY
Qty: 30 TABLET | Refills: 0 | Status: SHIPPED | OUTPATIENT
Start: 2022-03-08 | End: 2024-06-14

## 2022-03-08 NOTE — PATIENT INSTRUCTIONS
Alexx;  We will obtain a few labs that are overdue.  These include blood sugar and kidney function.    I have prescribed a medication called minus cycling for the pustules in the axilla.  Please try to reduce your utilization of antiperspirant as this is likely exacerbating the problem.    I filled out a note for you regarding your need for a  animal.    An ultrasound of the right lower extremity will be obtained to rule out thrombosis.  There is evidence for mild phlebitis.    Please return to clinic in 6 months for follow-up    Best regards  Rajeev

## 2022-03-08 NOTE — LETTER
[place] [address] [facility], [address]  [facility] [address] [phone]          March 8, 2022    RE:  Javier Markham                                                                                                                                                       [address]   [facility] [address]            To whom it may concern:    Javier Markham is under my professional care for depression, anxiety, and diabetes.    This patient requests a letter for  animal.  I feel that a  animal would be a medical necessity given his history of depression, anxiety, and loneliness.    Please contact me should you have any questions regarding this patient's health, per the discretion of my patient.    Sincerely,        Rajeev Oconnor MD

## 2022-03-08 NOTE — LETTER
March 18, 2022      EMBER EDMONDSON Rashi  [address]   [facility] [address]        Dear ,    As you can see your hemoglobin A1c is quite a bit higher lately.  I am sure the cellulitis did not help this.  Do which you can regarding diet we should recheck this in about 3 months.    Resulted Orders   HEMOGLOBIN A1C   Result Value Ref Range    Hemoglobin A1C 8.9 (H) 0.0 - 5.6 %      Comment:      Normal <5.7%   Prediabetes 5.7-6.4%    Diabetes 6.5% or higher     Note: Adopted from ADA consensus guidelines.   BASIC METABOLIC PANEL   Result Value Ref Range    Sodium 133 133 - 144 mmol/L    Potassium 4.6 3.4 - 5.3 mmol/L    Chloride 101 94 - 109 mmol/L    Carbon Dioxide (CO2) 22 20 - 32 mmol/L    Anion Gap 10 3 - 14 mmol/L    Urea Nitrogen 20 7 - 30 mg/dL    Creatinine 0.78 0.66 - 1.25 mg/dL    Calcium 10.2 (H) 8.5 - 10.1 mg/dL    Glucose 255 (H) 70 - 99 mg/dL    GFR Estimate >90 >60 mL/min/1.73m2      Comment:      Effective December 21, 2021 eGFRcr in adults is calculated using the 2021 CKD-EPI creatinine equation which includes age and gender (Swati et al., NE, DOI: 10.1056/FAOBrr1413129)   CBC with platelets and differential   Result Value Ref Range    WBC Count 8.2 4.0 - 11.0 10e3/uL    RBC Count 4.99 4.40 - 5.90 10e6/uL    Hemoglobin 15.8 13.3 - 17.7 g/dL    Hematocrit 46.9 40.0 - 53.0 %    MCV 94 78 - 100 fL    MCH 31.7 26.5 - 33.0 pg    MCHC 33.7 31.5 - 36.5 g/dL    RDW 13.7 10.0 - 15.0 %    Platelet Count 216 150 - 450 10e3/uL    % Neutrophils 71 %    % Lymphocytes 17 %    % Monocytes 10 %    % Eosinophils 1 %    % Basophils 0 %    Absolute Neutrophils 5.8 1.6 - 8.3 10e3/uL    Absolute Lymphocytes 1.4 0.8 - 5.3 10e3/uL    Absolute Monocytes 0.8 0.0 - 1.3 10e3/uL    Absolute Eosinophils 0.1 0.0 - 0.7 10e3/uL    Absolute Basophils 0.0 0.0 - 0.2 10e3/uL       If you have any questions or concerns, please call the clinic at the number listed above.       Sincerely,      Rajeev Oconnor,  MD

## 2022-03-09 LAB
ANION GAP SERPL CALCULATED.3IONS-SCNC: 10 MMOL/L (ref 3–14)
BUN SERPL-MCNC: 20 MG/DL (ref 7–30)
CALCIUM SERPL-MCNC: 10.2 MG/DL (ref 8.5–10.1)
CHLORIDE BLD-SCNC: 101 MMOL/L (ref 94–109)
CO2 SERPL-SCNC: 22 MMOL/L (ref 20–32)
CREAT SERPL-MCNC: 0.78 MG/DL (ref 0.66–1.25)
GFR SERPL CREATININE-BSD FRML MDRD: >90 ML/MIN/1.73M2
GLUCOSE BLD-MCNC: 255 MG/DL (ref 70–99)
POTASSIUM BLD-SCNC: 4.6 MMOL/L (ref 3.4–5.3)
SODIUM SERPL-SCNC: 133 MMOL/L (ref 133–144)

## 2022-03-15 ENCOUNTER — HOSPITAL ENCOUNTER (OUTPATIENT)
Dept: ULTRASOUND IMAGING | Facility: CLINIC | Age: 57
Discharge: HOME | End: 2022-03-15
Attending: INTERNAL MEDICINE | Admitting: INTERNAL MEDICINE
Payer: COMMERCIAL

## 2022-03-15 DIAGNOSIS — I80.9 PHLEBITIS: ICD-10-CM

## 2022-03-15 DIAGNOSIS — R79.1 ABNORMAL COAGULATION PROFILE: ICD-10-CM

## 2022-03-15 PROCEDURE — 93971 EXTREMITY STUDY: CPT | Mod: RT

## 2022-04-19 ENCOUNTER — TRANSFERRED RECORDS (OUTPATIENT)
Dept: HEALTH INFORMATION MANAGEMENT | Facility: CLINIC | Age: 57
End: 2022-04-19
Payer: COMMERCIAL

## 2022-05-01 DIAGNOSIS — G25.81 RESTLESS LEGS SYNDROME (RLS): Chronic | ICD-10-CM

## 2022-05-01 DIAGNOSIS — F41.9 ANXIETY: ICD-10-CM

## 2022-05-01 DIAGNOSIS — E11.9 TYPE 2 DIABETES MELLITUS WITHOUT COMPLICATION, WITHOUT LONG-TERM CURRENT USE OF INSULIN (H): Chronic | ICD-10-CM

## 2022-05-01 DIAGNOSIS — N40.0 BENIGN PROSTATIC HYPERPLASIA, PRESENCE OF LOWER URINARY TRACT SYMPTOMS UNSPECIFIED: ICD-10-CM

## 2022-05-01 DIAGNOSIS — I25.10 CORONARY ARTERY DISEASE INVOLVING NATIVE CORONARY ARTERY OF NATIVE HEART WITHOUT ANGINA PECTORIS: Chronic | ICD-10-CM

## 2022-05-01 DIAGNOSIS — E53.8 VITAMIN B12 DEFICIENCY (NON ANEMIC): ICD-10-CM

## 2022-05-01 DIAGNOSIS — E55.9 VITAMIN D DEFICIENCY: ICD-10-CM

## 2022-05-03 RX ORDER — CHOLECALCIFEROL (VITAMIN D3) 50 MCG
TABLET ORAL
Qty: 100 TABLET | Refills: 2 | Status: SHIPPED | OUTPATIENT
Start: 2022-05-03 | End: 2023-06-29

## 2022-05-03 RX ORDER — ASPIRIN 81 MG/1
TABLET, COATED ORAL
Qty: 90 TABLET | Refills: 1 | Status: SHIPPED | OUTPATIENT
Start: 2022-05-03 | End: 2023-10-31

## 2022-05-03 RX ORDER — PRAMIPEXOLE DIHYDROCHLORIDE 0.75 MG/1
TABLET ORAL
Qty: 90 TABLET | Refills: 2 | Status: SHIPPED | OUTPATIENT
Start: 2022-05-03 | End: 2023-05-03

## 2022-05-03 RX ORDER — SITAGLIPTIN 50 MG/1
TABLET, FILM COATED ORAL
Qty: 90 TABLET | Refills: 1 | Status: SHIPPED | OUTPATIENT
Start: 2022-05-03 | End: 2023-01-09

## 2022-05-03 RX ORDER — TAMSULOSIN HYDROCHLORIDE 0.4 MG/1
CAPSULE ORAL
Qty: 90 CAPSULE | Refills: 2 | Status: SHIPPED | OUTPATIENT
Start: 2022-05-03 | End: 2023-06-27

## 2022-05-03 RX ORDER — LANOLIN ALCOHOL/MO/W.PET/CERES
CREAM (GRAM) TOPICAL
Qty: 100 TABLET | Refills: 1 | Status: SHIPPED | OUTPATIENT
Start: 2022-05-03

## 2022-05-03 NOTE — TELEPHONE ENCOUNTER
Routing refill request to provider for review/approval because:  BP Readings from Last 3 Encounters:   03/08/22 (!) 141/86   02/08/22 134/86   02/03/22 124/78     Last office vist: 3/8/2022    Pended 90 day supply & 2 refills. Please Review.    Next office visit: 9/6/2022    Lenore Antony RN  [facility]

## 2022-05-03 NOTE — TELEPHONE ENCOUNTER
Routing refill request to provider for review/approval because:  Drug not on the [facility] refill protocol   Lenore Antony RN

## 2022-05-03 NOTE — TELEPHONE ENCOUNTER
Prescription approved per [facility] Refill Protocol.  Vitamin D3  Lenore Antony RN

## 2022-05-03 NOTE — TELEPHONE ENCOUNTER
Routing refill request to provider for review/approval because:  Lab Test 06/17/20  0000   ALT 50*     Last office vist: 3/8/2022    Pended 90 day supply & 2 refills. Please Review.    Next office visit: 9/6/2022      Lenore Antony RN  [facility]

## 2022-05-04 RX ORDER — CLONAZEPAM 0.5 MG/1
TABLET ORAL
Qty: 90 TABLET | Refills: 1 | Status: SHIPPED | OUTPATIENT
Start: 2022-05-04 | End: 2022-10-31

## 2022-05-18 DIAGNOSIS — E11.40 TYPE 2 DIABETES MELLITUS WITH DIABETIC NEUROPATHY, WITHOUT LONG-TERM CURRENT USE OF INSULIN (H): ICD-10-CM

## 2022-05-18 DIAGNOSIS — I10 BENIGN ESSENTIAL HYPERTENSION: Chronic | ICD-10-CM

## 2022-05-19 RX ORDER — LISINOPRIL 10 MG/1
TABLET ORAL
Qty: 90 TABLET | Refills: 2 | Status: SHIPPED | OUTPATIENT
Start: 2022-05-19

## 2022-05-19 NOTE — TELEPHONE ENCOUNTER
Routing refill request to provider for review/approval because:  BP      BP Readings from Last 3 Encounters:   03/08/22 (!) 141/86   02/08/22 134/86   02/03/22 124/78

## 2022-05-27 DIAGNOSIS — E11.40 TYPE 2 DIABETES MELLITUS WITH DIABETIC NEUROPATHY, WITHOUT LONG-TERM CURRENT USE OF INSULIN (H): ICD-10-CM

## 2022-05-27 RX ORDER — GABAPENTIN 300 MG/1
CAPSULE ORAL
Qty: 810 CAPSULE | Refills: 2 | Status: SHIPPED | OUTPATIENT
Start: 2022-05-27 | End: 2023-02-24

## 2022-05-27 NOTE — TELEPHONE ENCOUNTER
Routing refill request to provider for review/approval because:  Drug not on the [facility] refill protocol     Marc Oh RN  [facility] Triage Nurse

## 2022-07-27 DIAGNOSIS — I25.810 CORONARY ARTERY DISEASE INVOLVING CORONARY BYPASS GRAFT OF NATIVE HEART WITHOUT ANGINA PECTORIS: Chronic | ICD-10-CM

## 2022-07-29 RX ORDER — METOPROLOL SUCCINATE 25 MG/1
TABLET, EXTENDED RELEASE ORAL
Qty: 180 TABLET | Refills: 0 | Status: SHIPPED | OUTPATIENT
Start: 2022-07-29 | End: 2023-01-09

## 2022-07-29 NOTE — TELEPHONE ENCOUNTER
Routing refill request to provider for review/approval because:      Beth SKAGGS, Triage RN  [facility]

## 2022-08-24 NOTE — NURSING NOTE
"Chief Complaint   Patient presents with     Sleep Problem     medication follow up       Initial /89  Pulse 83  Resp 18  Ht 1.867 m (6' 1.5\")  Wt (!) 137 kg (302 lb)  SpO2 97%  BMI 39.3 kg/m2 Estimated body mass index is 39.3 kg/(m^2) as calculated from the following:    Height as of this encounter: 1.867 m (6' 1.5\").    Weight as of this encounter: 137 kg (302 lb).  Medication Reconciliation: complete   ESS 9  Maria Luisa Gonzalez MA      " Graft Donor Site Bandage (Optional-Leave Blank If You Don't Want In Note): Skin tapes with mastisol and a pressure bandage were applied to the donor site.

## 2022-09-16 DIAGNOSIS — K21.9 GASTROESOPHAGEAL REFLUX DISEASE WITHOUT ESOPHAGITIS: Chronic | ICD-10-CM

## 2022-09-21 DIAGNOSIS — I25.810 CORONARY ARTERY DISEASE INVOLVING CORONARY BYPASS GRAFT OF NATIVE HEART: ICD-10-CM

## 2022-09-21 DIAGNOSIS — E11.59 TYPE 2 DIABETES MELLITUS WITH OTHER CIRCULATORY COMPLICATION, WITHOUT LONG-TERM CURRENT USE OF INSULIN (H): ICD-10-CM

## 2022-09-21 NOTE — TELEPHONE ENCOUNTER
Routing refill request to provider for review/approval because:   Not on Clopidogrel (unless Pantoprazole ordered)    Marc Oh RN  [facility] Triage Nurse

## 2022-10-31 DIAGNOSIS — G25.81 RESTLESS LEGS SYNDROME (RLS): Chronic | ICD-10-CM

## 2022-10-31 DIAGNOSIS — F41.9 ANXIETY: ICD-10-CM

## 2022-10-31 RX ORDER — CLONAZEPAM 0.5 MG/1
TABLET ORAL
Qty: 90 TABLET | Refills: 1 | Status: SHIPPED | OUTPATIENT
Start: 2022-10-31 | End: 2023-05-03

## 2023-01-06 DIAGNOSIS — I25.810 CORONARY ARTERY DISEASE INVOLVING CORONARY BYPASS GRAFT OF NATIVE HEART WITHOUT ANGINA PECTORIS: Chronic | ICD-10-CM

## 2023-01-06 DIAGNOSIS — I25.810 CORONARY ARTERY DISEASE INVOLVING CORONARY BYPASS GRAFT OF NATIVE HEART: ICD-10-CM

## 2023-01-06 DIAGNOSIS — E78.5 HYPERLIPIDEMIA, UNSPECIFIED HYPERLIPIDEMIA TYPE: Chronic | ICD-10-CM

## 2023-01-06 DIAGNOSIS — E11.59 TYPE 2 DIABETES MELLITUS WITH OTHER CIRCULATORY COMPLICATION, WITHOUT LONG-TERM CURRENT USE OF INSULIN (H): ICD-10-CM

## 2023-01-06 DIAGNOSIS — J31.0 CHRONIC RHINITIS: ICD-10-CM

## 2023-01-06 DIAGNOSIS — E11.9 TYPE 2 DIABETES MELLITUS WITHOUT COMPLICATION, WITHOUT LONG-TERM CURRENT USE OF INSULIN (H): Chronic | ICD-10-CM

## 2023-01-06 DIAGNOSIS — E11.40 TYPE 2 DIABETES MELLITUS WITH DIABETIC NEUROPATHY, WITHOUT LONG-TERM CURRENT USE OF INSULIN (H): ICD-10-CM

## 2023-01-09 RX ORDER — EMPAGLIFLOZIN 10 MG/1
TABLET, FILM COATED ORAL
Qty: 90 TABLET | Refills: 0 | Status: SHIPPED | OUTPATIENT
Start: 2023-01-09 | End: 2023-06-01

## 2023-01-09 RX ORDER — FLUTICASONE PROPIONATE 50 MCG
SPRAY, SUSPENSION (ML) NASAL
Qty: 16 G | Refills: 0 | Status: SHIPPED | OUTPATIENT
Start: 2023-01-09 | End: 2023-06-27

## 2023-01-09 RX ORDER — OMEGA-3-ACID ETHYL ESTERS 1 G/1
CAPSULE, LIQUID FILLED ORAL
Qty: 360 CAPSULE | Refills: 0 | Status: SHIPPED | OUTPATIENT
Start: 2023-01-09 | End: 2023-11-01

## 2023-01-09 RX ORDER — METOPROLOL SUCCINATE 25 MG/1
TABLET, EXTENDED RELEASE ORAL
Qty: 180 TABLET | Refills: 0 | Status: SHIPPED | OUTPATIENT
Start: 2023-01-09 | End: 2024-05-07

## 2023-01-09 RX ORDER — SITAGLIPTIN 50 MG/1
TABLET, FILM COATED ORAL
Qty: 90 TABLET | Refills: 1 | Status: SHIPPED | OUTPATIENT
Start: 2023-01-09 | End: 2023-09-27

## 2023-01-13 DIAGNOSIS — F41.9 ANXIETY: ICD-10-CM

## 2023-01-13 NOTE — LETTER
[facility]  [address] [facility], [address]  [facility] [address]  Phone: [phone]        January 17, 2023      Javier Markham                                                                                                                                [address]   [facility] [address]              Beau Coffey,     We were not able to reach you by phone but left a message.      On recent refill it was noted you need to schedule an appointment to establish care with a new provider since Dr. Oconnor is no longer here at our clinic.    Please call the clinic to schedule. Once we see that you have scheduled an appointment we will resend the medication request for refill.    Providers accepting new patients patients here at our clinic are Ashia London, Jony Baird, Tobias, Nabil, or Sky Harris.        The [place] Team / marielos

## 2023-01-17 NOTE — TELEPHONE ENCOUNTER
No ans.  Left message on private voice mail to call clinic to schedule an appt to establish care with a new provider who is accepting new pt's since Dr. Oconnor is no longer here at clinic.      Also sent letter.     Once we see appt is sheduled we can route refill request back to on-call provider per Dr. ALFONSO.     Providers accepting new patients patients here at our clinic are Oli Wetzel Patani, Nabil Collado, or Sky Harris.      Oralia MÉNDEZ MA

## 2023-01-23 RX ORDER — HYDROXYZINE HYDROCHLORIDE 25 MG/1
TABLET, FILM COATED ORAL
Qty: 120 TABLET | Refills: 5 | OUTPATIENT
Start: 2023-01-23

## 2023-01-23 NOTE — TELEPHONE ENCOUNTER
Please refuse with note to pharm to [facility].      3 phone call attempts and paper letter mailed 17th.      No future appt scheduled.       Oralia MÉNDEZ MA

## 2023-02-24 DIAGNOSIS — E11.40 TYPE 2 DIABETES MELLITUS WITH DIABETIC NEUROPATHY, WITHOUT LONG-TERM CURRENT USE OF INSULIN (H): ICD-10-CM

## 2023-02-24 RX ORDER — GABAPENTIN 300 MG/1
CAPSULE ORAL
Qty: 810 CAPSULE | Refills: 2 | Status: SHIPPED | OUTPATIENT
Start: 2023-02-24 | End: 2024-02-01

## 2023-03-14 ENCOUNTER — HOSPITAL ENCOUNTER (EMERGENCY)
Facility: CLINIC | Age: 58
Discharge: HOME | End: 2023-03-14
Payer: COMMERCIAL

## 2023-03-16 ENCOUNTER — OFFICE VISIT (OUTPATIENT)
Dept: FAMILY MEDICINE | Facility: CLINIC | Age: 58
End: 2023-03-16
Payer: COMMERCIAL

## 2023-03-16 VITALS
HEART RATE: 95 BPM | SYSTOLIC BLOOD PRESSURE: 113 MMHG | OXYGEN SATURATION: 95 % | HEIGHT: 75 IN | WEIGHT: 266 LBS | RESPIRATION RATE: 18 BRPM | BODY MASS INDEX: 33.07 KG/M2 | DIASTOLIC BLOOD PRESSURE: 80 MMHG | TEMPERATURE: 98.2 F

## 2023-03-16 DIAGNOSIS — Z12.11 SCREEN FOR COLON CANCER: ICD-10-CM
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
## 2023-03-16 DIAGNOSIS — I10 BENIGN ESSENTIAL HYPERTENSION: ICD-10-CM

## 2023-03-16 DIAGNOSIS — S82.202A CLOSED FRACTURE OF LEFT TIBIA AND FIBULA, INITIAL ENCOUNTER: Primary | ICD-10-CM

## 2023-03-16 DIAGNOSIS — G25.81 RESTLESS LEG SYNDROME: ICD-10-CM

## 2023-03-16 DIAGNOSIS — S82.402A CLOSED FRACTURE OF LEFT TIBIA AND FIBULA, INITIAL ENCOUNTER: Primary | ICD-10-CM

## 2023-03-16 DIAGNOSIS — E11.40 TYPE 2 DIABETES MELLITUS WITH DIABETIC NEUROPATHY, WITHOUT LONG-TERM CURRENT USE OF INSULIN (H): ICD-10-CM

## 2023-03-16 DIAGNOSIS — S82.892A CLOSED FRACTURE OF LEFT ANKLE, INITIAL ENCOUNTER: ICD-10-CM

## 2023-03-16 PROCEDURE — 99214 OFFICE O/P EST MOD 30 MIN: CPT | Performed by: INTERNAL MEDICINE

## 2023-03-16 RX ORDER — CEPHALEXIN 500 MG/1
500 CAPSULE ORAL
COMMUNITY
Start: 2023-03-14 | End: 2023-03-24

## 2023-03-16 RX ORDER — FENOFIBRATE 67 MG/1
67 CAPSULE ORAL
COMMUNITY
Start: 2023-02-23

## 2023-03-16 RX ORDER — OXYCODONE HYDROCHLORIDE 5 MG/1
5-10 TABLET ORAL
COMMUNITY
Start: 2023-03-14 | End: 2023-03-20

## 2023-03-16 ASSESSMENT — PAIN SCALES - GENERAL: PAINLEVEL: SEVERE PAIN (7)

## 2023-03-16 NOTE — PROGRESS NOTES
Subjective   Alexx is a 58 year old presenting for the following health issues:  Establish Care      History of Present Illness       Reason for visit:  Establish care    He eats 4 or more servings of fruits and vegetables daily.He consumes 2 sweetened beverage(s) daily.He exercises with enough effort to increase his heart rate 10 to 19 minutes per day.  He exercises with enough effort to increase his heart rate 7 days per week.   He is taking medications regularly.         Current Medications:     Current Outpatient Medications   Medication Sig Dispense Refill     acetaminophen (TYLENOL) 325 MG tablet Take 2 tablets (650 mg) by mouth every 4 hours as needed for mild pain 50 tablet 0     albuterol (PROAIR HFA/PROVENTIL HFA/VENTOLIN HFA) 108 (90 Base) MCG/ACT inhaler Inhale 2 puffs into the lungs every 4 hours as needed for shortness of breath / dyspnea or wheezing 8.5 g 3     Alcohol Swabs (B-D SINGLE USE SWABS REGULAR) PADS USE AS NEEDED 200 each 0     ASPIRIN LOW DOSE 81 MG EC tablet TAKE ONE TABLET BY MOUTH ONCE DAILY 90 tablet 1     azelastine (ASTELIN) 0.1 % nasal spray Spray 1 spray into both nostrils every evening       blood glucose (NO BRAND SPECIFIED) test strip Use to test blood sugar 2 times daily or as directed. 200 strip 11     blood glucose monitoring (NO BRAND SPECIFIED) meter device kit Use to test blood sugar 1 times daily or as directed. ONE TOUCH VERIO. 1 kit 0     cephALEXin (KEFLEX) 500 MG capsule Take 500 mg by mouth       clonazePAM (KLONOPIN) 0.5 MG tablet TAKE ONE TABLET BY MOUTH EVERY NIGHT AT BEDTIME AS NEEDED FOR RESTLESS LEGS 90 tablet 1     clopidogrel (PLAVIX) 75 MG tablet TAKE ONE TABLET BY MOUTH ONCE DAILY 90 tablet 3     cyanocobalamin (VITAMIN B-12) 1000 MCG tablet TAKE ONE TABLET BY MOUTH ONCE DAILY 100 tablet 1     evolocumab (REPATHA) 140 MG/ML prefilled autoinjector Inject 140 mg Subcutaneous every 14 days       ezetimibe (ZETIA) 10 MG tablet Take 1 tablet (10 mg) by mouth  At Bedtime 90 tablet 1     fenofibrate micronized (LOFIBRA) 67 MG capsule Take 67 mg by mouth       FEROSUL 325 (65 Fe) MG tablet TAKE 1 TABLET (325 MG) BY MOUTH EVERY EVENING 60 tablet 0     fluticasone (FLONASE) 50 MCG/ACT nasal spray SPRAY TWO SPRAYS INTO BOTH NOSTRILS DAILY 16 g 0     gabapentin (NEURONTIN) 300 MG capsule TAKE THREE CAPSULES BY MOUTH THREE TIMES A  capsule 2     hydrOXYzine (ATARAX) 25 MG tablet TAKE ONE TO TWO TABLETS BY MOUTH EVERY 6 HOURS AS NEEDED FOR ANXIETY 120 tablet 0     ibuprofen (ADVIL/MOTRIN) 200 MG tablet Take 200 mg by mouth every 4 hours as needed for mild pain       JANUVIA 50 MG tablet TAKE ONE TABLET BY MOUTH ONCE DAILY 90 tablet 1     JARDIANCE 10 MG TABS tablet TAKE 1 TABLET (10 MG) BY MOUTH DAILY DUE FOR APPOINTMENT. PLEASE SCHEDULE: [phone] 90 tablet 0     lisinopril (ZESTRIL) 10 MG tablet TAKE ONE TABLET BY MOUTH ONCE DAILY 90 tablet 2     loratadine (CLARITIN) 10 MG tablet TAKE ONE TABLET BY MOUTH ONCE DAILY 90 tablet 1     Melatonin 10 MG TABS tablet Take 10 mg by mouth nightly as needed for sleep       meloxicam (MOBIC) 15 MG tablet Take 1 tablet (15 mg) by mouth daily 10 tablet 0     metFORMIN (GLUCOPHAGE) 500 MG tablet TAKE TWO TABLETS BY MOUTH TWICE A DAY WITH MEALS 360 tablet 0     metoprolol succinate ER (TOPROL XL) 25 MG 24 hr tablet TAKE ONE TABLET BY MOUTH TWICE A  tablet 0     minocycline (DYNACIN) 100 MG tablet Take 1 tablet (100 mg) by mouth daily 30 tablet 0     nitroGLYcerin (NITROSTAT) 0.4 MG sublingual tablet PLACE 1 TABLET UNDER THE TONGUE AT THE ONSET OF CHEST PAIN. MAY REPEAT EVERY 5 MINUTES AS NEEDED FOR A TOTAL OF 3 DOSES. 25 tablet 1     omega-3 acid ethyl esters (LOVAZA) 1 g capsule TAKE TWO CAPSULES BY MOUTH TWO TIMES A  capsule 0     omeprazole (PRILOSEC) 20 MG DR capsule TAKE ONE CAPSULE BY MOUTH TWO TIMES A DAY, 30 TO 60 MINUTES BEFORE A MEAL. 180 capsule 3     ONE TOUCH VERIO IQ test strip TEST ONCE DAILY OR AS  DIRECTED 100 each 5     OneTouch Delica Lancets 33G MISC 1 Act by Device route 2 times daily 200 each 0     oxyCODONE (ROXICODONE) 5 MG tablet Take 5-10 mg by mouth       polyethylene glycol (MIRALAX/GLYCOLAX) powder Take 17 g by mouth daily as needed for constipation 500 g 5     pramipexole (MIRAPEX) 0.75 MG tablet TAKE ONE TABLET BY MOUTH EVERY NIGHT AT BEDTIME 90 tablet 2     rosuvastatin (CRESTOR) 40 MG tablet Take 1 tablet (40 mg) by mouth daily       senna-docusate (SENOKOT-S/PERICOLACE) 8.6-50 MG tablet Take 1-2 tablets by mouth 2 times daily (Patient taking differently: Take 1 tablet by mouth daily) 30 tablet 0     Skin Protectants, Misc. (HYDROCERIN) CREA APPLY TOPICALLY AS NEEDED FOR DRY SKIN 226 g 3     sodium chloride (DEEP SEA NASAL SPRAY) 0.65 % nasal spray SPRAY 1 SPRAY IN EACH NOSTRIL FOUR TIMES A DAY AS NEEDED FOR CONGESTION 44 mL 3     tamsulosin (FLOMAX) 0.4 MG capsule TAKE ONE CAPSULE BY MOUTH ONCE DAILY 90 capsule 2     triamcinolone (KENALOG) 0.1 % external cream Apply sparingly once or twice per day as needed to affected area until the skin is better, then stop; REPEAT AS NEEDED 80 g 1     VITAMIN D3 50 MCG (2000 UT) tablet TAKE ONE TABLET BY MOUTH ONCE DAILY 100 tablet 2         Allergies:      Allergies   Allergen Reactions     Perfume      Soap      Perfume in soap-reaction excema            Past Medical History:     Past Medical History:   Diagnosis Date     Anxiety      Ascending aorta dilation (H)      CAD (coronary artery disease) Dec 2014    s/p CABGx3 Dec 2014 and later RCA stent July 2015     Diabetes mellitus (H)      DM2 (diabetes mellitus, type 2) (H) Dx June 2015    A1c 6.5% at time of dx     Epididymitis, right      Essential tremor      GERD (gastroesophageal reflux disease)      Heart attack (H) 12/2014, 6/2015, 7/4/2015    x3     Hernia, abdominal      History of cocaine abuse (H)     Smoked crack cocaine (remission since Nov 2014)     History of tobacco abuse       Hyperlipidemia      Hyperlipidemia LDL goal <100     chronic hypertriglyceredemia, regular elevation 300-400     Incomplete RBBB      Inguinal hernia     Left     Numbness and tingling     diabetic neuropathy     Prediabetes Dec 2014     Restless leg syndrome      S/P CABG x 3 Dec 2014     Severe obesity (BMI 35.0-35.9 with comorbidity) (H) 12/24/2018    BMI 37.5     Sleep apnea     mild, does not use CPAP     Stented coronary artery          Past Surgical History:     Past Surgical History:   Procedure Laterality Date     DAVINCI XI HERNIORRHAPHY INGUINAL Right 7/31/2020    Procedure: ROBOTIC ASSITED RIGHT INGUINAL HERNIA REPAIR WITH MESH;  Surgeon: Haim Green MD;  Location:  OR     HERNIORRHAPHY INGUINAL Left 9/20/2016    Procedure: HERNIORRHAPHY INGUINAL;  Surgeon: Haim Green MD;  Location:  SD     HYDROCELECTOMY SCROTAL Right 3/23/2021    Procedure: Right HYDROCELECTOMY, SCROTAL APPROACH with spermatic cord block;  Surgeon: Blaise Altamirano MD;  Location:  OR     STENT  07/2015    RCA stent     [facility] CABG, VEIN, THREE  12/23/2014    [facility]     [facility] NONSPECIFIC PROCEDURE  age 15    both feet bone spur removal in the arch of the foot     [facility] DRUG-ELUTING STENTS, SINGLE  10/19/2018    Left circumflex at [facility]         Family Medical History:     Family History   Problem Relation Age of Onset     Diabetes Mother         type 2     Breast Cancer Mother      Macular Degeneration Mother      Dementia Mother      Coronary Artery Disease Father 52     Heart Disease Paternal Uncle      Diabetes Maternal Grandfather      Breast Cancer Maternal Grandmother      Prostate Cancer No family hx of          Social History:     Social History     Socioeconomic History     Marital status: Single     Spouse name: Not on file     Number of children: 2     Years of education: Not on file     Highest education level: Not on file   Occupational History     Occupation: nurse assistant     Employer:  "[employer]   Tobacco Use     Smoking status: Former     Packs/day: 0.50     Years: 25.00     Pack years: 12.50     Types: Cigarettes     Quit date: 2015     Years since quittin.3     Smokeless tobacco: Never   Substance and Sexual Activity     Alcohol use: No     Alcohol/week: 0.0 standard drinks     Drug use: No     Comment: Past coccaine 2 weeks ago as of 12; no h/o IVDA. Smoked cocaine (did not snort)     Sexual activity: Yes     Partners: Female   Other Topics Concern     Parent/sibling w/ CABG, MI or angioplasty before 65F 55M? Not Asked   Social History Narrative     Not on file     Social Determinants of Health     Financial Resource Strain: Not on file   Food Insecurity: Not on file   Transportation Needs: Not on file   Physical Activity: Not on file   Stress: Not on file   Social Connections: Not on file   Intimate Partner Violence: Not on file   Housing Stability: Not on file           Review of System:     Constitutional: Negative for fever or chills  Skin: Negative for rashes  Ears/Nose/Throat: Negative for nasal congestion, sore throat  Respiratory: No shortness of breath, dyspnea on exertion, cough, or hemoptysis  Cardiovascular: Negative for chest pain  Gastrointestinal: Negative for nausea, vomiting  Genitourinary: Negative for dysuria, hematuria  Musculoskeletal: positive for recent fall on ice resulting in left leg/ankle fractures  Neurologic: Negative for headaches, positive for restless leg syndrome  Psychiatric: positive for anxiety  Hematologic/Lymphatic/Immunologic: Negative  Endocrine: positive for Type 2 Diabetes   Behavioral: Negative for tobacco use       Physical Exam:   /80 (BP Location: Right arm, Patient Position: Sitting, Cuff Size: Adult Large)   Pulse 95   Temp 98.2  F (36.8  C) (Oral)   Resp 18   Ht 1.905 m (6' 3\")   Wt 120.7 kg (266 lb)   SpO2 95%   BMI 33.25 kg/m      GENERAL: alert and no distress  EYES: eyes grossly normal to inspection, " and conjunctivae and sclerae normal  HENT: Normocephalic atraumatic. Nose and mouth without ulcers or lesions  NECK: supple  RESP: lungs clear to auscultation   CV: regular rate and rhythm, normal S1 S2  LYMPH: no peripheral edema   ABDOMEN: nondistended  MS: left leg/ankle/foot in ortho support cast noted  SKIN: no suspicious lesions or rashes  NEURO: Alert & Oriented x 3.   PSYCH: mentation appears normal, affect normal        Diagnostic Test Results:     Diagnostic Test Results:  Labs reviewed in Epic    ASSESSMENT/PLAN:       Javier was seen today for establish care.    Diagnoses and all orders for this visit:    Closed fracture of left tibia and fibula, initial encounter  Closed fracture of left ankle, initial encounter  Fall due to ice or snow, initial encounter  - follow up of recent fall on ice resulting in left leg, ankle fractures  - s/p ortho cast in the ER  - ortho support boot provided to patient today  - continue ortho clinic follow up going forward    Screen for colon cancer  - colonoscopy ordered    Benign essential hypertension  - stable, continue current therapy    Type 2 diabetes mellitus with diabetic neuropathy, without long-term current use of insulin (H)  - continue endocrine specialist clinic follow up going forward.     Restless leg syndrome  - stable, continue current therapy      Follow Up Plan:     Patient is instructed to return to Internal Medicine clinic for follow-up visit in 1 month.        Jo-Ann Baird MD  Internal Medicine  [facility]

## 2023-05-02 DIAGNOSIS — E11.40 TYPE 2 DIABETES MELLITUS WITH DIABETIC NEUROPATHY, WITHOUT LONG-TERM CURRENT USE OF INSULIN (H): ICD-10-CM

## 2023-05-02 DIAGNOSIS — G25.81 RESTLESS LEGS SYNDROME (RLS): Chronic | ICD-10-CM

## 2023-05-03 RX ORDER — PRAMIPEXOLE DIHYDROCHLORIDE 0.75 MG/1
TABLET ORAL
Qty: 90 TABLET | Refills: 2 | Status: SHIPPED | OUTPATIENT
Start: 2023-05-03 | End: 2024-05-07

## 2023-05-31 DIAGNOSIS — F41.9 ANXIETY: ICD-10-CM

## 2023-05-31 DIAGNOSIS — E11.59 TYPE 2 DIABETES MELLITUS WITH OTHER CIRCULATORY COMPLICATION, WITHOUT LONG-TERM CURRENT USE OF INSULIN (H): ICD-10-CM

## 2023-05-31 DIAGNOSIS — I25.810 CORONARY ARTERY DISEASE INVOLVING CORONARY BYPASS GRAFT OF NATIVE HEART: ICD-10-CM

## 2023-05-31 RX ORDER — HYDROXYZINE HYDROCHLORIDE 25 MG/1
TABLET, FILM COATED ORAL
Qty: 120 TABLET | Refills: 0 | Status: SHIPPED | OUTPATIENT
Start: 2023-05-31 | End: 2023-10-31

## 2023-05-31 NOTE — TELEPHONE ENCOUNTER
Prescription approved per [facility] Refill Protocol.  Lila Henry, RN  [facility] Triage Nurse

## 2023-06-01 RX ORDER — EMPAGLIFLOZIN 10 MG/1
TABLET, FILM COATED ORAL
Qty: 90 TABLET | Refills: 0 | Status: SHIPPED | OUTPATIENT
Start: 2023-06-01 | End: 2023-09-27

## 2023-06-21 NOTE — PROGRESS NOTES
SUBJECTIVE:   Alexx is a 58 year old who presents for Preventive Visit.  Are you in the first 12 months of your Medicare coverage?  No    HPI      Have you ever done Advance Care Planning? (For example, a Health Directive, POLST, or a discussion with a medical provider or your loved ones about your wishes): No, advance care planning information given to patient to review.  Patient plans to discuss their wishes with loved ones or provider.         Fall risk       Cognitive Screening   1) Repeat 3 items (Leader, Season, Table)    2) Clock draw: NORMAL  3) 3 item recall: Recalls 3 objects  Results: 3 items recalled: COGNITIVE IMPAIRMENT LESS LIKELY    Mini-CogTM Copyright S Bright. Licensed by the author for use in [facility]; reprinted with permission (sokam@[email or web address]). All rights reserved.      Do you have sleep apnea, excessive snoring or daytime drowsiness?: yes    Reviewed and updated as needed this visit by clinical staff                  Reviewed and updated as needed this visit by Provider                 Social History     Tobacco Use     Smoking status: Former     Packs/day: 0.50     Years: 25.00     Pack years: 12.50     Types: Cigarettes     Quit date: 2015     Years since quittin.6     Smokeless tobacco: Never   Substance Use Topics     Alcohol use: No     Alcohol/week: 0.0 standard drinks of alcohol     Do you have a current opioid prescription? No  Do you use any other controlled substances or medications that are not prescribed by a provider? None      Current providers sharing in care for this patient include:   Patient Care Team:  Jo-Ann Baird MD as PCP - General (Internal Medicine)  Susan Hughes as   Jo-Ann Baird MD as Assigned PCP    The following health maintenance items are reviewed in Epic and correct as of today:  Health Maintenance   Topic Date Due     HIV SCREENING  Never done     ZOSTER IMMUNIZATION (1 of 2) Never done     LUNG CANCER SCREENING   "Never done     MEDICARE ANNUAL WELLNESS VISIT  01/15/2021     MICROALBUMIN  01/15/2021     DIABETIC FOOT EXAM  01/15/2021     LIPID  06/17/2021     COVID-19 Vaccine (3 - Booster for King series) 01/05/2022     A1C  09/08/2022     COLORECTAL CANCER SCREENING  11/30/2022     BMP  03/08/2023     ANNUAL REVIEW OF HM ORDERS  03/08/2023     EYE EXAM  04/19/2023     INFLUENZA VACCINE (Season Ended) 09/01/2023     DTAP/TDAP/TD IMMUNIZATION (2 - Td or Tdap) 11/11/2024     ADVANCE CARE PLANNING  01/19/2025     Pneumococcal Vaccine: Pediatrics (0 to 5 Years) and At-Risk Patients (6 to 64 Years) (3 - PPSV23 if available, else PCV20) 01/09/2028     HEPATITIS C SCREENING  Completed     PHQ-2 (once per calendar year)  Completed     IPV IMMUNIZATION  Aged Out     MENINGITIS IMMUNIZATION  Aged Out     Labs reviewed in EPIC        Review of Systems  Constitutional, HEENT, cardiovascular, pulmonary, GI, , musculoskeletal, neuro, skin, endocrine and psych systems are negative, except as otherwise noted.    OBJECTIVE:   /78 (BP Location: Right arm, Patient Position: Sitting, Cuff Size: Adult Large)   Pulse 69   Temp 98.3  F (36.8  C) (Oral)   Resp 18   Ht 1.905 m (6' 3\")   Wt 117 kg (258 lb)   SpO2 94%   BMI 32.25 kg/m   Estimated body mass index is 33.25 kg/m  as calculated from the following:    Height as of 3/16/23: 1.905 m (6' 3\").    Weight as of 3/16/23: 120.7 kg (266 lb).  Physical Exam  GENERAL: alert and no distress  EYES: Eyes grossly normal to inspection, PERRL and conjunctivae and sclerae normal  HENT: ear canals and TM's normal, nose and mouth without ulcers or lesions  NECK: no adenopathy, no asymmetry, masses, or scars and thyroid normal to palpation  RESP: lungs clear to auscultation - no rales, rhonchi or wheezes  CV: regular rate and rhythm, normal S1 S2, no S3 or S4, no murmur, click or rub, no peripheral edema and peripheral pulses strong  ABDOMEN: soft, nontender, no hepatosplenomegaly, no masses " and bowel sounds normal  MS: right 1st toe plantar surface foot ulcer wound noted, foul smelling  SKIN: no suspicious lesions or rashes  NEURO: Normal strength and tone, mentation intact and speech normal  PSYCH: mentation appears normal, affect normal/bright    Diagnostic Test Results:  Labs reviewed in Epic    Results for orders placed or performed in visit on 06/22/23   Hemoglobin A1c     Status: Abnormal   Result Value Ref Range    Hemoglobin A1C 9.3 (H) 0.0 - 5.6 %         ASSESSMENT / PLAN:   Javier was seen today for physical, edema and health maintenance.    Diagnoses and all orders for this visit:    Wellness examination    Screening for HIV (human immunodeficiency virus)  -     HIV Antigen Antibody Combo; Future    Screen for colon cancer  -     Colonoscopy Screening  Referral; Future    Diabetic ulcer of toe of right foot associated with type 2 diabetes mellitus, unspecified ulcer stage (H)  -     Patient reports that the foot wound started when he cut his toe on broken glass at his home last week.   -  Orthopedic  Referral; Future with podiatry clinic appointment scheduled with [facility] tomorrow 6/23/23 for further evaluation and management going forward.   -     cephALEXin (KEFLEX) 500 MG capsule; Take 1 capsule (500 mg) by mouth 4 times daily for 10 days    Diabetic foot infection (H)  -     cephALEXin (KEFLEX) 500 MG capsule; Take 1 capsule (500 mg) by mouth 4 times daily for 10 days    Poorly controlled diabetes mellitus (H)  -     Adult Endocrinology  Referral; Future  -     Hemoglobin A1c; Future  -     Albumin Random Urine Quantitative with Creat Ratio; Future  -     Hemoglobin A1c    Other orders  -     REVIEW OF HEALTH MAINTENANCE PROTOCOL ORDERS        Patient has been advised of split billing requirements and indicates understanding: Yes      COUNSELING:  Reviewed preventive health counseling, as reflected in patient  "instructions  Special attention given to:       Regular exercise       Healthy diet/nutrition      BMI:   Estimated body mass index is 33.25 kg/m  as calculated from the following:    Height as of 3/16/23: 1.905 m (6' 3\").    Weight as of 3/16/23: 120.7 kg (266 lb).   Weight management plan: Discussed healthy diet and exercise guidelines      He reports that he quit smoking about 7 years ago. His smoking use included cigarettes. He has a 12.50 pack-year smoking history. He has never used smokeless tobacco.      Appropriate preventive services were discussed with this patient, including applicable screening as appropriate for cardiovascular disease, diabetes, osteopenia/osteoporosis, and glaucoma.  As appropriate for age/gender, discussed screening for colorectal cancer, prostate cancer, breast cancer, and cervical cancer. Checklist reviewing preventive services available has been given to the patient.    Reviewed patients plan of care and provided an AVS. The Basic Care Plan (routine screening as documented in Health Maintenance) for Javier meets the Care Plan requirement. This Care Plan has been established and reviewed with the Patient.      Jo-Ann Baird MD  [facility]    Identified Health Risks:    I have reviewed Opioid Use Disorder and Substance Use Disorder risk factors and made any needed referrals.         "

## 2023-06-22 ENCOUNTER — OFFICE VISIT (OUTPATIENT)
Dept: FAMILY MEDICINE | Facility: CLINIC | Age: 58
End: 2023-06-22
Payer: COMMERCIAL

## 2023-06-22 VITALS
SYSTOLIC BLOOD PRESSURE: 119 MMHG | HEIGHT: 75 IN | WEIGHT: 258 LBS | DIASTOLIC BLOOD PRESSURE: 78 MMHG | BODY MASS INDEX: 32.08 KG/M2 | HEART RATE: 69 BPM | RESPIRATION RATE: 18 BRPM | TEMPERATURE: 98.3 F | OXYGEN SATURATION: 94 %

## 2023-06-22 DIAGNOSIS — E11.621 DIABETIC ULCER OF TOE OF RIGHT FOOT ASSOCIATED WITH TYPE 2 DIABETES MELLITUS, UNSPECIFIED ULCER STAGE (H): ICD-10-CM

## 2023-06-22 DIAGNOSIS — Z12.11 SCREEN FOR COLON CANCER: ICD-10-CM

## 2023-06-22 DIAGNOSIS — Z00.00 WELLNESS EXAMINATION: Primary | ICD-10-CM

## 2023-06-22 DIAGNOSIS — L97.519 DIABETIC ULCER OF TOE OF RIGHT FOOT ASSOCIATED WITH TYPE 2 DIABETES MELLITUS, UNSPECIFIED ULCER STAGE (H): ICD-10-CM

## 2023-06-22 DIAGNOSIS — E11.65 POORLY CONTROLLED DIABETES MELLITUS (H): ICD-10-CM

## 2023-06-22 DIAGNOSIS — L08.9 DIABETIC FOOT INFECTION (H): ICD-10-CM

## 2023-06-22 DIAGNOSIS — E11.628 DIABETIC FOOT INFECTION (H): ICD-10-CM

## 2023-06-22 DIAGNOSIS — Z11.4 SCREENING FOR HIV (HUMAN IMMUNODEFICIENCY VIRUS): ICD-10-CM

## 2023-06-22 LAB — HBA1C MFR BLD: 9.3 % (ref 0–5.6)

## 2023-06-22 PROCEDURE — 83036 HEMOGLOBIN GLYCOSYLATED A1C: CPT | Performed by: INTERNAL MEDICINE

## 2023-06-22 PROCEDURE — 87389 HIV-1 AG W/HIV-1&-2 AB AG IA: CPT | Performed by: INTERNAL MEDICINE

## 2023-06-22 PROCEDURE — 36415 COLL VENOUS BLD VENIPUNCTURE: CPT | Performed by: INTERNAL MEDICINE

## 2023-06-22 PROCEDURE — 99214 OFFICE O/P EST MOD 30 MIN: CPT | Mod: 25 | Performed by: INTERNAL MEDICINE

## 2023-06-22 PROCEDURE — G0439 PPPS, SUBSEQ VISIT: HCPCS | Performed by: INTERNAL MEDICINE

## 2023-06-22 RX ORDER — CEPHALEXIN 500 MG/1
500 CAPSULE ORAL 4 TIMES DAILY
Qty: 40 CAPSULE | Refills: 0 | Status: SHIPPED | OUTPATIENT
Start: 2023-06-22 | End: 2023-07-02

## 2023-06-22 ASSESSMENT — ENCOUNTER SYMPTOMS
WEAKNESS: 0
CONSTIPATION: 0
ABDOMINAL PAIN: 0
COUGH: 0
SORE THROAT: 0
HEARTBURN: 0
HEMATURIA: 0
CHILLS: 0
NERVOUS/ANXIOUS: 0
PALPITATIONS: 0
FEVER: 0
SHORTNESS OF BREATH: 0
NAUSEA: 0
HEADACHES: 0
PARESTHESIAS: 0
JOINT SWELLING: 0
HEMATOCHEZIA: 0
DIZZINESS: 1
MYALGIAS: 0
ARTHRALGIAS: 1
EYE PAIN: 0
FREQUENCY: 0
DIARRHEA: 0
DYSURIA: 0

## 2023-06-22 ASSESSMENT — ACTIVITIES OF DAILY LIVING (ADL): CURRENT_FUNCTION: NO ASSISTANCE NEEDED

## 2023-06-22 ASSESSMENT — PAIN SCALES - GENERAL: PAINLEVEL: MILD PAIN (3)

## 2023-06-23 LAB — HIV 1+2 AB+HIV1 P24 AG SERPL QL IA: NONREACTIVE

## 2023-06-27 DIAGNOSIS — I25.810 CORONARY ARTERY DISEASE INVOLVING CORONARY BYPASS GRAFT OF NATIVE HEART WITHOUT ANGINA PECTORIS: Chronic | ICD-10-CM

## 2023-06-27 DIAGNOSIS — E61.1 IRON DEFICIENCY: ICD-10-CM

## 2023-06-27 DIAGNOSIS — J31.0 CHRONIC RHINITIS: ICD-10-CM

## 2023-06-27 DIAGNOSIS — N40.0 BENIGN PROSTATIC HYPERPLASIA, PRESENCE OF LOWER URINARY TRACT SYMPTOMS UNSPECIFIED: ICD-10-CM

## 2023-06-27 RX ORDER — TAMSULOSIN HYDROCHLORIDE 0.4 MG/1
CAPSULE ORAL
Qty: 90 CAPSULE | Refills: 2 | Status: SHIPPED | OUTPATIENT
Start: 2023-06-27

## 2023-06-27 RX ORDER — FLUTICASONE PROPIONATE 50 MCG
SPRAY, SUSPENSION (ML) NASAL
Qty: 16 G | Refills: 0 | Status: SHIPPED | OUTPATIENT
Start: 2023-06-27 | End: 2023-10-31

## 2023-06-28 RX ORDER — CLOPIDOGREL BISULFATE 75 MG/1
TABLET ORAL
Qty: 90 TABLET | Refills: 3 | Status: SHIPPED | OUTPATIENT
Start: 2023-06-28

## 2023-06-28 RX ORDER — FERROUS SULFATE 325(65) MG
TABLET ORAL
Qty: 60 TABLET | Refills: 0 | Status: SHIPPED | OUTPATIENT
Start: 2023-06-28 | End: 2024-06-05

## 2023-07-12 ENCOUNTER — OFFICE VISIT (OUTPATIENT)
Dept: PODIATRY | Facility: CLINIC | Age: 58
End: 2023-07-12
Payer: COMMERCIAL

## 2023-07-12 VITALS — SYSTOLIC BLOOD PRESSURE: 125 MMHG | HEART RATE: 74 BPM | DIASTOLIC BLOOD PRESSURE: 78 MMHG

## 2023-07-12 DIAGNOSIS — M21.6X2 PRONATION OF BOTH FEET: Primary | ICD-10-CM

## 2023-07-12 DIAGNOSIS — E11.621 DIABETIC ULCER OF TOE OF RIGHT FOOT ASSOCIATED WITH TYPE 2 DIABETES MELLITUS, UNSPECIFIED ULCER STAGE (H): ICD-10-CM

## 2023-07-12 DIAGNOSIS — M21.6X1 PRONATION OF BOTH FEET: Primary | ICD-10-CM

## 2023-07-12 DIAGNOSIS — L97.519 DIABETIC ULCER OF TOE OF RIGHT FOOT ASSOCIATED WITH TYPE 2 DIABETES MELLITUS, UNSPECIFIED ULCER STAGE (H): ICD-10-CM

## 2023-07-12 DIAGNOSIS — E11.59 TYPE 2 DIABETES MELLITUS WITH OTHER CIRCULATORY COMPLICATION, WITHOUT LONG-TERM CURRENT USE OF INSULIN (H): ICD-10-CM

## 2023-07-12 DIAGNOSIS — M20.5X9 HALLUX LIMITUS, UNSPECIFIED LATERALITY: ICD-10-CM

## 2023-07-12 PROCEDURE — 99204 OFFICE O/P NEW MOD 45 MIN: CPT | Performed by: PODIATRIST

## 2023-07-12 NOTE — LETTER
7/12/2023         RE: Javier Markham  [address]  [place] [address]        Dear Colleague,    Thank you for referring your patient, Javier Markham, to the [facility]. Please see a copy of my visit note below.    Subjective:    Pt is seen today in consult from Dr. Baird with the c/c of an ulcer right hallux plantar IPJ.  This has been problematic for 1 month(s). No history of trauma.  Denies drainage, erythema or edema.  Patient believes he may have gotten this from walking around the house without issue.  Has a past history of right hallux ulcer.  He has diabetes mellitus which is poorly controlled.  Patient has peripheral neuropathy.  He quit smoking in 2015.    ROS: see above         Allergies   Allergen Reactions     Perfume      Soap      Perfume in soap-reaction excema       Current Outpatient Medications   Medication Sig Dispense Refill     acetaminophen (TYLENOL) 325 MG tablet Take 2 tablets (650 mg) by mouth every 4 hours as needed for mild pain 50 tablet 0     albuterol (PROAIR HFA/PROVENTIL HFA/VENTOLIN HFA) 108 (90 Base) MCG/ACT inhaler Inhale 2 puffs into the lungs every 4 hours as needed for shortness of breath / dyspnea or wheezing 8.5 g 3     Alcohol Swabs (B-D SINGLE USE SWABS REGULAR) PADS USE AS NEEDED 200 each 0     ASPIRIN LOW DOSE 81 MG EC tablet TAKE ONE TABLET BY MOUTH ONCE DAILY 90 tablet 1     azelastine (ASTELIN) 0.1 % nasal spray Spray 1 spray into both nostrils every evening       blood glucose (NO BRAND SPECIFIED) test strip Use to test blood sugar 2 times daily or as directed. 200 strip 11     blood glucose monitoring (NO BRAND SPECIFIED) meter device kit Use to test blood sugar 1 times daily or as directed. ONE TOUCH VERIO. 1 kit 0     clonazePAM (KLONOPIN) 0.5 MG tablet TAKE ONE TABLET BY MOUTH AT BEDTIME AS NEEDED FOR RESTLESS LEGS 30 tablet 1     clopidogrel (PLAVIX) 75 MG tablet TAKE ONE TABLET BY MOUTH ONCE DAILY 90 tablet 3      cyanocobalamin (VITAMIN B-12) 1000 MCG tablet TAKE ONE TABLET BY MOUTH ONCE DAILY 100 tablet 1     evolocumab (REPATHA) 140 MG/ML prefilled autoinjector Inject 140 mg Subcutaneous every 14 days       ezetimibe (ZETIA) 10 MG tablet Take 1 tablet (10 mg) by mouth At Bedtime 90 tablet 1     fenofibrate micronized (LOFIBRA) 67 MG capsule Take 67 mg by mouth       FEROSUL 325 (65 Fe) MG tablet TAKE 1 TABLET (325 MG) BY MOUTH EVERY EVENING 60 tablet 0     fluticasone (FLONASE) 50 MCG/ACT nasal spray SPRAY TWO SPRAYS INTO BOTH NOSTRILS DAILY 16 g 0     gabapentin (NEURONTIN) 300 MG capsule TAKE THREE CAPSULES BY MOUTH THREE TIMES A  capsule 2     hydrOXYzine (ATARAX) 25 MG tablet TAKE ONE TO TWO TABLETS BY MOUTH EVERY 6 HOURS AS NEEDED FOR ANXIETY 120 tablet 0     ibuprofen (ADVIL/MOTRIN) 200 MG tablet Take 200 mg by mouth every 4 hours as needed for mild pain       JANUVIA 50 MG tablet TAKE ONE TABLET BY MOUTH ONCE DAILY 90 tablet 1     JARDIANCE 10 MG TABS tablet TAKE 1 TABLET (10 MG) BY MOUTH DAILY DUE FOR APPOINTMENT. PLEASE SCHEDULE: [phone] 90 tablet 0     lisinopril (ZESTRIL) 10 MG tablet TAKE ONE TABLET BY MOUTH ONCE DAILY 90 tablet 2     loratadine (CLARITIN) 10 MG tablet TAKE ONE TABLET BY MOUTH ONCE DAILY 90 tablet 1     Melatonin 10 MG TABS tablet Take 10 mg by mouth nightly as needed for sleep       meloxicam (MOBIC) 15 MG tablet Take 1 tablet (15 mg) by mouth daily 10 tablet 0     metFORMIN (GLUCOPHAGE) 500 MG tablet TAKE TWO TABLETS BY MOUTH TWICE A DAY WITH MEALS 360 tablet 0     metoprolol succinate ER (TOPROL XL) 25 MG 24 hr tablet TAKE ONE TABLET BY MOUTH TWICE A  tablet 0     minocycline (DYNACIN) 100 MG tablet Take 1 tablet (100 mg) by mouth daily 30 tablet 0     nitroGLYcerin (NITROSTAT) 0.4 MG sublingual tablet PLACE 1 TABLET UNDER THE TONGUE AT THE ONSET OF CHEST PAIN. MAY REPEAT EVERY 5 MINUTES AS NEEDED FOR A TOTAL OF 3 DOSES. 25 tablet 1     omega-3 acid ethyl esters (LOVAZA) 1  g capsule TAKE TWO CAPSULES BY MOUTH TWO TIMES A  capsule 0     omeprazole (PRILOSEC) 20 MG DR capsule TAKE ONE CAPSULE BY MOUTH TWO TIMES A DAY, 30 TO 60 MINUTES BEFORE A MEAL. 180 capsule 3     ONE TOUCH VERIO IQ test strip TEST ONCE DAILY OR AS DIRECTED 100 each 5     OneTouch Delica Lancets 33G MISC 1 Act by Device route 2 times daily 200 each 0     polyethylene glycol (MIRALAX/GLYCOLAX) powder Take 17 g by mouth daily as needed for constipation 500 g 5     pramipexole (MIRAPEX) 0.75 MG tablet TAKE ONE TABLET BY MOUTH EVERY NIGHT AT BEDTIME 90 tablet 2     rosuvastatin (CRESTOR) 40 MG tablet Take 1 tablet (40 mg) by mouth daily       senna-docusate (SENOKOT-S/PERICOLACE) 8.6-50 MG tablet Take 1-2 tablets by mouth 2 times daily (Patient taking differently: Take 1 tablet by mouth daily) 30 tablet 0     Skin Protectants, Misc. (HYDROCERIN) CREA APPLY TOPICALLY AS NEEDED FOR DRY SKIN 226 g 3     sodium chloride (DEEP SEA NASAL SPRAY) 0.65 % nasal spray SPRAY 1 SPRAY IN EACH NOSTRIL FOUR TIMES A DAY AS NEEDED FOR CONGESTION 44 mL 3     tamsulosin (FLOMAX) 0.4 MG capsule TAKE ONE CAPSULE BY MOUTH ONCE DAILY 90 capsule 2     triamcinolone (KENALOG) 0.1 % external cream Apply sparingly once or twice per day as needed to affected area until the skin is better, then stop; REPEAT AS NEEDED 80 g 1     Vitamin D3 50 mcg (2000 units) tablet Take 1 tablet (50 mcg) by mouth daily 100 tablet 1       Patient Active Problem List   Diagnosis     Esophageal reflux     History of cocaine use in remission     Hyperlipidemia     Anxiety     Restless legs syndrome (RLS)     Type 2 diabetes mellitus with circulatory disorder, without long-term current use of insulin (H)     [facility]     Coronary artery disease involving autologous artery coronary bypass graft without angina pectoris     BPH (benign prostatic hypertrophy)     Class 1 obesity with serious comorbidity and body mass index (BMI) of 34.0 to 34.9 in adult,  unspecified obesity type     Benign essential hypertension; goal < 140/90     History of tobacco abuse     Incomplete RBBB     Right knee pain, unspecified chronicity     Benign essential tremor     Tinnitus, left     Severe obesity (BMI 35.0-35.9 with comorbidity) (H)     Hyperlipidemia LDL goal <100     Right inguinal hernia     Right hydrocele     S/P CABG x 3     NSTEMI (non-ST elevated myocardial infarction) (H)     Mixed, or nondependent drug abuse     Ascending aorta dilatation (H)     ACP (advance care planning)       Past Medical History:   Diagnosis Date     Anxiety      Ascending aorta dilation (H)      CAD (coronary artery disease) Dec 2014    s/p CABGx3 Dec 2014 and later RCA stent July 2015     Diabetes mellitus (H)      DM2 (diabetes mellitus, type 2) (H) Dx June 2015    A1c 6.5% at time of dx     Epididymitis, right      Essential tremor      GERD (gastroesophageal reflux disease)      Heart attack (H) 12/2014, 6/2015, 7/4/2015    x3     Hernia, abdominal      History of cocaine abuse (H)     Smoked crack cocaine (remission since Nov 2014)     History of tobacco abuse      Hyperlipidemia      Hyperlipidemia LDL goal <100     chronic hypertriglyceredemia, regular elevation 300-400     Incomplete RBBB      Inguinal hernia     Left     Numbness and tingling     diabetic neuropathy     Prediabetes Dec 2014     Restless leg syndrome      S/P CABG x 3 Dec 2014     Severe obesity (BMI 35.0-35.9 with comorbidity) (H) 12/24/2018    BMI 37.5     Sleep apnea     mild, does not use CPAP     Stented coronary artery        Past Surgical History:   Procedure Laterality Date     DAVINCI XI HERNIORRHAPHY INGUINAL Right 7/31/2020    Procedure: ROBOTIC ASSITED RIGHT INGUINAL HERNIA REPAIR WITH MESH;  Surgeon: Haim Green MD;  Location:  OR     HERNIORRHAPHY INGUINAL Left 9/20/2016    Procedure: HERNIORRHAPHY INGUINAL;  Surgeon: Haim Green MD;  Location:  [facility]     HYDROCELECTOMY SCROTAL Right  3/23/2021    Procedure: Right HYDROCELECTOMY, SCROTAL APPROACH with spermatic cord block;  Surgeon: Blaise Altamirano MD;  Location: [facility] OR     STENT  2015    RCA stent     [facility] CABG, VEIN, THREE  2014    [facility]     [facility] NONSPECIFIC PROCEDURE  age 15    both feet bone spur removal in the arch of the foot     [facility] DRUG-ELUTING STENTS, SINGLE  10/19/2018    Left circumflex at [facility]       Family History   Problem Relation Age of Onset     Diabetes Mother         type 2     Breast Cancer Mother      Macular Degeneration Mother      Dementia Mother      Coronary Artery Disease Father 52     Heart Disease Paternal Uncle      Diabetes Maternal Grandfather      Breast Cancer Maternal Grandmother      Prostate Cancer No family hx of        Social History     Tobacco Use     Smoking status: Former     Packs/day: 0.50     Years: 25.00     Pack years: 12.50     Types: Cigarettes     Quit date: 2015     Years since quittin.6     Smokeless tobacco: Never   Substance Use Topics     Alcohol use: No     Alcohol/week: 0.0 standard drinks of alcohol         Exam:    Vitals: /78   Pulse 74   BMI: There is no height or weight on file to calculate BMI.  Height: Data Unavailable    Constitutional/ general:  Pt is in no apparent distress, appears well-nourished.  Cooperative with history and physical exam.     Psych:  The patient answered questions appropriately.  Normal affect.  Seems to have reasonable expectations, in terms of treatment.     Lungs:  Non labored breathing, non labored speech. No cough.  No audible wheezing. Even, quiet breathing.       Vascular:  positive pedal pulses bilaterally for both the DP and PT arteries.  CFT < 3 sec.  positive ankle edema.  positive pedal hair growth.    Neuro:  Alert and oriented x 3. Coordinated gait.   Monofilament absent to midfoot bilaterally    Derm: Somewhat thin with atrophic changes noted.    Musculoskeletal:    Lower extremity muscle strength is normal.   Patient is ambulatory without an assistive device or brace.  Bilateral pronation noted.  Decreased range of motion both first MTPJ's right greater than left.  Wound noted right hallux plantar central.  This measures 3 x 2 mm.  It is barely full-thickness.  The base is excellent looking granulation tissue.  There is no erythema edema sinus tracts purulence or odor.  Has the appearance of a healing wound    Component Ref Range & Units 2 wk ago  (6/22/23) 1 yr ago  (3/8/22) 2 yr ago  (3/3/21) 2 yr ago  (7/23/20) 3 yr ago  (6/17/20) 3 yr ago  (1/15/20) 4 yr ago  (5/7/19)    Hemoglobin A1C 0.0 - 5.6 % 9.3 High   8.9 High  CM  8.0 High  R, CM  7.1 High  R, CM  7.4 High  R  6.6 High  R,         A:  Diabetes mellitus with peripheral neuropathy and LOPS  Bilateral pronation  Bilateral hallux limitus right greater than left  Right hallux ulcer    P:  Discussed mechanics of right foot causing ulcer here.  It is not infected.  It appears to be healing.  Continue dressings.  Continue shoes at all times.  Discussed stiff soled shoe will help offload this more in light of hallux limitus.  Discussed diabetic shoes and inserts for future use.  He is in agreement.  Wrote a prescription.  We will make P wing orthotics for flat feet.  We will decrease activities and watch for signs of infection.  We will decrease activities until this is healed.  Return to clinic in 3 weeks to ensure this is healed.  Thank you for allowing me participate in the care of this patient.        Frank Adkins DPM, FACFAS              Again, thank you for allowing me to participate in the care of your patient.        Sincerely,        Frank Adkins DPM

## 2023-07-12 NOTE — PROGRESS NOTES
Subjective:    Pt is seen today in consult from Dr. Baird with the c/c of an ulcer right hallux plantar IPJ.  This has been problematic for 1 month(s). No history of trauma.  Denies drainage, erythema or edema.  Patient believes he may have gotten this from walking around the house without issue.  Has a past history of right hallux ulcer.  He has diabetes mellitus which is poorly controlled.  Patient has peripheral neuropathy.  He quit smoking in 2015.    ROS: see above         Allergies   Allergen Reactions     Perfume      Soap      Perfume in soap-reaction excema       Current Outpatient Medications   Medication Sig Dispense Refill     acetaminophen (TYLENOL) 325 MG tablet Take 2 tablets (650 mg) by mouth every 4 hours as needed for mild pain 50 tablet 0     albuterol (PROAIR HFA/PROVENTIL HFA/VENTOLIN HFA) 108 (90 Base) MCG/ACT inhaler Inhale 2 puffs into the lungs every 4 hours as needed for shortness of breath / dyspnea or wheezing 8.5 g 3     Alcohol Swabs (B-D SINGLE USE SWABS REGULAR) PADS USE AS NEEDED 200 each 0     ASPIRIN LOW DOSE 81 MG EC tablet TAKE ONE TABLET BY MOUTH ONCE DAILY 90 tablet 1     azelastine (ASTELIN) 0.1 % nasal spray Spray 1 spray into both nostrils every evening       blood glucose (NO BRAND SPECIFIED) test strip Use to test blood sugar 2 times daily or as directed. 200 strip 11     blood glucose monitoring (NO BRAND SPECIFIED) meter device kit Use to test blood sugar 1 times daily or as directed. ONE TOUCH VERIO. 1 kit 0     clonazePAM (KLONOPIN) 0.5 MG tablet TAKE ONE TABLET BY MOUTH AT BEDTIME AS NEEDED FOR RESTLESS LEGS 30 tablet 1     clopidogrel (PLAVIX) 75 MG tablet TAKE ONE TABLET BY MOUTH ONCE DAILY 90 tablet 3     cyanocobalamin (VITAMIN B-12) 1000 MCG tablet TAKE ONE TABLET BY MOUTH ONCE DAILY 100 tablet 1     evolocumab (REPATHA) 140 MG/ML prefilled autoinjector Inject 140 mg Subcutaneous every 14 days       ezetimibe (ZETIA) 10 MG tablet Take 1 tablet (10 mg) by mouth At  Bedtime 90 tablet 1     fenofibrate micronized (LOFIBRA) 67 MG capsule Take 67 mg by mouth       FEROSUL 325 (65 Fe) MG tablet TAKE 1 TABLET (325 MG) BY MOUTH EVERY EVENING 60 tablet 0     fluticasone (FLONASE) 50 MCG/ACT nasal spray SPRAY TWO SPRAYS INTO BOTH NOSTRILS DAILY 16 g 0     gabapentin (NEURONTIN) 300 MG capsule TAKE THREE CAPSULES BY MOUTH THREE TIMES A  capsule 2     hydrOXYzine (ATARAX) 25 MG tablet TAKE ONE TO TWO TABLETS BY MOUTH EVERY 6 HOURS AS NEEDED FOR ANXIETY 120 tablet 0     ibuprofen (ADVIL/MOTRIN) 200 MG tablet Take 200 mg by mouth every 4 hours as needed for mild pain       JANUVIA 50 MG tablet TAKE ONE TABLET BY MOUTH ONCE DAILY 90 tablet 1     JARDIANCE 10 MG TABS tablet TAKE 1 TABLET (10 MG) BY MOUTH DAILY DUE FOR APPOINTMENT. PLEASE SCHEDULE: [phone] 90 tablet 0     lisinopril (ZESTRIL) 10 MG tablet TAKE ONE TABLET BY MOUTH ONCE DAILY 90 tablet 2     loratadine (CLARITIN) 10 MG tablet TAKE ONE TABLET BY MOUTH ONCE DAILY 90 tablet 1     Melatonin 10 MG TABS tablet Take 10 mg by mouth nightly as needed for sleep       meloxicam (MOBIC) 15 MG tablet Take 1 tablet (15 mg) by mouth daily 10 tablet 0     metFORMIN (GLUCOPHAGE) 500 MG tablet TAKE TWO TABLETS BY MOUTH TWICE A DAY WITH MEALS 360 tablet 0     metoprolol succinate ER (TOPROL XL) 25 MG 24 hr tablet TAKE ONE TABLET BY MOUTH TWICE A  tablet 0     minocycline (DYNACIN) 100 MG tablet Take 1 tablet (100 mg) by mouth daily 30 tablet 0     nitroGLYcerin (NITROSTAT) 0.4 MG sublingual tablet PLACE 1 TABLET UNDER THE TONGUE AT THE ONSET OF CHEST PAIN. MAY REPEAT EVERY 5 MINUTES AS NEEDED FOR A TOTAL OF 3 DOSES. 25 tablet 1     omega-3 acid ethyl esters (LOVAZA) 1 g capsule TAKE TWO CAPSULES BY MOUTH TWO TIMES A  capsule 0     omeprazole (PRILOSEC) 20 MG DR capsule TAKE ONE CAPSULE BY MOUTH TWO TIMES A DAY, 30 TO 60 MINUTES BEFORE A MEAL. 180 capsule 3     ONE TOUCH VERIO IQ test strip TEST ONCE DAILY OR AS DIRECTED  100 each 5     OneTouch Delica Lancets 33G MISC 1 Act by Device route 2 times daily 200 each 0     polyethylene glycol (MIRALAX/GLYCOLAX) powder Take 17 g by mouth daily as needed for constipation 500 g 5     pramipexole (MIRAPEX) 0.75 MG tablet TAKE ONE TABLET BY MOUTH EVERY NIGHT AT BEDTIME 90 tablet 2     rosuvastatin (CRESTOR) 40 MG tablet Take 1 tablet (40 mg) by mouth daily       senna-docusate (SENOKOT-S/PERICOLACE) 8.6-50 MG tablet Take 1-2 tablets by mouth 2 times daily (Patient taking differently: Take 1 tablet by mouth daily) 30 tablet 0     Skin Protectants, Misc. (HYDROCERIN) CREA APPLY TOPICALLY AS NEEDED FOR DRY SKIN 226 g 3     sodium chloride (DEEP SEA NASAL SPRAY) 0.65 % nasal spray SPRAY 1 SPRAY IN EACH NOSTRIL FOUR TIMES A DAY AS NEEDED FOR CONGESTION 44 mL 3     tamsulosin (FLOMAX) 0.4 MG capsule TAKE ONE CAPSULE BY MOUTH ONCE DAILY 90 capsule 2     triamcinolone (KENALOG) 0.1 % external cream Apply sparingly once or twice per day as needed to affected area until the skin is better, then stop; REPEAT AS NEEDED 80 g 1     Vitamin D3 50 mcg (2000 units) tablet Take 1 tablet (50 mcg) by mouth daily 100 tablet 1       Patient Active Problem List   Diagnosis     Esophageal reflux     History of cocaine use in remission     Hyperlipidemia     Anxiety     Restless legs syndrome (RLS)     Type 2 diabetes mellitus with circulatory disorder, without long-term current use of insulin (H)     Health jail     Coronary artery disease involving autologous artery coronary bypass graft without angina pectoris     BPH (benign prostatic hypertrophy)     Class 1 obesity with serious comorbidity and body mass index (BMI) of 34.0 to 34.9 in adult, unspecified obesity type     Benign essential hypertension; goal < 140/90     History of tobacco abuse     Incomplete RBBB     Right knee pain, unspecified chronicity     Benign essential tremor     Tinnitus, left     Severe obesity (BMI 35.0-35.9 with comorbidity) (H)      Hyperlipidemia LDL goal <100     Right inguinal hernia     Right hydrocele     S/P CABG x 3     NSTEMI (non-ST elevated myocardial infarction) (H)     Mixed, or nondependent drug abuse     Ascending aorta dilatation (H)     ACP (advance care planning)       Past Medical History:   Diagnosis Date     Anxiety      Ascending aorta dilation (H)      CAD (coronary artery disease) Dec 2014    s/p CABGx3 Dec 2014 and later RCA stent July 2015     Diabetes mellitus (H)      DM2 (diabetes mellitus, type 2) (H) Dx June 2015    A1c 6.5% at time of dx     Epididymitis, right      Essential tremor      GERD (gastroesophageal reflux disease)      Heart attack (H) 12/2014, 6/2015, 7/4/2015    x3     Hernia, abdominal      History of cocaine abuse (H)     Smoked crack cocaine (remission since Nov 2014)     History of tobacco abuse      Hyperlipidemia      Hyperlipidemia LDL goal <100     chronic hypertriglyceredemia, regular elevation 300-400     Incomplete RBBB      Inguinal hernia     Left     Numbness and tingling     diabetic neuropathy     Prediabetes Dec 2014     Restless leg syndrome      S/P CABG x 3 Dec 2014     Severe obesity (BMI 35.0-35.9 with comorbidity) (H) 12/24/2018    BMI 37.5     Sleep apnea     mild, does not use CPAP     Stented coronary artery        Past Surgical History:   Procedure Laterality Date     DAVINCI XI HERNIORRHAPHY INGUINAL Right 7/31/2020    Procedure: ROBOTIC ASSITED RIGHT INGUINAL HERNIA REPAIR WITH MESH;  Surgeon: Haim Green MD;  Location:  OR     HERNIORRHAPHY INGUINAL Left 9/20/2016    Procedure: HERNIORRHAPHY INGUINAL;  Surgeon: Haim Green MD;  Location:  SD     HYDROCELECTOMY SCROTAL Right 3/23/2021    Procedure: Right HYDROCELECTOMY, SCROTAL APPROACH with spermatic cord block;  Surgeon: Blaise Altamirano MD;  Location:  OR     STENT  07/2015    RCA stent     [facility] [place] CABG, VEIN, THREE  12/23/2014    [facility]     [facility] [place] NONSPECIFIC PROCEDURE  age 15    both  feet bone spur removal in the arch of the foot     [facility] DRUG-ELUTING STENTS, SINGLE  10/19/2018    Left circumflex at [facility]       Family History   Problem Relation Age of Onset     Diabetes Mother         type 2     Breast Cancer Mother      Macular Degeneration Mother      Dementia Mother      Coronary Artery Disease Father 52     Heart Disease Paternal Uncle      Diabetes Maternal Grandfather      Breast Cancer Maternal Grandmother      Prostate Cancer No family hx of        Social History     Tobacco Use     Smoking status: Former     Packs/day: 0.50     Years: 25.00     Pack years: 12.50     Types: Cigarettes     Quit date: 2015     Years since quittin.6     Smokeless tobacco: Never   Substance Use Topics     Alcohol use: No     Alcohol/week: 0.0 standard drinks of alcohol         Exam:    Vitals: /78   Pulse 74   BMI: There is no height or weight on file to calculate BMI.  Height: Data Unavailable    Constitutional/ general:  Pt is in no apparent distress, appears well-nourished.  Cooperative with history and physical exam.     Psych:  The patient answered questions appropriately.  Normal affect.  Seems to have reasonable expectations, in terms of treatment.     Lungs:  Non labored breathing, non labored speech. No cough.  No audible wheezing. Even, quiet breathing.       Vascular:  positive pedal pulses bilaterally for both the DP and PT arteries.  CFT < 3 sec.  positive ankle edema.  positive pedal hair growth.    Neuro:  Alert and oriented x 3. Coordinated gait.   Monofilament absent to midfoot bilaterally    Derm: Somewhat thin with atrophic changes noted.    Musculoskeletal:    Lower extremity muscle strength is normal.  Patient is ambulatory without an assistive device or brace.  Bilateral pronation noted.  Decreased range of motion both first MTPJ's right greater than left.  Wound noted right hallux plantar central.  This measures 3 x 2 mm.  It is barely full-thickness.  The base is  excellent looking granulation tissue.  There is no erythema edema sinus tracts purulence or odor.  Has the appearance of a healing wound    Component Ref Range & Units 2 wk ago  (6/22/23) 1 yr ago  (3/8/22) 2 yr ago  (3/3/21) 2 yr ago  (7/23/20) 3 yr ago  (6/17/20) 3 yr ago  (1/15/20) 4 yr ago  (5/7/19)    Hemoglobin A1C 0.0 - 5.6 % 9.3 High   8.9 High  CM  8.0 High  R, CM  7.1 High  R, CM  7.4 High  R  6.6 High  R,         A:  Diabetes mellitus with peripheral neuropathy and LOPS  Bilateral pronation  Bilateral hallux limitus right greater than left  Right hallux ulcer    P:  Discussed mechanics of right foot causing ulcer here.  It is not infected.  It appears to be healing.  Continue dressings.  Continue shoes at all times.  Discussed stiff soled shoe will help offload this more in light of hallux limitus.  Discussed diabetic shoes and inserts for future use.  He is in agreement.  Wrote a prescription.  We will make P wing orthotics for flat feet.  We will decrease activities and watch for signs of infection.  We will decrease activities until this is healed.  Return to clinic in 3 weeks to ensure this is healed.  Thank you for allowing me participate in the care of this patient.        Frank Adkins DPM, FACFAS

## 2023-07-12 NOTE — PATIENT INSTRUCTIONS
We wish you continued good healing. If you have any questions or concerns, please do not hesitate to contact us at  [phone]    [employer] (secure e-mail communication and access to your chart) to send a message or to make an appointment.    Please remember to call and schedule a follow up appointment if one was recommended at your earliest convenience.     PODIATRY CLINIC HOURS  TELEPHONE NUMBER    Dr. Frank MÉNDEZPJONAS [facility]        Clinics:  [facility] Fer[facility]  [facility] [place], [facility]  Tuesday 1PM-6PM  [facility] [place]  Wednesday 745AM-330PM  [place]  Thursday/Friday 745AM-230PM  [place]   1st and 3rd Mondays  845-430 PM   [place] APPOINTMENTS  [phone]    [place] APPOINTMENTS  [phone]    [place] APPOINTMENTS  [phone]        If you need a medication refill, please contact us you may need lab work and/or a follow up visit prior to your refill (i.e. Antifungal medications).  If MRI needed please call Imaging at [phone]   HOW DO I GET MY KNEE SCOOTER? Knee scooters can be picked up at ANY Medical Supply stores with your knee scooter Prescription.  OR  Bring your signed prescription to an [facility] Medical Equipment showroom.  Call or bring in your Orthotics order to any Orthotics locations. Or call [phone]

## 2023-07-20 ENCOUNTER — NURSE TRIAGE (OUTPATIENT)
Dept: FAMILY MEDICINE | Facility: CLINIC | Age: 58
End: 2023-07-20
Payer: COMMERCIAL

## 2023-07-20 NOTE — TELEPHONE ENCOUNTER
Patient Quality Outreach    Patient is due for the following:   Diabetes -  A1C, LDL (Fasting), Eye Exam, Microalbumin, Diabetic Follow-Up Visit and Foot Exam    Next Steps:       Type of outreach:    Sent letter. and lvm      Questions for provider review:    None           Sandra Hernandez MA

## 2023-07-20 NOTE — LETTER
[facility]  [address] [place], [address]  [place] [id number] [phone]        July 20, 2023  Javier Markham  [address]   [place] [address]    Dear Javier,    I care about your health and have reviewed your health plan. I have reviewed your medical conditions, medication list, and lab results and am making recommendations based on this review, to better manage your health.    You are in particular need of attention regarding:  -Diabetes    I am recommending that you:  -schedule an appointment     Here is a list of Health Maintenance topics that are due now or due soon:  Health Maintenance Due   Topic Date Due     ZOSTER IMMUNIZATION (1 of 2) Never done     LUNG CANCER SCREENING  Never done     MICROALBUMIN  01/15/2021     DIABETIC FOOT EXAM  01/15/2021     LIPID  06/17/2021     COVID-19 Vaccine (3 - Booster for King series) 01/05/2022     COLORECTAL CANCER SCREENING  11/30/2022     BMP  03/08/2023     EYE EXAM  04/19/2023       Please call us at [phone] (or use [employer]) to address the above recommendations.     Thank you for trusting [facility] and we appreciate the opportunity to serve you.  We look forward to supporting your healthcare needs in the future.    Healthy Regards,    Mario Baird MD

## 2023-09-26 DIAGNOSIS — I25.810 CORONARY ARTERY DISEASE INVOLVING CORONARY BYPASS GRAFT OF NATIVE HEART: ICD-10-CM

## 2023-09-26 DIAGNOSIS — E11.9 TYPE 2 DIABETES MELLITUS WITHOUT COMPLICATION, WITHOUT LONG-TERM CURRENT USE OF INSULIN (H): Chronic | ICD-10-CM

## 2023-09-26 DIAGNOSIS — E11.59 TYPE 2 DIABETES MELLITUS WITH OTHER CIRCULATORY COMPLICATION, WITHOUT LONG-TERM CURRENT USE OF INSULIN (H): ICD-10-CM

## 2023-09-27 RX ORDER — EMPAGLIFLOZIN 10 MG/1
TABLET, FILM COATED ORAL
Qty: 90 TABLET | Refills: 0 | Status: SHIPPED | OUTPATIENT
Start: 2023-09-27 | End: 2024-02-07

## 2023-09-27 RX ORDER — SITAGLIPTIN 50 MG/1
TABLET, FILM COATED ORAL
Qty: 90 TABLET | Refills: 1 | Status: SHIPPED | OUTPATIENT
Start: 2023-09-27

## 2023-10-31 DIAGNOSIS — I25.10 CORONARY ARTERY DISEASE INVOLVING NATIVE CORONARY ARTERY OF NATIVE HEART WITHOUT ANGINA PECTORIS: Chronic | ICD-10-CM

## 2023-10-31 DIAGNOSIS — F41.9 ANXIETY: ICD-10-CM

## 2023-10-31 DIAGNOSIS — E78.5 HYPERLIPIDEMIA, UNSPECIFIED HYPERLIPIDEMIA TYPE: Chronic | ICD-10-CM

## 2023-10-31 DIAGNOSIS — J31.0 CHRONIC RHINITIS: ICD-10-CM

## 2023-10-31 DIAGNOSIS — J30.2 SEASONAL ALLERGIES: ICD-10-CM

## 2023-10-31 RX ORDER — ASPIRIN 81 MG/1
TABLET, COATED ORAL
Qty: 90 TABLET | Refills: 1 | Status: SHIPPED | OUTPATIENT
Start: 2023-10-31

## 2023-10-31 RX ORDER — LORATADINE 10 MG/1
TABLET ORAL
Qty: 90 TABLET | Refills: 1 | Status: SHIPPED | OUTPATIENT
Start: 2023-10-31 | End: 2024-05-28

## 2023-10-31 RX ORDER — HYDROXYZINE HYDROCHLORIDE 25 MG/1
TABLET, FILM COATED ORAL
Qty: 120 TABLET | Refills: 0 | Status: SHIPPED | OUTPATIENT
Start: 2023-10-31 | End: 2024-02-01

## 2023-10-31 RX ORDER — FLUTICASONE PROPIONATE 50 MCG
SPRAY, SUSPENSION (ML) NASAL
Qty: 48 G | Refills: 1 | Status: SHIPPED | OUTPATIENT
Start: 2023-10-31

## 2023-10-31 NOTE — TELEPHONE ENCOUNTER
Prescription approved per [facility] Refill Protocol.  Lila Henry, RN  [facility] Triage Nurse

## 2023-10-31 NOTE — TELEPHONE ENCOUNTER
Prescription approved per [facility] Refill Protocol.  Lila Henry, RN  [facility] Triage Nurse

## 2023-11-01 RX ORDER — OMEGA-3-ACID ETHYL ESTERS 1 G/1
CAPSULE, LIQUID FILLED ORAL
Qty: 360 CAPSULE | Refills: 0 | Status: SHIPPED | OUTPATIENT
Start: 2023-11-01 | End: 2024-06-05

## 2023-12-23 ENCOUNTER — TRANSFERRED RECORDS (OUTPATIENT)
Dept: HEALTH INFORMATION MANAGEMENT | Facility: CLINIC | Age: 58
End: 2023-12-23
Payer: COMMERCIAL

## 2024-02-01 DIAGNOSIS — K21.9 GASTROESOPHAGEAL REFLUX DISEASE WITHOUT ESOPHAGITIS: Chronic | ICD-10-CM

## 2024-02-01 DIAGNOSIS — G25.81 RESTLESS LEGS SYNDROME (RLS): Chronic | ICD-10-CM

## 2024-02-01 DIAGNOSIS — E11.59 TYPE 2 DIABETES MELLITUS WITH OTHER CIRCULATORY COMPLICATION, WITHOUT LONG-TERM CURRENT USE OF INSULIN (H): ICD-10-CM

## 2024-02-01 DIAGNOSIS — E11.40 TYPE 2 DIABETES MELLITUS WITH DIABETIC NEUROPATHY, WITHOUT LONG-TERM CURRENT USE OF INSULIN (H): ICD-10-CM

## 2024-02-01 DIAGNOSIS — I25.810 CORONARY ARTERY DISEASE INVOLVING CORONARY BYPASS GRAFT OF NATIVE HEART: ICD-10-CM

## 2024-02-01 DIAGNOSIS — F41.9 ANXIETY: ICD-10-CM

## 2024-02-01 RX ORDER — HYDROXYZINE HYDROCHLORIDE 25 MG/1
TABLET, FILM COATED ORAL
Qty: 120 TABLET | Refills: 0 | Status: SHIPPED | OUTPATIENT
Start: 2024-02-01 | End: 2024-06-05

## 2024-02-01 RX ORDER — GABAPENTIN 300 MG/1
CAPSULE ORAL
Qty: 810 CAPSULE | Refills: 0 | Status: SHIPPED | OUTPATIENT
Start: 2024-02-01 | End: 2024-06-05

## 2024-02-07 RX ORDER — EMPAGLIFLOZIN 10 MG/1
TABLET, FILM COATED ORAL
Qty: 90 TABLET | Refills: 0 | Status: SHIPPED | OUTPATIENT
Start: 2024-02-07

## 2024-02-07 RX ORDER — CLONAZEPAM 0.5 MG/1
TABLET ORAL
Qty: 30 TABLET | Refills: 1 | Status: SHIPPED | OUTPATIENT
Start: 2024-02-07 | End: 2024-08-22

## 2024-02-14 ENCOUNTER — ANCILLARY PROCEDURE (OUTPATIENT)
Dept: GENERAL RADIOLOGY | Facility: CLINIC | Age: 59
End: 2024-02-14
Attending: PODIATRIST
Payer: COMMERCIAL

## 2024-02-14 ENCOUNTER — OFFICE VISIT (OUTPATIENT)
Dept: PODIATRY | Facility: CLINIC | Age: 59
End: 2024-02-14
Payer: COMMERCIAL

## 2024-02-14 VITALS — DIASTOLIC BLOOD PRESSURE: 76 MMHG | SYSTOLIC BLOOD PRESSURE: 118 MMHG | HEART RATE: 88 BPM

## 2024-02-14 DIAGNOSIS — M21.6X2 PRONATION OF BOTH FEET: ICD-10-CM

## 2024-02-14 DIAGNOSIS — E11.59 TYPE 2 DIABETES MELLITUS WITH OTHER CIRCULATORY COMPLICATION, WITHOUT LONG-TERM CURRENT USE OF INSULIN (H): ICD-10-CM

## 2024-02-14 DIAGNOSIS — L97.519 DIABETIC ULCER OF TOE OF RIGHT FOOT ASSOCIATED WITH TYPE 2 DIABETES MELLITUS, UNSPECIFIED ULCER STAGE (H): ICD-10-CM

## 2024-02-14 DIAGNOSIS — R60.0 EDEMA OF RIGHT LOWER EXTREMITY: ICD-10-CM

## 2024-02-14 DIAGNOSIS — R60.0 EDEMA OF RIGHT LOWER EXTREMITY: Primary | ICD-10-CM

## 2024-02-14 DIAGNOSIS — E11.621 DIABETIC ULCER OF TOE OF RIGHT FOOT ASSOCIATED WITH TYPE 2 DIABETES MELLITUS, UNSPECIFIED ULCER STAGE (H): ICD-10-CM

## 2024-02-14 DIAGNOSIS — M21.6X1 PRONATION OF BOTH FEET: ICD-10-CM

## 2024-02-14 DIAGNOSIS — L03.115 CELLULITIS OF FOOT, RIGHT: ICD-10-CM

## 2024-02-14 PROCEDURE — 99214 OFFICE O/P EST MOD 30 MIN: CPT | Performed by: PODIATRIST

## 2024-02-14 PROCEDURE — 87205 SMEAR GRAM STAIN: CPT | Performed by: PODIATRIST

## 2024-02-14 PROCEDURE — 73610 X-RAY EXAM OF ANKLE: CPT | Mod: RT | Performed by: RADIOLOGY

## 2024-02-14 PROCEDURE — 87070 CULTURE OTHR SPECIMN AEROBIC: CPT | Performed by: PODIATRIST

## 2024-02-14 PROCEDURE — 73630 X-RAY EXAM OF FOOT: CPT | Mod: RT | Performed by: RADIOLOGY

## 2024-02-14 PROCEDURE — 87077 CULTURE AEROBIC IDENTIFY: CPT | Performed by: PODIATRIST

## 2024-02-14 PROCEDURE — 87186 SC STD MICRODIL/AGAR DIL: CPT | Performed by: PODIATRIST

## 2024-02-14 RX ORDER — CEPHALEXIN 500 MG/1
500 CAPSULE ORAL 4 TIMES DAILY
Qty: 40 CAPSULE | Refills: 0 | Status: SHIPPED | OUTPATIENT
Start: 2024-02-14

## 2024-02-14 NOTE — PATIENT INSTRUCTIONS
We wish you continued good healing. If you have any questions or concerns, please do not hesitate to contact us at  [phone]    [employer] (secure e-mail communication and access to your chart) to send a message or to make an appointment.    Please remember to call and schedule a follow up appointment if one was recommended at your earliest convenience.     PODIATRY CLINIC HOURS  TELEPHONE NUMBER    Dr. Frank MÉNDEZPJONAS FACFAS        Clinics:  [facility] [place] [facility]   [place]  Tuesday 1PM-6PM  [place]  Wednesday 745AM-330PM  [place]  Monday 2nd,4th  830AM-4PM  Thursday/Friday 745AM-230PM     [place] APPOINTMENTS  [phone]    [place] APPOINTMENTS  [phone]          If you need a medication refill, please contact us you may need lab work and/or a follow up visit prior to your refill (i.e. Antifungal medications).  If MRI needed please call Imaging at [phone]   HOW DO I GET MY KNEE SCOOTER? Knee scooters can be picked up at ANY Medical Supply stores with your knee scooter Prescription.  OR  Bring your signed prescription to an [facility] Medical Equipment showroom.   Set up an appointment for your custom Orthotics. Call any Orthotics locations call [phone]

## 2024-02-14 NOTE — LETTER
2/14/2024         RE: Javier Markham  [address]  [place] [address]        Dear Colleague,    Thank you for referring your patient, Javier Markham, to the [facility]. Please see a copy of my visit note below.    Subjective:    Pt is seen today for swelling around his right ankle.  He has had this for 1 week.  Some pain associated with swelling.  Denies weakness or increased deformity.  Has history of right ankle fracture last year.  Treated conservatively and had no problems with this until just recently.  States his current wound in his groin.  He has diabetes which is poorly controlled with peripheral neuropathy in his feet.  Saw patient 1 year ago and recommended orthotics for his pronation and diabetic shoes.  He never got these.  He denies any fever or chills.  States he had some dizziness yesterday.  States his blood sugars have not been more elevated than normal recently.  Patient not working and on disability because he has had 3 heart attacks.    ROS:  see above         Allergies   Allergen Reactions     Perfume      Soap      Perfume in soap-reaction excema       Current Outpatient Medications   Medication Sig Dispense Refill     acetaminophen (TYLENOL) 325 MG tablet Take 2 tablets (650 mg) by mouth every 4 hours as needed for mild pain 50 tablet 0     albuterol (PROAIR HFA/PROVENTIL HFA/VENTOLIN HFA) 108 (90 Base) MCG/ACT inhaler Inhale 2 puffs into the lungs every 4 hours as needed for shortness of breath / dyspnea or wheezing 8.5 g 3     Alcohol Swabs (B-D SINGLE USE SWABS REGULAR) PADS USE AS NEEDED 200 each 0     ASPIRIN LOW DOSE 81 MG EC tablet TAKE ONE TABLET BY MOUTH ONCE DAILY 90 tablet 1     azelastine (ASTELIN) 0.1 % nasal spray Spray 1 spray into both nostrils every evening       blood glucose (NO BRAND SPECIFIED) test strip Use to test blood sugar 2 times daily or as directed. 200 strip 11     blood glucose monitoring (NO BRAND SPECIFIED)  meter device kit Use to test blood sugar 1 times daily or as directed. ONE TOUCH VERIO. 1 kit 0     clonazePAM (KLONOPIN) 0.5 MG tablet TAKE ONE TABLET BY MOUTH AT BEDTIME AS NEEDED FOR RESTLESS LEGS 30 tablet 1     clopidogrel (PLAVIX) 75 MG tablet TAKE ONE TABLET BY MOUTH ONCE DAILY 90 tablet 3     cyanocobalamin (VITAMIN B-12) 1000 MCG tablet TAKE ONE TABLET BY MOUTH ONCE DAILY 100 tablet 1     evolocumab (REPATHA) 140 MG/ML prefilled autoinjector Inject 140 mg Subcutaneous every 14 days       ezetimibe (ZETIA) 10 MG tablet Take 1 tablet (10 mg) by mouth At Bedtime 90 tablet 1     fenofibrate micronized (LOFIBRA) 67 MG capsule Take 67 mg by mouth       FEROSUL 325 (65 Fe) MG tablet TAKE 1 TABLET (325 MG) BY MOUTH EVERY EVENING 60 tablet 0     fluticasone (FLONASE) 50 MCG/ACT nasal spray SPRAY TWO SPRAYS INTO BOTH NOSTRILS DAILY 48 g 1     gabapentin (NEURONTIN) 300 MG capsule TAKE 3 CAPSULES BY MOUTH 3 TIMES DAILY. 810 capsule 0     hydrOXYzine HCl (ATARAX) 25 MG tablet TAKE ONE TO TWO TABLETS BY MOUTH EVERY 6 HOURS AS NEEDED FOR ANXIETY 120 tablet 0     ibuprofen (ADVIL/MOTRIN) 200 MG tablet Take 200 mg by mouth every 4 hours as needed for mild pain       JANUVIA 50 MG tablet TAKE ONE TABLET BY MOUTH ONCE DAILY 90 tablet 1     JARDIANCE 10 MG TABS tablet TAKE 1 TABLET (10 MG) BY MOUTH DAILY DUE FOR APPOINTMENT. PLEASE SCHEDULE: [phone] 90 tablet 0     lisinopril (ZESTRIL) 10 MG tablet TAKE ONE TABLET BY MOUTH ONCE DAILY 90 tablet 2     loratadine (CLARITIN) 10 MG tablet TAKE ONE TABLET BY MOUTH ONCE DAILY 90 tablet 1     Melatonin 10 MG TABS tablet Take 10 mg by mouth nightly as needed for sleep       meloxicam (MOBIC) 15 MG tablet Take 1 tablet (15 mg) by mouth daily 10 tablet 0     metFORMIN (GLUCOPHAGE) 500 MG tablet TAKE TWO TABLETS BY MOUTH TWICE A DAY WITH MEALS 360 tablet 0     metoprolol succinate ER (TOPROL XL) 25 MG 24 hr tablet TAKE ONE TABLET BY MOUTH TWICE A  tablet 0     minocycline  (DYNACIN) 100 MG tablet Take 1 tablet (100 mg) by mouth daily 30 tablet 0     nitroGLYcerin (NITROSTAT) 0.4 MG sublingual tablet PLACE 1 TABLET UNDER THE TONGUE AT THE ONSET OF CHEST PAIN. MAY REPEAT EVERY 5 MINUTES AS NEEDED FOR A TOTAL OF 3 DOSES. 25 tablet 1     omega-3 acid ethyl esters (LOVAZA) 1 g capsule TAKE TWO CAPSULES BY MOUTH TWO TIMES A  capsule 0     omeprazole (PRILOSEC) 20 MG DR capsule TAKE ONE CAPSULE BY MOUTH TWICE A DAY 30 TO 60 MINUTES BEFORE A MEAL 180 capsule 0     ONE TOUCH VERIO IQ test strip TEST ONCE DAILY OR AS DIRECTED 100 each 5     OneTouch Delica Lancets 33G MISC 1 Act by Device route 2 times daily 200 each 0     polyethylene glycol (MIRALAX/GLYCOLAX) powder Take 17 g by mouth daily as needed for constipation 500 g 5     pramipexole (MIRAPEX) 0.75 MG tablet TAKE ONE TABLET BY MOUTH EVERY NIGHT AT BEDTIME 90 tablet 2     rosuvastatin (CRESTOR) 40 MG tablet Take 1 tablet (40 mg) by mouth daily       senna-docusate (SENOKOT-S/PERICOLACE) 8.6-50 MG tablet Take 1-2 tablets by mouth 2 times daily (Patient taking differently: Take 1 tablet by mouth daily) 30 tablet 0     Skin Protectants, Misc. (HYDROCERIN) CREA APPLY TOPICALLY AS NEEDED FOR DRY SKIN 226 g 3     sodium chloride (DEEP SEA NASAL SPRAY) 0.65 % nasal spray SPRAY 1 SPRAY IN EACH NOSTRIL FOUR TIMES A DAY AS NEEDED FOR CONGESTION 44 mL 3     tamsulosin (FLOMAX) 0.4 MG capsule TAKE ONE CAPSULE BY MOUTH ONCE DAILY 90 capsule 2     triamcinolone (KENALOG) 0.1 % external cream Apply sparingly once or twice per day as needed to affected area until the skin is better, then stop; REPEAT AS NEEDED 80 g 1     Vitamin D3 50 mcg (2000 units) tablet Take 1 tablet (50 mcg) by mouth daily 100 tablet 1       Patient Active Problem List   Diagnosis     Esophageal reflux     History of cocaine use in remission     Hyperlipidemia     Anxiety     Restless legs syndrome (RLS)     Type 2 diabetes mellitus with circulatory disorder, without  long-term current use of insulin (H)     [facility]     Coronary artery disease involving autologous artery coronary bypass graft without angina pectoris     BPH (benign prostatic hypertrophy)     Class 1 obesity with serious comorbidity and body mass index (BMI) of 34.0 to 34.9 in adult, unspecified obesity type     Benign essential hypertension; goal < 140/90     History of tobacco abuse     Incomplete RBBB     Right knee pain, unspecified chronicity     Benign essential tremor     Tinnitus, left     Severe obesity (BMI 35.0-35.9 with comorbidity) (H)     Hyperlipidemia LDL goal <100     Right inguinal hernia     Right hydrocele     S/P CABG x 3     NSTEMI (non-ST elevated myocardial infarction) (H)     Mixed, or nondependent drug abuse     Ascending aorta dilatation (H24)     ACP (advance care planning)       Past Medical History:   Diagnosis Date     Anxiety      Ascending aorta dilation (H)      CAD (coronary artery disease) Dec 2014    s/p CABGx3 Dec 2014 and later RCA stent July 2015     Diabetes mellitus (H)      DM2 (diabetes mellitus, type 2) (H) Dx June 2015    A1c 6.5% at time of dx     Epididymitis, right      Essential tremor      GERD (gastroesophageal reflux disease)      Heart attack (H) 12/2014, 6/2015, 7/4/2015    x3     Hernia, abdominal      History of cocaine abuse (H)     Smoked crack cocaine (remission since Nov 2014)     History of tobacco abuse      Hyperlipidemia      Hyperlipidemia LDL goal <100     chronic hypertriglyceredemia, regular elevation 300-400     Incomplete RBBB      Inguinal hernia     Left     Numbness and tingling     diabetic neuropathy     Prediabetes Dec 2014     Restless leg syndrome      S/P CABG x 3 Dec 2014     Severe obesity (BMI 35.0-35.9 with comorbidity) (H) 12/24/2018    BMI 37.5     Sleep apnea     mild, does not use CPAP     Stented coronary artery        Past Surgical History:   Procedure Laterality Date     DAVINCI XI HERNIORRHAPHY INGUINAL Right  2020    Procedure: ROBOTIC ASSITED RIGHT INGUINAL HERNIA REPAIR WITH MESH;  Surgeon: Haim Green MD;  Location:  OR     HERNIORRHAPHY INGUINAL Left 2016    Procedure: HERNIORRHAPHY INGUINAL;  Surgeon: Haim Green MD;  Location:  [facility]     HYDROCELECTOMY SCROTAL Right 3/23/2021    Procedure: Right HYDROCELECTOMY, SCROTAL APPROACH with spermatic cord block;  Surgeon: Blaise Altamirano MD;  Location:  OR     STENT  2015    RCA stent     [facility] CABG, VEIN, THREE  2014    [facility]     [facility] NONSPECIFIC PROCEDURE  age 15    both feet bone spur removal in the arch of the foot     [facility] DRUG-ELUTING STENTS, SINGLE  10/19/2018    Left circumflex at [facility]       Family History   Problem Relation Age of Onset     Diabetes Mother         type 2     Breast Cancer Mother      Macular Degeneration Mother      Dementia Mother      Coronary Artery Disease Father 52     Heart Disease Paternal Uncle      Diabetes Maternal Grandfather      Breast Cancer Maternal Grandmother      Prostate Cancer No family hx of        Social History     Tobacco Use     Smoking status: Former     Packs/day: 0.50     Years: 25.00     Additional pack years: 0.00     Total pack years: 12.50     Types: Cigarettes     Quit date: 2015     Years since quittin.2     Smokeless tobacco: Never   Substance Use Topics     Alcohol use: No     Alcohol/week: 0.0 standard drinks of alcohol         Exam:    Vitals: /76   Pulse 88   BMI: There is no height or weight on file to calculate BMI.  Height: Data Unavailable    Constitutional/ general:  Pt is in no apparent distress, appears well-nourished.  Cooperative with history and physical exam.     Psych:  The patient answered questions appropriately.  Normal affect.  Seems to have reasonable expectations, in terms of treatment.       Lungs:  Non labored breathing, non labored speech. No cough.  No audible wheezing. Even, quiet breathing.       Vascular:  positive  pedal pulses bilaterally.  CFT < 3 sec. bilateral lower extremity edema and varicosities noted.  Positive pedal hair growth.    Neuro:  Alert and oriented x 3. Coordinated gait.  Monofilament absent to ankles    Derm: Normal texture and turgor.  Hemosiderin deposits noted in legs.    Musculoskeletal:   Pronated arch.  Decreased range of motion bilateral first MTPJ's.  Long right second toe.  There is a wound on the end.  It is plantar distal central.  It measures 3 x 2 mm.  Only full-thickness.  Base excellent looking granulation tissue.  Some erythema around the DIPJ distally.  No sinus tracts purulence or odor.  No crepitus.  Edema noted around right ankle.  Hard to locate pain in any 1 area.  No ecchymosis erythema or crepitus.    X-rays show no signs of ankle fracture or arthritis.  Foot x-rays show no signs of any Charcot breakdown.  Hallux limitus noted.    A:  Diabetes mellitus with peripheral neuropathy and LOPS  Right second toe ulcer with surrounding cellulitis  Right ankle edema with history of fracture    P: X-rays taken today of right foot and ankle.  Discussed with patient these are unremarkable.  Edema could be from cellulitis or possibly posterior tibial tendinitis.  Charcot foot also possibility.  Will apply ankle brace on here today to support rear foot.  Culture taken of toe.  Start Keflex.  Will just daily with Medihoney.  Dispensed DH shoe to offload this.  Prescription written for P wearing orthotics and diabetic shoes.  Encourage patient to get these to help support foot and prevent problems in the future.  Return to clinic in 1 week to discuss culture results and reevaluate lower extremity.    Frank Adkins DPM, FACFAS              Again, thank you for allowing me to participate in the care of your patient.        Sincerely,        Frank Adkins DPM

## 2024-02-14 NOTE — PROGRESS NOTES
Subjective:    Pt is seen today for swelling around his right ankle.  He has had this for 1 week.  Some pain associated with swelling.  Denies weakness or increased deformity.  Has history of right ankle fracture last year.  Treated conservatively and had no problems with this until just recently.  States his current wound in his groin.  He has diabetes which is poorly controlled with peripheral neuropathy in his feet.  Saw patient 1 year ago and recommended orthotics for his pronation and diabetic shoes.  He never got these.  He denies any fever or chills.  States he had some dizziness yesterday.  States his blood sugars have not been more elevated than normal recently.  Patient not working and on disability because he has had 3 heart attacks.    ROS:  see above         Allergies   Allergen Reactions    Perfume     Soap      Perfume in soap-reaction excema       Current Outpatient Medications   Medication Sig Dispense Refill    acetaminophen (TYLENOL) 325 MG tablet Take 2 tablets (650 mg) by mouth every 4 hours as needed for mild pain 50 tablet 0    albuterol (PROAIR HFA/PROVENTIL HFA/VENTOLIN HFA) 108 (90 Base) MCG/ACT inhaler Inhale 2 puffs into the lungs every 4 hours as needed for shortness of breath / dyspnea or wheezing 8.5 g 3    Alcohol Swabs (B-D SINGLE USE SWABS REGULAR) PADS USE AS NEEDED 200 each 0    ASPIRIN LOW DOSE 81 MG EC tablet TAKE ONE TABLET BY MOUTH ONCE DAILY 90 tablet 1    azelastine (ASTELIN) 0.1 % nasal spray Spray 1 spray into both nostrils every evening      blood glucose (NO BRAND SPECIFIED) test strip Use to test blood sugar 2 times daily or as directed. 200 strip 11    blood glucose monitoring (NO BRAND SPECIFIED) meter device kit Use to test blood sugar 1 times daily or as directed. ONE TOUCH VERIO. 1 kit 0    clonazePAM (KLONOPIN) 0.5 MG tablet TAKE ONE TABLET BY MOUTH AT BEDTIME AS NEEDED FOR RESTLESS LEGS 30 tablet 1    clopidogrel (PLAVIX) 75 MG tablet TAKE ONE TABLET BY MOUTH  ONCE DAILY 90 tablet 3    cyanocobalamin (VITAMIN B-12) 1000 MCG tablet TAKE ONE TABLET BY MOUTH ONCE DAILY 100 tablet 1    evolocumab (REPATHA) 140 MG/ML prefilled autoinjector Inject 140 mg Subcutaneous every 14 days      ezetimibe (ZETIA) 10 MG tablet Take 1 tablet (10 mg) by mouth At Bedtime 90 tablet 1    fenofibrate micronized (LOFIBRA) 67 MG capsule Take 67 mg by mouth      FEROSUL 325 (65 Fe) MG tablet TAKE 1 TABLET (325 MG) BY MOUTH EVERY EVENING 60 tablet 0    fluticasone (FLONASE) 50 MCG/ACT nasal spray SPRAY TWO SPRAYS INTO BOTH NOSTRILS DAILY 48 g 1    gabapentin (NEURONTIN) 300 MG capsule TAKE 3 CAPSULES BY MOUTH 3 TIMES DAILY. 810 capsule 0    hydrOXYzine HCl (ATARAX) 25 MG tablet TAKE ONE TO TWO TABLETS BY MOUTH EVERY 6 HOURS AS NEEDED FOR ANXIETY 120 tablet 0    ibuprofen (ADVIL/MOTRIN) 200 MG tablet Take 200 mg by mouth every 4 hours as needed for mild pain      JANUVIA 50 MG tablet TAKE ONE TABLET BY MOUTH ONCE DAILY 90 tablet 1    JARDIANCE 10 MG TABS tablet TAKE 1 TABLET (10 MG) BY MOUTH DAILY DUE FOR APPOINTMENT. PLEASE SCHEDULE: [phone] 90 tablet 0    lisinopril (ZESTRIL) 10 MG tablet TAKE ONE TABLET BY MOUTH ONCE DAILY 90 tablet 2    loratadine (CLARITIN) 10 MG tablet TAKE ONE TABLET BY MOUTH ONCE DAILY 90 tablet 1    Melatonin 10 MG TABS tablet Take 10 mg by mouth nightly as needed for sleep      meloxicam (MOBIC) 15 MG tablet Take 1 tablet (15 mg) by mouth daily 10 tablet 0    metFORMIN (GLUCOPHAGE) 500 MG tablet TAKE TWO TABLETS BY MOUTH TWICE A DAY WITH MEALS 360 tablet 0    metoprolol succinate ER (TOPROL XL) 25 MG 24 hr tablet TAKE ONE TABLET BY MOUTH TWICE A  tablet 0    minocycline (DYNACIN) 100 MG tablet Take 1 tablet (100 mg) by mouth daily 30 tablet 0    nitroGLYcerin (NITROSTAT) 0.4 MG sublingual tablet PLACE 1 TABLET UNDER THE TONGUE AT THE ONSET OF CHEST PAIN. MAY REPEAT EVERY 5 MINUTES AS NEEDED FOR A TOTAL OF 3 DOSES. 25 tablet 1    omega-3 acid ethyl esters  (LOVAZA) 1 g capsule TAKE TWO CAPSULES BY MOUTH TWO TIMES A  capsule 0    omeprazole (PRILOSEC) 20 MG DR capsule TAKE ONE CAPSULE BY MOUTH TWICE A DAY 30 TO 60 MINUTES BEFORE A MEAL 180 capsule 0    ONE TOUCH VERIO IQ test strip TEST ONCE DAILY OR AS DIRECTED 100 each 5    OneTouch Delica Lancets 33G MISC 1 Act by Device route 2 times daily 200 each 0    polyethylene glycol (MIRALAX/GLYCOLAX) powder Take 17 g by mouth daily as needed for constipation 500 g 5    pramipexole (MIRAPEX) 0.75 MG tablet TAKE ONE TABLET BY MOUTH EVERY NIGHT AT BEDTIME 90 tablet 2    rosuvastatin (CRESTOR) 40 MG tablet Take 1 tablet (40 mg) by mouth daily      senna-docusate (SENOKOT-S/PERICOLACE) 8.6-50 MG tablet Take 1-2 tablets by mouth 2 times daily (Patient taking differently: Take 1 tablet by mouth daily) 30 tablet 0    Skin Protectants, Misc. (HYDROCERIN) CREA APPLY TOPICALLY AS NEEDED FOR DRY SKIN 226 g 3    sodium chloride (DEEP SEA NASAL SPRAY) 0.65 % nasal spray SPRAY 1 SPRAY IN EACH NOSTRIL FOUR TIMES A DAY AS NEEDED FOR CONGESTION 44 mL 3    tamsulosin (FLOMAX) 0.4 MG capsule TAKE ONE CAPSULE BY MOUTH ONCE DAILY 90 capsule 2    triamcinolone (KENALOG) 0.1 % external cream Apply sparingly once or twice per day as needed to affected area until the skin is better, then stop; REPEAT AS NEEDED 80 g 1    Vitamin D3 50 mcg (2000 units) tablet Take 1 tablet (50 mcg) by mouth daily 100 tablet 1       Patient Active Problem List   Diagnosis    Esophageal reflux    History of cocaine use in remission    Hyperlipidemia    Anxiety    Restless legs syndrome (RLS)    Type 2 diabetes mellitus with circulatory disorder, without long-term current use of insulin (H)    [facility]    Coronary artery disease involving autologous artery coronary bypass graft without angina pectoris    BPH (benign prostatic hypertrophy)    Class 1 obesity with serious comorbidity and body mass index (BMI) of 34.0 to 34.9 in adult, unspecified obesity  type    Benign essential hypertension; goal < 140/90    History of tobacco abuse    Incomplete RBBB    Right knee pain, unspecified chronicity    Benign essential tremor    Tinnitus, left    Severe obesity (BMI 35.0-35.9 with comorbidity) (H)    Hyperlipidemia LDL goal <100    Right inguinal hernia    Right hydrocele    S/P CABG x 3    NSTEMI (non-ST elevated myocardial infarction) (H)    Mixed, or nondependent drug abuse    Ascending aorta dilatation (H24)    ACP (advance care planning)       Past Medical History:   Diagnosis Date    Anxiety     Ascending aorta dilation (H)     CAD (coronary artery disease) Dec 2014    s/p CABGx3 Dec 2014 and later RCA stent July 2015    Diabetes mellitus (H)     DM2 (diabetes mellitus, type 2) (H) Dx June 2015    A1c 6.5% at time of dx    Epididymitis, right     Essential tremor     GERD (gastroesophageal reflux disease)     Heart attack (H) 12/2014, 6/2015, 7/4/2015    x3    Hernia, abdominal     History of cocaine abuse (H)     Smoked crack cocaine (remission since Nov 2014)    History of tobacco abuse     Hyperlipidemia     Hyperlipidemia LDL goal <100     chronic hypertriglyceredemia, regular elevation 300-400    Incomplete RBBB     Inguinal hernia     Left    Numbness and tingling     diabetic neuropathy    Prediabetes Dec 2014    Restless leg syndrome     S/P CABG x 3 Dec 2014    Severe obesity (BMI 35.0-35.9 with comorbidity) (H) 12/24/2018    BMI 37.5    Sleep apnea     mild, does not use CPAP    Stented coronary artery        Past Surgical History:   Procedure Laterality Date    DAVINCI XI HERNIORRHAPHY INGUINAL Right 7/31/2020    Procedure: ROBOTIC ASSITED RIGHT INGUINAL HERNIA REPAIR WITH MESH;  Surgeon: Haim Green MD;  Location:  OR    HERNIORRHAPHY INGUINAL Left 9/20/2016    Procedure: HERNIORRHAPHY INGUINAL;  Surgeon: Haim Green MD;  Location:  [facility]    HYDROCELECTOMY SCROTAL Right 3/23/2021    Procedure: Right HYDROCELECTOMY, SCROTAL  APPROACH with spermatic cord block;  Surgeon: Blaise Altamirano MD;  Location: [facility] OR    STENT  2015    RCA stent    [facility] CABG, VEIN, THREE  2014    [facility]    [facility] NONSPECIFIC PROCEDURE  age 15    both feet bone spur removal in the arch of the foot    [facility] DRUG-ELUTING STENTS, SINGLE  10/19/2018    Left circumflex at [facility]       Family History   Problem Relation Age of Onset    Diabetes Mother         type 2    Breast Cancer Mother     Macular Degeneration Mother     Dementia Mother     Coronary Artery Disease Father 52    Heart Disease Paternal Uncle     Diabetes Maternal Grandfather     Breast Cancer Maternal Grandmother     Prostate Cancer No family hx of        Social History     Tobacco Use    Smoking status: Former     Packs/day: 0.50     Years: 25.00     Additional pack years: 0.00     Total pack years: 12.50     Types: Cigarettes     Quit date: 2015     Years since quittin.2    Smokeless tobacco: Never   Substance Use Topics    Alcohol use: No     Alcohol/week: 0.0 standard drinks of alcohol         Exam:    Vitals: /76   Pulse 88   BMI: There is no height or weight on file to calculate BMI.  Height: Data Unavailable    Constitutional/ general:  Pt is in no apparent distress, appears well-nourished.  Cooperative with history and physical exam.     Psych:  The patient answered questions appropriately.  Normal affect.  Seems to have reasonable expectations, in terms of treatment.       Lungs:  Non labored breathing, non labored speech. No cough.  No audible wheezing. Even, quiet breathing.       Vascular:  positive pedal pulses bilaterally.  CFT < 3 sec. bilateral lower extremity edema and varicosities noted.  Positive pedal hair growth.    Neuro:  Alert and oriented x 3. Coordinated gait.  Monofilament absent to ankles    Derm: Normal texture and turgor.  Hemosiderin deposits noted in legs.    Musculoskeletal:   Pronated arch.  Decreased range of motion bilateral first MTPJ's.   Long right second toe.  There is a wound on the end.  It is plantar distal central.  It measures 3 x 2 mm.  Only full-thickness.  Base excellent looking granulation tissue.  Some erythema around the DIPJ distally.  No sinus tracts purulence or odor.  No crepitus.  Edema noted around right ankle.  Hard to locate pain in any 1 area.  No ecchymosis erythema or crepitus.    X-rays show no signs of ankle fracture or arthritis.  Foot x-rays show no signs of any Charcot breakdown.  Hallux limitus noted.    A:  Diabetes mellitus with peripheral neuropathy and LOPS  Right second toe ulcer with surrounding cellulitis  Right ankle edema with history of fracture    P: X-rays taken today of right foot and ankle.  Discussed with patient these are unremarkable.  Edema could be from cellulitis or possibly posterior tibial tendinitis.  Charcot foot also possibility.  Will apply ankle brace on here today to support rear foot.  Culture taken of toe.  Start Keflex.  Will just daily with Medihoney.  Dispensed DH shoe to offload this.  Prescription written for P wearing orthotics and diabetic shoes.  Encourage patient to get these to help support foot and prevent problems in the future.  Return to clinic in 1 week to discuss culture results and reevaluate lower extremity.    Frank Adkins DPM, FACFAS

## 2024-02-17 LAB
BACTERIA SKIN AEROBE CULT: ABNORMAL
BACTERIA SKIN AEROBE CULT: ABNORMAL
GRAM STAIN RESULT: ABNORMAL
GRAM STAIN RESULT: ABNORMAL

## 2024-02-21 ENCOUNTER — OFFICE VISIT (OUTPATIENT)
Dept: PODIATRY | Facility: CLINIC | Age: 59
End: 2024-02-21
Payer: COMMERCIAL

## 2024-02-21 VITALS
WEIGHT: 258 LBS | DIASTOLIC BLOOD PRESSURE: 82 MMHG | HEART RATE: 78 BPM | SYSTOLIC BLOOD PRESSURE: 126 MMHG | BODY MASS INDEX: 32.25 KG/M2

## 2024-02-21 DIAGNOSIS — E11.621 DIABETIC ULCER OF TOE OF RIGHT FOOT ASSOCIATED WITH TYPE 2 DIABETES MELLITUS, UNSPECIFIED ULCER STAGE (H): Primary | ICD-10-CM

## 2024-02-21 DIAGNOSIS — M76.821 POSTERIOR TIBIAL TENDINITIS, RIGHT: ICD-10-CM

## 2024-02-21 DIAGNOSIS — L97.519 DIABETIC ULCER OF TOE OF RIGHT FOOT ASSOCIATED WITH TYPE 2 DIABETES MELLITUS, UNSPECIFIED ULCER STAGE (H): Primary | ICD-10-CM

## 2024-02-21 DIAGNOSIS — M21.6X1 PRONATION OF BOTH FEET: ICD-10-CM

## 2024-02-21 DIAGNOSIS — L03.115 CELLULITIS OF FOOT, RIGHT: ICD-10-CM

## 2024-02-21 DIAGNOSIS — M21.6X2 PRONATION OF BOTH FEET: ICD-10-CM

## 2024-02-21 PROCEDURE — 99214 OFFICE O/P EST MOD 30 MIN: CPT | Performed by: PODIATRIST

## 2024-02-21 RX ORDER — CEPHALEXIN 500 MG/1
500 CAPSULE ORAL 4 TIMES DAILY
Qty: 28 CAPSULE | Refills: 0 | Status: SHIPPED | OUTPATIENT
Start: 2024-02-21

## 2024-02-21 NOTE — LETTER
2/21/2024         RE: Javier Markham  [address]  [facility] [address]        Dear Colleague,    Thank you for referring your patient, Javier Markham, to the [facility]. Please see a copy of my visit note below.    Subjective:    2/14/24   Pt is seen today for swelling around his right ankle.  He has had this for 1 week.  Some pain associated with swelling.  Denies weakness or increased deformity.  Has history of right ankle fracture last year.  Treated conservatively and had no problems with this until just recently.  States his current wound in his groin.  He has diabetes which is poorly controlled with peripheral neuropathy in his feet.  Saw patient 1 year ago and recommended orthotics for his pronation and diabetic shoes.  He never got these.  He denies any fever or chills.  States he had some dizziness yesterday.  States his blood sugars have not been more elevated than normal recently.  Patient not working and on disability because he has had 3 heart attacks.    2/21/24 patient returns for evaluation of his right lower extremity.  Patient states after taking Keflex for 1 day his right lower extremity swelling was significantly better.  He had a wound on his groin which she states is mostly healed.  He is using the DH shoe.  Dressing second toe daily.  No longer drainage.  States around the right ankle is decreased but he is still having a lot of pain.  He points to the posterior tibial tendon close to the insertion.  Has history of bilateral medial foot surgery in the past.    ROS:  see above         Allergies   Allergen Reactions     Perfume      Soap      Perfume in soap-reaction excema       Current Outpatient Medications   Medication Sig Dispense Refill     acetaminophen (TYLENOL) 325 MG tablet Take 2 tablets (650 mg) by mouth every 4 hours as needed for mild pain 50 tablet 0     albuterol (PROAIR HFA/PROVENTIL HFA/VENTOLIN HFA) 108 (90 Base) MCG/ACT inhaler  Inhale 2 puffs into the lungs every 4 hours as needed for shortness of breath / dyspnea or wheezing 8.5 g 3     Alcohol Swabs (B-D SINGLE USE SWABS REGULAR) PADS USE AS NEEDED 200 each 0     ASPIRIN LOW DOSE 81 MG EC tablet TAKE ONE TABLET BY MOUTH ONCE DAILY 90 tablet 1     azelastine (ASTELIN) 0.1 % nasal spray Spray 1 spray into both nostrils every evening       blood glucose (NO BRAND SPECIFIED) test strip Use to test blood sugar 2 times daily or as directed. 200 strip 11     blood glucose monitoring (NO BRAND SPECIFIED) meter device kit Use to test blood sugar 1 times daily or as directed. ONE TOUCH VERIO. 1 kit 0     cephALEXin (KEFLEX) 500 MG capsule Take 1 capsule (500 mg) by mouth 4 times daily 40 capsule 0     clonazePAM (KLONOPIN) 0.5 MG tablet TAKE ONE TABLET BY MOUTH AT BEDTIME AS NEEDED FOR RESTLESS LEGS 30 tablet 1     clopidogrel (PLAVIX) 75 MG tablet TAKE ONE TABLET BY MOUTH ONCE DAILY 90 tablet 3     cyanocobalamin (VITAMIN B-12) 1000 MCG tablet TAKE ONE TABLET BY MOUTH ONCE DAILY 100 tablet 1     evolocumab (REPATHA) 140 MG/ML prefilled autoinjector Inject 140 mg Subcutaneous every 14 days       ezetimibe (ZETIA) 10 MG tablet Take 1 tablet (10 mg) by mouth At Bedtime 90 tablet 1     fenofibrate micronized (LOFIBRA) 67 MG capsule Take 67 mg by mouth       FEROSUL 325 (65 Fe) MG tablet TAKE 1 TABLET (325 MG) BY MOUTH EVERY EVENING 60 tablet 0     fluticasone (FLONASE) 50 MCG/ACT nasal spray SPRAY TWO SPRAYS INTO BOTH NOSTRILS DAILY 48 g 1     gabapentin (NEURONTIN) 300 MG capsule TAKE 3 CAPSULES BY MOUTH 3 TIMES DAILY. 810 capsule 0     hydrOXYzine HCl (ATARAX) 25 MG tablet TAKE ONE TO TWO TABLETS BY MOUTH EVERY 6 HOURS AS NEEDED FOR ANXIETY 120 tablet 0     ibuprofen (ADVIL/MOTRIN) 200 MG tablet Take 200 mg by mouth every 4 hours as needed for mild pain       JANUVIA 50 MG tablet TAKE ONE TABLET BY MOUTH ONCE DAILY 90 tablet 1     JARDIANCE 10 MG TABS tablet TAKE 1 TABLET (10 MG) BY MOUTH DAILY DUE  FOR APPOINTMENT. PLEASE SCHEDULE: [phone] 90 tablet 0     lisinopril (ZESTRIL) 10 MG tablet TAKE ONE TABLET BY MOUTH ONCE DAILY 90 tablet 2     loratadine (CLARITIN) 10 MG tablet TAKE ONE TABLET BY MOUTH ONCE DAILY 90 tablet 1     Melatonin 10 MG TABS tablet Take 10 mg by mouth nightly as needed for sleep       meloxicam (MOBIC) 15 MG tablet Take 1 tablet (15 mg) by mouth daily 10 tablet 0     metFORMIN (GLUCOPHAGE) 500 MG tablet TAKE TWO TABLETS BY MOUTH TWICE A DAY WITH MEALS 360 tablet 0     metoprolol succinate ER (TOPROL XL) 25 MG 24 hr tablet TAKE ONE TABLET BY MOUTH TWICE A  tablet 0     minocycline (DYNACIN) 100 MG tablet Take 1 tablet (100 mg) by mouth daily 30 tablet 0     nitroGLYcerin (NITROSTAT) 0.4 MG sublingual tablet PLACE 1 TABLET UNDER THE TONGUE AT THE ONSET OF CHEST PAIN. MAY REPEAT EVERY 5 MINUTES AS NEEDED FOR A TOTAL OF 3 DOSES. 25 tablet 1     omega-3 acid ethyl esters (LOVAZA) 1 g capsule TAKE TWO CAPSULES BY MOUTH TWO TIMES A  capsule 0     omeprazole (PRILOSEC) 20 MG DR capsule TAKE ONE CAPSULE BY MOUTH TWICE A DAY 30 TO 60 MINUTES BEFORE A MEAL 180 capsule 0     ONE TOUCH VERIO IQ test strip TEST ONCE DAILY OR AS DIRECTED 100 each 5     OneTouch Delica Lancets 33G MISC 1 Act by Device route 2 times daily 200 each 0     polyethylene glycol (MIRALAX/GLYCOLAX) powder Take 17 g by mouth daily as needed for constipation 500 g 5     pramipexole (MIRAPEX) 0.75 MG tablet TAKE ONE TABLET BY MOUTH EVERY NIGHT AT BEDTIME 90 tablet 2     rosuvastatin (CRESTOR) 40 MG tablet Take 1 tablet (40 mg) by mouth daily       senna-docusate (SENOKOT-S/PERICOLACE) 8.6-50 MG tablet Take 1-2 tablets by mouth 2 times daily (Patient taking differently: Take 1 tablet by mouth daily) 30 tablet 0     Skin Protectants, Misc. (HYDROCERIN) CREA APPLY TOPICALLY AS NEEDED FOR DRY SKIN 226 g 3     sodium chloride (DEEP SEA NASAL SPRAY) 0.65 % nasal spray SPRAY 1 SPRAY IN EACH NOSTRIL FOUR TIMES A DAY AS  NEEDED FOR CONGESTION 44 mL 3     tamsulosin (FLOMAX) 0.4 MG capsule TAKE ONE CAPSULE BY MOUTH ONCE DAILY 90 capsule 2     triamcinolone (KENALOG) 0.1 % external cream Apply sparingly once or twice per day as needed to affected area until the skin is better, then stop; REPEAT AS NEEDED 80 g 1     Vitamin D3 50 mcg (2000 units) tablet Take 1 tablet (50 mcg) by mouth daily 100 tablet 1       Patient Active Problem List   Diagnosis     Esophageal reflux     History of cocaine use in remission     Hyperlipidemia     Anxiety     Restless legs syndrome (RLS)     Type 2 diabetes mellitus with circulatory disorder, without long-term current use of insulin (H)     Health [facility]     Coronary artery disease involving autologous artery coronary bypass graft without angina pectoris     BPH (benign prostatic hypertrophy)     Class 1 obesity with serious comorbidity and body mass index (BMI) of 34.0 to 34.9 in adult, unspecified obesity type     Benign essential hypertension; goal < 140/90     History of tobacco abuse     Incomplete RBBB     Right knee pain, unspecified chronicity     Benign essential tremor     Tinnitus, left     Severe obesity (BMI 35.0-35.9 with comorbidity) (H)     Hyperlipidemia LDL goal <100     Right inguinal hernia     Right hydrocele     S/P CABG x 3     NSTEMI (non-ST elevated myocardial infarction) (H)     Mixed, or nondependent drug abuse     Ascending aorta dilatation (H24)     ACP (advance care planning)       Past Medical History:   Diagnosis Date     Anxiety      Ascending aorta dilation (H)      CAD (coronary artery disease) Dec 2014    s/p CABGx3 Dec 2014 and later RCA stent July 2015     Diabetes mellitus (H)      DM2 (diabetes mellitus, type 2) (H) Dx June 2015    A1c 6.5% at time of dx     Epididymitis, right      Essential tremor      GERD (gastroesophageal reflux disease)      Heart attack (H) 12/2014, 6/2015, 7/4/2015    x3     Hernia, abdominal      History of cocaine abuse (H)      Smoked crack cocaine (remission since 2014)     History of tobacco abuse      Hyperlipidemia      Hyperlipidemia LDL goal <100     chronic hypertriglyceredemia, regular elevation 300-400     Incomplete RBBB      Inguinal hernia     Left     Numbness and tingling     diabetic neuropathy     Prediabetes Dec 2014     Restless leg syndrome      S/P CABG x 3 Dec 2014     Severe obesity (BMI 35.0-35.9 with comorbidity) (H) 2018    BMI 37.5     Sleep apnea     mild, does not use CPAP     Stented coronary artery        Past Surgical History:   Procedure Laterality Date     DAVINCI XI HERNIORRHAPHY INGUINAL Right 2020    Procedure: ROBOTIC ASSITED RIGHT INGUINAL HERNIA REPAIR WITH MESH;  Surgeon: Haim Green MD;  Location:  OR     HERNIORRHAPHY INGUINAL Left 2016    Procedure: HERNIORRHAPHY INGUINAL;  Surgeon: Haim Green MD;  Location:  SD     HYDROCELECTOMY SCROTAL Right 3/23/2021    Procedure: Right HYDROCELECTOMY, SCROTAL APPROACH with spermatic cord block;  Surgeon: Blaise Altamirano MD;  Location:  OR     STENT  2015    RCA stent     [facility] CABG, VEIN, THREE  2014    [facility]     [facility] NONSPECIFIC PROCEDURE  age 15    both feet bone spur removal in the arch of the foot     [facility]  [facility]    Left circumflex at [facility]       Family History   Problem Relation Age of Onset     Diabetes Mother         type 2     Breast Cancer Mother      Macular Degeneration Mother      Dementia Mother      Coronary Artery Disease Father 52     Heart Disease Paternal Uncle      Diabetes Maternal Grandfather      Breast Cancer Maternal Grandmother      Prostate Cancer No family hx of        Social History     Tobacco Use     Smoking status: Former     Packs/day: 0.50     Years: 25.00     Additional pack years: 0.00     Total pack years: 12.50     Types: Cigarettes     Quit date: 2015     Years since quittin.3     Smokeless tobacco: Never    Substance Use Topics     Alcohol use: No     Alcohol/week: 0.0 standard drinks of alcohol         Exam:    Vitals: There were no vitals taken for this visit.  BMI: There is no height or weight on file to calculate BMI.  Height: Data Unavailable    Constitutional/ general:  Pt is in no apparent distress, appears well-nourished.  Cooperative with history and physical exam.     Psych:  The patient answered questions appropriately.  Normal affect.  Seems to have reasonable expectations, in terms of treatment.       Lungs:  Non labored breathing, non labored speech. No cough.  No audible wheezing. Even, quiet breathing.       Vascular:  positive pedal pulses bilaterally.  CFT < 3 sec. bilateral lower extremity edema and varicosities noted with right greater than left.  Positive pedal hair growth.    Neuro:  Alert and oriented x 3. Coordinated gait.  Monofilament absent to ankles    Derm: Normal texture and turgor.  Hemosiderin deposits noted in legs.    Musculoskeletal:   Pronated arch bilaterally right greater than left.  Right increased internal tibial torsion noted.  Pain with palpation posterior tibial tendon close to insertion.  No pain posterior to medial malleoli.  No pain on the dorsum of the navicular.  Tibialis anterior intact.  Edema still present medial ankle but decreased.  Still slight erythema.  Long right second toe.  There is a wound on the end plantar distal central in the past measured 3 x 2 mm and today measures 1 x 1 mm.  It is now dry eschar that is healing.  Less edema and erythema minimal now.  Bilateral pronation right greater than left  Collected 2/14/2024  8:19 AM       Status: Final result       Visible to patient: No (inaccessible in MyChart)       Dx: Edema of right lower extremity; Diabe...    Specimen Information: Foot, Right; Skin   0 Result Notes  Culture 4+ Streptococcus dysgalactiae (Group C/G Streptococcus) Abnormal    This organism is susceptible to ampicillin, penicillin,  vancomycin and the cephalosporins. If treatment is required and your patient is allergic to penicillin, contact the microbiology lab within 5 days to request susceptibility testing.   3+ Staphylococcus aureus Abnormal                Gram Stain Result  Abnormal   4+ Gram positive cocci      No white blood cells seen              Resulting Agency: [facility]     Susceptibility     Staphylococcus aureus     JCARLOS     Clindamycin 0.25 ug/mL Susceptible 1     Daptomycin 1 ug/mL Susceptible     Doxycycline 4 ug/mL Susceptible     Erythromycin >=8 ug/mL Resistant     Gentamicin <=0.5 ug/mL Susceptible     Oxacillin 0.5 ug/mL Susceptible 2     Tetracycline >=16 ug/mL Resistant     Trimethoprim/Sulfamethoxazole <=0.5/9.5 u... Susceptible     Vancomycin 1 ug/mL Susceptible                         X-rays show no signs of ankle fracture or arthritis.  Foot x-rays show no signs of any Charcot breakdown.  Hallux limitus noted.    A:  Diabetes mellitus with peripheral neuropathy and LOPS  Right second toe ulcer with surrounding cellulitis  Bilateral pronation right greater than left  Right medial ankle pain consistent with posterior tibial tendinitis    P:   Discussed right second toe wound much improved.  He no longer needs to dress this.  Discussed culture results.  Will continue Keflex for additional week and then will stop.  Discussed right medial ankle pain could be from posterior tibial tendinitis.  Explained how increased pronation will put more stress on this tendon.  Discussed MRI for further evaluation.  He is in agreement.  We wrote an order for this.  Discussed compression stockings for his lower extremity edema and varicose veins.  He declines.  We did write a prescription for an elastic ankle brace.  Discussed additional offloading of posterior tibial tendon with cam walker in addition to Ace bandage.  Send so painful he would like to do this.  He has dogs he has to let out several times a day.  Will dispense cam walker  today.  Patient to wear this at all times while walking.  When not walking patient will take this off and do ROM to prevent blood clot and joint stiffness.  Patient will not sleep with this on.  Patient will return to come and see me several days after MRI to reevaluate.  Discussed once medial ankle better will need additional support for his foot and discussed orthotics and Robb brace.      Frank Adkins DPM, FACFAS              Again, thank you for allowing me to participate in the care of your patient.        Sincerely,        Frank Adkins DPM

## 2024-02-21 NOTE — PROGRESS NOTES
Subjective:    2/14/24   Pt is seen today for swelling around his right ankle.  He has had this for 1 week.  Some pain associated with swelling.  Denies weakness or increased deformity.  Has history of right ankle fracture last year.  Treated conservatively and had no problems with this until just recently.  States his current wound in his groin.  He has diabetes which is poorly controlled with peripheral neuropathy in his feet.  Saw patient 1 year ago and recommended orthotics for his pronation and diabetic shoes.  He never got these.  He denies any fever or chills.  States he had some dizziness yesterday.  States his blood sugars have not been more elevated than normal recently.  Patient not working and on disability because he has had 3 heart attacks.    2/21/24 patient returns for evaluation of his right lower extremity.  Patient states after taking Keflex for 1 day his right lower extremity swelling was significantly better.  He had a wound on his groin which she states is mostly healed.  He is using the DH shoe.  Dressing second toe daily.  No longer drainage.  States around the right ankle is decreased but he is still having a lot of pain.  He points to the posterior tibial tendon close to the insertion.  Has history of bilateral medial foot surgery in the past.    ROS:  see above         Allergies   Allergen Reactions    Perfume     Soap      Perfume in soap-reaction excema       Current Outpatient Medications   Medication Sig Dispense Refill    acetaminophen (TYLENOL) 325 MG tablet Take 2 tablets (650 mg) by mouth every 4 hours as needed for mild pain 50 tablet 0    albuterol (PROAIR HFA/PROVENTIL HFA/VENTOLIN HFA) 108 (90 Base) MCG/ACT inhaler Inhale 2 puffs into the lungs every 4 hours as needed for shortness of breath / dyspnea or wheezing 8.5 g 3    Alcohol Swabs (B-D SINGLE USE SWABS REGULAR) PADS USE AS NEEDED 200 each 0    ASPIRIN LOW DOSE 81 MG EC tablet TAKE ONE TABLET BY MOUTH ONCE DAILY 90 tablet  1    azelastine (ASTELIN) 0.1 % nasal spray Spray 1 spray into both nostrils every evening      blood glucose (NO BRAND SPECIFIED) test strip Use to test blood sugar 2 times daily or as directed. 200 strip 11    blood glucose monitoring (NO BRAND SPECIFIED) meter device kit Use to test blood sugar 1 times daily or as directed. ONE TOUCH VERIO. 1 kit 0    cephALEXin (KEFLEX) 500 MG capsule Take 1 capsule (500 mg) by mouth 4 times daily 40 capsule 0    clonazePAM (KLONOPIN) 0.5 MG tablet TAKE ONE TABLET BY MOUTH AT BEDTIME AS NEEDED FOR RESTLESS LEGS 30 tablet 1    clopidogrel (PLAVIX) 75 MG tablet TAKE ONE TABLET BY MOUTH ONCE DAILY 90 tablet 3    cyanocobalamin (VITAMIN B-12) 1000 MCG tablet TAKE ONE TABLET BY MOUTH ONCE DAILY 100 tablet 1    evolocumab (REPATHA) 140 MG/ML prefilled autoinjector Inject 140 mg Subcutaneous every 14 days      ezetimibe (ZETIA) 10 MG tablet Take 1 tablet (10 mg) by mouth At Bedtime 90 tablet 1    fenofibrate micronized (LOFIBRA) 67 MG capsule Take 67 mg by mouth      FEROSUL 325 (65 Fe) MG tablet TAKE 1 TABLET (325 MG) BY MOUTH EVERY EVENING 60 tablet 0    fluticasone (FLONASE) 50 MCG/ACT nasal spray SPRAY TWO SPRAYS INTO BOTH NOSTRILS DAILY 48 g 1    gabapentin (NEURONTIN) 300 MG capsule TAKE 3 CAPSULES BY MOUTH 3 TIMES DAILY. 810 capsule 0    hydrOXYzine HCl (ATARAX) 25 MG tablet TAKE ONE TO TWO TABLETS BY MOUTH EVERY 6 HOURS AS NEEDED FOR ANXIETY 120 tablet 0    ibuprofen (ADVIL/MOTRIN) 200 MG tablet Take 200 mg by mouth every 4 hours as needed for mild pain      JANUVIA 50 MG tablet TAKE ONE TABLET BY MOUTH ONCE DAILY 90 tablet 1    JARDIANCE 10 MG TABS tablet TAKE 1 TABLET (10 MG) BY MOUTH DAILY DUE FOR APPOINTMENT. PLEASE SCHEDULE: [phone] 90 tablet 0    lisinopril (ZESTRIL) 10 MG tablet TAKE ONE TABLET BY MOUTH ONCE DAILY 90 tablet 2    loratadine (CLARITIN) 10 MG tablet TAKE ONE TABLET BY MOUTH ONCE DAILY 90 tablet 1    Melatonin 10 MG TABS tablet Take 10 mg by mouth  nightly as needed for sleep      meloxicam (MOBIC) 15 MG tablet Take 1 tablet (15 mg) by mouth daily 10 tablet 0    metFORMIN (GLUCOPHAGE) 500 MG tablet TAKE TWO TABLETS BY MOUTH TWICE A DAY WITH MEALS 360 tablet 0    metoprolol succinate ER (TOPROL XL) 25 MG 24 hr tablet TAKE ONE TABLET BY MOUTH TWICE A  tablet 0    minocycline (DYNACIN) 100 MG tablet Take 1 tablet (100 mg) by mouth daily 30 tablet 0    nitroGLYcerin (NITROSTAT) 0.4 MG sublingual tablet PLACE 1 TABLET UNDER THE TONGUE AT THE ONSET OF CHEST PAIN. MAY REPEAT EVERY 5 MINUTES AS NEEDED FOR A TOTAL OF 3 DOSES. 25 tablet 1    omega-3 acid ethyl esters (LOVAZA) 1 g capsule TAKE TWO CAPSULES BY MOUTH TWO TIMES A  capsule 0    omeprazole (PRILOSEC) 20 MG DR capsule TAKE ONE CAPSULE BY MOUTH TWICE A DAY 30 TO 60 MINUTES BEFORE A MEAL 180 capsule 0    ONE TOUCH VERIO IQ test strip TEST ONCE DAILY OR AS DIRECTED 100 each 5    OneTouch Delica Lancets 33G MISC 1 Act by Device route 2 times daily 200 each 0    polyethylene glycol (MIRALAX/GLYCOLAX) powder Take 17 g by mouth daily as needed for constipation 500 g 5    pramipexole (MIRAPEX) 0.75 MG tablet TAKE ONE TABLET BY MOUTH EVERY NIGHT AT BEDTIME 90 tablet 2    rosuvastatin (CRESTOR) 40 MG tablet Take 1 tablet (40 mg) by mouth daily      senna-docusate (SENOKOT-S/PERICOLACE) 8.6-50 MG tablet Take 1-2 tablets by mouth 2 times daily (Patient taking differently: Take 1 tablet by mouth daily) 30 tablet 0    Skin Protectants, Misc. (HYDROCERIN) CREA APPLY TOPICALLY AS NEEDED FOR DRY SKIN 226 g 3    sodium chloride (DEEP SEA NASAL SPRAY) 0.65 % nasal spray SPRAY 1 SPRAY IN EACH NOSTRIL FOUR TIMES A DAY AS NEEDED FOR CONGESTION 44 mL 3    tamsulosin (FLOMAX) 0.4 MG capsule TAKE ONE CAPSULE BY MOUTH ONCE DAILY 90 capsule 2    triamcinolone (KENALOG) 0.1 % external cream Apply sparingly once or twice per day as needed to affected area until the skin is better, then stop; REPEAT AS NEEDED 80 g 1     Vitamin D3 50 mcg (2000 units) tablet Take 1 tablet (50 mcg) by mouth daily 100 tablet 1       Patient Active Problem List   Diagnosis    Esophageal reflux    History of cocaine use in remission    Hyperlipidemia    Anxiety    Restless legs syndrome (RLS)    Type 2 diabetes mellitus with circulatory disorder, without long-term current use of insulin (H)    Health Care Home    Coronary artery disease involving autologous artery coronary bypass graft without angina pectoris    BPH (benign prostatic hypertrophy)    Class 1 obesity with serious comorbidity and body mass index (BMI) of 34.0 to 34.9 in adult, unspecified obesity type    Benign essential hypertension; goal < 140/90    History of tobacco abuse    Incomplete RBBB    Right knee pain, unspecified chronicity    Benign essential tremor    Tinnitus, left    Severe obesity (BMI 35.0-35.9 with comorbidity) (H)    Hyperlipidemia LDL goal <100    Right inguinal hernia    Right hydrocele    S/P CABG x 3    NSTEMI (non-ST elevated myocardial infarction) (H)    Mixed, or nondependent drug abuse    Ascending aorta dilatation (H24)    ACP (advance care planning)       Past Medical History:   Diagnosis Date    Anxiety     Ascending aorta dilation (H)     CAD (coronary artery disease) Dec 2014    s/p CABGx3 Dec 2014 and later RCA stent July 2015    Diabetes mellitus (H)     DM2 (diabetes mellitus, type 2) (H) Dx June 2015    A1c 6.5% at time of dx    Epididymitis, right     Essential tremor     GERD (gastroesophageal reflux disease)     Heart attack (H) 12/2014, 6/2015, 7/4/2015    x3    Hernia, abdominal     History of cocaine abuse (H)     Smoked crack cocaine (remission since Nov 2014)    History of tobacco abuse     Hyperlipidemia     Hyperlipidemia LDL goal <100     chronic hypertriglyceredemia, regular elevation 300-400    Incomplete RBBB     Inguinal hernia     Left    Numbness and tingling     diabetic neuropathy    Prediabetes Dec 2014    Restless leg syndrome      S/P CABG x 3 Dec 2014    Severe obesity (BMI 35.0-35.9 with comorbidity) (H) 2018    BMI 37.5    Sleep apnea     mild, does not use CPAP    Stented coronary artery        Past Surgical History:   Procedure Laterality Date    DAVVIVIANI XI HERNIORRHAPHY INGUINAL Right 2020    Procedure: ROBOTIC ASSITED RIGHT INGUINAL HERNIA REPAIR WITH MESH;  Surgeon: Haim Green MD;  Location:  OR    HERNIORRHAPHY INGUINAL Left 2016    Procedure: HERNIORRHAPHY INGUINAL;  Surgeon: Haim Green MD;  Location:  SD    HYDROCELECTOMY SCROTAL Right 3/23/2021    Procedure: Right HYDROCELECTOMY, SCROTAL APPROACH with spermatic cord block;  Surgeon: Blaise Altamirano MD;  Location:  OR    STENT  2015    RCA stent    [facility] CABG, VEIN, THREE  2014    [facility]    [facility] NONSPECIFIC PROCEDURE  age 15    both feet bone spur removal in the arch of the foot    [facility] DRUG-ELUTING STENTS, SINGLE  10/19/2018    Left circumflex at [facility]       Family History   Problem Relation Age of Onset    Diabetes Mother         type 2    Breast Cancer Mother     Macular Degeneration Mother     Dementia Mother     Coronary Artery Disease Father 52    Heart Disease Paternal Uncle     Diabetes Maternal Grandfather     Breast Cancer Maternal Grandmother     Prostate Cancer No family hx of        Social History     Tobacco Use    Smoking status: Former     Packs/day: 0.50     Years: 25.00     Additional pack years: 0.00     Total pack years: 12.50     Types: Cigarettes     Quit date: 2015     Years since quittin.3    Smokeless tobacco: Never   Substance Use Topics    Alcohol use: No     Alcohol/week: 0.0 standard drinks of alcohol         Exam:    Vitals: There were no vitals taken for this visit.  BMI: There is no height or weight on file to calculate BMI.  Height: Data Unavailable    Constitutional/ general:  Pt is in no apparent distress, appears well-nourished.  Cooperative with history and physical exam.      Psych:  The patient answered questions appropriately.  Normal affect.  Seems to have reasonable expectations, in terms of treatment.       Lungs:  Non labored breathing, non labored speech. No cough.  No audible wheezing. Even, quiet breathing.       Vascular:  positive pedal pulses bilaterally.  CFT < 3 sec. bilateral lower extremity edema and varicosities noted with right greater than left.  Positive pedal hair growth.    Neuro:  Alert and oriented x 3. Coordinated gait.  Monofilament absent to ankles    Derm: Normal texture and turgor.  Hemosiderin deposits noted in legs.    Musculoskeletal:   Pronated arch bilaterally right greater than left.  Right increased internal tibial torsion noted.  Pain with palpation posterior tibial tendon close to insertion.  No pain posterior to medial malleoli.  No pain on the dorsum of the navicular.  Tibialis anterior intact.  Edema still present medial ankle but decreased.  Still slight erythema.  Long right second toe.  There is a wound on the end plantar distal central in the past measured 3 x 2 mm and today measures 1 x 1 mm.  It is now dry eschar that is healing.  Less edema and erythema minimal now.  Bilateral pronation right greater than left  Collected 2/14/2024  8:19 AM       Status: Final result       Visible to patient: No (inaccessible in MyChart)       Dx: Edema of right lower extremity; Diabe...    Specimen Information: Foot, Right; Skin   0 Result Notes  Culture 4+ Streptococcus dysgalactiae (Group C/G Streptococcus) Abnormal    This organism is susceptible to ampicillin, penicillin, vancomycin and the cephalosporins. If treatment is required and your patient is allergic to penicillin, contact the microbiology lab within 5 days to request susceptibility testing.   3+ Staphylococcus aureus Abnormal                Gram Stain Result  Abnormal   4+ Gram positive cocci      No white blood cells seen              Resulting Agency: [facility]     Susceptibility      Staphylococcus aureus     JCARLOS     Clindamycin 0.25 ug/mL Susceptible 1     Daptomycin 1 ug/mL Susceptible     Doxycycline 4 ug/mL Susceptible     Erythromycin >=8 ug/mL Resistant     Gentamicin <=0.5 ug/mL Susceptible     Oxacillin 0.5 ug/mL Susceptible 2     Tetracycline >=16 ug/mL Resistant     Trimethoprim/Sulfamethoxazole <=0.5/9.5 u... Susceptible     Vancomycin 1 ug/mL Susceptible                         X-rays show no signs of ankle fracture or arthritis.  Foot x-rays show no signs of any Charcot breakdown.  Hallux limitus noted.    A:  Diabetes mellitus with peripheral neuropathy and LOPS  Right second toe ulcer with surrounding cellulitis  Bilateral pronation right greater than left  Right medial ankle pain consistent with posterior tibial tendinitis    P:   Discussed right second toe wound much improved.  He no longer needs to dress this.  Discussed culture results.  Will continue Keflex for additional week and then will stop.  Discussed right medial ankle pain could be from posterior tibial tendinitis.  Explained how increased pronation will put more stress on this tendon.  Discussed MRI for further evaluation.  He is in agreement.  We wrote an order for this.  Discussed compression stockings for his lower extremity edema and varicose veins.  He declines.  We did write a prescription for an elastic ankle brace.  Discussed additional offloading of posterior tibial tendon with cam walker in addition to Ace bandage.  Send so painful he would like to do this.  He has dogs he has to let out several times a day.  Will dispense cam walker today.  Patient to wear this at all times while walking.  When not walking patient will take this off and do ROM to prevent blood clot and joint stiffness.  Patient will not sleep with this on.  Patient will return to come and see me several days after MRI to reevaluate.  Discussed once medial ankle better will need additional support for his foot and discussed orthotics and  Robb delaney.      Frank Adkins, DPM, FACFAS

## 2024-02-21 NOTE — PATIENT INSTRUCTIONS
We wish you continued good healing. If you have any questions or concerns, please do not hesitate to contact us at  [phone]    [employer] (secure e-mail communication and access to your chart) to send a message or to make an appointment.    Please remember to call and schedule a follow up appointment if one was recommended at your earliest convenience.     PODIATRY CLINIC HOURS  TELEPHONE NUMBER    Dr. Frank MÉNDEZPJONAS FACFAS        Clinics:  [facility]   [place]  Tuesday 1PM-6PM  [place]  Wednesday 745AM-330PM  [place]  Monday 2nd,4th  830AM-4PM  Thursday/Friday 745AM-230PM     [place] APPOINTMENTS  [phone]    [place] APPOINTMENTS  [phone]          If you need a medication refill, please contact us you may need lab work and/or a follow up visit prior to your refill (i.e. Antifungal medications).  If MRI needed please call Imaging at [phone]   HOW DO I GET MY KNEE SCOOTER? Knee scooters can be picked up at ANY Medical Supply stores with your knee scooter Prescription.  OR  Bring your signed prescription to an [facility] showroom.   Set up an appointment for your custom Orthotics. Call any Orthotics locations call [phone]

## 2024-03-27 ENCOUNTER — HOSPITAL ENCOUNTER (OUTPATIENT)
Dept: MRI IMAGING | Facility: CLINIC | Age: 59
Discharge: HOME | End: 2024-03-27
Attending: PODIATRIST | Admitting: PODIATRIST
Payer: COMMERCIAL

## 2024-03-27 DIAGNOSIS — M76.821 POSTERIOR TIBIAL TENDINITIS, RIGHT: ICD-10-CM

## 2024-03-27 DIAGNOSIS — M21.6X2 PRONATION OF BOTH FEET: ICD-10-CM

## 2024-03-27 DIAGNOSIS — M21.6X1 PRONATION OF BOTH FEET: ICD-10-CM

## 2024-03-27 PROCEDURE — 73721 MRI JNT OF LWR EXTRE W/O DYE: CPT | Mod: 26 | Performed by: RADIOLOGY

## 2024-03-27 PROCEDURE — 73721 MRI JNT OF LWR EXTRE W/O DYE: CPT | Mod: RT

## 2024-05-01 ENCOUNTER — OFFICE VISIT (OUTPATIENT)
Dept: PODIATRY | Facility: CLINIC | Age: 59
End: 2024-05-01
Payer: COMMERCIAL

## 2024-05-01 VITALS
RESPIRATION RATE: 16 BRPM | OXYGEN SATURATION: 96 % | BODY MASS INDEX: 31.37 KG/M2 | DIASTOLIC BLOOD PRESSURE: 86 MMHG | WEIGHT: 251 LBS | SYSTOLIC BLOOD PRESSURE: 134 MMHG | HEART RATE: 68 BPM

## 2024-05-01 DIAGNOSIS — M21.6X2 PRONATION OF BOTH FEET: Primary | ICD-10-CM

## 2024-05-01 DIAGNOSIS — L97.519 DIABETIC ULCER OF TOE OF RIGHT FOOT ASSOCIATED WITH TYPE 2 DIABETES MELLITUS, UNSPECIFIED ULCER STAGE (H): ICD-10-CM

## 2024-05-01 DIAGNOSIS — E11.59 TYPE 2 DIABETES MELLITUS WITH OTHER CIRCULATORY COMPLICATION, WITHOUT LONG-TERM CURRENT USE OF INSULIN (H): ICD-10-CM

## 2024-05-01 DIAGNOSIS — M76.821 POSTERIOR TIBIAL TENDINITIS, RIGHT: ICD-10-CM

## 2024-05-01 DIAGNOSIS — M21.6X1 PRONATION OF BOTH FEET: Primary | ICD-10-CM

## 2024-05-01 DIAGNOSIS — E11.621 DIABETIC ULCER OF TOE OF RIGHT FOOT ASSOCIATED WITH TYPE 2 DIABETES MELLITUS, UNSPECIFIED ULCER STAGE (H): ICD-10-CM

## 2024-05-01 PROCEDURE — 99214 OFFICE O/P EST MOD 30 MIN: CPT | Performed by: PODIATRIST

## 2024-05-01 ASSESSMENT — PAIN SCALES - GENERAL: PAINLEVEL: NO PAIN (0)

## 2024-05-01 NOTE — LETTER
5/1/2024         RE: Javier Markham  [address]  [facility] [address]        Dear Colleague,    Thank you for referring your patient, Javier Markham, to the [facility]. Please see a copy of my visit note below.    Subjective:    2/14/24   Pt is seen today for swelling around his right ankle.  He has had this for 1 week.  Some pain associated with swelling.  Denies weakness or increased deformity.  Has history of right ankle fracture last year.  Treated conservatively and had no problems with this until just recently.  States his current wound in his groin.  He has diabetes which is poorly controlled with peripheral neuropathy in his feet.  Saw patient 1 year ago and recommended orthotics for his pronation and diabetic shoes.  He never got these.  He denies any fever or chills.  States he had some dizziness yesterday.  States his blood sugars have not been more elevated than normal recently.  Patient not working and on disability because he has had 3 heart attacks.    2/21/24 patient returns for evaluation of his right lower extremity.  Patient states after taking Keflex for 1 day his right lower extremity swelling was significantly better.  He had a wound on his groin which she states is mostly healed.  He is using the DH shoe.  Dressing second toe daily.  No longer drainage.  States around the right ankle is decreased but he is still having a lot of pain.  He points to the posterior tibial tendon close to the insertion.  Has history of bilateral medial foot surgery in the past.    5/1/24 patient returns for evaluation of his right lower extremity.  The wound on the end of his right second toe is healed.  He believes that not wearing socks may have caused this.  States right medial ankle feeling much better.  The cam walker helped him.  Has gotten better over the last few weeks.  States he has lost approximately 25 pounds recently.       ROS:  see above          Allergies   Allergen Reactions     Perfume      Soap      Perfume in soap-reaction excema       Current Outpatient Medications   Medication Sig Dispense Refill     acetaminophen (TYLENOL) 325 MG tablet Take 2 tablets (650 mg) by mouth every 4 hours as needed for mild pain 50 tablet 0     albuterol (PROAIR HFA/PROVENTIL HFA/VENTOLIN HFA) 108 (90 Base) MCG/ACT inhaler Inhale 2 puffs into the lungs every 4 hours as needed for shortness of breath / dyspnea or wheezing 8.5 g 3     Alcohol Swabs (B-D SINGLE USE SWABS REGULAR) PADS USE AS NEEDED 200 each 0     ASPIRIN LOW DOSE 81 MG EC tablet TAKE ONE TABLET BY MOUTH ONCE DAILY 90 tablet 1     azelastine (ASTELIN) 0.1 % nasal spray Spray 1 spray into both nostrils every evening       blood glucose (NO BRAND SPECIFIED) test strip Use to test blood sugar 2 times daily or as directed. 200 strip 11     blood glucose monitoring (NO BRAND SPECIFIED) meter device kit Use to test blood sugar 1 times daily or as directed. ONE TOUCH VERIO. 1 kit 0     cephALEXin (KEFLEX) 500 MG capsule Take 1 capsule (500 mg) by mouth 4 times daily 28 capsule 0     cephALEXin (KEFLEX) 500 MG capsule Take 1 capsule (500 mg) by mouth 4 times daily 40 capsule 0     clonazePAM (KLONOPIN) 0.5 MG tablet TAKE ONE TABLET BY MOUTH AT BEDTIME AS NEEDED FOR RESTLESS LEGS 30 tablet 1     clopidogrel (PLAVIX) 75 MG tablet TAKE ONE TABLET BY MOUTH ONCE DAILY 90 tablet 3     cyanocobalamin (VITAMIN B-12) 1000 MCG tablet TAKE ONE TABLET BY MOUTH ONCE DAILY 100 tablet 1     evolocumab (REPATHA) 140 MG/ML prefilled autoinjector Inject 140 mg Subcutaneous every 14 days       ezetimibe (ZETIA) 10 MG tablet Take 1 tablet (10 mg) by mouth At Bedtime 90 tablet 1     fenofibrate micronized (LOFIBRA) 67 MG capsule Take 67 mg by mouth       FEROSUL 325 (65 Fe) MG tablet TAKE 1 TABLET (325 MG) BY MOUTH EVERY EVENING 60 tablet 0     fluticasone (FLONASE) 50 MCG/ACT nasal spray SPRAY TWO SPRAYS INTO BOTH NOSTRILS DAILY 48 g 1      gabapentin (NEURONTIN) 300 MG capsule TAKE 3 CAPSULES BY MOUTH 3 TIMES DAILY. 810 capsule 0     hydrOXYzine HCl (ATARAX) 25 MG tablet TAKE ONE TO TWO TABLETS BY MOUTH EVERY 6 HOURS AS NEEDED FOR ANXIETY 120 tablet 0     ibuprofen (ADVIL/MOTRIN) 200 MG tablet Take 200 mg by mouth every 4 hours as needed for mild pain       JANUVIA 50 MG tablet TAKE ONE TABLET BY MOUTH ONCE DAILY 90 tablet 1     JARDIANCE 10 MG TABS tablet TAKE 1 TABLET (10 MG) BY MOUTH DAILY DUE FOR APPOINTMENT. PLEASE SCHEDULE: [phone] 90 tablet 0     lisinopril (ZESTRIL) 10 MG tablet TAKE ONE TABLET BY MOUTH ONCE DAILY 90 tablet 2     loratadine (CLARITIN) 10 MG tablet TAKE ONE TABLET BY MOUTH ONCE DAILY 90 tablet 1     Melatonin 10 MG TABS tablet Take 10 mg by mouth nightly as needed for sleep       meloxicam (MOBIC) 15 MG tablet Take 1 tablet (15 mg) by mouth daily 10 tablet 0     metFORMIN (GLUCOPHAGE) 500 MG tablet TAKE TWO TABLETS BY MOUTH TWICE A DAY WITH MEALS 360 tablet 0     metoprolol succinate ER (TOPROL XL) 25 MG 24 hr tablet TAKE ONE TABLET BY MOUTH TWICE A  tablet 0     minocycline (DYNACIN) 100 MG tablet Take 1 tablet (100 mg) by mouth daily 30 tablet 0     nitroGLYcerin (NITROSTAT) 0.4 MG sublingual tablet PLACE 1 TABLET UNDER THE TONGUE AT THE ONSET OF CHEST PAIN. MAY REPEAT EVERY 5 MINUTES AS NEEDED FOR A TOTAL OF 3 DOSES. 25 tablet 1     omega-3 acid ethyl esters (LOVAZA) 1 g capsule TAKE TWO CAPSULES BY MOUTH TWO TIMES A  capsule 0     omeprazole (PRILOSEC) 20 MG DR capsule TAKE ONE CAPSULE BY MOUTH TWICE A DAY 30 TO 60 MINUTES BEFORE A MEAL 180 capsule 0     ONE TOUCH VERIO IQ test strip TEST ONCE DAILY OR AS DIRECTED 100 each 5     OneTouch Delica Lancets 33G MISC 1 Act by Device route 2 times daily 200 each 0     polyethylene glycol (MIRALAX/GLYCOLAX) powder Take 17 g by mouth daily as needed for constipation 500 g 5     pramipexole (MIRAPEX) 0.75 MG tablet TAKE ONE TABLET BY MOUTH EVERY NIGHT AT  BEDTIME 90 tablet 2     rosuvastatin (CRESTOR) 40 MG tablet Take 1 tablet (40 mg) by mouth daily       senna-docusate (SENOKOT-S/PERICOLACE) 8.6-50 MG tablet Take 1-2 tablets by mouth 2 times daily (Patient taking differently: Take 1 tablet by mouth daily) 30 tablet 0     Skin Protectants, Misc. (HYDROCERIN) CREA APPLY TOPICALLY AS NEEDED FOR DRY SKIN 226 g 3     sodium chloride (DEEP SEA NASAL SPRAY) 0.65 % nasal spray SPRAY 1 SPRAY IN EACH NOSTRIL FOUR TIMES A DAY AS NEEDED FOR CONGESTION 44 mL 3     tamsulosin (FLOMAX) 0.4 MG capsule TAKE ONE CAPSULE BY MOUTH ONCE DAILY 90 capsule 2     triamcinolone (KENALOG) 0.1 % external cream Apply sparingly once or twice per day as needed to affected area until the skin is better, then stop; REPEAT AS NEEDED 80 g 1     Vitamin D3 50 mcg (2000 units) tablet Take 1 tablet (50 mcg) by mouth daily 100 tablet 1       Patient Active Problem List   Diagnosis     Esophageal reflux     History of cocaine use in remission     Hyperlipidemia     Anxiety     Restless legs syndrome (RLS)     Type 2 diabetes mellitus with circulatory disorder, without long-term current use of insulin (H)     [facility]     Coronary artery disease involving autologous artery coronary bypass graft without angina pectoris     BPH (benign prostatic hypertrophy)     Class 1 obesity with serious comorbidity and body mass index (BMI) of 34.0 to 34.9 in adult, unspecified obesity type     Benign essential hypertension; goal < 140/90     History of tobacco abuse     Incomplete RBBB     Right knee pain, unspecified chronicity     Benign essential tremor     Tinnitus, left     Severe obesity (BMI 35.0-35.9 with comorbidity) (H)     Hyperlipidemia LDL goal <100     Right inguinal hernia     Right hydrocele     S/P CABG x 3     NSTEMI (non-ST elevated myocardial infarction) (H)     Mixed, or nondependent drug abuse     Ascending aorta dilatation (H24)     ACP (advance care planning)       Past Medical History:    Diagnosis Date     Anxiety      Ascending aorta dilation (H24)      CAD (coronary artery disease) Dec 2014    s/p CABGx3 Dec 2014 and later RCA stent July 2015     Diabetes mellitus (H)      DM2 (diabetes mellitus, type 2) (H) Dx June 2015    A1c 6.5% at time of dx     Epididymitis, right      Essential tremor      GERD (gastroesophageal reflux disease)      Heart attack (H) 12/2014, 6/2015, 7/4/2015    x3     Hernia, abdominal      History of cocaine abuse (H)     Smoked crack cocaine (remission since Nov 2014)     History of tobacco abuse      Hyperlipidemia      Hyperlipidemia LDL goal <100     chronic hypertriglyceredemia, regular elevation 300-400     Incomplete RBBB      Inguinal hernia     Left     Numbness and tingling     diabetic neuropathy     Prediabetes Dec 2014     Restless leg syndrome      S/P CABG x 3 Dec 2014     Severe obesity (BMI 35.0-35.9 with comorbidity) (H) 12/24/2018    BMI 37.5     Sleep apnea     mild, does not use CPAP     Stented coronary artery        Past Surgical History:   Procedure Laterality Date     DAVINCI XI HERNIORRHAPHY INGUINAL Right 7/31/2020    Procedure: ROBOTIC ASSITED RIGHT INGUINAL HERNIA REPAIR WITH MESH;  Surgeon: Haim Green MD;  Location:  OR     HERNIORRHAPHY INGUINAL Left 9/20/2016    Procedure: HERNIORRHAPHY INGUINAL;  Surgeon: Haim Green MD;  Location:  [facility]     HYDROCELECTOMY SCROTAL Right 3/23/2021    Procedure: Right HYDROCELECTOMY, SCROTAL APPROACH with spermatic cord block;  Surgeon: Blaise Altamirano MD;  Location: [facility] OR     STENT  07/2015    RCA stent     [facility] CABG, VEIN, THREE  12/23/2014    [facility] NONSPECIFIC PROCEDURE  age 15    both feet bone spur removal in the arch of the foot     [facility] DRUG-ELUTING STENTS, SINGLE  10/19/2018    Left circumflex at [facility]       Family History   Problem Relation Age of Onset     Diabetes Mother         type 2     Breast Cancer Mother      Macular Degeneration Mother       Dementia Mother      Coronary Artery Disease Father 52     Heart Disease Paternal Uncle      Diabetes Maternal Grandfather      Breast Cancer Maternal Grandmother      Prostate Cancer No family hx of        Social History     Tobacco Use     Smoking status: Former     Current packs/day: 0.00     Average packs/day: 0.5 packs/day for 25.0 years (12.5 ttl pk-yrs)     Types: Cigarettes     Start date: 1990     Quit date: 2015     Years since quittin.5     Smokeless tobacco: Never   Substance Use Topics     Alcohol use: No     Alcohol/week: 0.0 standard drinks of alcohol         Exam:    Vitals: There were no vitals taken for this visit.  BMI: There is no height or weight on file to calculate BMI.  Height: Data Unavailable    Constitutional/ general:  Pt is in no apparent distress, appears well-nourished.  Cooperative with history and physical exam.     Psych:  The patient answered questions appropriately.  Normal affect.  Seems to have reasonable expectations, in terms of treatment.       Lungs:  Non labored breathing, non labored speech. No cough.  No audible wheezing. Even, quiet breathing.       Vascular:  positive pedal pulses bilaterally.  CFT < 3 sec. bilateral lower extremity edema and varicosities noted with right greater than left.  Positive pedal hair growth.    Neuro:  Alert and oriented x 3. Coordinated gait.  Monofilament absent to ankles    Derm: Normal texture and turgor.  Hemosiderin deposits noted in legs.    Musculoskeletal:   Pronated arch bilaterally right greater than left.  Right increased internal tibial torsion noted.  Today no pain with palpation posterior tibial tendon close to insertion.  No pain posterior to medial malleoli.  No pain on the dorsum of the navicular.  Tibialis anterior intact.  No edema or erythema.  Long right second toe and distal wound now healed.  Skin looks healthy.  Bilateral pronation right greater than left  Collected 2024  8:19 AM       Status: Final  result       Visible to patient: No (inaccessible in MyChart)       Dx: Edema of right lower extremity; Diabe...    Specimen Information: Foot, Right; Skin   0 Result Notes  Culture 4+ Streptococcus dysgalactiae (Group C/G Streptococcus) Abnormal    This organism is susceptible to ampicillin, penicillin, vancomycin and the cephalosporins. If treatment is required and your patient is allergic to penicillin, contact the microbiology lab within 5 days to request susceptibility testing.   3+ Staphylococcus aureus Abnormal                Gram Stain Result  Abnormal   4+ Gram positive cocci      No white blood cells seen              Resulting Agency: [facility]     Susceptibility     Staphylococcus aureus     JCARLOS     Clindamycin 0.25 ug/mL Susceptible 1     Daptomycin 1 ug/mL Susceptible     Doxycycline 4 ug/mL Susceptible     Erythromycin >=8 ug/mL Resistant     Gentamicin <=0.5 ug/mL Susceptible     Oxacillin 0.5 ug/mL Susceptible 2     Tetracycline >=16 ug/mL Resistant     Trimethoprim/Sulfamethoxazole <=0.5/9.5 u... Susceptible     Vancomycin 1 ug/mL Susceptible                         X-rays show no signs of ankle fracture or arthritis.  Foot x-rays show no signs of any Charcot breakdown.  Hallux limitus noted.      Narrative & Impression   MR right ankle without  contrast 3/27/2024 11:16 AM     History: Pronation of both feet; Pronation of both feet; Posterior  tibial tendinitis, right     Techniques: Multiplanar multisequence imaging of the right ankle was  obtained without administration of intra-articular or intravenous  contrast.     Comparison: 2/14/2024     Findings:     MEDIAL COMPARTMENT     Tendons: Marked thickening of posterior tibialis with low to moderate  grade intrasubstance tearing approximately at the level of posterior  subtalar joint. Otherwise medial flexor tendons are intact.     Ligaments: Loss of normal striation deep portion of deltoid ligament,  likely sequelae of remote trauma. Otherwise,  superficial component of  deltoid and spring ligamentous complexes are intact.     LATERAL COMPARTMENT     Tendons: The visualized portion of peroneal tendons are intact. The  peroneus longus distal attachment is not included in the field of  view.     Ligaments: Thickened anterior talofibular and calcaneofibular  ligaments, likely sequelae of remote trauma. Posterior talofibular  ligament and syndesmotic ligamentous complex are intact.     POSTERIOR COMPARTMENT     Achilles tendon: Intact. Small retrocalcaneal bursal fluid. Trace  retro-Achilles bursal fluid/edema.     Plantar fascia: Normal.      ANTERIOR COMPARTMENT     Tendons: The visualized courses of anterior extensor tendons are  intact.     JOINTS     Physiologic amount joint fluid.     GENERAL FINDINGS     Bones: No fracture, marrow contusion or infiltrative change.  Approximately 8 x 11 mm medial talar dome osteochondral lesion with  subchondral bone plate discontinuity and depression with trace amount  of edema like marrow signal intensity. Signal heterogeneity of  overlying cartilage without unstable fragment.     Prominent Stieda process. Trace edema like marrow signal intensity at  the Achilles tendon at calcaneal attachment. Bony proliferation of the  distal fibula, consistent with sequelae of remote trauma.     Muscles: Peroneus quartus attaching lateral calcaneus. Small flexor  digitorum accessorius longus.     Tarsal tunnel: Normal.      Sinus tarsi: Partial effacement of the sinus Tarsi fat. Nonspecific  cystlike change/edema like marrow signal intensity at the critical  angle of [place].     ANCILLARY FINDINGS                                                                         Impression:  1. Marked thickening, consistent with tendinosis of posterior tibialis  with short segment low to moderate grade intrasubstance tearing  approximately at the level of posterior subtalar joint.   2. Approximately 8 x 11 mm medial talar dome osteochondral  lesion with  mild subchondral edema.  *  Radiographically noted ossicle appears to be embedded within the  overlying cartilage.  3. Query mild sinus Tarsi syndrome.       A:  Diabetes mellitus with peripheral neuropathy and LOPS  Right second toe ulcer resolved  Bilateral pronation right greater than left  Right posterior tibial tendinitis    P:   Discussed right second toe wound is now healed.  Continue to watch carefully.  Continue to wear socks and shoes to protect.  Congratulated patient on weight loss.  He will continue this as this will help offload feet and keep diabetes better controlled.  Right posterior tibial tendon much better.  Discussed weight loss and offloading has been helpful.  She is a somewhat malleable.  Will recommend better shoe.  Wear prescription for orthotics.  RTC as needed.      Frank Adkins DPM, FACFAS              Again, thank you for allowing me to participate in the care of your patient.        Sincerely,        Frank Adkins DPM

## 2024-05-01 NOTE — PROGRESS NOTES
Subjective:    2/14/24   Pt is seen today for swelling around his right ankle.  He has had this for 1 week.  Some pain associated with swelling.  Denies weakness or increased deformity.  Has history of right ankle fracture last year.  Treated conservatively and had no problems with this until just recently.  States his current wound in his groin.  He has diabetes which is poorly controlled with peripheral neuropathy in his feet.  Saw patient 1 year ago and recommended orthotics for his pronation and diabetic shoes.  He never got these.  He denies any fever or chills.  States he had some dizziness yesterday.  States his blood sugars have not been more elevated than normal recently.  Patient not working and on disability because he has had 3 heart attacks.    2/21/24 patient returns for evaluation of his right lower extremity.  Patient states after taking Keflex for 1 day his right lower extremity swelling was significantly better.  He had a wound on his groin which she states is mostly healed.  He is using the DH shoe.  Dressing second toe daily.  No longer drainage.  States around the right ankle is decreased but he is still having a lot of pain.  He points to the posterior tibial tendon close to the insertion.  Has history of bilateral medial foot surgery in the past.    5/1/24 patient returns for evaluation of his right lower extremity.  The wound on the end of his right second toe is healed.  He believes that not wearing socks may have caused this.  States right medial ankle feeling much better.  The cam walker helped him.  Has gotten better over the last few weeks.  States he has lost approximately 25 pounds recently.       ROS:  see above         Allergies   Allergen Reactions    Perfume     Soap      Perfume in soap-reaction excema       Current Outpatient Medications   Medication Sig Dispense Refill    acetaminophen (TYLENOL) 325 MG tablet Take 2 tablets (650 mg) by mouth every 4 hours as needed for mild pain  50 tablet 0    albuterol (PROAIR HFA/PROVENTIL HFA/VENTOLIN HFA) 108 (90 Base) MCG/ACT inhaler Inhale 2 puffs into the lungs every 4 hours as needed for shortness of breath / dyspnea or wheezing 8.5 g 3    Alcohol Swabs (B-D SINGLE USE SWABS REGULAR) PADS USE AS NEEDED 200 each 0    ASPIRIN LOW DOSE 81 MG EC tablet TAKE ONE TABLET BY MOUTH ONCE DAILY 90 tablet 1    azelastine (ASTELIN) 0.1 % nasal spray Spray 1 spray into both nostrils every evening      blood glucose (NO BRAND SPECIFIED) test strip Use to test blood sugar 2 times daily or as directed. 200 strip 11    blood glucose monitoring (NO BRAND SPECIFIED) meter device kit Use to test blood sugar 1 times daily or as directed. ONE TOUCH VERIO. 1 kit 0    cephALEXin (KEFLEX) 500 MG capsule Take 1 capsule (500 mg) by mouth 4 times daily 28 capsule 0    cephALEXin (KEFLEX) 500 MG capsule Take 1 capsule (500 mg) by mouth 4 times daily 40 capsule 0    clonazePAM (KLONOPIN) 0.5 MG tablet TAKE ONE TABLET BY MOUTH AT BEDTIME AS NEEDED FOR RESTLESS LEGS 30 tablet 1    clopidogrel (PLAVIX) 75 MG tablet TAKE ONE TABLET BY MOUTH ONCE DAILY 90 tablet 3    cyanocobalamin (VITAMIN B-12) 1000 MCG tablet TAKE ONE TABLET BY MOUTH ONCE DAILY 100 tablet 1    evolocumab (REPATHA) 140 MG/ML prefilled autoinjector Inject 140 mg Subcutaneous every 14 days      ezetimibe (ZETIA) 10 MG tablet Take 1 tablet (10 mg) by mouth At Bedtime 90 tablet 1    fenofibrate micronized (LOFIBRA) 67 MG capsule Take 67 mg by mouth      FEROSUL 325 (65 Fe) MG tablet TAKE 1 TABLET (325 MG) BY MOUTH EVERY EVENING 60 tablet 0    fluticasone (FLONASE) 50 MCG/ACT nasal spray SPRAY TWO SPRAYS INTO BOTH NOSTRILS DAILY 48 g 1    gabapentin (NEURONTIN) 300 MG capsule TAKE 3 CAPSULES BY MOUTH 3 TIMES DAILY. 810 capsule 0    hydrOXYzine HCl (ATARAX) 25 MG tablet TAKE ONE TO TWO TABLETS BY MOUTH EVERY 6 HOURS AS NEEDED FOR ANXIETY 120 tablet 0    ibuprofen (ADVIL/MOTRIN) 200 MG tablet Take 200 mg by mouth every 4  hours as needed for mild pain      JANUVIA 50 MG tablet TAKE ONE TABLET BY MOUTH ONCE DAILY 90 tablet 1    JARDIANCE 10 MG TABS tablet TAKE 1 TABLET (10 MG) BY MOUTH DAILY DUE FOR APPOINTMENT. PLEASE SCHEDULE: [phone] 90 tablet 0    lisinopril (ZESTRIL) 10 MG tablet TAKE ONE TABLET BY MOUTH ONCE DAILY 90 tablet 2    loratadine (CLARITIN) 10 MG tablet TAKE ONE TABLET BY MOUTH ONCE DAILY 90 tablet 1    Melatonin 10 MG TABS tablet Take 10 mg by mouth nightly as needed for sleep      meloxicam (MOBIC) 15 MG tablet Take 1 tablet (15 mg) by mouth daily 10 tablet 0    metFORMIN (GLUCOPHAGE) 500 MG tablet TAKE TWO TABLETS BY MOUTH TWICE A DAY WITH MEALS 360 tablet 0    metoprolol succinate ER (TOPROL XL) 25 MG 24 hr tablet TAKE ONE TABLET BY MOUTH TWICE A  tablet 0    minocycline (DYNACIN) 100 MG tablet Take 1 tablet (100 mg) by mouth daily 30 tablet 0    nitroGLYcerin (NITROSTAT) 0.4 MG sublingual tablet PLACE 1 TABLET UNDER THE TONGUE AT THE ONSET OF CHEST PAIN. MAY REPEAT EVERY 5 MINUTES AS NEEDED FOR A TOTAL OF 3 DOSES. 25 tablet 1    omega-3 acid ethyl esters (LOVAZA) 1 g capsule TAKE TWO CAPSULES BY MOUTH TWO TIMES A  capsule 0    omeprazole (PRILOSEC) 20 MG DR capsule TAKE ONE CAPSULE BY MOUTH TWICE A DAY 30 TO 60 MINUTES BEFORE A MEAL 180 capsule 0    ONE TOUCH VERIO IQ test strip TEST ONCE DAILY OR AS DIRECTED 100 each 5    OneTouch Delica Lancets 33G MISC 1 Act by Device route 2 times daily 200 each 0    polyethylene glycol (MIRALAX/GLYCOLAX) powder Take 17 g by mouth daily as needed for constipation 500 g 5    pramipexole (MIRAPEX) 0.75 MG tablet TAKE ONE TABLET BY MOUTH EVERY NIGHT AT BEDTIME 90 tablet 2    rosuvastatin (CRESTOR) 40 MG tablet Take 1 tablet (40 mg) by mouth daily      senna-docusate (SENOKOT-S/PERICOLACE) 8.6-50 MG tablet Take 1-2 tablets by mouth 2 times daily (Patient taking differently: Take 1 tablet by mouth daily) 30 tablet 0    Skin Protectants, Misc. (HYDROCERIN) CREA  APPLY TOPICALLY AS NEEDED FOR DRY SKIN 226 g 3    sodium chloride (DEEP SEA NASAL SPRAY) 0.65 % nasal spray SPRAY 1 SPRAY IN EACH NOSTRIL FOUR TIMES A DAY AS NEEDED FOR CONGESTION 44 mL 3    tamsulosin (FLOMAX) 0.4 MG capsule TAKE ONE CAPSULE BY MOUTH ONCE DAILY 90 capsule 2    triamcinolone (KENALOG) 0.1 % external cream Apply sparingly once or twice per day as needed to affected area until the skin is better, then stop; REPEAT AS NEEDED 80 g 1    Vitamin D3 50 mcg (2000 units) tablet Take 1 tablet (50 mcg) by mouth daily 100 tablet 1       Patient Active Problem List   Diagnosis    Esophageal reflux    History of cocaine use in remission    Hyperlipidemia    Anxiety    Restless legs syndrome (RLS)    Type 2 diabetes mellitus with circulatory disorder, without long-term current use of insulin (H)    Health Care Home    Coronary artery disease involving autologous artery coronary bypass graft without angina pectoris    BPH (benign prostatic hypertrophy)    Class 1 obesity with serious comorbidity and body mass index (BMI) of 34.0 to 34.9 in adult, unspecified obesity type    Benign essential hypertension; goal < 140/90    History of tobacco abuse    Incomplete RBBB    Right knee pain, unspecified chronicity    Benign essential tremor    Tinnitus, left    Severe obesity (BMI 35.0-35.9 with comorbidity) (H)    Hyperlipidemia LDL goal <100    Right inguinal hernia    Right hydrocele    S/P CABG x 3    NSTEMI (non-ST elevated myocardial infarction) (H)    Mixed, or nondependent drug abuse    Ascending aorta dilatation (H24)    ACP (advance care planning)       Past Medical History:   Diagnosis Date    Anxiety     Ascending aorta dilation (H24)     CAD (coronary artery disease) Dec 2014    s/p CABGx3 Dec 2014 and later RCA stent July 2015    Diabetes mellitus (H)     DM2 (diabetes mellitus, type 2) (H) Dx June 2015    A1c 6.5% at time of dx    Epididymitis, right     Essential tremor     GERD (gastroesophageal reflux  disease)     Heart attack (H) 12/2014, 6/2015, 7/4/2015    x3    Hernia, abdominal     History of cocaine abuse (H)     Smoked crack cocaine (remission since Nov 2014)    History of tobacco abuse     Hyperlipidemia     Hyperlipidemia LDL goal <100     chronic hypertriglyceredemia, regular elevation 300-400    Incomplete RBBB     Inguinal hernia     Left    Numbness and tingling     diabetic neuropathy    Prediabetes Dec 2014    Restless leg syndrome     S/P CABG x 3 Dec 2014    Severe obesity (BMI 35.0-35.9 with comorbidity) (H) 12/24/2018    BMI 37.5    Sleep apnea     mild, does not use CPAP    Stented coronary artery        Past Surgical History:   Procedure Laterality Date    DAVINCI XI HERNIORRHAPHY INGUINAL Right 7/31/2020    Procedure: ROBOTIC ASSITED RIGHT INGUINAL HERNIA REPAIR WITH MESH;  Surgeon: Haim Green MD;  Location:  OR    HERNIORRHAPHY INGUINAL Left 9/20/2016    Procedure: HERNIORRHAPHY INGUINAL;  Surgeon: Haim Green MD;  Location:  SD    HYDROCELECTOMY SCROTAL Right 3/23/2021    Procedure: Right HYDROCELECTOMY, SCROTAL APPROACH with spermatic cord block;  Surgeon: Blaise Altamirano MD;  Location:  OR    STENT  07/2015    RCA stent    [facility] CABG, VEIN, THREE  12/23/2014    [facility]    [facility] NONSPECIFIC PROCEDURE  age 15    both feet bone spur removal in the arch of the foot    [facility] DRUG-ELUTING STENTS, SINGLE  10/19/2018    Left circumflex at [facility]       Family History   Problem Relation Age of Onset    Diabetes Mother         type 2    Breast Cancer Mother     Macular Degeneration Mother     Dementia Mother     Coronary Artery Disease Father 52    Heart Disease Paternal Uncle     Diabetes Maternal Grandfather     Breast Cancer Maternal Grandmother     Prostate Cancer No family hx of        Social History     Tobacco Use    Smoking status: Former     Current packs/day: 0.00     Average packs/day: 0.5 packs/day for 25.0 years (12.5 ttl pk-yrs)     Types: Cigarettes      Start date: 1990     Quit date: 2015     Years since quittin.5    Smokeless tobacco: Never   Substance Use Topics    Alcohol use: No     Alcohol/week: 0.0 standard drinks of alcohol         Exam:    Vitals: There were no vitals taken for this visit.  BMI: There is no height or weight on file to calculate BMI.  Height: Data Unavailable    Constitutional/ general:  Pt is in no apparent distress, appears well-nourished.  Cooperative with history and physical exam.     Psych:  The patient answered questions appropriately.  Normal affect.  Seems to have reasonable expectations, in terms of treatment.       Lungs:  Non labored breathing, non labored speech. No cough.  No audible wheezing. Even, quiet breathing.       Vascular:  positive pedal pulses bilaterally.  CFT < 3 sec. bilateral lower extremity edema and varicosities noted with right greater than left.  Positive pedal hair growth.    Neuro:  Alert and oriented x 3. Coordinated gait.  Monofilament absent to ankles    Derm: Normal texture and turgor.  Hemosiderin deposits noted in legs.    Musculoskeletal:   Pronated arch bilaterally right greater than left.  Right increased internal tibial torsion noted.  Today no pain with palpation posterior tibial tendon close to insertion.  No pain posterior to medial malleoli.  No pain on the dorsum of the navicular.  Tibialis anterior intact.  No edema or erythema.  Long right second toe and distal wound now healed.  Skin looks healthy.  Bilateral pronation right greater than left  Collected 2024  8:19 AM       Status: Final result       Visible to patient: No (inaccessible in MyChart)       Dx: Edema of right lower extremity; Diabe...    Specimen Information: Foot, Right; Skin   0 Result Notes  Culture 4+ Streptococcus dysgalactiae (Group C/G Streptococcus) Abnormal    This organism is susceptible to ampicillin, penicillin, vancomycin and the cephalosporins. If treatment is required and your patient is  allergic to penicillin, contact the microbiology lab within 5 days to request susceptibility testing.   3+ Staphylococcus aureus Abnormal                Gram Stain Result  Abnormal   4+ Gram positive cocci      No white blood cells seen              Resulting Agency: [facility]     Susceptibility     Staphylococcus aureus     JCARLOS     Clindamycin 0.25 ug/mL Susceptible 1     Daptomycin 1 ug/mL Susceptible     Doxycycline 4 ug/mL Susceptible     Erythromycin >=8 ug/mL Resistant     Gentamicin <=0.5 ug/mL Susceptible     Oxacillin 0.5 ug/mL Susceptible 2     Tetracycline >=16 ug/mL Resistant     Trimethoprim/Sulfamethoxazole <=0.5/9.5 u... Susceptible     Vancomycin 1 ug/mL Susceptible                         X-rays show no signs of ankle fracture or arthritis.  Foot x-rays show no signs of any Charcot breakdown.  Hallux limitus noted.      Narrative & Impression   MR right ankle without  contrast 3/27/2024 11:16 AM     History: Pronation of both feet; Pronation of both feet; Posterior  tibial tendinitis, right     Techniques: Multiplanar multisequence imaging of the right ankle was  obtained without administration of intra-articular or intravenous  contrast.     Comparison: 2/14/2024     Findings:     MEDIAL COMPARTMENT     Tendons: Marked thickening of posterior tibialis with low to moderate  grade intrasubstance tearing approximately at the level of posterior  subtalar joint. Otherwise medial flexor tendons are intact.     Ligaments: Loss of normal striation deep portion of deltoid ligament,  likely sequelae of remote trauma. Otherwise, superficial component of  deltoid and spring ligamentous complexes are intact.     LATERAL COMPARTMENT     Tendons: The visualized portion of peroneal tendons are intact. The  peroneus longus distal attachment is not included in the field of  view.     Ligaments: Thickened anterior talofibular and calcaneofibular  ligaments, likely sequelae of remote trauma. Posterior talofibular  ligament  and syndesmotic ligamentous complex are intact.     POSTERIOR COMPARTMENT     Achilles tendon: Intact. Small retrocalcaneal bursal fluid. Trace  retro-Achilles bursal fluid/edema.     Plantar fascia: Normal.      ANTERIOR COMPARTMENT     Tendons: The visualized courses of anterior extensor tendons are  intact.     JOINTS     Physiologic amount joint fluid.     GENERAL FINDINGS     Bones: No fracture, marrow contusion or infiltrative change.  Approximately 8 x 11 mm medial talar dome osteochondral lesion with  subchondral bone plate discontinuity and depression with trace amount  of edema like marrow signal intensity. Signal heterogeneity of  overlying cartilage without unstable fragment.     Prominent Stieda process. Trace edema like marrow signal intensity at  the Achilles tendon at calcaneal attachment. Bony proliferation of the  distal fibula, consistent with sequelae of remote trauma.     Muscles: Peroneus quartus attaching lateral calcaneus. Small flexor  digitorum accessorius longus.     Tarsal tunnel: Normal.      Sinus tarsi: Partial effacement of the sinus Tarsi fat. Nonspecific  cystlike change/edema like marrow signal intensity at the critical  angle of Gissane.     ANCILLARY FINDINGS                                                                         Impression:  1. Marked thickening, consistent with tendinosis of posterior tibialis  with short segment low to moderate grade intrasubstance tearing  approximately at the level of posterior subtalar joint.   2. Approximately 8 x 11 mm medial talar dome osteochondral lesion with  mild subchondral edema.  *  Radiographically noted ossicle appears to be embedded within the  overlying cartilage.  3. Query mild sinus Tarsi syndrome.       A:  Diabetes mellitus with peripheral neuropathy and LOPS  Right second toe ulcer resolved  Bilateral pronation right greater than left  Right posterior tibial tendinitis    P:   Discussed right second toe wound is now  healed.  Continue to watch carefully.  Continue to wear socks and shoes to protect.  Congratulated patient on weight loss.  He will continue this as this will help offload feet and keep diabetes better controlled.  Right posterior tibial tendon much better.  Discussed weight loss and offloading has been helpful.  She is a somewhat malleable.  Will recommend better shoe.  Wear prescription for orthotics.  RTC as needed.      Frank Adkins DPM, FACFAS

## 2024-05-06 DIAGNOSIS — I25.810 CORONARY ARTERY DISEASE INVOLVING CORONARY BYPASS GRAFT OF NATIVE HEART WITHOUT ANGINA PECTORIS: Chronic | ICD-10-CM

## 2024-05-06 DIAGNOSIS — G25.81 RESTLESS LEGS SYNDROME (RLS): Chronic | ICD-10-CM

## 2024-05-07 RX ORDER — PRAMIPEXOLE DIHYDROCHLORIDE 0.75 MG/1
TABLET ORAL
Qty: 90 TABLET | Refills: 2 | Status: SHIPPED | OUTPATIENT
Start: 2024-05-07

## 2024-05-07 RX ORDER — METOPROLOL SUCCINATE 25 MG/1
TABLET, EXTENDED RELEASE ORAL
Qty: 180 TABLET | Refills: 0 | Status: SHIPPED | OUTPATIENT
Start: 2024-05-07

## 2024-05-17 DIAGNOSIS — E11.40 TYPE 2 DIABETES MELLITUS WITH DIABETIC NEUROPATHY, WITHOUT LONG-TERM CURRENT USE OF INSULIN (H): ICD-10-CM

## 2024-05-28 DIAGNOSIS — E11.40 TYPE 2 DIABETES MELLITUS WITH DIABETIC NEUROPATHY, WITHOUT LONG-TERM CURRENT USE OF INSULIN (H): ICD-10-CM

## 2024-05-28 DIAGNOSIS — K21.9 GASTROESOPHAGEAL REFLUX DISEASE WITHOUT ESOPHAGITIS: Chronic | ICD-10-CM

## 2024-05-28 DIAGNOSIS — F41.9 ANXIETY: ICD-10-CM

## 2024-05-28 DIAGNOSIS — E78.5 HYPERLIPIDEMIA, UNSPECIFIED HYPERLIPIDEMIA TYPE: Chronic | ICD-10-CM

## 2024-05-28 DIAGNOSIS — E61.1 IRON DEFICIENCY: ICD-10-CM

## 2024-05-28 DIAGNOSIS — J30.2 SEASONAL ALLERGIES: ICD-10-CM

## 2024-05-28 RX ORDER — LORATADINE 10 MG/1
TABLET ORAL
Qty: 90 TABLET | Refills: 0 | Status: SHIPPED | OUTPATIENT
Start: 2024-05-28

## 2024-05-30 ENCOUNTER — OFFICE VISIT (OUTPATIENT)
Dept: VASCULAR SURGERY | Facility: CLINIC | Age: 59
End: 2024-05-30
Payer: COMMERCIAL

## 2024-05-30 DIAGNOSIS — I80.01 THROMBOPHLEBITIS OF SUPERFICIAL VEINS OF RIGHT LOWER EXTREMITY: Primary | ICD-10-CM

## 2024-05-30 DIAGNOSIS — Z86.72 PERSONAL HISTORY OF THROMBOPHLEBITIS: Primary | ICD-10-CM

## 2024-05-30 PROCEDURE — 99203 OFFICE O/P NEW LOW 30 MIN: CPT | Performed by: SURGERY

## 2024-05-30 NOTE — PROGRESS NOTES
Patient called to state that he cannot get a ride to his US appointment on 5/30. He has rescheduled for 6/4 in [place] on which day K[facility] is out of the office. In collaboration with [place] nursing staff and Dr Ivey, plan is for [facility] to give results to the patient.    Cassia Kay RN

## 2024-05-30 NOTE — PATIENT INSTRUCTIONS
and start your xarelto blood thinner following the instructions in the booklet and on the packaging    Come to your ultrasound tomorrow. Dr Ivey will call you with results.    Thigh High Compression Stockings are recommended, medical grade, 20-30 mmHg strength.    Options for purchasing Compression stockings:    You can call your insurance company and ask if compression stockings are covered.  They usually fall under your Durable Medical Equipment (DME) coverage, if covered. The DME codes if they ask are: Knee high , Thigh high , Panty .   Be sure to ask if you need a specific diagnosis for coverage, if your deductible needs to be met first, how many pairs are covered and how often.  If insurance covers and you would like to order from [facility], or another medical supply company: call the clinic back and we can fax a prescription to your medical supply company of choice. They will bill your insurance and ship them to you. We will ask you color choice (black or beige), and toe choice (open or closed toe).    We sell many sizes in our clinic (except specialty order or petite sizing). We can check to see if we have your size.  Knee high (Mediven brand) are $60/pair  Thigh high (Mediven brand) are $85/pair.   If you would like to purchase from the clinic, let us know, and we will set them aside for you to  and pay for at your next clinic appointment or the day of your procedure.    Online website ordering options:   [email or web address] has many options available.

## 2024-05-30 NOTE — LETTER
5/30/2024         RE: Javier Markham  [address]  [place] [address]        Dear Colleague,    Thank you for referring your patient, Javier Markham, to the [facility] [place]. Please see a copy of my visit note below.    I had the pleasure of seeing Mr. Javier Markham in the vein clinic today.  He is a 59-year-old male who comes to us with right lower extremity pain.  He tells me that he has had prominent right lower extremity varicose veins that plan from his calf up to his groin.  They had never caused any symptoms before and therefore no specific treatment was undertaken.  He tells me that for about a week he has had increased pain in that area with pain that started in the calf and then went up to his groin.  The associates this with swelling, redness and warmth in that area.    On examination he does have thrombophlebitis of the large cluster of varicose veins going from the calf to the groin.    We offered to get him an ultrasound today at 1 PM but because he uses medical transport he feels that it is better that the ultrasound be done tomorrow.  Nevertheless I will start him on Xarelto 15 mg twice daily for 3 weeks and 20 mg daily for 1 week.    Patient is already on clopidogrel.    Many years ago he had a coronary artery bypass grafting and subsequent percutaneous coronary interventions.  He tells me that he was supposed to come off of the clopidogrel but he felt quite rundown when he experimented being off the Plavix.  He has since been on clopidogrel.  His cardiologist is Dr. Omar Silverio at [facility].  I have asked him to request Dr. Silverio's office to review my note from today as I am unable to CC him.    I will call him an update after his ultrasound tomorrow.      Again, thank you for allowing me to participate in the care of your patient.        Sincerely,        David Ivey MD

## 2024-05-30 NOTE — PROGRESS NOTES
I had the pleasure of seeing Mr. Javier Markham in the vein clinic today.  He is a 59-year-old male who comes to us with right lower extremity pain.  He tells me that he has had prominent right lower extremity varicose veins that plan from his calf up to his groin.  They had never caused any symptoms before and therefore no specific treatment was undertaken.  He tells me that for about a week he has had increased pain in that area with pain that started in the calf and then went up to his groin.  The associates this with swelling, redness and warmth in that area.    On examination he does have thrombophlebitis of the large cluster of varicose veins going from the calf to the groin.    We offered to get him an ultrasound today at 1 PM but because he uses medical transport he feels that it is better that the ultrasound be done tomorrow.  Nevertheless I will start him on Xarelto 15 mg twice daily for 3 weeks and 20 mg daily for 1 week.    Patient is already on clopidogrel.    Many years ago he had a coronary artery bypass grafting and subsequent percutaneous coronary interventions.  He tells me that he was supposed to come off of the clopidogrel but he felt quite rundown when he experimented being off the Plavix.  He has since been on clopidogrel.  His cardiologist is Dr. Omar Silverio at [facility].  I have asked him to request Dr. Silverio's office to review my note from today as I am unable to CC him.    I will call him an update after his ultrasound tomorrow.

## 2024-05-30 NOTE — NURSING NOTE
Patient Reported symptoms:    Right leg   Heaviness None of the time   Achiness All of the time  Swelling All of the time  Throbbing Some of the time   Itching A little of the time   Appearance Very noticeable   Impact on work/activities Moderately reduced    Left Leg   Heaviness None of the time   Achiness None of the time   Swelling Some of the time   Throbbing None of the time   Itching None of the time   Appearance Moderately noticeable   Impact on work/activities no impact

## 2024-06-05 ENCOUNTER — TELEPHONE (OUTPATIENT)
Dept: VASCULAR SURGERY | Facility: CLINIC | Age: 59
End: 2024-06-05

## 2024-06-05 RX ORDER — GABAPENTIN 300 MG/1
CAPSULE ORAL
Qty: 810 CAPSULE | Refills: 0 | Status: SHIPPED | OUTPATIENT
Start: 2024-06-05 | End: 2024-08-29

## 2024-06-05 RX ORDER — OMEGA-3-ACID ETHYL ESTERS 1 G/1
CAPSULE, LIQUID FILLED ORAL
Qty: 360 CAPSULE | Refills: 0 | Status: SHIPPED | OUTPATIENT
Start: 2024-06-05

## 2024-06-05 RX ORDER — FERROUS SULFATE 325(65) MG
TABLET ORAL
Qty: 90 TABLET | Refills: 0 | Status: SHIPPED | OUTPATIENT
Start: 2024-06-05

## 2024-06-05 RX ORDER — HYDROXYZINE HYDROCHLORIDE 25 MG/1
TABLET, FILM COATED ORAL
Qty: 120 TABLET | Refills: 0 | Status: SHIPPED | OUTPATIENT
Start: 2024-06-05

## 2024-06-05 NOTE — TELEPHONE ENCOUNTER
Patient initally saw Dr. Ivey 5/30/24, which was instructed by [facility] to get an ultrasound due to the thrombophlebitis. Patient was started on Xarelto that day as well. Patient declined ultrasound on 5/31/24, and cancelled 6/5/24 ultrasound due to illness. Writer wanted to verify patient would be ok waiting another week. Per Sumit, another provider in clinic, patient should be ok to have ultrasound next week and follow up with [facility]. Patient notified and rescheduled for Tuesday 6/11/24. [facility] to call the patient with results (like original plan). Patient verbalized understanding. No further questions or concerns.

## 2024-06-05 NOTE — TELEPHONE ENCOUNTER
Pt has scheduled future follow up appt with PCP but is requesting gabapentin refill today. Please approve if appropriate. Pt request 90 day supply.     Routing refill request to provider for review/approval because:  Drug not on the [facility] refill protocol     Future Appointments 6/5/2024 - 12/2/2024        Date Visit Type Length Department Provider     6/5/2024 10:00 AM US LWR EXT VENOUS DUPLEX RIGHT 30 min SH VS ULTRASOUND  [id number]              6/5/2024 10:30 AM RETURN VEIN PATIENT 30 min  [facility] Dave Arana MD    Location Instructions:     Located at [address] [place] [address]              6/14/2024 10:00 AM OFFICE VISIT 30 min [facility] FAMILY PR[facility]/Jo-Ann Marks MD    Location Instructions:     [facility] is in [address] of the [facility] at [address] This is just south of [facility] and the [facility] exit off of [address]. Free parking is available; access the lot by turning east from [facility] onto [address] [place] [address]. Through the main entrance, the clinic is directly to the left.

## 2024-06-11 ENCOUNTER — ANCILLARY PROCEDURE (OUTPATIENT)
Dept: ULTRASOUND IMAGING | Facility: CLINIC | Age: 59
End: 2024-06-11
Attending: SURGERY
Payer: COMMERCIAL

## 2024-06-11 ENCOUNTER — ALLIED HEALTH/NURSE VISIT (OUTPATIENT)
Dept: VASCULAR SURGERY | Facility: CLINIC | Age: 59
End: 2024-06-11
Payer: COMMERCIAL

## 2024-06-11 DIAGNOSIS — Z86.72 PERSONAL HISTORY OF THROMBOPHLEBITIS: ICD-10-CM

## 2024-06-11 DIAGNOSIS — Z71.9 COUNSELED BY NURSE: Primary | ICD-10-CM

## 2024-06-11 PROCEDURE — 93971 EXTREMITY STUDY: CPT | Mod: RT | Performed by: SURGERY

## 2024-06-11 PROCEDURE — 99207 PR NO CHARGE NURSE ONLY: CPT

## 2024-06-11 RX ORDER — RIVAROXABAN 20 MG/1
15-20 TABLET, FILM COATED ORAL 2 TIMES DAILY WITH MEALS
COMMUNITY
Start: 2024-05-30 | End: 2024-07-11

## 2024-06-11 NOTE — PROGRESS NOTES
Patient returned for his rule out DVT ultrasound. It was communicated to the patient that his ultrasound was negative for a DVT on the RLE. The patient is aware that there are other non-emergent abnormal findings with his superficial veins which the patient will discuss in depth with Dr Ivey at his telephone appointment on Thursday 6/13.    The patient's right thigh and leg are less red and swollen on exam today. He reports that his pain is resolved although he can still feel a 'hard vein' in his thigh. He is taking his xarelto. He is not wearing compression hose today.    The patient has many questions regarding treatment options and if his problem veins can be removed. He will discuss treatment options going forward with Dr Ivey at his telephone visit 6/13.    The patient had no further questions for nursing staff and is in agreement with the plan.    Cassia Kay RN

## 2024-06-13 ENCOUNTER — VIRTUAL VISIT (OUTPATIENT)
Dept: VASCULAR SURGERY | Facility: CLINIC | Age: 59
End: 2024-06-13
Payer: COMMERCIAL

## 2024-06-13 DIAGNOSIS — I80.01 SUPERFICIAL PHLEBITIS AND THROMBOPHLEBITIS OF RIGHT LOWER EXTREMITY: Primary | ICD-10-CM

## 2024-06-13 PROCEDURE — 99441 PR PHYSICIAN TELEPHONE EVALUATION 5-10 MIN: CPT | Mod: 93 | Performed by: SURGERY

## 2024-06-13 NOTE — LETTER
"6/13/2024      Javier Markham  [address]  [facility] [address]      Dear Colleague,    Thank you for referring your patient, Javier Markham, to the [facility] CLINIC [place]. Please see a copy of my visit note below.    Alexx is a 59 year old who is being evaluated via a billable telephone visit.    What phone number would you like to be contacted at? [phone]  How would you like to obtain your AVS? Mail a copy  Originating Location (pt. Location): Home  Distant Location (provider location):  On-site    Phone call duration: 5 minutes  Signed Electronically by: David Ivey MD    I called Javier Markham from the vein clinic in [place].  He was at home.  I discussed the findings of his sonography.  He did not have deep venous thrombosis.  He had superficial thrombophlebitis in the cluster of varicose veins which were previously asymptomatic.  He tells me that his symptoms are starting to improve.  He is concerned about the \"lumps\" in his thigh where the varicose veins are thrombosed.    I explained to him that we cannot remove those lumps.  We will finish off his 4 weeks of Xarelto.  I will see him back on July 11 and we will get sonography of the right lower extremity veins to assess for reflux.  If there is reflux in the great saphenous vein then it will need to be treated with radiofrequency ablation to prevent recurrent episodes of thrombophlebitis.  All of this was explained to him in detail.  All questions answered.    Phone call duration: 5 minutes.      Again, thank you for allowing me to participate in the care of your patient.        Sincerely,        David Ivey MD  "

## 2024-06-13 NOTE — PROGRESS NOTES
"I called Javier Markham from the vein clinic in [place].  He was at home.  I discussed the findings of his sonography.  He did not have deep venous thrombosis.  He had superficial thrombophlebitis in the cluster of varicose veins which were previously asymptomatic.  He tells me that his symptoms are starting to improve.  He is concerned about the \"lumps\" in his thigh where the varicose veins are thrombosed.    I explained to him that we cannot remove those lumps.  We will finish off his 4 weeks of Xarelto.  I will see him back on July 11 and we will get sonography of the right lower extremity veins to assess for reflux.  If there is reflux in the great saphenous vein then it will need to be treated with radiofrequency ablation to prevent recurrent episodes of thrombophlebitis.  All of this was explained to him in detail.  All questions answered.    Phone call duration: 5 minutes.  "
Alexx is a 59 year old who is being evaluated via a billable telephone visit.    What phone number would you like to be contacted at? [phone]  How would you like to obtain your AVS? Mail a copy  Originating Location (pt. Location): Home  Distant Location (provider location):  On-site    Phone call duration: 5 minutes  Signed Electronically by: David Ivey MD  
NPO reinforced; safety maintained/plan of care explained/repositioned/side rails up
reinforced NPO with mom and patient; safety maintained/plan of care explained/repositioned/side rails up

## 2024-06-14 ENCOUNTER — OFFICE VISIT (OUTPATIENT)
Dept: FAMILY MEDICINE | Facility: CLINIC | Age: 59
End: 2024-06-14
Payer: COMMERCIAL

## 2024-06-14 VITALS
HEART RATE: 86 BPM | WEIGHT: 258 LBS | SYSTOLIC BLOOD PRESSURE: 106 MMHG | HEIGHT: 75 IN | OXYGEN SATURATION: 97 % | DIASTOLIC BLOOD PRESSURE: 72 MMHG | BODY MASS INDEX: 32.08 KG/M2 | RESPIRATION RATE: 18 BRPM | TEMPERATURE: 96.9 F

## 2024-06-14 DIAGNOSIS — E78.5 HYPERLIPIDEMIA LDL GOAL <100: ICD-10-CM

## 2024-06-14 DIAGNOSIS — E11.65 TYPE 2 DIABETES MELLITUS WITH HYPERGLYCEMIA, WITHOUT LONG-TERM CURRENT USE OF INSULIN (H): Primary | ICD-10-CM

## 2024-06-14 DIAGNOSIS — I10 BENIGN ESSENTIAL HYPERTENSION: ICD-10-CM

## 2024-06-14 DIAGNOSIS — L70.9 ACNE, UNSPECIFIED ACNE TYPE: ICD-10-CM

## 2024-06-14 DIAGNOSIS — Z12.11 SCREEN FOR COLON CANCER: ICD-10-CM

## 2024-06-14 DIAGNOSIS — L30.9 ECZEMA, UNSPECIFIED TYPE: ICD-10-CM

## 2024-06-14 PROCEDURE — 99214 OFFICE O/P EST MOD 30 MIN: CPT | Performed by: INTERNAL MEDICINE

## 2024-06-14 RX ORDER — BLOOD SUGAR DIAGNOSTIC
STRIP MISCELLANEOUS
Qty: 100 STRIP | Refills: 5 | Status: SHIPPED | OUTPATIENT
Start: 2024-06-14

## 2024-06-14 RX ORDER — MINOCYCLINE HYDROCHLORIDE 100 MG/1
100 TABLET ORAL DAILY
Qty: 30 TABLET | Refills: 1 | Status: SHIPPED | OUTPATIENT
Start: 2024-06-14

## 2024-06-14 RX ORDER — TRIAMCINOLONE ACETONIDE 1 MG/G
CREAM TOPICAL 2 TIMES DAILY
Qty: 30 G | Refills: 3 | Status: SHIPPED | OUTPATIENT
Start: 2024-06-14

## 2024-06-14 RX ORDER — LANCETS 33 GAUGE
1 EACH MISCELLANEOUS DAILY
Qty: 200 EACH | Refills: 5 | Status: SHIPPED | OUTPATIENT
Start: 2024-06-14

## 2024-06-14 ASSESSMENT — PAIN SCALES - GENERAL: PAINLEVEL: NO PAIN (0)

## 2024-06-14 NOTE — PROGRESS NOTES
Subjective   Alexx is a 59 year old, presenting for the following health issues:  Follow Up and Health Maintenance (Eye Exam done on 2022)  HPI     Chief Complaint:     Follow up on multiple concerns including Type 2 Diabetes, acne, hypertension, hyperlipidemia      HPI:   Patient Javier Markham is a very pleasant 59 year old male with history of Type 2 Diabetes, hypertension, acne who presents to Internal Medicine clinic today for   Follow up on multiple concerns including Type 2 Diabetes, acne, hypertension, hyperlipidemia.          Current Medications:     Current Outpatient Medications   Medication Sig Dispense Refill    acetaminophen (TYLENOL) 325 MG tablet Take 2 tablets (650 mg) by mouth every 4 hours as needed for mild pain 50 tablet 0    albuterol (PROAIR HFA/PROVENTIL HFA/VENTOLIN HFA) 108 (90 Base) MCG/ACT inhaler Inhale 2 puffs into the lungs every 4 hours as needed for shortness of breath / dyspnea or wheezing 8.5 g 3    Alcohol Swabs (B-D SINGLE USE SWABS REGULAR) PADS USE AS NEEDED 200 each 0    ASPIRIN LOW DOSE 81 MG EC tablet TAKE ONE TABLET BY MOUTH ONCE DAILY 90 tablet 1    azelastine (ASTELIN) 0.1 % nasal spray Spray 1 spray into both nostrils every evening      blood glucose (NO BRAND SPECIFIED) test strip Use to test blood sugar 2 times daily or as directed. 200 strip 11    blood glucose (ONETOUCH VERIO IQ) test strip Use to test blood sugar 1 times daily or as directed. 100 strip 5    blood glucose monitoring (NO BRAND SPECIFIED) meter device kit Use to test blood sugar 1 times daily or as directed. ONE TOUCH VERIO. 1 kit 0    cephALEXin (KEFLEX) 500 MG capsule Take 1 capsule (500 mg) by mouth 4 times daily 28 capsule 0    cephALEXin (KEFLEX) 500 MG capsule Take 1 capsule (500 mg) by mouth 4 times daily 40 capsule 0    clonazePAM (KLONOPIN) 0.5 MG tablet TAKE ONE TABLET BY MOUTH AT BEDTIME AS NEEDED FOR RESTLESS LEGS 30 tablet 1    clopidogrel (PLAVIX) 75 MG tablet TAKE ONE TABLET BY MOUTH  ONCE DAILY 90 tablet 3    cyanocobalamin (VITAMIN B-12) 1000 MCG tablet TAKE ONE TABLET BY MOUTH ONCE DAILY 100 tablet 1    evolocumab (REPATHA) 140 MG/ML prefilled autoinjector Inject 140 mg Subcutaneous every 14 days      ezetimibe (ZETIA) 10 MG tablet Take 1 tablet (10 mg) by mouth At Bedtime 90 tablet 1    fenofibrate micronized (LOFIBRA) 67 MG capsule Take 67 mg by mouth      FEROSUL 325 (65 Fe) MG tablet TAKE 1 TABLET (325 MG) BY MOUTH EVERY EVENING. 90 tablet 0    fluticasone (FLONASE) 50 MCG/ACT nasal spray SPRAY TWO SPRAYS INTO BOTH NOSTRILS DAILY 48 g 1    gabapentin (NEURONTIN) 300 MG capsule TAKE 3 CAPSULES BY MOUTH 3 TIMES DAILY. 810 capsule 0    hydrOXYzine HCl (ATARAX) 25 MG tablet TAKE ONE TO TWO TABLETS BY MOUTH EVERY 6 HOURS AS NEEDED FOR ANXIETY 120 tablet 0    ibuprofen (ADVIL/MOTRIN) 200 MG tablet Take 200 mg by mouth every 4 hours as needed for mild pain      JANUVIA 50 MG tablet TAKE ONE TABLET BY MOUTH ONCE DAILY 90 tablet 1    JARDIANCE 10 MG TABS tablet TAKE 1 TABLET (10 MG) BY MOUTH DAILY DUE FOR APPOINTMENT. PLEASE SCHEDULE: [phone] 90 tablet 0    lisinopril (ZESTRIL) 10 MG tablet TAKE ONE TABLET BY MOUTH ONCE DAILY 90 tablet 2    loratadine (CLARITIN) 10 MG tablet TAKE ONE TABLET BY MOUTH ONCE DAILY 90 tablet 0    Melatonin 10 MG TABS tablet Take 10 mg by mouth nightly as needed for sleep      meloxicam (MOBIC) 15 MG tablet Take 1 tablet (15 mg) by mouth daily 10 tablet 0    metFORMIN (GLUCOPHAGE) 500 MG tablet TAKE TWO TABLETS BY MOUTH TWICE A DAY WITH MEALS 360 tablet 0    metoprolol succinate ER (TOPROL XL) 25 MG 24 hr tablet TAKE ONE TABLET BY MOUTH TWICE A  tablet 0    minocycline (DYNACIN) 100 MG tablet Take 1 tablet (100 mg) by mouth daily 30 tablet 0    nitroGLYcerin (NITROSTAT) 0.4 MG sublingual tablet PLACE 1 TABLET UNDER THE TONGUE AT THE ONSET OF CHEST PAIN. MAY REPEAT EVERY 5 MINUTES AS NEEDED FOR A TOTAL OF 3 DOSES. 25 tablet 1    omega-3 acid ethyl esters  (LOVAZA) 1 g capsule TAKE TWO CAPSULES BY MOUTH TWO TIMES A  capsule 0    omeprazole (PRILOSEC) 20 MG DR capsule TAKE ONE CAPSULE BY MOUTH TWICE A DAY 30 TO 60 MINUTES BEFORE A MEAL 180 capsule 0    OneTouch Delica Lancets 33G MISC 1 Act by Device route daily 200 each 5    polyethylene glycol (MIRALAX/GLYCOLAX) powder Take 17 g by mouth daily as needed for constipation 500 g 5    pramipexole (MIRAPEX) 0.75 MG tablet TAKE ONE TABLET BY MOUTH EVERY NIGHT AT BEDTIME 90 tablet 2    Rivaroxaban ANTICOAGULANT 15 & 20 MG TBPK Starter Therapy Pack Take 15 mg by mouth 2 times daily 42 each 0    rosuvastatin (CRESTOR) 40 MG tablet Take 1 tablet (40 mg) by mouth daily      senna-docusate (SENOKOT-S/PERICOLACE) 8.6-50 MG tablet Take 1-2 tablets by mouth 2 times daily (Patient taking differently: Take 1 tablet by mouth daily) 30 tablet 0    Skin Protectants, Misc. (HYDROCERIN) CREA APPLY TOPICALLY AS NEEDED FOR DRY SKIN 226 g 3    sodium chloride (DEEP SEA NASAL SPRAY) 0.65 % nasal spray SPRAY 1 SPRAY IN EACH NOSTRIL FOUR TIMES A DAY AS NEEDED FOR CONGESTION 44 mL 3    tamsulosin (FLOMAX) 0.4 MG capsule TAKE ONE CAPSULE BY MOUTH ONCE DAILY 90 capsule 2    triamcinolone (KENALOG) 0.1 % external cream Apply topically 2 times daily 30 g 3    triamcinolone (KENALOG) 0.1 % external cream Apply sparingly once or twice per day as needed to affected area until the skin is better, then stop; REPEAT AS NEEDED 80 g 1    Vitamin D3 50 mcg (2000 units) tablet Take 1 tablet (50 mcg) by mouth daily 100 tablet 1    XARELTO ANTICOAGULANT 20 MG TABS tablet Take 15-20 mg by mouth 2 times daily (with meals) 15 mg  PO BID for three weeks. Then 20 mg daily for 7 days then stop. Duration of treatment is 4 weeks.           Allergies:      Allergies   Allergen Reactions    Perfume     Soap      Perfume in soap-reaction excema            Past Medical History:     Past Medical History:   Diagnosis Date    Anxiety     Ascending aorta dilation (H24)      CAD (coronary artery disease) Dec 2014    s/p CABGx3 Dec 2014 and later RCA stent July 2015    Diabetes mellitus (H)     DM2 (diabetes mellitus, type 2) (H) Dx June 2015    A1c 6.5% at time of dx    Epididymitis, right     Essential tremor     GERD (gastroesophageal reflux disease)     Heart attack (H) 12/2014, 6/2015, 7/4/2015    x3    Hernia, abdominal     History of cocaine abuse (H)     Smoked crack cocaine (remission since Nov 2014)    History of tobacco abuse     Hyperlipidemia     Hyperlipidemia LDL goal <100     chronic hypertriglyceredemia, regular elevation 300-400    Incomplete RBBB     Inguinal hernia     Left    Numbness and tingling     diabetic neuropathy    Prediabetes Dec 2014    Restless leg syndrome     S/P CABG x 3 Dec 2014    Severe obesity (BMI 35.0-35.9 with comorbidity) (H) 12/24/2018    BMI 37.5    Sleep apnea     mild, does not use CPAP    Stented coronary artery          Past Surgical History:     Past Surgical History:   Procedure Laterality Date    DAVINCI XI HERNIORRHAPHY INGUINAL Right 7/31/2020    Procedure: ROBOTIC ASSITED RIGHT INGUINAL HERNIA REPAIR WITH MESH;  Surgeon: Haim Green MD;  Location:  OR    HERNIORRHAPHY INGUINAL Left 9/20/2016    Procedure: HERNIORRHAPHY INGUINAL;  Surgeon: Haim Green MD;  Location:  SD    HYDROCELECTOMY SCROTAL Right 3/23/2021    Procedure: Right HYDROCELECTOMY, SCROTAL APPROACH with spermatic cord block;  Surgeon: Blaise Altamirano MD;  Location:  OR    STENT  07/2015    RCA stent    [facility] CABG, VEIN, THREE  12/23/2014    [facility] NONSPECIFIC PROCEDURE  age 15    both feet bone spur removal in the arch of the foot    [facility] DRUG-ELUTING STENTS, SINGLE  10/19/2018    Left circumflex at [facility]         Family Medical History:     Family History   Problem Relation Age of Onset    Diabetes Mother         type 2    Breast Cancer Mother     Macular Degeneration Mother     Dementia Mother     Coronary Artery Disease  Father 52    Heart Disease Paternal Uncle     Diabetes Maternal Grandfather     Breast Cancer Maternal Grandmother     Prostate Cancer No family hx of          Social History:     Social History     Socioeconomic History    Marital status: Single     Spouse name: Not on file    Number of children: 2    Years of education: Not on file    Highest education level: Not on file   Occupational History    Occupation: nurse assistant     Employer: [employer]   Tobacco Use    Smoking status: Former     Current packs/day: 0.00     Average packs/day: 0.5 packs/day for 25.0 years (12.5 ttl pk-yrs)     Types: Cigarettes     Start date: 1990     Quit date: 2015     Years since quittin.6    Smokeless tobacco: Never   Vaping Use    Vaping status: Never Used   Substance and Sexual Activity    Alcohol use: No     Alcohol/week: 0.0 standard drinks of alcohol    Drug use: No     Comment: Past coccaine 2 weeks ago as of 12; no h/o IVDA. Smoked cocaine (did not snort)    Sexual activity: Yes     Partners: Female   Other Topics Concern    Parent/sibling w/ CABG, MI or angioplasty before 65F 55M? Not Asked   Social History Narrative    Not on file     Social Determinants of Health     Financial Resource Strain: High Risk (2021)    [facility]    Financial Resource Strain     Difficulty of Paying Living Expenses: Not on file     Difficulty of Paying Living Expenses: Not on file   Food Insecurity: Not on file   Transportation Needs: Not on file   Physical Activity: Not on file   Stress: Not on file   Social Connections: Unknown (2021)    [facility]    Social Connections     Frequency of Communication with Friends and Family: Not on file   Interpersonal Safety: Not on file   Housing Stability: Not on file  "          Review of System:     Constitutional: Negative for fever or chills  Skin: positive for eczema, acne symptoms  Ears/Nose/Throat: Negative for nasal congestion, sore throat  Respiratory: No shortness of breath, dyspnea on exertion, cough, or hemoptysis  Cardiovascular: Negative for chest pain  Gastrointestinal: Negative for nausea, vomiting  Genitourinary: Negative for dysuria, hematuria  Musculoskeletal: Negative for myalgias  Neurologic: Negative for headaches  Psychiatric: Negative for depression, anxiety  Hematologic/Lymphatic/Immunologic: Negative  Endocrine: positive for Type 2 Diabetes   Behavioral: Negative for tobacco use       Physical Exam:   /72 (BP Location: Right arm, Patient Position: Sitting, Cuff Size: Adult Large)   Pulse 86   Temp 96.9  F (36.1  C) (Temporal)   Resp 18   Ht 1.905 m (6' 3\")   Wt 117 kg (258 lb)   SpO2 97%   BMI 32.25 kg/m      GENERAL:  alert and no distress  EYES: eyes grossly normal to inspection, and conjunctivae and sclerae normal  HENT: Normocephalic atraumatic. Nose and mouth without ulcers or lesions  NECK: supple  RESP: lungs clear to auscultation   CV: regular rate and rhythm, normal S1 S2  LYMPH: no peripheral edema   ABDOMEN: nondistended  MS: no gross musculoskeletal defects noted  SKIN: positive for eczema, acne symptoms  NEURO: Alert & Oriented x 3.   PSYCH: mentation appears normal, affect normal        Diagnostic Test Results:     Diagnostic Test Results:  Labs reviewed in Epic    ASSESSMENT/PLAN:       Alexx was seen today for follow up and health maintenance.    Diagnoses and all orders for this visit:    Type 2 diabetes mellitus with hyperglycemia, without long-term current use of insulin (H)  -     Adult Eye  Referral; Future  -     HEMOGLOBIN A1C; Future  -     OneTouch Delica Lancets 33G MISC; 1 Act by Device route daily  -     blood glucose (ONETOUCH VERIO IQ) test strip; Use to test blood sugar 1 times daily or as " directed.    Screen for colon cancer  -     Colonoscopy Screening  Referral; Future    Benign essential hypertension  - stable, continue current therapy    Eczema, unspecified type  -     triamcinolone (KENALOG) 0.1 % external cream; Apply topically 2 times daily    Hyperlipidemia LDL goal <100  -     Lipid panel reflex to direct LDL Non-fasting; Future  -     REVIEW OF HEALTH MAINTENANCE PROTOCOL ORDERS          Follow Up Plan:     Patient is instructed to return to Internal Medicine clinic for follow-up visit in 3 months.        Jo-Ann Baird MD  Internal Medicine  [facility]      Signed Electronically by: Jo-Ann Baird MD

## 2024-07-02 ENCOUNTER — TELEPHONE (OUTPATIENT)
Dept: FAMILY MEDICINE | Facility: CLINIC | Age: 59
End: 2024-07-02
Payer: COMMERCIAL

## 2024-07-02 ENCOUNTER — TELEPHONE (OUTPATIENT)
Dept: VASCULAR SURGERY | Facility: CLINIC | Age: 59
End: 2024-07-02
Payer: COMMERCIAL

## 2024-07-02 DIAGNOSIS — K21.9 GASTROESOPHAGEAL REFLUX DISEASE WITHOUT ESOPHAGITIS: Primary | ICD-10-CM

## 2024-07-02 NOTE — TELEPHONE ENCOUNTER
Patient has questions about Xarelto medication prescribed per KMK. Patient is out of it. Should he continue taking it? Patient appt is on July 11th 2024.     JEOVANNY KURTZ,   [facility] VEIN [facility]

## 2024-07-02 NOTE — TELEPHONE ENCOUNTER
Called and SWP. Reviewed Xarelto medication with patient. Patient wondering if he is to continue taking the medication up until his appt because he does not have any medication left. Patient started taking medication 5/30/24 and was instructed to take for 4 weeks (ending 6/27/24). Informed patient that he does not need anymore medication at this time. To follow up 7/11/24 as already scheduled and [facility] can discuss further plan of care based on ultrasound results from that appt. Patient verbalized understanding. No further questions or concerns at this time.

## 2024-07-02 NOTE — TELEPHONE ENCOUNTER
Omeprazole and plavix drug interaction. Pharmacy asking if OK to switch omeprazole to pantoprazole?    Please review and advise.    Ivon Hewitt RN

## 2024-07-08 RX ORDER — PANTOPRAZOLE SODIUM 20 MG/1
40 TABLET, DELAYED RELEASE ORAL DAILY
Qty: 90 TABLET | Refills: 3 | Status: SHIPPED | OUTPATIENT
Start: 2024-07-08 | End: 2024-08-28

## 2024-07-08 NOTE — TELEPHONE ENCOUNTER
Called pharmacy to notify. If PCP wants pantoprazole please order, pended pharmacy.    Ivon Hewitt RN

## 2024-07-08 NOTE — TELEPHONE ENCOUNTER
Jo-Ann Baird MD   to Me       7/2/24 12:50 PM  Pharmacy asking if OK to switch omeprazole to pantoprazole? Yes approved

## 2024-07-11 ENCOUNTER — ANCILLARY PROCEDURE (OUTPATIENT)
Dept: ULTRASOUND IMAGING | Facility: CLINIC | Age: 59
End: 2024-07-11
Attending: SURGERY
Payer: COMMERCIAL

## 2024-07-11 ENCOUNTER — OFFICE VISIT (OUTPATIENT)
Dept: VASCULAR SURGERY | Facility: CLINIC | Age: 59
End: 2024-07-11
Attending: SURGERY
Payer: COMMERCIAL

## 2024-07-11 DIAGNOSIS — I80.01 SUPERFICIAL PHLEBITIS AND THROMBOPHLEBITIS OF RIGHT LOWER EXTREMITY: ICD-10-CM

## 2024-07-11 DIAGNOSIS — I80.01 SUPERFICIAL PHLEBITIS AND THROMBOPHLEBITIS OF RIGHT LOWER EXTREMITY: Primary | ICD-10-CM

## 2024-07-11 PROCEDURE — 93971 EXTREMITY STUDY: CPT | Mod: RT | Performed by: SURGERY

## 2024-07-11 PROCEDURE — G2211 COMPLEX E/M VISIT ADD ON: HCPCS | Performed by: SURGERY

## 2024-07-11 PROCEDURE — 99213 OFFICE O/P EST LOW 20 MIN: CPT | Performed by: SURGERY

## 2024-07-11 NOTE — LETTER
7/11/2024      Javier Markham  [address]  [facility] [address]      Dear Colleague,    Thank you for referring your patient, Javier Markham, to the [facility] VEIN CLINIC [place]. Please see a copy of my visit note below.    Javier Markham is a 59-year-old gentleman who I have been seeing for thrombophlebitis of a cluster of varicose veins in the right lower extremity.  When we last ultrasounded him he had thrombus in the varicosities.  He was started on Xarelto at the time.  He did not have any deep venous thrombosis.  He returns for follow-up.  He reports that he feels that the in the right lower extremity are more tender.    Sonography shows that the great saphenous vein is now occluded.  I do not think there is any need or role for surgical intervention at this time.    We will repeat ultrasonography and 1 months time to make sure this has not progressed into the common femoral vein.      Again, thank you for allowing me to participate in the care of your patient.        Sincerely,        David Ivey MD

## 2024-07-12 ENCOUNTER — TELEPHONE (OUTPATIENT)
Dept: VASCULAR SURGERY | Facility: CLINIC | Age: 59
End: 2024-07-12
Payer: COMMERCIAL

## 2024-07-12 ENCOUNTER — TELEPHONE (OUTPATIENT)
Dept: OTHER | Facility: CLINIC | Age: 59
End: 2024-07-12
Payer: COMMERCIAL

## 2024-07-12 DIAGNOSIS — I80.01 SUPERFICIAL PHLEBITIS AND THROMBOPHLEBITIS OF RIGHT LOWER EXTREMITY: Primary | ICD-10-CM

## 2024-07-12 NOTE — TELEPHONE ENCOUNTER
Patient called back. Per KMK last note, patient is to start back on the Xarelto and continue until his next ultrasound scheduled for 8/22/24. Xarelto 20mg sent to patient pharmacy of choice. Encouraged patient to  medication today and start taking. Also, instructed patient to call the clinic with any worsening symptoms. Patient verbalized understanding of all information. No further questions or concerns at this time.

## 2024-07-12 NOTE — TELEPHONE ENCOUNTER
After review of her sonography from 11 June 2024 and the 1 from yesterday I have noticed that previously his great saphenous vein thrombosis was limited to around the knee and now it has progressed.  We have a change of plan and I would like for him to continue Xarelto till his next sonography.    To that effect I have made multiple attempts to call him at his mobile phone number which is [phone].  I have left a message for him to call us back.

## 2024-07-12 NOTE — PROGRESS NOTES
Javier Markham is a 59-year-old gentleman who I have been seeing for thrombophlebitis of a cluster of varicose veins in the right lower extremity.  When we last ultrasounded him he had thrombus in the varicosities.  He was started on Xarelto at the time.  He did not have any deep venous thrombosis.  He returns for follow-up.  He reports that he feels that the in the right lower extremity are more tender.    Sonography shows that the great saphenous vein is now occluded.  I do not think there is any need or role for surgical intervention at this time.    We will repeat ultrasonography and 1 months time to make sure this has not progressed into the common femoral vein.

## 2024-08-22 ENCOUNTER — ANCILLARY PROCEDURE (OUTPATIENT)
Dept: ULTRASOUND IMAGING | Facility: CLINIC | Age: 59
End: 2024-08-22
Attending: SURGERY
Payer: COMMERCIAL

## 2024-08-22 ENCOUNTER — OFFICE VISIT (OUTPATIENT)
Dept: VASCULAR SURGERY | Facility: CLINIC | Age: 59
End: 2024-08-22
Attending: SURGERY
Payer: COMMERCIAL

## 2024-08-22 DIAGNOSIS — G25.81 RESTLESS LEGS SYNDROME (RLS): Chronic | ICD-10-CM

## 2024-08-22 DIAGNOSIS — F41.9 ANXIETY: ICD-10-CM

## 2024-08-22 DIAGNOSIS — I80.01 SUPERFICIAL THROMBOPHLEBITIS OF RIGHT LEG: Primary | ICD-10-CM

## 2024-08-22 DIAGNOSIS — I80.01 SUPERFICIAL PHLEBITIS AND THROMBOPHLEBITIS OF RIGHT LOWER EXTREMITY: ICD-10-CM

## 2024-08-22 PROCEDURE — 93971 EXTREMITY STUDY: CPT | Mod: RT | Performed by: SURGERY

## 2024-08-22 PROCEDURE — 99213 OFFICE O/P EST LOW 20 MIN: CPT | Performed by: SURGERY

## 2024-08-22 RX ORDER — CLONAZEPAM 0.5 MG/1
TABLET ORAL
Qty: 30 TABLET | Refills: 1 | Status: SHIPPED | OUTPATIENT
Start: 2024-08-22 | End: 2024-08-26

## 2024-08-22 NOTE — PROGRESS NOTES
Javier Markham returns for follow-up.  He is a very pleasant 59-year-old gentleman who had been seeing for thrombophlebitis of a cluster of varicose veins in the right lower extremity.  When we ultrasounded him he had thrombus in the varicosities and the great saphenous vein.  When I saw him last month I decided that because of the progression of the thrombus in the great saphenous vein he should continue with Xarelto.  On 12 July 2024 a prescription for 20 mg of Xarelto was sent to his pharmacy.  Today he tells me that he has not taken Xarelto for over a month now.    He has not had any worsening of symptoms.  On sonography he has thrombosis of the great saphenous vein and a varicosity arising from the anterior accessory saphenous vein which is also thrombosed.  Although the anterior accessory saphenous vein is incompetent with a actual varicosity that is arising from it has thrombosed.  At least right now I do not feel the need to do an ablation of the anterior accessory saphenous vein.    I have asked him to keep an eye on his symptoms and stay active and let us know if he has any worsening then we can reconsider doing anterior accessory saphenous vein ablation.    Presently he can follow-up on a as needed basis.

## 2024-08-22 NOTE — LETTER
8/22/2024      Javier Markham  [address]  [place] [address]      Dear Colleague,    Thank you for referring your patient, Javier Markham, to the [facility]IN CLINIC [place]. Please see a copy of my visit note below.    Javier Markham returns for follow-up.  He is a very pleasant 59-year-old gentleman who had been seeing for thrombophlebitis of a cluster of varicose veins in the right lower extremity.  When we ultrasounded him he had thrombus in the varicosities and the great saphenous vein.  When I saw him last month I decided that because of the progression of the thrombus in the great saphenous vein he should continue with Xarelto.  On 12 July 2024 a prescription for 20 mg of Xarelto was sent to his pharmacy.  Today he tells me that he has not taken Xarelto for over a month now.    He has not had any worsening of symptoms.  On sonography he has thrombosis of the great saphenous vein and a varicosity arising from the anterior accessory saphenous vein which is also thrombosed.  Although the anterior accessory saphenous vein is incompetent with a actual varicosity that is arising from it has thrombosed.  At least right now I do not feel the need to do an ablation of the anterior accessory saphenous vein.    I have asked him to keep an eye on his symptoms and stay active and let us know if he has any worsening then we can reconsider doing anterior accessory saphenous vein ablation.    Presently he can follow-up on a as needed basis.      Again, thank you for allowing me to participate in the care of your patient.        Sincerely,        David Ivey MD

## 2024-08-26 DIAGNOSIS — G25.81 RESTLESS LEGS SYNDROME (RLS): Chronic | ICD-10-CM

## 2024-08-26 DIAGNOSIS — F41.9 ANXIETY: ICD-10-CM

## 2024-08-26 RX ORDER — CLONAZEPAM 0.5 MG/1
TABLET ORAL
Qty: 30 TABLET | Refills: 0 | Status: SHIPPED | OUTPATIENT
Start: 2024-08-26

## 2024-08-26 NOTE — TELEPHONE ENCOUNTER
I the patient's medication chart we can see a prescription ordered for Clonazepam 0.5mg tablets on 8/22/24.  This prescription did not seem to reach the pharmacy. Would it be possible to resend this prescription?    Thank you  Jennifer, Pharmacy Coordinator  [facility]  [address] [facility] [address]  [phone] Phone  [phone] Fax

## 2024-08-28 ENCOUNTER — TELEPHONE (OUTPATIENT)
Dept: FAMILY MEDICINE | Facility: CLINIC | Age: 59
End: 2024-08-28
Payer: COMMERCIAL

## 2024-08-28 DIAGNOSIS — K21.9 GASTROESOPHAGEAL REFLUX DISEASE WITHOUT ESOPHAGITIS: Primary | ICD-10-CM

## 2024-08-28 NOTE — TELEPHONE ENCOUNTER
Routing to PCP - patient requesting omeprazole from PCP (previously prescribed by previous PCP Dr. Oconnor).    Nelly FERNANDES  [facility]

## 2024-08-28 NOTE — TELEPHONE ENCOUNTER
Alexx is requesting a refill of omeprazole 20mg capsules. One capsule twice daily before meals. This was originally prescribed by a Dr aRjeev Oconnor. Would you be willing to order this medication for him please?    Thank you  Jennifer, Pharmacy Coordinator  [facility]  [address]  [address]  [phone] Phone  [phone] Fax

## 2024-08-29 ENCOUNTER — TELEPHONE (OUTPATIENT)
Dept: FAMILY MEDICINE | Facility: CLINIC | Age: 59
End: 2024-08-29
Payer: COMMERCIAL

## 2024-08-29 DIAGNOSIS — E11.40 TYPE 2 DIABETES MELLITUS WITH DIABETIC NEUROPATHY, WITHOUT LONG-TERM CURRENT USE OF INSULIN (H): ICD-10-CM

## 2024-08-29 RX ORDER — GABAPENTIN 300 MG/1
CAPSULE ORAL
Qty: 810 CAPSULE | Refills: 0 | Status: SHIPPED | OUTPATIENT
Start: 2024-08-29

## 2024-08-29 NOTE — TELEPHONE ENCOUNTER
Patient is requesting a 90-day Rx (#810 capsules) be sent to [facility] for Gabapentin 300 mg. If appropriate, please send an Rx and then we will cancel the 30-day Rx we have on file. Thank you!  Beth Guzman [facility] on 8/29/2024 at 8:31 AM

## 2024-12-31 DIAGNOSIS — G25.81 RESTLESS LEGS SYNDROME (RLS): Chronic | ICD-10-CM

## 2024-12-31 DIAGNOSIS — E11.40 TYPE 2 DIABETES MELLITUS WITH DIABETIC NEUROPATHY, WITHOUT LONG-TERM CURRENT USE OF INSULIN (H): ICD-10-CM

## 2024-12-31 DIAGNOSIS — F41.9 ANXIETY: ICD-10-CM

## 2025-01-08 DIAGNOSIS — N40.0 BENIGN PROSTATIC HYPERPLASIA, PRESENCE OF LOWER URINARY TRACT SYMPTOMS UNSPECIFIED: ICD-10-CM

## 2025-01-09 ENCOUNTER — VIRTUAL VISIT (OUTPATIENT)
Dept: FAMILY MEDICINE | Facility: CLINIC | Age: 60
End: 2025-01-09
Payer: COMMERCIAL

## 2025-01-09 DIAGNOSIS — F41.9 ANXIETY: ICD-10-CM

## 2025-01-09 DIAGNOSIS — E11.40 TYPE 2 DIABETES MELLITUS WITH DIABETIC NEUROPATHY, WITHOUT LONG-TERM CURRENT USE OF INSULIN (H): ICD-10-CM

## 2025-01-09 DIAGNOSIS — G25.81 RESTLESS LEGS SYNDROME (RLS): Chronic | ICD-10-CM

## 2025-01-09 PROCEDURE — 98013 SYNCH AUDIO-ONLY EST LOW 20: CPT | Performed by: INTERNAL MEDICINE

## 2025-01-09 RX ORDER — GABAPENTIN 300 MG/1
CAPSULE ORAL
Qty: 270 CAPSULE | Refills: 0 | Status: SHIPPED | OUTPATIENT
Start: 2025-01-09 | End: 2025-01-09

## 2025-01-09 RX ORDER — CLONAZEPAM 0.5 MG/1
TABLET ORAL
Qty: 30 TABLET | Refills: 2 | Status: SHIPPED | OUTPATIENT
Start: 2025-01-09

## 2025-01-09 RX ORDER — CLONAZEPAM 0.5 MG/1
TABLET ORAL
Qty: 30 TABLET | Refills: 0 | Status: SHIPPED | OUTPATIENT
Start: 2025-01-09 | End: 2025-01-09

## 2025-01-09 RX ORDER — CLONAZEPAM 0.5 MG/1
TABLET ORAL
Qty: 30 TABLET | Refills: 0 | OUTPATIENT
Start: 2025-01-09

## 2025-01-09 RX ORDER — TAMSULOSIN HYDROCHLORIDE 0.4 MG/1
0.4 CAPSULE ORAL DAILY
Qty: 90 CAPSULE | Refills: 0 | Status: SHIPPED | OUTPATIENT
Start: 2025-01-09

## 2025-01-09 RX ORDER — GABAPENTIN 300 MG/1
CAPSULE ORAL
Qty: 270 CAPSULE | Refills: 2 | Status: SHIPPED | OUTPATIENT
Start: 2025-01-09

## 2025-01-09 RX ORDER — GABAPENTIN 300 MG/1
CAPSULE ORAL
Qty: 810 CAPSULE | Refills: 0 | OUTPATIENT
Start: 2025-01-09

## 2025-01-09 NOTE — PROGRESS NOTES
"Alexx is a 60 year old who is being evaluated via a billable telephone visit.    What phone number would you like to be contacted at? [phone]   How would you like to obtain your AVS? {AVS Preference:497338}  Originating Location (pt. Location): {patient location:235704::\"Home\"}  {PROVIDER LOCATION On-site should be selected for visits conducted from your clinic location or adjoining [facility] hospital, academic office, or other nearby [facility] building. Off-site should be selected for all other provider locations, including home:512623}  Distant Location (provider location):  {virtual location provider:238051}  Telephone visit completed due to {audio only reason:607342}    {PROVIDER CHARTING PREFERENCE:467701}    Subjective   Alexx is a 60 year old, presenting for the following health issues:  Recheck Medication and Follow Up  {(!) Visit Details have not yet been documented.  Please enter Visit Details and then use this list to pull in documentation. (Optional):712225}  HPI     {SUPERLIST (Optional):002076}  {additonal problems for provider to add (Optional):720995}    {ROS Picklists (Optional):806991}      Objective    Vitals - Patient Reported  Weight (Patient Reported): 117 kg (258 lb)  Height (Patient Reported): 190.5 cm (6' 3\")  BMI (Based on Pt Reported Ht/Wt): 32.25        Physical Exam   General: Alert and no distress //Respiratory: No audible wheeze, cough, or shortness of breath // Psychiatric:  Appropriate affect, tone, and pace of words      {Diagnostic Test Results (Optional):229444}      Phone call duration: *** minutes  Signed Electronically by: Jo-Ann Baird MD  {Email feedback regarding this note to [email or web address]   :124901}  " MG EC tablet TAKE ONE TABLET BY MOUTH ONCE DAILY 90 tablet 1    azelastine (ASTELIN) 0.1 % nasal spray Spray 1 spray into both nostrils every evening      blood glucose (NO BRAND SPECIFIED) test strip Use to test blood sugar 2 times daily or as directed. 200 strip 11    blood glucose (ONETOUCH VERIO IQ) test strip Use to test blood sugar 1 times daily or as directed. 100 strip 5    blood glucose monitoring (NO BRAND SPECIFIED) meter device kit Use to test blood sugar 1 times daily or as directed. ONE TOUCH VERIO. 1 kit 0    cephALEXin (KEFLEX) 500 MG capsule Take 1 capsule (500 mg) by mouth 4 times daily 28 capsule 0    cephALEXin (KEFLEX) 500 MG capsule Take 1 capsule (500 mg) by mouth 4 times daily 40 capsule 0    clonazePAM (KLONOPIN) 0.5 MG tablet TAKE ONE TABLET BY MOUTH EVERY EVENING AT BEDTIME AS NEEDED FOR RESTLESS LEGS 30 tablet 2    clopidogrel (PLAVIX) 75 MG tablet TAKE ONE TABLET BY MOUTH ONCE DAILY 90 tablet 3    cyanocobalamin (VITAMIN B-12) 1000 MCG tablet TAKE ONE TABLET BY MOUTH ONCE DAILY 100 tablet 1    evolocumab (REPATHA) 140 MG/ML prefilled autoinjector Inject 140 mg Subcutaneous every 14 days      ezetimibe (ZETIA) 10 MG tablet Take 1 tablet (10 mg) by mouth At Bedtime 90 tablet 1    fenofibrate micronized (LOFIBRA) 67 MG capsule Take 67 mg by mouth      FEROSUL 325 (65 Fe) MG tablet TAKE 1 TABLET (325 MG) BY MOUTH EVERY EVENING. 90 tablet 0    fluticasone (FLONASE) 50 MCG/ACT nasal spray SPRAY TWO SPRAYS INTO BOTH NOSTRILS DAILY 48 g 1    gabapentin (NEURONTIN) 300 MG capsule TAKE 3 CAPSULES BY MOUTH 3 TIMES DAILY. 270 capsule 2    hydrOXYzine HCl (ATARAX) 25 MG tablet TAKE ONE TO TWO TABLETS BY MOUTH EVERY 6 HOURS AS NEEDED FOR ANXIETY 120 tablet 0    ibuprofen (ADVIL/MOTRIN) 200 MG tablet Take 200 mg by mouth every 4 hours as needed for mild pain      JANUVIA 50 MG tablet TAKE ONE TABLET BY MOUTH ONCE DAILY 90 tablet 1    JARDIANCE 10 MG TABS tablet TAKE 1 TABLET (10 MG) BY MOUTH DAILY DUE  FOR APPOINTMENT. PLEASE SCHEDULE: [phone] (Patient taking differently: Take 20 mg by mouth.) 90 tablet 0    lisinopril (ZESTRIL) 10 MG tablet TAKE ONE TABLET BY MOUTH ONCE DAILY 90 tablet 2    loratadine (CLARITIN) 10 MG tablet TAKE ONE TABLET BY MOUTH ONCE DAILY 90 tablet 0    Melatonin 10 MG TABS tablet Take 10 mg by mouth nightly as needed for sleep      meloxicam (MOBIC) 15 MG tablet Take 1 tablet (15 mg) by mouth daily 10 tablet 0    metFORMIN (GLUCOPHAGE) 500 MG tablet TAKE TWO TABLETS BY MOUTH TWICE A DAY WITH MEALS 360 tablet 0    metoprolol succinate ER (TOPROL XL) 25 MG 24 hr tablet TAKE ONE TABLET BY MOUTH TWICE A  tablet 0    MINERAL OIL-HYDROPHIL PETROLAT EX Apply topically.      minocycline (DYNACIN) 100 MG tablet Take 1 tablet (100 mg) by mouth daily 30 tablet 1    nitroGLYcerin (NITROSTAT) 0.4 MG sublingual tablet PLACE 1 TABLET UNDER THE TONGUE AT THE ONSET OF CHEST PAIN. MAY REPEAT EVERY 5 MINUTES AS NEEDED FOR A TOTAL OF 3 DOSES. 25 tablet 1    omega-3 acid ethyl esters (LOVAZA) 1 g capsule TAKE TWO CAPSULES BY MOUTH TWO TIMES A  capsule 0    omeprazole (PRILOSEC) 20 MG DR capsule Take 1 capsule (20 mg) by mouth daily. 90 capsule 3    OneTouch Delica Lancets 33G MISC 1 Act by Device route daily 200 each 5    polyethylene glycol (MIRALAX/GLYCOLAX) powder Take 17 g by mouth daily as needed for constipation 500 g 5    pramipexole (MIRAPEX) 0.75 MG tablet TAKE ONE TABLET BY MOUTH EVERY NIGHT AT BEDTIME 90 tablet 2    rosuvastatin (CRESTOR) 40 MG tablet Take 1 tablet (40 mg) by mouth daily      senna-docusate (SENOKOT-S/PERICOLACE) 8.6-50 MG tablet Take 1-2 tablets by mouth 2 times daily (Patient taking differently: Take 1 tablet by mouth daily.) 30 tablet 0    Skin Protectants, Misc. (HYDROCERIN) CREA APPLY TOPICALLY AS NEEDED FOR DRY SKIN 226 g 3    sodium chloride (DEEP SEA NASAL SPRAY) 0.65 % nasal spray SPRAY 1 SPRAY IN EACH NOSTRIL FOUR TIMES A DAY AS NEEDED FOR CONGESTION 44  mL 3    tamsulosin (FLOMAX) 0.4 MG capsule Take 1 capsule (0.4 mg) by mouth daily. 90 capsule 0    triamcinolone (KENALOG) 0.1 % external cream Apply topically 2 times daily 30 g 3    triamcinolone (KENALOG) 0.1 % external cream Apply sparingly once or twice per day as needed to affected area until the skin is better, then stop; REPEAT AS NEEDED 80 g 1    Vitamin D3 50 mcg (2000 units) tablet Take 1 tablet (50 mcg) by mouth daily 100 tablet 1    rivaroxaban ANTICOAGULANT (XARELTO) 20 MG TABS tablet Take 1 tablet (20 mg) by mouth daily (with dinner) for 42 days 42 tablet 0     No current facility-administered medications for this visit.     Past Medical History:   Diagnosis Date    Anxiety     Ascending aorta dilation     CAD (coronary artery disease) Dec 2014    s/p CABGx3 Dec 2014 and later RCA stent July 2015    Diabetes mellitus (H)     DM2 (diabetes mellitus, type 2) (H) Dx June 2015    A1c 6.5% at time of dx    Epididymitis, right     Essential tremor     GERD (gastroesophageal reflux disease)     Heart attack (H) 12/2014, 6/2015, 7/4/2015    x3    Hernia, abdominal     History of cocaine abuse (H)     Smoked crack cocaine (remission since Nov 2014)    History of tobacco abuse     Hyperlipidemia     Hyperlipidemia LDL goal <100     chronic hypertriglyceredemia, regular elevation 300-400    Incomplete RBBB     Inguinal hernia     Left    Numbness and tingling     diabetic neuropathy    Prediabetes Dec 2014    Restless leg syndrome     S/P CABG x 3 Dec 2014    Severe obesity (BMI 35.0-35.9 with comorbidity) (H) 12/24/2018    BMI 37.5    Sleep apnea     mild, does not use CPAP    Stented coronary artery      Social History     Socioeconomic History    Marital status: Single     Spouse name: Not on file    Number of children: 2    Years of education: Not on file    Highest education level: Not on file   Occupational History    Occupation: nurse assistant     Employer: [employer]   Tobacco Use    Smoking  status: Former     Current packs/day: 0.00     Average packs/day: 0.5 packs/day for 25.0 years (12.5 ttl pk-yrs)     Types: Cigarettes     Start date: 1990     Quit date: 2015     Years since quittin.2    Smokeless tobacco: Never   Vaping Use    Vaping status: Never Used   Substance and Sexual Activity    Alcohol use: No     Alcohol/week: 0.0 standard drinks of alcohol    Drug use: No     Comment: Past coccaine 2 weeks ago as of 12; no h/o IVDA. Smoked cocaine (did not snort)    Sexual activity: Yes     Partners: Female   Other Topics Concern    Parent/sibling w/ CABG, MI or angioplasty before 65F 55M? Not Asked   Social History Narrative    Not on file     Social Drivers of Health     Financial Resource Strain: Low Risk  (2024)    Received from [facility]    Financial Resource Strain     Difficulty of Paying Living Expenses: 3     Difficulty of Paying Living Expenses: Not on file   Food Insecurity: No Food Insecurity (2024)    Received from [facility]    Food Insecurity     Do you worry your food will run out before you are able to buy more?: 1   Transportation Needs: No Transportation Needs (2024)    Received from [facility]    Transportation Needs     Does lack of transportation keep you from medical appointments?: 1     Does lack of transportation keep you from work, meetings or getting things that you need?: 1   Physical Activity: Not on file   Stress: Not on file   Social Connections: Socially Integrated (2024)    Received from [facility]    Social Connections     Do you often feel lonely or isolated from those around you?: 0   Interpersonal Safety: Low Risk  (2025)    Interpersonal Safety     Do you feel physically and emotionally safe where you currently live?: Yes     Within the past 12 months, have you been hit, slapped, kicked  "or otherwise physically hurt by someone?: No     Within the past 12 months, have you been humiliated or emotionally abused in other ways by your partner or ex-partner?: No   Housing Stability: Low Risk  (7/17/2024)    Received from [employer] [facility]    Housing Stability     What is your housing situation today?: 1         Review of Systems  Constitutional, HEENT, cardiovascular, pulmonary, GI, , musculoskeletal, neuro, skin, endocrine and psych systems are negative, except as otherwise noted.      Objective    Vitals - Patient Reported  Weight (Patient Reported): 117 kg (258 lb)  Height (Patient Reported): 190.5 cm (6' 3\")  BMI (Based on Pt Reported Ht/Wt): 32.25        Physical Exam   General: Alert and no distress //Respiratory: No audible wheeze, cough, or shortness of breath // Psychiatric:  Appropriate affect, tone, and pace of words      Labs reviewed in [facility]        telephone visit duration including chart review medication management: 22 minutes  Signed Electronically by: Jo-Ann Baird MD    "

## 2025-01-09 NOTE — TELEPHONE ENCOUNTER
Pt called requesting Gabapentin and Clonazepam refill. He has scheduled future appt with PCP, but needs refill medication now.     Future Appointments 1/9/2025 - 7/8/2025        Date Visit Type Length Department Provider     1/17/2025 11:30 AM OFFICE VISIT 30 min [facility]/Jo-Ann Marks MD    Location Instructions:     [facility] is in [address] of the [facility] at [address] This is just south of [facility] and the [address] exit off of [address]. Free parking is available; access the lot by turning east from [facility] onto [address]. Through the main entrance, the clinic is directly to the left.                   Routing refill request to provider for review/approval because:  Drug not on the [facility] refill protocol

## 2025-01-30 DIAGNOSIS — I25.810 CORONARY ARTERY DISEASE INVOLVING CORONARY BYPASS GRAFT OF NATIVE HEART WITHOUT ANGINA PECTORIS: Chronic | ICD-10-CM

## 2025-01-30 RX ORDER — CLOPIDOGREL BISULFATE 75 MG/1
TABLET ORAL
Qty: 30 TABLET | Refills: 0 | OUTPATIENT
Start: 2025-01-30

## 2025-01-30 RX ORDER — CLOPIDOGREL BISULFATE 75 MG/1
75 TABLET ORAL DAILY
Qty: 90 TABLET | Refills: 3 | Status: SHIPPED | OUTPATIENT
Start: 2025-01-30

## 2025-03-03 ENCOUNTER — TRANSFERRED RECORDS (OUTPATIENT)
Dept: MULTI SPECIALTY CLINIC | Facility: CLINIC | Age: 60
End: 2025-03-03

## 2025-03-03 LAB — RETINOPATHY: NORMAL

## 2025-06-02 DIAGNOSIS — E11.40 TYPE 2 DIABETES MELLITUS WITH DIABETIC NEUROPATHY, WITHOUT LONG-TERM CURRENT USE OF INSULIN (H): ICD-10-CM

## 2025-06-02 RX ORDER — GABAPENTIN 300 MG/1
900 CAPSULE ORAL 3 TIMES DAILY
Qty: 270 CAPSULE | Refills: 0 | OUTPATIENT
Start: 2025-06-02

## 2025-06-03 NOTE — TELEPHONE ENCOUNTER
1st attempt: LVM to patient, scheduling for an appt. Please call back and ask for  for assistance.

## 2025-06-09 ENCOUNTER — VIRTUAL VISIT (OUTPATIENT)
Dept: FAMILY MEDICINE | Facility: CLINIC | Age: 60
End: 2025-06-09
Payer: COMMERCIAL

## 2025-06-09 DIAGNOSIS — G25.81 RESTLESS LEGS SYNDROME (RLS): Chronic | ICD-10-CM

## 2025-06-09 DIAGNOSIS — E11.40 TYPE 2 DIABETES MELLITUS WITH DIABETIC NEUROPATHY, WITHOUT LONG-TERM CURRENT USE OF INSULIN (H): ICD-10-CM

## 2025-06-09 DIAGNOSIS — F41.9 ANXIETY: ICD-10-CM

## 2025-06-09 DIAGNOSIS — E78.5 HYPERLIPIDEMIA LDL GOAL <100: Primary | ICD-10-CM

## 2025-06-09 PROCEDURE — 98014 SYNCH AUDIO-ONLY EST MOD 30: CPT | Performed by: INTERNAL MEDICINE

## 2025-06-09 RX ORDER — CLONAZEPAM 0.5 MG/1
TABLET ORAL
Qty: 30 TABLET | Refills: 1 | Status: SHIPPED | OUTPATIENT
Start: 2025-06-09

## 2025-06-09 RX ORDER — GABAPENTIN 300 MG/1
900 CAPSULE ORAL 3 TIMES DAILY
Qty: 810 CAPSULE | Refills: 0 | Status: SHIPPED | OUTPATIENT
Start: 2025-06-09

## 2025-06-09 NOTE — PROGRESS NOTES
Alexx is a 60 year old who is being evaluated via a billable telephone visit.    What phone number would you like to be contacted at? [phone]  How would you like to obtain your AVS? Miguel  Originating Location (pt. Location): Home    Distant Location (provider location):  Off-site  Telephone visit completed due to the patient did not consent to a video visit.    Assessment & Plan     Type 2 diabetes mellitus with diabetic neuropathy, without long-term current use of insulin (H)  - stable  - medications refilled today for metFORMIN (GLUCOPHAGE) 500 MG tablet  Dispense: 360 tablet; Refill: 0    Restless legs syndrome (RLS)  - stable  - medications refilled today for gabapentin (NEURONTIN) 300 MG capsule  Dispense: 810 capsule; Refill: 0    Anxiety  - stable  - medications refilled today for clonazePAM (KLONOPIN) 0.5 MG tablet  Dispense: 30 tablet; Refill: 1    Hyperlipidemia LDL goal <100  - stable  - diet, exercise              Follow-up in 3 months      Subjective   Alexx is a 60 year old, presenting for the following health issues:  Recheck Medication, Health Maintenance (Eye Exam done at [facility] on 03/03/2025 ), Anxiety, and Diabetes    History of Present Illness       Reason for visit:  Recheck medications   He is taking medications regularly.        Current Outpatient Medications   Medication Sig Dispense Refill    acetaminophen (TYLENOL) 325 MG tablet Take 2 tablets (650 mg) by mouth every 4 hours as needed for mild pain 50 tablet 0    albuterol (PROAIR HFA/PROVENTIL HFA/VENTOLIN HFA) 108 (90 Base) MCG/ACT inhaler Inhale 2 puffs into the lungs every 4 hours as needed for shortness of breath / dyspnea or wheezing 8.5 g 3    Alcohol Swabs (B-D SINGLE USE SWABS REGULAR) PADS USE AS NEEDED 200 each 0    ASPIRIN LOW DOSE 81 MG EC tablet TAKE ONE TABLET BY MOUTH ONCE DAILY 90 tablet 1    azelastine (ASTELIN) 0.1 % nasal spray Spray 1 spray into both nostrils every evening      blood glucose  (NO BRAND SPECIFIED) test strip Use to test blood sugar 2 times daily or as directed. 200 strip 11    blood glucose (ONETOUCH VERIO IQ) test strip Use to test blood sugar 1 times daily or as directed. 100 strip 5    blood glucose monitoring (NO BRAND SPECIFIED) meter device kit Use to test blood sugar 1 times daily or as directed. ONE TOUCH VERIO. 1 kit 0    clonazePAM (KLONOPIN) 0.5 MG tablet TAKE ONE TABLET BY MOUTH EVERY EVENING AT BEDTIME AS NEEDED FOR RESTLESS LEGS 30 tablet 1    clopidogrel (PLAVIX) 75 MG tablet Take 1 tablet (75 mg) by mouth daily. 90 tablet 3    cyanocobalamin (VITAMIN B-12) 1000 MCG tablet TAKE ONE TABLET BY MOUTH ONCE DAILY 100 tablet 1    evolocumab (REPATHA) 140 MG/ML prefilled autoinjector Inject 140 mg Subcutaneous every 14 days      ezetimibe (ZETIA) 10 MG tablet Take 1 tablet (10 mg) by mouth At Bedtime 90 tablet 1    fenofibrate micronized (LOFIBRA) 67 MG capsule Take 67 mg by mouth      FEROSUL 325 (65 Fe) MG tablet TAKE 1 TABLET (325 MG) BY MOUTH EVERY EVENING. 90 tablet 0    fluticasone (FLONASE) 50 MCG/ACT nasal spray SPRAY TWO SPRAYS INTO BOTH NOSTRILS DAILY 48 g 1    gabapentin (NEURONTIN) 300 MG capsule Take 3 capsules (900 mg) by mouth 3 times daily. 810 capsule 0    hydrOXYzine HCl (ATARAX) 25 MG tablet TAKE ONE TO TWO TABLETS BY MOUTH EVERY 6 HOURS AS NEEDED FOR ANXIETY 120 tablet 0    ibuprofen (ADVIL/MOTRIN) 200 MG tablet Take 200 mg by mouth every 4 hours as needed for mild pain      JANUVIA 50 MG tablet TAKE ONE TABLET BY MOUTH ONCE DAILY 90 tablet 1    JARDIANCE 10 MG TABS tablet TAKE 1 TABLET (10 MG) BY MOUTH DAILY DUE FOR APPOINTMENT. PLEASE SCHEDULE: [phone] (Patient taking differently: Take 20 mg by mouth.) 90 tablet 0    lisinopril (ZESTRIL) 10 MG tablet TAKE ONE TABLET BY MOUTH ONCE DAILY 90 tablet 2    loratadine (CLARITIN) 10 MG tablet TAKE ONE TABLET BY MOUTH ONCE DAILY 90 tablet 0    Melatonin 10 MG TABS tablet Take 10 mg by mouth nightly as needed for  sleep      meloxicam (MOBIC) 15 MG tablet Take 1 tablet (15 mg) by mouth daily 10 tablet 0    metFORMIN (GLUCOPHAGE) 500 MG tablet TAKE TWO TABLETS BY MOUTH TWICE A DAY WITH MEALS 360 tablet 0    metoprolol succinate ER (TOPROL XL) 25 MG 24 hr tablet TAKE ONE TABLET BY MOUTH TWICE A  tablet 0    MINERAL OIL-HYDROPHIL PETROLAT EX Apply topically.      nitroGLYcerin (NITROSTAT) 0.4 MG sublingual tablet PLACE 1 TABLET UNDER THE TONGUE AT THE ONSET OF CHEST PAIN. MAY REPEAT EVERY 5 MINUTES AS NEEDED FOR A TOTAL OF 3 DOSES. 25 tablet 1    omega-3 acid ethyl esters (LOVAZA) 1 g capsule TAKE TWO CAPSULES BY MOUTH TWO TIMES A  capsule 0    omeprazole (PRILOSEC) 20 MG DR capsule Take 1 capsule (20 mg) by mouth daily. 90 capsule 3    OneTouch Delica Lancets 33G MISC 1 Act by Device route daily 200 each 5    polyethylene glycol (MIRALAX/GLYCOLAX) powder Take 17 g by mouth daily as needed for constipation 500 g 5    pramipexole (MIRAPEX) 0.75 MG tablet TAKE ONE TABLET BY MOUTH EVERY NIGHT AT BEDTIME 90 tablet 2    rivaroxaban ANTICOAGULANT (XARELTO) 20 MG TABS tablet Take 1 tablet (20 mg) by mouth daily (with dinner) for 42 days 42 tablet 0    rosuvastatin (CRESTOR) 40 MG tablet Take 1 tablet (40 mg) by mouth daily      senna-docusate (SENOKOT-S/PERICOLACE) 8.6-50 MG tablet Take 1-2 tablets by mouth 2 times daily (Patient taking differently: Take 1 tablet by mouth daily.) 30 tablet 0    Skin Protectants, Misc. (HYDROCERIN) CREA APPLY TOPICALLY AS NEEDED FOR DRY SKIN 226 g 3    sodium chloride (DEEP SEA NASAL SPRAY) 0.65 % nasal spray SPRAY 1 SPRAY IN EACH NOSTRIL FOUR TIMES A DAY AS NEEDED FOR CONGESTION 44 mL 3    tamsulosin (FLOMAX) 0.4 MG capsule Take 1 capsule (0.4 mg) by mouth daily. 90 capsule 0    triamcinolone (KENALOG) 0.1 % external cream Apply topically 2 times daily 30 g 3    triamcinolone (KENALOG) 0.1 % external cream Apply sparingly once or twice per day as needed to affected area until the skin is  better, then stop; REPEAT AS NEEDED 80 g 1    Vitamin D3 50 mcg (2000 units) tablet Take 1 tablet (50 mcg) by mouth daily 100 tablet 1     No current facility-administered medications for this visit.     Past Medical History:   Diagnosis Date    Anxiety     Ascending aorta dilation     CAD (coronary artery disease) Dec 2014    s/p CABGx3 Dec 2014 and later RCA stent 2015    Diabetes mellitus (H)     DM2 (diabetes mellitus, type 2) (H) Dx 2015    A1c 6.5% at time of dx    Epididymitis, right     Essential tremor     GERD (gastroesophageal reflux disease)     Heart attack (H) 2014, 2015, 7/4/2015    x3    Hernia, abdominal     History of cocaine abuse (H)     Smoked crack cocaine (remission since 2014)    History of tobacco abuse     Hyperlipidemia     Hyperlipidemia LDL goal <100     chronic hypertriglyceredemia, regular elevation 300-400    Incomplete RBBB     Inguinal hernia     Left    Numbness and tingling     diabetic neuropathy    Prediabetes Dec 2014    Restless leg syndrome     S/P CABG x 3 Dec 2014    Severe obesity (BMI 35.0-35.9 with comorbidity) (H) 2018    BMI 37.5    Sleep apnea     mild, does not use CPAP    Stented coronary artery      Social History     Socioeconomic History    Marital status: Single     Spouse name: Not on file    Number of children: 2    Years of education: Not on file    Highest education level: Not on file   Occupational History    Occupation: nurse assistant     Employer: [employer]   Tobacco Use    Smoking status: Former     Current packs/day: 0.00     Average packs/day: 0.5 packs/day for 25.0 years (12.5 ttl pk-yrs)     Types: Cigarettes     Start date: 1990     Quit date: 2015     Years since quittin.6    Smokeless tobacco: Never   Vaping Use    Vaping status: Never Used   Substance and Sexual Activity    Alcohol use: No     Alcohol/week: 0.0 standard drinks of alcohol    Drug use: No     Comment: Past coccaine 2 weeks ago as  of 11/11/14; no h/o IVDA. Smoked cocaine (did not snort)    Sexual activity: Yes     Partners: Female   Other Topics Concern    Parent/sibling w/ CABG, MI or angioplasty before 65F 55M? Not Asked   Social History Narrative    Not on file     Social Drivers of Health     Financial Resource Strain: Low Risk  (7/17/2024)    Received from [facility]    Financial Resource Strain     Difficulty of Paying Living Expenses: 3     Difficulty of Paying Living Expenses: Not on file   Food Insecurity: No Food Insecurity (7/17/2024)    Received from [facility]    Food Insecurity     Do you worry your food will run out before you are able to buy more?: 1   Transportation Needs: No Transportation Needs (7/17/2024)    Received from [facility]    Transportation Needs     Does lack of transportation keep you from medical appointments?: 1     Does lack of transportation keep you from work, meetings or getting things that you need?: 1   Physical Activity: Not on file   Stress: Not on file   Social Connections: Socially Integrated (7/17/2024)    Received from [facility]    Social Connections     Do you often feel lonely or isolated from those around you?: 0   Interpersonal Safety: Low Risk  (1/9/2025)    Interpersonal Safety     Do you feel physically and emotionally safe where you currently live?: Yes     Within the past 12 months, have you been hit, slapped, kicked or otherwise physically hurt by someone?: No     Within the past 12 months, have you been humiliated or emotionally abused in other ways by your partner or ex-partner?: No   Housing Stability: Low Risk  (7/17/2024)    Received from [facility]    Housing Stability     What is your housing situation today?: 1     Family History   Problem Relation Age of Onset    Diabetes Mother         type 2    Breast Cancer  Mother     Macular Degeneration Mother     Dementia Mother     Coronary Artery Disease Father 52    Heart Disease Paternal Uncle     Diabetes Maternal Grandfather     Breast Cancer Maternal Grandmother     Prostate Cancer No family hx of          Review of Systems  Constitutional, HEENT, cardiovascular, pulmonary, GI, , musculoskeletal, neuro, skin, endocrine and psych systems are negative, except as otherwise noted.      Objective           Vitals:  No vitals were obtained today due to virtual visit.    Physical Exam   General: Alert and no distress //Respiratory: No audible wheeze, cough, or shortness of breath // Psychiatric:  Appropriate affect, tone, and pace of words      Labs reviewed in [facility]          telephone visit duration including chart review medication management: 32 minutes  Signed Electronically by: Jo-Ann Baird MD

## (undated) DEVICE — LINEN TOWEL PACK X5 5464

## (undated) DEVICE — PACK MINOR SBA15MIFSE

## (undated) DEVICE — GLOVE PROTEXIS W/NEU-THERA 8.0  2D73TE80

## (undated) DEVICE — BARRIER INTERCEED 3X4" 4350

## (undated) DEVICE — SU VICRYL 4-0 PS-2 18" UND J496H

## (undated) DEVICE — DAVINCI XI DRAPE ARM 470015

## (undated) DEVICE — SOL WATER IRRIG 1000ML BOTTLE 2F7114

## (undated) DEVICE — GOWN IMPERVIOUS SPECIALTY XLG/XLONG 32474

## (undated) DEVICE — ESU GROUND PAD UNIVERSAL W/O CORD

## (undated) DEVICE — BLADE CLIPPER 4406

## (undated) DEVICE — SUPPORTER ATHLETIC LG LATEX 202636

## (undated) DEVICE — SYSTEM CLEARIFY VISUALIZATION 21-345

## (undated) DEVICE — SUCTION CANISTER MEDIVAC LINER 3000ML W/LID 65651-530

## (undated) DEVICE — CATH TRAY FOLEY COUDE 16FR SILVER W/URINE METER 350ML 304716

## (undated) DEVICE — DAVINCI XI DRAPE COLUMN 470341

## (undated) DEVICE — ESU PENCIL W/SMOKE EVAC CVPLP2000

## (undated) DEVICE — EVAC SYSTEM CLEAR FLOW SC082500

## (undated) DEVICE — LUBRICANT INST ELECTROLUBE EL101

## (undated) DEVICE — DAVINCI XI OBTURATOR BLADELESS 8MM 470359

## (undated) DEVICE — DAVINCI XI SEAL UNIVERSAL 5-8MM 470361

## (undated) DEVICE — PACK LAP CHOLE SLC15LCFSD

## (undated) DEVICE — DAVINCI HOT SHEARS TIP COVER  400180

## (undated) DEVICE — SU VICRYL 2-0 SH 27" J317H

## (undated) DEVICE — DRAPE SHEET REV FOLD 3/4 9349

## (undated) DEVICE — LIGHT HANDLE X2

## (undated) DEVICE — PREP CHLORAPREP 26ML TINTED ORANGE  260815

## (undated) DEVICE — SOL NACL 0.9% IRRIG 1000ML BOTTLE 2F7124

## (undated) DEVICE — PREP SKIN SCRUB TRAY 4461A

## (undated) DEVICE — DRSG KERLIX FLUFFS X5

## (undated) DEVICE — DRAPE LAP W/ARMBOARD 29410

## (undated) DEVICE — SU STRATAFIX PDS PLUS 2-0 SPIRAL CT-1 9" 22CM SXPP1B456

## (undated) DEVICE — NDL INSUFFLATION 13GA 120MM C2201

## (undated) RX ORDER — FENTANYL CITRATE 0.05 MG/ML
INJECTION, SOLUTION INTRAMUSCULAR; INTRAVENOUS
Status: DISPENSED
Start: 2020-07-31

## (undated) RX ORDER — GLYCOPYRROLATE 0.2 MG/ML
INJECTION, SOLUTION INTRAMUSCULAR; INTRAVENOUS
Status: DISPENSED
Start: 2021-03-23

## (undated) RX ORDER — ONDANSETRON 2 MG/ML
INJECTION INTRAMUSCULAR; INTRAVENOUS
Status: DISPENSED
Start: 2021-03-23

## (undated) RX ORDER — HYDROMORPHONE HYDROCHLORIDE 1 MG/ML
INJECTION, SOLUTION INTRAMUSCULAR; INTRAVENOUS; SUBCUTANEOUS
Status: DISPENSED
Start: 2021-03-23

## (undated) RX ORDER — FENTANYL CITRATE 50 UG/ML
INJECTION, SOLUTION INTRAMUSCULAR; INTRAVENOUS
Status: DISPENSED
Start: 2020-07-31

## (undated) RX ORDER — PROPOFOL 10 MG/ML
INJECTION, EMULSION INTRAVENOUS
Status: DISPENSED
Start: 2021-03-23

## (undated) RX ORDER — DEXAMETHASONE SODIUM PHOSPHATE 4 MG/ML
INJECTION, SOLUTION INTRA-ARTICULAR; INTRALESIONAL; INTRAMUSCULAR; INTRAVENOUS; SOFT TISSUE
Status: DISPENSED
Start: 2021-03-23

## (undated) RX ORDER — KETOROLAC TROMETHAMINE 30 MG/ML
INJECTION, SOLUTION INTRAMUSCULAR; INTRAVENOUS
Status: DISPENSED
Start: 2020-07-31

## (undated) RX ORDER — CEFAZOLIN SODIUM IN 0.9 % NACL 3 G/100 ML
INTRAVENOUS SOLUTION, PIGGYBACK (ML) INTRAVENOUS
Status: DISPENSED
Start: 2020-07-31

## (undated) RX ORDER — CEFAZOLIN SODIUM IN 0.9 % NACL 3 G/100 ML
INTRAVENOUS SOLUTION, PIGGYBACK (ML) INTRAVENOUS
Status: DISPENSED
Start: 2021-03-23

## (undated) RX ORDER — GINSENG 100 MG
CAPSULE ORAL
Status: DISPENSED
Start: 2021-03-23

## (undated) RX ORDER — LIDOCAINE HYDROCHLORIDE 20 MG/ML
INJECTION, SOLUTION EPIDURAL; INFILTRATION; INTRACAUDAL; PERINEURAL
Status: DISPENSED
Start: 2020-07-31

## (undated) RX ORDER — NEOSTIGMINE METHYLSULFATE 1 MG/ML
VIAL (ML) INJECTION
Status: DISPENSED
Start: 2021-03-23

## (undated) RX ORDER — BUPIVACAINE HYDROCHLORIDE AND EPINEPHRINE 5; 5 MG/ML; UG/ML
INJECTION, SOLUTION EPIDURAL; INTRACAUDAL; PERINEURAL
Status: DISPENSED
Start: 2020-07-31

## (undated) RX ORDER — FENTANYL CITRATE 50 UG/ML
INJECTION, SOLUTION INTRAMUSCULAR; INTRAVENOUS
Status: DISPENSED
Start: 2021-03-23

## (undated) RX ORDER — PROPOFOL 10 MG/ML
INJECTION, EMULSION INTRAVENOUS
Status: DISPENSED
Start: 2020-07-31

## (undated) RX ORDER — LIDOCAINE HYDROCHLORIDE 20 MG/ML
INJECTION, SOLUTION EPIDURAL; INFILTRATION; INTRACAUDAL; PERINEURAL
Status: DISPENSED
Start: 2021-03-23

## (undated) RX ORDER — HYDROMORPHONE HYDROCHLORIDE 1 MG/ML
INJECTION, SOLUTION INTRAMUSCULAR; INTRAVENOUS; SUBCUTANEOUS
Status: DISPENSED
Start: 2020-07-31

## (undated) RX ORDER — DEXAMETHASONE SODIUM PHOSPHATE 4 MG/ML
INJECTION, SOLUTION INTRA-ARTICULAR; INTRALESIONAL; INTRAMUSCULAR; INTRAVENOUS; SOFT TISSUE
Status: DISPENSED
Start: 2020-07-31

## (undated) RX ORDER — BUPIVACAINE HYDROCHLORIDE 2.5 MG/ML
INJECTION, SOLUTION EPIDURAL; INFILTRATION; INTRACAUDAL
Status: DISPENSED
Start: 2021-03-23

## (undated) RX ORDER — OXYCODONE HYDROCHLORIDE 5 MG/1
TABLET ORAL
Status: DISPENSED
Start: 2020-07-31

## (undated) RX ORDER — ONDANSETRON 2 MG/ML
INJECTION INTRAMUSCULAR; INTRAVENOUS
Status: DISPENSED
Start: 2020-07-31